# Patient Record
Sex: MALE | Race: WHITE | NOT HISPANIC OR LATINO | Employment: OTHER | URBAN - METROPOLITAN AREA
[De-identification: names, ages, dates, MRNs, and addresses within clinical notes are randomized per-mention and may not be internally consistent; named-entity substitution may affect disease eponyms.]

---

## 2017-03-02 DIAGNOSIS — I21.9 ACUTE MYOCARDIAL INFARCTION (H): ICD-10-CM

## 2017-03-02 RX ORDER — SPIRONOLACTONE 25 MG/1
25 TABLET ORAL DAILY
Qty: 90 TABLET | Refills: 0 | Status: SHIPPED | OUTPATIENT
Start: 2017-03-02 | End: 2017-06-01

## 2017-03-02 RX ORDER — LISINOPRIL 5 MG/1
5 TABLET ORAL DAILY
Qty: 90 TABLET | Refills: 0 | Status: SHIPPED | OUTPATIENT
Start: 2017-03-02 | End: 2017-06-01

## 2017-03-08 DIAGNOSIS — I21.9 ACUTE MYOCARDIAL INFARCTION (H): ICD-10-CM

## 2017-03-08 DIAGNOSIS — I42.9 CARDIOMYOPATHY (H): ICD-10-CM

## 2017-03-08 DIAGNOSIS — I50.9 CHF (CONGESTIVE HEART FAILURE) (H): ICD-10-CM

## 2017-03-08 RX ORDER — CLOPIDOGREL BISULFATE 75 MG/1
75 TABLET ORAL DAILY
Qty: 90 TABLET | Refills: 0 | Status: SHIPPED | OUTPATIENT
Start: 2017-03-08 | End: 2017-05-22

## 2017-03-08 RX ORDER — ATORVASTATIN CALCIUM 20 MG/1
20 TABLET, FILM COATED ORAL DAILY
Qty: 90 TABLET | Refills: 0 | Status: SHIPPED | OUTPATIENT
Start: 2017-03-08 | End: 2017-06-06

## 2017-03-10 ENCOUNTER — HOSPITAL ENCOUNTER (OUTPATIENT)
Dept: CARDIOLOGY | Facility: CLINIC | Age: 62
Discharge: HOME | End: 2017-03-10
Attending: INTERNAL MEDICINE | Admitting: INTERNAL MEDICINE
Payer: COMMERCIAL

## 2017-03-10 DIAGNOSIS — I21.09 ACUTE ST ELEVATION MYOCARDIAL INFARCTION (STEMI) INVOLVING OTHER CORONARY ARTERY OF ANTERIOR WALL (H): ICD-10-CM

## 2017-03-10 DIAGNOSIS — I25.5 ISCHEMIC CARDIOMYOPATHY: ICD-10-CM

## 2017-03-10 DIAGNOSIS — I46.9 CARDIAC ARREST (H): ICD-10-CM

## 2017-03-10 DIAGNOSIS — I42.9 CARDIOMYOPATHY (H): ICD-10-CM

## 2017-03-10 DIAGNOSIS — I25.10 CORONARY ARTERY DISEASE INVOLVING NATIVE CORONARY ARTERY OF NATIVE HEART WITHOUT ANGINA PECTORIS: ICD-10-CM

## 2017-03-10 PROCEDURE — 93306 TTE W/DOPPLER COMPLETE: CPT | Mod: 26 | Performed by: INTERNAL MEDICINE

## 2017-03-10 PROCEDURE — 40000264 ECHO COMPLETE WITH OPTISON

## 2017-03-10 PROCEDURE — 25500064 ZZH RX 255 OP 636: Performed by: INTERNAL MEDICINE

## 2017-03-10 RX ADMIN — HUMAN ALBUMIN MICROSPHERES AND PERFLUTREN 3 ML: 10; .22 INJECTION, SOLUTION INTRAVENOUS at 11:45

## 2017-03-24 ENCOUNTER — ALLIED HEALTH/NURSE VISIT (OUTPATIENT)
Dept: CARDIOLOGY | Facility: CLINIC | Age: 62
End: 2017-03-24
Attending: INTERNAL MEDICINE
Payer: COMMERCIAL

## 2017-03-24 VITALS
WEIGHT: 165 LBS | HEART RATE: 76 BPM | BODY MASS INDEX: 22.35 KG/M2 | HEIGHT: 72 IN | DIASTOLIC BLOOD PRESSURE: 70 MMHG | SYSTOLIC BLOOD PRESSURE: 104 MMHG

## 2017-03-24 DIAGNOSIS — I46.9 CARDIAC ARREST (H): ICD-10-CM

## 2017-03-24 DIAGNOSIS — I25.5 ISCHEMIC CARDIOMYOPATHY: Primary | ICD-10-CM

## 2017-03-24 DIAGNOSIS — Z95.810 ICD (IMPLANTABLE CARDIOVERTER-DEFIBRILLATOR) IN PLACE: ICD-10-CM

## 2017-03-24 DIAGNOSIS — I25.10 CORONARY ARTERY DISEASE INVOLVING NATIVE CORONARY ARTERY OF NATIVE HEART WITHOUT ANGINA PECTORIS: ICD-10-CM

## 2017-03-24 DIAGNOSIS — I21.09 ACUTE ST ELEVATION MYOCARDIAL INFARCTION (STEMI) INVOLVING OTHER CORONARY ARTERY OF ANTERIOR WALL (H): ICD-10-CM

## 2017-03-24 DIAGNOSIS — I25.5 ISCHEMIC CARDIOMYOPATHY: ICD-10-CM

## 2017-03-24 DIAGNOSIS — I42.9 CARDIOMYOPATHY (H): ICD-10-CM

## 2017-03-24 PROCEDURE — 93282 PRGRMG EVAL IMPLANTABLE DFB: CPT | Performed by: INTERNAL MEDICINE

## 2017-03-24 PROCEDURE — 99213 OFFICE O/P EST LOW 20 MIN: CPT | Performed by: INTERNAL MEDICINE

## 2017-03-24 NOTE — MR AVS SNAPSHOT
After Visit Summary   3/24/2017    Iraida Hernandez    MRN: [id number]           Patient Information     Date Of Birth          1955        Visit Information        Provider Department      3/24/2017 9:00 AM [facility]        Today's Diagnoses     Ischemic cardiomyopathy    -  1    ICD (implantable cardioverter-defibrillator) in place           Follow-ups after your visit        Your next 10 appointments already scheduled     Jun 27, 2017  4:30 PM CDT   Remote ICD Check with SINHA DCR2   [facility] ([facility])    [address] [place] [id number] [phone]           This appointment is for a remote check of your debrillator.  This is not an appointment at the office.              Future tests that were ordered for you today     Open Future Orders        Priority Expected Expires Ordered    Follow-Up with Cardiologist Routine 3/24/2018 8/6/2018 3/24/2017    Echocardiogram Routine 3/24/2018 4/28/2018 3/24/2017            Who to contact     If you have questions or need follow up information about today's clinic visit or your schedule please contact [facility] directly at [phone].  Normal or non-critical lab and imaging results will be communicated to you by MyChart, letter or phone within 4 business days after the clinic has received the results. If you do not hear from us within 7 days, please contact the clinic through MyChart or phone. If you have a critical or abnormal lab result, we will notify you by phone as soon as possible.  Submit refill requests through [employer] or call your pharmacy and they will forward the refill request to us. Please allow 3 business days for your refill to be completed.          Additional Information About Your Visit        Tangoehart Information     [employer] gives you secure access to your electronic  health record. If you see a primary care provider, you can also send messages to your care team and make appointments. If you have questions, please call your primary care clinic.  If you do not have a primary care provider, please call [phone] and they will assist you.        Care EveryWhere ID     This is your Care EveryWhere ID. This could be used by other organizations to access your [facility] medical records  [id number]         Blood Pressure from Last 3 Encounters:   03/24/17 104/70   02/29/16 94/60   08/13/15 118/70    Weight from Last 3 Encounters:   03/24/17 74.8 kg (165 lb)   02/29/16 75.8 kg (167 lb)   08/13/15 75.3 kg (166 lb)              We Performed the Following     Follow-Up with Device Clinic     ICD DEVICE PROGRAMMING EVAL, SINGLE LEAD ICD (27156)        Primary Care Provider Office Phone # Fax #    Lawson Abe Mahoney [phone]       [address] [address]        Thank you!     Thank you for choosing [facility] [place]  for your care. Our goal is always to provide you with excellent care. Hearing back from our patients is one way we can continue to improve our services. Please take a few minutes to complete the written survey that you may receive in the mail after your visit with us. Thank you!             Your Updated Medication List - Protect others around you: Learn how to safely use, store and throw away your medicines at www.disposemymeds.org.          This list is accurate as of: 3/24/17  9:30 AM.  Always use your most recent med list.                   Brand Name Dispense Instructions for use    aspirin 81 MG EC tablet      Take 1 tablet (81 mg) by mouth daily       atorvastatin 20 MG tablet    LIPITOR    90 tablet    Take 1 tablet (20 mg) by mouth daily       carvedilol 12.5 MG tablet    COREG    180 tablet    Take 1 tablet (12.5 mg) by mouth 2 times daily (with meals)        clopidogrel 75 MG tablet    PLAVIX    90 tablet    Take 1 tablet (75 mg) by mouth daily       lisinopril 5 MG tablet    PRINIVIL/ZESTRIL    90 tablet    Take 1 tablet (5 mg) by mouth daily       nitroglycerin 0.4 MG sublingual tablet    NITROSTAT    25 tablet    Place 1 tablet (0.4 mg) under the tongue every 5 minutes as needed for chest pain       spironolactone 25 MG tablet    ALDACTONE    90 tablet    Take 1 tablet (25 mg) by mouth daily

## 2017-03-24 NOTE — MR AVS SNAPSHOT
After Visit Summary   3/24/2017    Iraida Hernandez    MRN: [id number]           Patient Information     Date Of Birth          1955        Visit Information        Provider Department      3/24/2017 8:15 AM Juancarlos Stevens MD [facility]        Today's Diagnoses     Cardiomyopathy (H)        Ischemic cardiomyopathy        Coronary artery disease involving native coronary artery of native heart without angina pectoris        Acute ST elevation myocardial infarction (STEMI) involving other coronary artery of anterior wall (H)        Cardiac arrest (H)           Follow-ups after your visit        Additional Services     Follow-Up with Cardiologist                 Your next 10 appointments already scheduled     Mar 24, 2017  9:00 AM CDT   ICD Check with SINHA SHONA   [facility] ([facility])    [address] [place] [id number] [phone]              Future tests that were ordered for you today     Open Future Orders        Priority Expected Expires Ordered    Follow-Up with Cardiologist Routine 3/24/2018 8/6/2018 3/24/2017    Echocardiogram Routine 3/24/2018 4/28/2018 3/24/2017            Who to contact     If you have questions or need follow up information about today's clinic visit or your schedule please contact [facility] directly at [phone].  Normal or non-critical lab and imaging results will be communicated to you by MyChart, letter or phone within 4 business days after the clinic has received the results. If you do not hear from us within 7 days, please contact the clinic through MyChart or phone. If you have a critical or abnormal lab result, we will notify you by phone as soon as possible.  Submit refill requests through [email or web address] or call your pharmacy and they will forward the refill request to us. Please allow 3 business days for  your refill to be completed.          Additional Information About Your Visit        Med[employer]art Information     Pay with a Tweet gives you secure access to your electronic health record. If you see a primary care provider, you can also send messages to your care team and make appointments. If you have questions, please call your primary care clinic.  If you do not have a primary care provider, please call [phone] and they will assist you.        Care EveryWhere ID     This is your Care EveryWhere ID. This could be used by other organizations to access your [facility] medical records  [id number]        Your Vitals Were     Pulse Height BMI (Body Mass Index)             76 1.829 m (6') 22.38 kg/m2          Blood Pressure from Last 3 Encounters:   03/24/17 104/70   02/29/16 94/60   08/13/15 118/70    Weight from Last 3 Encounters:   03/24/17 74.8 kg (165 lb)   02/29/16 75.8 kg (167 lb)   08/13/15 75.3 kg (166 lb)              We Performed the Following     Follow-Up with Cardiologist        Primary Care Provider Office Phone # Fax #    Lawson Mahoney [phone]       [address] [address]        Thank you!     Thank you for choosing [facility]  for your care. Our goal is always to provide you with excellent care. Hearing back from our patients is one way we can continue to improve our services. Please take a few minutes to complete the written survey that you may receive in the mail after your visit with us. Thank you!             Your Updated Medication List - Protect others around you: Learn how to safely use, store and throw away your medicines at www.disposemymeds.org.          This list is accurate as of: 3/24/17  8:37 AM.  Always use your most recent med list.                   Brand Name Dispense Instructions for use    aspirin 81 MG EC tablet      Take 1 tablet (81 mg) by mouth daily       atorvastatin 20  MG tablet    LIPITOR    90 tablet    Take 1 tablet (20 mg) by mouth daily       carvedilol 12.5 MG tablet    COREG    180 tablet    Take 1 tablet (12.5 mg) by mouth 2 times daily (with meals)       clopidogrel 75 MG tablet    PLAVIX    90 tablet    Take 1 tablet (75 mg) by mouth daily       lisinopril 5 MG tablet    PRINIVIL/ZESTRIL    90 tablet    Take 1 tablet (5 mg) by mouth daily       nitroglycerin 0.4 MG sublingual tablet    NITROSTAT    25 tablet    Place 1 tablet (0.4 mg) under the tongue every 5 minutes as needed for chest pain       spironolactone 25 MG tablet    ALDACTONE    90 tablet    Take 1 tablet (25 mg) by mouth daily

## 2017-03-24 NOTE — PROGRESS NOTES
HPI and Plan:   See dictation    Orders Placed This Encounter   Procedures     Follow-Up with Cardiologist     Echocardiogram     No orders of the defined types were placed in this encounter.    There are no discontinued medications.      Encounter Diagnoses   Name Primary?     Cardiomyopathy (H)      Ischemic cardiomyopathy      Coronary artery disease involving native coronary artery of native heart without angina pectoris      Acute ST elevation myocardial infarction (STEMI) involving other coronary artery of anterior wall (H)      Cardiac arrest (H)        CURRENT MEDICATIONS:  Current Outpatient Prescriptions   Medication Sig Dispense Refill     clopidogrel (PLAVIX) 75 MG tablet Take 1 tablet (75 mg) by mouth daily 90 tablet 0     atorvastatin (LIPITOR) 20 MG tablet Take 1 tablet (20 mg) by mouth daily 90 tablet 0     lisinopril (PRINIVIL/ZESTRIL) 5 MG tablet Take 1 tablet (5 mg) by mouth daily 90 tablet 0     spironolactone (ALDACTONE) 25 MG tablet Take 1 tablet (25 mg) by mouth daily 90 tablet 0     carvedilol (COREG) 12.5 MG tablet Take 1 tablet (12.5 mg) by mouth 2 times daily (with meals) 180 tablet 3     nitroglycerin (NITROSTAT) 0.4 MG SL tablet Place 1 tablet (0.4 mg) under the tongue every 5 minutes as needed for chest pain 25 tablet 0     aspirin EC 81 MG EC tablet Take 1 tablet (81 mg) by mouth daily         ALLERGIES     Allergies   Allergen Reactions     Penicillins      Tetracyclines        PAST MEDICAL HISTORY:  Past Medical History:   Diagnosis Date     Calculus of kidney      Cancer (H)     basel cell on nose     Cardiac arrest (H)     V-fib, 2/16/2014     Cardiomyopathy (H)      Coronary artery disease      Hyperlipidaemia      ICD (implantable cardiac defibrillator) in place     12-1-2014 EF 29%     Mild intermittent asthma      Myocardial infarction (H) 2/2014     Anterior infarct     Tobacco dependence        PAST SURGICAL HISTORY:  Past Surgical History:   Procedure Laterality Date      CARDIAC SURGERY       HEART CATH, ANGIOPLASTY  2/2014    Emergent cath,thrombectomy-pt had cardiac arrest-severe 3 vessel CAD-MACY proximal LAD, LAD diagonal kissing balloon, and stent to proximal RCA     HERNIA REPAIR       NO HISTORY OF SURGERY         FAMILY HISTORY:  Family History   Problem Relation Age of Onset     CEREBROVASCULAR DISEASE Father      HEART DISEASE Father      MI     CANCER Mother      DIABETES Son      CANCER Maternal Uncle        SOCIAL HISTORY:  Social History     Social History     Marital status:      Spouse name: Jessica     Number of children: 2     Years of education: N/A     Occupational History     [employer]     Social History Main Topics     Smoking status: Current Every Day Smoker     Packs/day: 0.50     Years: 42.00     Last attempt to quit: 2/16/2014     Smokeless tobacco: Never Used      Comment: 8-10 cigs per day     Alcohol use Yes      Comment: rare     Drug use: No     Sexual activity: Yes     Partners: Female     Other Topics Concern      Service No     Blood Transfusions No     Caffeine Concern No     soda occasionally      Occupational Exposure No     Hobby Hazards No     Sleep Concern No     Stress Concern No     Weight Concern No     Special Diet Yes     eats healthy      Back Care No     Exercise Yes     golf 2x/week, tredmill daily     Bike Helmet Not Asked     NA     Seat Belt Yes     Self-Exams Yes     Social History Narrative       Review of Systems:  Skin:  Positive for bruising   Eyes:  Positive for glasses  ENT:  Negative    Respiratory:  Negative    Cardiovascular:  Negative    Gastroenterology: Negative    Genitourinary:  Negative    Musculoskeletal:  Negative    Neurologic:  Negative    Psychiatric:  Negative    Heme/Lymph/Imm:  Positive for allergies  Endocrine:  Negative      Physical Exam:  Vitals: /70  Pulse 76  Ht 1.829 m (6')  Wt 74.8 kg (165 lb)  BMI 22.38 kg/m2    Constitutional:   cooperative, alert and oriented, well developed, well nourished, in no acute distress        Skin:  warm and dry to the touch, no apparent skin lesions or masses noted        Head:  normocephalic, no masses or lesions        Eyes:  pupils equal and round;conjunctivae and lids unremarkable;sclera white        ENT:  no pallor or cyanosis, dentition good        Neck:  carotid pulses are full and equal bilaterally;JVP normal;no carotid bruit        Chest:  normal breath sounds, clear to auscultation, normal A-P diameter, normal symmetry, normal respiratory excursion, no use of accessory muscles        Cardiac: regular rhythm;no S3 or S4     no presence of murmur early systolic murmur          Abdomen:  abdomen soft        Vascular: pulses full and equal                                      Extremities and Back:  no deformities, clubbing, cyanosis, erythema observed;no edema        Neurological:  affect appropriate, oriented to time, person and place          Recent Lab Results:  LIPID RESULTS:  Lab Results   Component Value Date    CHOL 151 08/15/2014    HDL 49 08/15/2014    LDL 82 08/15/2014    TRIG 102 08/15/2014    CHOLHDLRATIO 3.1 08/15/2014       LIVER ENZYME RESULTS:  Lab Results   Component Value Date    AST 29 02/16/2014    ALT <    5 (L) 04/01/2014       CBC RESULTS:  Lab Results   Component Value Date    WBC 8.4 12/01/2014    RBC 4.63 12/01/2014    HGB 15.2 12/01/2014    HCT 45.7 12/01/2014    MCV 99 12/01/2014    MCH 32.8 12/01/2014    MCHC 33.3 12/01/2014    RDW 12.5 12/01/2014     12/01/2014       BMP RESULTS:  Lab Results   Component Value Date     12/01/2014    POTASSIUM 4.6 12/01/2014    CHLORIDE 107 12/01/2014    CO2 27 12/01/2014    ANIONGAP 6 12/01/2014     (H) 12/01/2014    BUN 16 12/01/2014    CR 0.99 12/01/2014    GFRESTIMATED 77 12/01/2014    GFRESTBLACK >90   GFR Calc   12/01/2014    DIEGO 9.2 12/01/2014        A1C RESULTS:  Lab Results   Component Value Date     A1C 4.8 12/10/2007       INR RESULTS:  Lab Results   Component Value Date    INR 0.90 12/01/2014    INR 0.89 02/16/2014           CC  Juancarlos Stevens MD   [facility]  [address]  [place] [id number]

## 2017-03-24 NOTE — PROGRESS NOTES
[employer] Energen (S) ICD Device Check  : <1 %  Mode: VVI 40        Underlying Rhythm: SR  Heart Rate: excellent variability  Sensing: WNL    Pacing Threshold: WNL    Impedance: WNL  Battery Status: 12 yrs estimated longevity  Atrial Arrhythmia: NA  Ventricular Arrhythmia: 4 events saved for NSVT, 2 with EGMs, first shows 6 beats NSVT,  bpm, second shows 7 second run of VS beats, slightly irregular, very little change in morphology, suggesting PAT,  bpm.   ATP: none    Shocks: none  Setting Change: none    Care Plan: remote in 3 months. Pt saw Dr. Stevens for OV today.   Janeen

## 2017-03-24 NOTE — LETTER
3/24/2017    Lawson Mahoney MD  [facility]   [address] [facility] [address]    RE: Iraida Pearsonler       Dear Colleague,    I had the pleasure of seeing Iraida Hernandez in the [facility].    Mr. Hernandez is a very pleasant 62-year-old gentleman with a history of anterior myocardial infarction, ischemic cardiomyopathy, left ventricular dysfunction status post ICD implantation.  He comes in for his annual visit today.  He feels extremely well.  He has no complaints.  He denies any chest pain, chest pressure, shortness of breath, heart racing, palpitations, syncope, near-syncope, PND, orthopnea or pedal edema.  Review of his echocardiogram shows stable left ventricular ejection fraction of 35%-40%.  His valvular function is normal.  He is due for a lipid profile.  He has been taking his medications and has had no difficulty.  He continues to be active, walking, playing golf with no limitations.     Outpatient Encounter Prescriptions as of 3/24/2017   Medication Sig Dispense Refill     clopidogrel (PLAVIX) 75 MG tablet Take 1 tablet (75 mg) by mouth daily 90 tablet 0     atorvastatin (LIPITOR) 20 MG tablet Take 1 tablet (20 mg) by mouth daily 90 tablet 0     lisinopril (PRINIVIL/ZESTRIL) 5 MG tablet Take 1 tablet (5 mg) by mouth daily 90 tablet 0     spironolactone (ALDACTONE) 25 MG tablet Take 1 tablet (25 mg) by mouth daily 90 tablet 0     carvedilol (COREG) 12.5 MG tablet Take 1 tablet (12.5 mg) by mouth 2 times daily (with meals) 180 tablet 3     nitroglycerin (NITROSTAT) 0.4 MG SL tablet Place 1 tablet (0.4 mg) under the tongue every 5 minutes as needed for chest pain 25 tablet 0     aspirin EC 81 MG EC tablet Take 1 tablet (81 mg) by mouth daily       No facility-administered encounter medications on file as of 3/24/2017.       ASSESSMENT AND PLAN:  It is a delight seeing Mr. Hernandez back in clinic appearing stable from a cardiovascular standpoint.  He  is asymptomatic.  There is no evidence of heart failure, angina pectoris.  His left ventricular ejection fraction is stable.  We will plan on continuing medical therapy as we have for Mr. Hernandez and plan for a followup in 1 year's time.  He is due for a lipid cholesterol profile check with Dr. Mahoney.     Again, thank you for allowing me to participate in the care of your patient.      Sincerely,    ELIANA KHAN MD     [facility]

## 2017-03-28 NOTE — PROGRESS NOTES
HISTORY OF PRESENT ILLNESS:  Mr. Hernandez is a very pleasant 62-year-old gentleman with a history of anterior myocardial infarction, ischemic cardiomyopathy, left ventricular dysfunction status post ICD implantation.  He comes in for his annual visit today.  He feels extremely well.  He has no complaints.  He denies any chest pain, chest pressure, shortness of breath, heart racing, palpitations, syncope, near-syncope, PND, orthopnea or pedal edema.  Review of his echocardiogram shows stable left ventricular ejection fraction of 35%-40%.  His valvular function is normal.  He is due for a lipid profile.  He has been taking his medications and has had no difficulty.  He continues to be active, walking, playing golf with no limitations.      ASSESSMENT AND PLAN:  It is a delight seeing Mr. Hernandez back in clinic appearing stable from a cardiovascular standpoint.  He is asymptomatic.  There is no evidence of heart failure, angina pectoris.  His left ventricular ejection fraction is stable.  We will plan on continuing medical therapy as we have for Mr. Hernandez and plan for a followup in 1 year's time.  He is due for a lipid cholesterol profile check with Dr. Mahoney.      MD ELIANA Padron MD [facility]             D: 2017 08:38   T: 2017 15:18   MT: BRIAN      Name:     JAZZMINE HERNANDEZ   MRN:      [id number]        Account:      [id number]   :      1955           Service Date: 2017      Document: [id number]

## 2017-05-22 DIAGNOSIS — I50.9 CHF (CONGESTIVE HEART FAILURE) (H): ICD-10-CM

## 2017-05-22 DIAGNOSIS — I21.9 ACUTE MYOCARDIAL INFARCTION (H): ICD-10-CM

## 2017-05-22 DIAGNOSIS — I42.9 CARDIOMYOPATHY (H): ICD-10-CM

## 2017-05-22 RX ORDER — CLOPIDOGREL BISULFATE 75 MG/1
75 TABLET ORAL DAILY
Qty: 90 TABLET | Refills: 1 | Status: SHIPPED | OUTPATIENT
Start: 2017-05-22 | End: 2017-11-27

## 2017-06-01 DIAGNOSIS — I21.9 ACUTE MYOCARDIAL INFARCTION (H): ICD-10-CM

## 2017-06-01 RX ORDER — LISINOPRIL 5 MG/1
5 TABLET ORAL DAILY
Qty: 90 TABLET | Refills: 3 | Status: SHIPPED | OUTPATIENT
Start: 2017-06-01 | End: 2018-05-25

## 2017-06-01 RX ORDER — SPIRONOLACTONE 25 MG/1
25 TABLET ORAL DAILY
Qty: 90 TABLET | Refills: 3 | Status: SHIPPED | OUTPATIENT
Start: 2017-06-01 | End: 2018-05-25

## 2017-06-06 DIAGNOSIS — I21.9 ACUTE MYOCARDIAL INFARCTION (H): ICD-10-CM

## 2017-06-06 RX ORDER — ATORVASTATIN CALCIUM 20 MG/1
20 TABLET, FILM COATED ORAL DAILY
Qty: 90 TABLET | Refills: 2 | Status: SHIPPED | OUTPATIENT
Start: 2017-06-06 | End: 2018-02-26

## 2017-06-27 ENCOUNTER — ALLIED HEALTH/NURSE VISIT (OUTPATIENT)
Dept: CARDIOLOGY | Facility: CLINIC | Age: 62
End: 2017-06-27
Payer: COMMERCIAL

## 2017-06-27 DIAGNOSIS — Z95.810 ICD (IMPLANTABLE CARDIOVERTER-DEFIBRILLATOR) IN PLACE: Primary | ICD-10-CM

## 2017-06-27 PROCEDURE — 93295 DEV INTERROG REMOTE 1/2/MLT: CPT | Performed by: INTERNAL MEDICINE

## 2017-06-27 PROCEDURE — 93296 REM INTERROG EVL PM/IDS: CPT | Performed by: INTERNAL MEDICINE

## 2017-06-27 NOTE — PROGRESS NOTES
[employer] Energen (S) ICD Remote Device Check  AP: NA % : 0 %  Mode: VVI Lower rate 40 bpm        Presenting Rhythm: Presenting EGM showed VS  Heart Rate: Heart rate stable with good variability.  Sensing: WNL    Pacing Threshold: Not performed, auto capture not on    Impedance: WNL  Battery Status: Approximately 12 years longevity.  Atrial Arrhythmia: 0  Ventricular Arrhythmia: 1 episode logged, EGM showed 6 beats of NSVT, rate 150-180 bpm.  ATP: 0    Shocks: 0    Care Plan: Follow up with Q 3 month remote checks. Will call the patient with today's result and date of the next remote. SAGE Torres

## 2017-06-27 NOTE — MR AVS SNAPSHOT
After Visit Summary   6/27/2017    Iraida Hernandez    MRN: [id number]           Patient Information     Date Of Birth          1955        Visit Information        Provider Department      6/27/2017 4:30 PM SINHA DCR2 [facility]        Today's Diagnoses     ICD (implantable cardioverter-defibrillator) in place    -  1       Follow-ups after your visit        Your next 10 appointments already scheduled     Jun 27, 2017  4:30 PM CDT   Remote ICD Check with SINHA DCR2   [facility] ([facility])    [address]  [place] [address] [phone]           This appointment is for a remote check of your debrillator.  This is not an appointment at the office.            Oct 09, 2017  4:30 PM CDT   Remote ICD Check with SINHA DCR2   [facility] ([facility])    [address]  [place] [address] [phone]           This appointment is for a remote check of your debrillator.  This is not an appointment at the office.              Who to contact     If you have questions or need follow up information about today's clinic visit or your schedule please contact [facility] directly at [phone].  Normal or non-critical lab and imaging results will be communicated to you by MyChart, letter or phone within 4 business days after the clinic has received the results. If you do not hear from us within 7 days, please contact the clinic through MyChart or phone. If you have a critical or abnormal lab result, we will notify you by phone as soon as possible.  Submit refill requests through [employer]here or call your pharmacy and they will forward the refill request to us. Please allow 3 business days for your refill to be completed.          Additional Information About Your Visit        MyChart Information      [employer] gives you secure access to your electronic health record. If you see a primary care provider, you can also send messages to your care team and make appointments. If you have questions, please call your primary care clinic.  If you do not have a primary care provider, please call [phone] and they will assist you.        Care EveryWhere ID     This is your Care EveryWhere ID. This could be used by other organizations to access your [place] medical records  [id number]         Blood Pressure from Last 3 Encounters:   03/24/17 104/70   02/29/16 94/60   08/13/15 118/70    Weight from Last 3 Encounters:   03/24/17 74.8 kg (165 lb)   02/29/16 75.8 kg (167 lb)   08/13/15 75.3 kg (166 lb)              We Performed the Following     ICD DEVICE INTERROGAT REMOTE (74263)     INTERROGATION DEVICE EVAL REMOTE, PACER/ICD (47889)        Primary Care Provider Office Phone # Fax #    Lawson Abe Mahoney [phone]       [address] [address]        Equal Access to Services     CONNER BLACKMON AH: Hadii aad ku hadasho Soomaali, waaxda luqadaha, qaybta kaalmada adeegyada, ryley morris hayalma delian annie montanez . So [facility] [phone].    ATENCIÓN: Si habla español, tiene a summers disposición servicios gratuitos de asistencia lingüística. [facility] [phone].    We comply with applicable federal civil rights laws and [place] laws. We do not discriminate on the basis of race, color, national origin, age, disability sex, sexual orientation or gender identity.            Thank you!     Thank you for choosing [facility] [place]  for your care. Our goal is always to provide you with excellent care. Hearing back from our patients is one way we can continue to improve our services. Please take a few minutes to complete the written survey that you may receive in the mail after your visit with us. Thank you!             Your Updated  Medication List - Protect others around you: Learn how to safely use, store and throw away your medicines at www.disposemymeds.org.          This list is accurate as of: 6/27/17  8:24 AM.  Always use your most recent med list.                   Brand Name Dispense Instructions for use Diagnosis    aspirin 81 MG EC tablet      Take 1 tablet (81 mg) by mouth daily    Acute myocardial infarction (H)       atorvastatin 20 MG tablet    LIPITOR    90 tablet    Take 1 tablet (20 mg) by mouth daily    Acute myocardial infarction (H)       carvedilol 12.5 MG tablet    COREG    180 tablet    Take 1 tablet (12.5 mg) by mouth 2 times daily (with meals)    Acute myocardial infarction (H)       clopidogrel 75 MG tablet    PLAVIX    90 tablet    Take 1 tablet (75 mg) by mouth daily    Cardiomyopathy (H), Acute myocardial infarction (H), CHF (congestive heart failure) (H)       lisinopril 5 MG tablet    PRINIVIL/ZESTRIL    90 tablet    Take 1 tablet (5 mg) by mouth daily    Acute myocardial infarction (H)       nitroglycerin 0.4 MG sublingual tablet    NITROSTAT    25 tablet    Place 1 tablet (0.4 mg) under the tongue every 5 minutes as needed for chest pain    Acute myocardial infarction (H)       spironolactone 25 MG tablet    ALDACTONE    90 tablet    Take 1 tablet (25 mg) by mouth daily    Acute myocardial infarction (H)

## 2017-10-09 ENCOUNTER — ALLIED HEALTH/NURSE VISIT (OUTPATIENT)
Dept: CARDIOLOGY | Facility: CLINIC | Age: 62
End: 2017-10-09
Payer: COMMERCIAL

## 2017-10-09 DIAGNOSIS — Z95.810 ICD (IMPLANTABLE CARDIOVERTER-DEFIBRILLATOR), SINGLE, IN SITU: Primary | ICD-10-CM

## 2017-10-09 PROCEDURE — 93296 REM INTERROG EVL PM/IDS: CPT | Performed by: INTERNAL MEDICINE

## 2017-10-09 PROCEDURE — 93295 DEV INTERROG REMOTE 1/2/MLT: CPT | Performed by: INTERNAL MEDICINE

## 2017-10-09 NOTE — MR AVS SNAPSHOT
After Visit Summary   10/9/2017    Iraida Hernandez    MRN: [id number]           Patient Information     Date Of Birth          1955        Visit Information        Provider Department      10/9/2017 4:30 PM SINHA DCR2 [facility]        Today's Diagnoses     ICD (implantable cardioverter-defibrillator), single, in situ    -  1       Follow-ups after your visit        Your next 10 appointments already scheduled     Oct 09, 2017  4:30 PM CDT   Remote ICD Check with SINHA DCR2   [facility] ([facility])    [address]  [place] [address] [phone]           This appointment is for a remote check of your debrillator.  This is not an appointment at the office.            Jan 30, 2018  4:30 PM CST   Remote ICD Check with SINHA DCR2   [facility] ([facility])    [address]  [place] [address] [phone]           This appointment is for a remote check of your debrillator.  This is not an appointment at the office.              Who to contact     If you have questions or need follow up information about today's clinic visit or your schedule please contact [facility] directly at [phone].  Normal or non-critical lab and imaging results will be communicated to you by MyChart, letter or phone within 4 business days after the clinic has received the results. If you do not hear from us within 7 days, please contact the clinic through MyChart or phone. If you have a critical or abnormal lab result, we will notify you by phone as soon as possible.  Submit refill requests through [employer] or call your pharmacy and they will forward the refill request to us. Please allow 3 business days for your refill to be completed.          Additional Information About Your Visit        [facility] [employer]  Information     [employer] gives you secure access to your electronic health record. If you see a primary care provider, you can also send messages to your care team and make appointments. If you have questions, please call your primary care clinic.  If you do not have a primary care provider, please call [phone] and they will assist you.        Care EveryWhere ID     This is your Care EveryWhere ID. This could be used by other organizations to access your [place] medical records  [id number]         Blood Pressure from Last 3 Encounters:   03/24/17 104/70   02/29/16 94/60   08/13/15 118/70    Weight from Last 3 Encounters:   03/24/17 74.8 kg (165 lb)   02/29/16 75.8 kg (167 lb)   08/13/15 75.3 kg (166 lb)              We Performed the Following     ICD DEVICE INTERROGAT REMOTE (82717)     INTERROGATION DEVICE EVAL REMOTE, PACER/ICD (84760)        Primary Care Provider Office Phone # Fax #    Lawson Aeb Mahoney [phone] [address] [address] [address]        Equal Access to Services     JODY BLACKMON : Hadii aad ku hadasho Soamy, waaxda luqadaha, qaybta kaalmada malick, ryley montanez . So [facility] [phone].    ATENCIÓN: Si habla español, tiene a summers disposición servicios gratlnros de asistencia lingüística. [facility] [phone].    We comply with applicable federal civil rights laws and [place] laws. We do not discriminate on the basis of race, color, national origin, age, disability, sex, sexual orientation, or gender identity.            Thank you!     Thank you for choosing [facility] [place]  for your care. Our goal is always to provide you with excellent care. Hearing back from our patients is one way we can continue to improve our services. Please take a few minutes to complete the written survey that you may receive in the mail after your visit with us. Thank you!             Your Updated Medication  List - Protect others around you: Learn how to safely use, store and throw away your medicines at www.disposemymeds.org.          This list is accurate as of: 10/9/17  3:02 PM.  Always use your most recent med list.                   Brand Name Dispense Instructions for use Diagnosis    aspirin 81 MG EC tablet      Take 1 tablet (81 mg) by mouth daily    Acute myocardial infarction       atorvastatin 20 MG tablet    LIPITOR    90 tablet    Take 1 tablet (20 mg) by mouth daily    Acute myocardial infarction       carvedilol 12.5 MG tablet    COREG    180 tablet    Take 1 tablet (12.5 mg) by mouth 2 times daily (with meals)    Acute myocardial infarction       clopidogrel 75 MG tablet    PLAVIX    90 tablet    Take 1 tablet (75 mg) by mouth daily    Cardiomyopathy (H), Acute myocardial infarction, CHF (congestive heart failure) (H)       lisinopril 5 MG tablet    PRINIVIL/ZESTRIL    90 tablet    Take 1 tablet (5 mg) by mouth daily    Acute myocardial infarction       nitroGLYcerin 0.4 MG sublingual tablet    NITROSTAT    25 tablet    Place 1 tablet (0.4 mg) under the tongue every 5 minutes as needed for chest pain    Acute myocardial infarction       spironolactone 25 MG tablet    ALDACTONE    90 tablet    Take 1 tablet (25 mg) by mouth daily    Acute myocardial infarction

## 2017-10-09 NOTE — PROGRESS NOTES
Jose Bridges (S) ICD Remote Device Check  : NONE  Mode: VVI/40 bm        Presenting Rhythm: SR  Heart Rate: Histogram is stable with adequate HR  Sensing: Stable    Pacing Threshold: Stable    Impedance: WNL  Battery Status: 11.5 years estimated longevity  Atrial Arrhythmia: NONE  Ventricular Arrhythmia: NONE  ATP: NONE    Shocks: NONE    Care Plan: Remote in 3 months. Letter sent with next transmission date eneida

## 2017-10-24 DIAGNOSIS — I21.9 ACUTE MYOCARDIAL INFARCTION (H): ICD-10-CM

## 2017-10-24 RX ORDER — CARVEDILOL 12.5 MG/1
12.5 TABLET ORAL 2 TIMES DAILY WITH MEALS
Qty: 180 TABLET | Refills: 1 | Status: SHIPPED | OUTPATIENT
Start: 2017-10-24 | End: 2018-04-25

## 2017-11-27 DIAGNOSIS — I21.9 ACUTE MYOCARDIAL INFARCTION (H): ICD-10-CM

## 2017-11-27 DIAGNOSIS — I42.9 CARDIOMYOPATHY, UNSPECIFIED TYPE (H): ICD-10-CM

## 2017-11-27 DIAGNOSIS — I50.20 SYSTOLIC CONGESTIVE HEART FAILURE, UNSPECIFIED CONGESTIVE HEART FAILURE CHRONICITY: ICD-10-CM

## 2017-11-27 RX ORDER — CLOPIDOGREL BISULFATE 75 MG/1
75 TABLET ORAL DAILY
Qty: 90 TABLET | Refills: 3 | Status: SHIPPED | OUTPATIENT
Start: 2017-11-27 | End: 2018-11-19

## 2018-01-29 ENCOUNTER — CARE COORDINATION (OUTPATIENT)
Dept: CARDIOLOGY | Facility: CLINIC | Age: 63
End: 2018-01-29

## 2018-01-29 DIAGNOSIS — I25.10 CAD (CORONARY ARTERY DISEASE): Primary | ICD-10-CM

## 2018-01-29 NOTE — PROGRESS NOTES
patient due for his annual visit - questioning if he could have his labs here at [facility] heart rather than his PMD.  Reviewed with Dr. Stevens. Added a BMP and FLP to the patients visit. Labs scheduled for 1040 am on 2/23/2018.. No other tasks to be done at this time. Sue Leal

## 2018-01-30 ENCOUNTER — ALLIED HEALTH/NURSE VISIT (OUTPATIENT)
Dept: CARDIOLOGY | Facility: CLINIC | Age: 63
End: 2018-01-30
Payer: COMMERCIAL

## 2018-01-30 DIAGNOSIS — Z95.810 ICD (IMPLANTABLE CARDIOVERTER-DEFIBRILLATOR), SINGLE, IN SITU: ICD-10-CM

## 2018-01-30 DIAGNOSIS — I25.5 ISCHEMIC CARDIOMYOPATHY: Primary | ICD-10-CM

## 2018-01-30 PROCEDURE — 93296 REM INTERROG EVL PM/IDS: CPT | Performed by: INTERNAL MEDICINE

## 2018-01-30 PROCEDURE — 93295 DEV INTERROG REMOTE 1/2/MLT: CPT | Performed by: INTERNAL MEDICINE

## 2018-01-30 NOTE — LETTER
Iraida Hernandez    [address] [address] [facility] [address]    January 30, 2018      Dear Iraida Hernandez,     Your transmission sent on 1/30/2018 has been reviewed. It was determined the results are normal.  __X___ You are due for an in office appointment, please call [phone] to arrange.   Please call Francisco at [phone] to change your appointment if needed. You may call and leave a message for a device RN if you have any questions at [phone] and they will return your call. Device clinic hours: Monday - Friday  8:00am-4:00pm   Sincerely,   Fanny

## 2018-01-30 NOTE — PROGRESS NOTES
[employer] (S) ICD Remote Device Check    : 0 %  Mode: VVI 40        Presenting Rhythm: VS events  Heart Rate: stable with good variability, patient is active 1.2 hours/day  Sensing: WNL    Pacing Threshold: not obtained    Impedance: WNL  Battery Status: estimated 11.5 years longevity remaining with a 9.6 second charge time  Ventricular Arrhythmia: 3 episodes logged as NSVT - EGMs show 5-10 beats change in morphology, rates in the 170s.   ATP: 0    Shocks: 0    Care Plan: Patient is due for in office threshold, attempted to call patient with results, but he did not answer - order placed. Letter sent with results and plan. He is seeing Dr. Stevens on 2/23/2018. Fanny

## 2018-01-30 NOTE — MR AVS SNAPSHOT
After Visit Summary   1/30/2018    Iraida Hernandez    MRN: [id number]           Patient Information     Date Of Birth          1955        Visit Information        Provider Department      1/30/2018 4:30 PM BHARATH FORRESTER2 [facility]        Today's Diagnoses     Ischemic cardiomyopathy    -  1    ICD (implantable cardioverter-defibrillator), single, in situ           Follow-ups after your visit        Additional Services     Follow-Up with Device Clinic                 Your next 10 appointments already scheduled     Jan 30, 2018  4:30 PM CST   Remote ICD Check with SINHA MITZY2   [facility] ([facility])    [address]  [place] [address] [phone]           This appointment is for a remote check of your debrillator.  This is not an appointment at the office.            Feb 23, 2018 10:40 AM CST   LAB with SINHA LAB   [facility] ([facility])    [address] [place], [place] [address]  [facility] [address] [phone]           Please do not eat 10-12 hours before your appointment if you are coming in fasting for labs on lipids, cholesterol, or glucose (sugar). This does not apply to pregnant women. Water, hot tea and black coffee (with nothing added) are okay. Do not drink other fluids, diet soda or chew gum.            Feb 23, 2018 11:00 AM CST   Ech Complete with [id number]   [facility] CV Echocardiography ([facility])    [address] [place] [address]  [address]  [facility] [place] [address] [phone]           1. Please bring or wear a comfortable two-piece outfit. 2. You may eat, drink and take your normal medicines. 3. For any questions that cannot be answered, please contact the ordering physician            Feb 23, 2018  1:45 PM CST   Return Visit with Juancarlos Stevens MD   [facility]  Care   [place] ([facility])    [address]  [place] [address] [phone]              Future tests that were ordered for you today     Open Future Orders        Priority Expected Expires Ordered    Follow-Up with Device Clinic Routine 4/30/2018 1/30/2020 1/30/2018    Basic metabolic panel Routine 1/29/2018 1/29/2019 1/29/2018    Lipid Profile Routine 1/29/2018 1/29/2019 1/29/2018    ALT Routine 1/29/2018 1/29/2019 1/29/2018            Who to contact     If you have questions or need follow up information about today's clinic visit or your schedule please contact [facility]   [place] directly at [phone].  Normal or non-critical lab and imaging results will be communicated to you by MyChart, letter or phone within 4 business days after the clinic has received the results. If you do not hear from us within 7 days, please contact the clinic through MyChart or phone. If you have a critical or abnormal lab result, we will notify you by phone as soon as possible.  Submit refill requests through [employer] or call your pharmacy and they will forward the refill request to us. Please allow 3 business days for your refill to be completed.          Additional Information About Your Visit        [employer] Information     [employer] gives you secure access to your electronic health record. If you see a primary care provider, you can also send messages to your care team and make appointments. If you have questions, please call your primary care clinic.  If you do not have a primary care provider, please call [phone] and they will assist you.        Care EveryWhere ID     This is your Care EveryWhere ID. This could be used by other organizations to access your [place] medical records  [id number]         Blood Pressure from Last 3 Encounters:   03/24/17 104/70   02/29/16 94/60   08/13/15 118/70    Weight from Last 3 Encounters:   03/24/17 74.8 kg (165 lb)   02/29/16  75.8 kg (167 lb)   08/13/15 75.3 kg (166 lb)              We Performed the Following     ICD DEVICE INTERROGAT REMOTE (39681)     INTERROGATION DEVICE EVAL REMOTE, PACER/ICD (88066)        Primary Care Provider Office Phone # Fax #    Lawson Abe Mahoney [phone] [address] [facility] [address]        Equal Access to Services     JODY [facility] : Hadii aad ku hadasho Soomaali, waaxda luqadaha, qaybta kaalmada adeegyada, waxay idiin hayaan adeeg kharash la'aan ah. So [facility] [phone].    ATENCIÓN: Si habla español, tiene a summers disposición servicios gratuitos de asistencia lingüística. Llame al [phone].    We comply with applicable federal civil rights laws and [place] laws. We do not discriminate on the basis of race, color, national origin, age, disability, sex, sexual orientation, or gender identity.            Thank you!     Thank you for choosing [facility]  for your care. Our goal is always to provide you with excellent care. Hearing back from our patients is one way we can continue to improve our services. Please take a few minutes to complete the written survey that you may receive in the mail after your visit with us. Thank you!             Your Updated Medication List - Protect others around you: Learn how to safely use, store and throw away your medicines at www.disposemymeds.org.          This list is accurate as of 1/30/18 12:00 PM.  Always use your most recent med list.                   Brand Name Dispense Instructions for use Diagnosis    aspirin 81 MG EC tablet      Take 1 tablet (81 mg) by mouth daily    Acute myocardial infarction       atorvastatin 20 MG tablet    LIPITOR    90 tablet    Take 1 tablet (20 mg) by mouth daily    Acute myocardial infarction       carvedilol 12.5 MG tablet    COREG    180 tablet    Take 1 tablet (12.5 mg) by mouth 2 times daily (with meals)    Acute myocardial infarction       clopidogrel 75  MG tablet    PLAVIX    90 tablet    Take 1 tablet (75 mg) by mouth daily    Cardiomyopathy, unspecified type (H), Acute myocardial infarction, Systolic congestive heart failure, unspecified congestive heart failure chronicity (H)       lisinopril 5 MG tablet    PRINIVIL/ZESTRIL    90 tablet    Take 1 tablet (5 mg) by mouth daily    Acute myocardial infarction       nitroGLYcerin 0.4 MG sublingual tablet    NITROSTAT    25 tablet    Place 1 tablet (0.4 mg) under the tongue every 5 minutes as needed for chest pain    Acute myocardial infarction       spironolactone 25 MG tablet    ALDACTONE    90 tablet    Take 1 tablet (25 mg) by mouth daily    Acute myocardial infarction

## 2018-02-23 ENCOUNTER — HOSPITAL ENCOUNTER (OUTPATIENT)
Dept: CARDIOLOGY | Facility: CLINIC | Age: 63
Discharge: HOME | End: 2018-02-23
Attending: INTERNAL MEDICINE | Admitting: INTERNAL MEDICINE
Payer: COMMERCIAL

## 2018-02-23 DIAGNOSIS — I25.10 CAD (CORONARY ARTERY DISEASE): ICD-10-CM

## 2018-02-23 DIAGNOSIS — I46.9 CARDIAC ARREST (H): ICD-10-CM

## 2018-02-23 DIAGNOSIS — I25.5 ISCHEMIC CARDIOMYOPATHY: ICD-10-CM

## 2018-02-23 DIAGNOSIS — I21.09 ACUTE ST ELEVATION MYOCARDIAL INFARCTION (STEMI) INVOLVING OTHER CORONARY ARTERY OF ANTERIOR WALL (H): ICD-10-CM

## 2018-02-23 DIAGNOSIS — I25.10 CORONARY ARTERY DISEASE INVOLVING NATIVE CORONARY ARTERY OF NATIVE HEART WITHOUT ANGINA PECTORIS: ICD-10-CM

## 2018-02-23 DIAGNOSIS — I42.9 CARDIOMYOPATHY (H): ICD-10-CM

## 2018-02-23 LAB
ALT SERPL W P-5'-P-CCNC: <5 U/L (ref 5–30)
ANION GAP SERPL CALCULATED.3IONS-SCNC: 11.7 MMOL/L (ref 6–17)
BUN SERPL-MCNC: 21 MG/DL (ref 7–30)
CALCIUM SERPL-MCNC: 9.6 MG/DL (ref 8.5–10.5)
CHLORIDE SERPL-SCNC: 101 MMOL/L (ref 98–107)
CHOLEST SERPL-MCNC: 159 MG/DL
CO2 SERPL-SCNC: 29 MMOL/L (ref 23–29)
CREAT SERPL-MCNC: 1.11 MG/DL (ref 0.7–1.3)
GFR SERPL CREATININE-BSD FRML MDRD: 67 ML/MIN/1.7M2
GLUCOSE SERPL-MCNC: 96 MG/DL (ref 70–105)
HDLC SERPL-MCNC: 48 MG/DL
LDLC SERPL CALC-MCNC: 92 MG/DL
NONHDLC SERPL-MCNC: 111 MG/DL
POTASSIUM SERPL-SCNC: 4.7 MMOL/L (ref 3.5–5.1)
SODIUM SERPL-SCNC: 137 MMOL/L (ref 136–145)
TRIGL SERPL-MCNC: 97 MG/DL

## 2018-02-23 PROCEDURE — 80048 BASIC METABOLIC PNL TOTAL CA: CPT | Performed by: INTERNAL MEDICINE

## 2018-02-23 PROCEDURE — 36415 COLL VENOUS BLD VENIPUNCTURE: CPT | Performed by: INTERNAL MEDICINE

## 2018-02-23 PROCEDURE — 93306 TTE W/DOPPLER COMPLETE: CPT

## 2018-02-23 PROCEDURE — 84460 ALANINE AMINO (ALT) (SGPT): CPT | Performed by: INTERNAL MEDICINE

## 2018-02-23 PROCEDURE — 25500064 ZZH RX 255 OP 636: Performed by: INTERNAL MEDICINE

## 2018-02-23 PROCEDURE — 80061 LIPID PANEL: CPT | Performed by: INTERNAL MEDICINE

## 2018-02-23 PROCEDURE — 93306 TTE W/DOPPLER COMPLETE: CPT | Mod: 26 | Performed by: INTERNAL MEDICINE

## 2018-02-23 RX ADMIN — HUMAN ALBUMIN MICROSPHERES AND PERFLUTREN 5 ML: 10; .22 INJECTION, SOLUTION INTRAVENOUS at 12:00

## 2018-02-26 DIAGNOSIS — I21.9 ACUTE MYOCARDIAL INFARCTION (H): ICD-10-CM

## 2018-02-26 RX ORDER — ATORVASTATIN CALCIUM 20 MG/1
20 TABLET, FILM COATED ORAL DAILY
Qty: 15 TABLET | Refills: 0 | Status: SHIPPED | OUTPATIENT
Start: 2018-02-26 | End: 2018-03-08

## 2018-03-05 ENCOUNTER — PRE VISIT (OUTPATIENT)
Dept: CARDIOLOGY | Facility: CLINIC | Age: 63
End: 2018-03-05

## 2018-03-08 ENCOUNTER — OFFICE VISIT (OUTPATIENT)
Dept: CARDIOLOGY | Facility: CLINIC | Age: 63
End: 2018-03-08
Attending: INTERNAL MEDICINE
Payer: COMMERCIAL

## 2018-03-08 VITALS
HEIGHT: 72 IN | HEART RATE: 80 BPM | BODY MASS INDEX: 22.17 KG/M2 | SYSTOLIC BLOOD PRESSURE: 99 MMHG | DIASTOLIC BLOOD PRESSURE: 65 MMHG | WEIGHT: 163.7 LBS

## 2018-03-08 DIAGNOSIS — I21.02 ACUTE ST ELEVATION MYOCARDIAL INFARCTION (STEMI) INVOLVING LEFT ANTERIOR DESCENDING (LAD) CORONARY ARTERY (H): ICD-10-CM

## 2018-03-08 DIAGNOSIS — I25.10 CORONARY ARTERY DISEASE INVOLVING NATIVE CORONARY ARTERY OF NATIVE HEART WITHOUT ANGINA PECTORIS: ICD-10-CM

## 2018-03-08 DIAGNOSIS — I46.9 CARDIAC ARREST (H): ICD-10-CM

## 2018-03-08 DIAGNOSIS — I21.09 ACUTE ST ELEVATION MYOCARDIAL INFARCTION (STEMI) INVOLVING OTHER CORONARY ARTERY OF ANTERIOR WALL (H): ICD-10-CM

## 2018-03-08 DIAGNOSIS — I25.5 ISCHEMIC CARDIOMYOPATHY: ICD-10-CM

## 2018-03-08 PROCEDURE — 99214 OFFICE O/P EST MOD 30 MIN: CPT | Performed by: INTERNAL MEDICINE

## 2018-03-08 RX ORDER — ATORVASTATIN CALCIUM 20 MG/1
40 TABLET, FILM COATED ORAL DAILY
Qty: 90 TABLET | Refills: 3 | Status: SHIPPED | OUTPATIENT
Start: 2018-03-08 | End: 2018-03-16

## 2018-03-08 RX ORDER — ATORVASTATIN CALCIUM 20 MG/1
40 TABLET, FILM COATED ORAL DAILY
Qty: 15 TABLET | Refills: 0 | Status: SHIPPED | OUTPATIENT
Start: 2018-03-08 | End: 2018-03-08

## 2018-03-08 NOTE — Clinical Note
3/8/2018    Lawson Mahoney MD  [address] [facility] [address]    RE: Iraida Hernandez       Dear Colleague,    I had the pleasure of seeing Iraida Hernandez in the [facility].    HPI and Plan:   See dictation    No orders of the defined types were placed in this encounter.    Orders Placed This Encounter   Medications     DISCONTD: atorvastatin (LIPITOR) 20 MG tablet     Sig: Take 2 tablets (40 mg) by mouth daily     Dispense:  15 tablet     Refill:  0     atorvastatin (LIPITOR) 20 MG tablet     Sig: Take 2 tablets (40 mg) by mouth daily     Dispense:  90 tablet     Refill:  3     Medications Discontinued During This Encounter   Medication Reason     atorvastatin (LIPITOR) 20 MG tablet Reorder     atorvastatin (LIPITOR) 20 MG tablet Reorder         Encounter Diagnoses   Name Primary?     Ischemic cardiomyopathy      Coronary artery disease involving native coronary artery of native heart without angina pectoris      Acute ST elevation myocardial infarction (STEMI) involving other coronary artery of anterior wall (H)      Cardiac arrest (H)      Acute ST elevation myocardial infarction (STEMI) involving left anterior descending (LAD) coronary artery (H)        CURRENT MEDICATIONS:  Current Outpatient Prescriptions   Medication Sig Dispense Refill     atorvastatin (LIPITOR) 20 MG tablet Take 2 tablets (40 mg) by mouth daily 90 tablet 3     clopidogrel (PLAVIX) 75 MG tablet Take 1 tablet (75 mg) by mouth daily 90 tablet 3     carvedilol (COREG) 12.5 MG tablet Take 1 tablet (12.5 mg) by mouth 2 times daily (with meals) 180 tablet 1     spironolactone (ALDACTONE) 25 MG tablet Take 1 tablet (25 mg) by mouth daily 90 tablet 3     lisinopril (PRINIVIL/ZESTRIL) 5 MG tablet Take 1 tablet (5 mg) by mouth daily 90 tablet 3     nitroglycerin (NITROSTAT) 0.4 MG SL tablet Place 1 tablet (0.4 mg) under the tongue every 5 minutes as needed for chest pain 25 tablet 0     aspirin EC 81 MG EC  tablet Take 1 tablet (81 mg) by mouth daily       [DISCONTINUED] atorvastatin (LIPITOR) 20 MG tablet Take 2 tablets (40 mg) by mouth daily 15 tablet 0     [DISCONTINUED] atorvastatin (LIPITOR) 20 MG tablet Take 1 tablet (20 mg) by mouth daily 15 tablet 0       ALLERGIES     Allergies   Allergen Reactions     Penicillins      Tetracyclines        PAST MEDICAL HISTORY:  Past Medical History:   Diagnosis Date     Calculus of kidney      Cancer (H)     basel cell on nose     Cardiac arrest (H)     V-fib, 2/16/2014     Cardiomyopathy (H)      Coronary artery disease      Hyperlipidaemia      Hyperlipidemia with target LDL less than 70 10/31/2010     Diagnosis updated by automated process. Provider to review and confirm.     ICD (implantable cardiac defibrillator) in place     12-1-2014 EF 29%     Mild intermittent asthma      Myocardial infarction 2/2014     Anterior infarct     Tobacco dependence        PAST SURGICAL HISTORY:  Past Surgical History:   Procedure Laterality Date     CARDIAC SURGERY       HEART CATH, ANGIOPLASTY  2/2014    Emergent cath,thrombectomy-pt had cardiac arrest-severe 3 vessel CAD-MACY proximal LAD, LAD diagonal kissing balloon, and stent to proximal RCA     HERNIA REPAIR       NO HISTORY OF SURGERY         FAMILY HISTORY:  Family History   Problem Relation Age of Onset     CEREBROVASCULAR DISEASE Father      HEART DISEASE Father      MI     CANCER Mother      Unknown/Adopted Maternal Grandmother      Unknown/Adopted Maternal Grandfather      Unknown/Adopted Paternal Grandmother      Unknown/Adopted Paternal Grandfather      DIABETES Son      Family History Negative Son      CANCER Maternal Uncle        SOCIAL HISTORY:  Social History     Social History     Marital status:      Spouse name: Jessica     Number of children: 2     Years of education: N/A     Occupational History     [employer]     Social History Main Topics     Smoking status: Current  Every Day Smoker     Packs/day: 0.50     Years: 42.00     Last attempt to quit: 2/16/2014     Smokeless tobacco: Never Used      Comment: 4-6 cigs per day     Alcohol use Yes      Comment: rare     Drug use: No     Sexual activity: Yes     Partners: Female     Other Topics Concern      Service No     Blood Transfusions No     Caffeine Concern No     soda occasionally      Occupational Exposure No     Hobby Hazards No     Sleep Concern No     Stress Concern No     Weight Concern No     Special Diet Yes     eats healthy      Back Care No     Exercise Yes     golf 2x/week, tredmill daily     Bike Helmet Not Asked     NA     Seat Belt Yes     Self-Exams Yes     Social History Narrative       Review of Systems:  Skin:  Positive for bruising   Eyes:  Positive for glasses  ENT:  Negative    Respiratory:  Negative    Cardiovascular:  Negative    Gastroenterology: Negative    Genitourinary:  Negative    Musculoskeletal:  Negative    Neurologic:  Negative    Psychiatric:  Negative    Heme/Lymph/Imm:  Positive for allergies  Endocrine:  Negative      Physical Exam:  Vitals: BP 99/65  Pulse 80  Ht 1.829 m (6')  Wt 74.3 kg (163 lb 11.2 oz)  BMI 22.2 kg/m2    Constitutional:           Skin:             Head:           Eyes:           Lymph:      ENT:           Neck:           Respiratory:            Cardiac:                                                           GI:           Extremities and Muscular Skeletal:                 Neurological:           Psych:           Recent Lab Results:  LIPID RESULTS:  Lab Results   Component Value Date    CHOL 159 02/23/2018    HDL 48 02/23/2018    LDL 92 02/23/2018    TRIG 97 02/23/2018    CHOLHDLRATIO 3.1 08/15/2014       LIVER ENZYME RESULTS:  Lab Results   Component Value Date    AST 29 02/16/2014    ALT <5 (L) 02/23/2018       CBC RESULTS:  Lab Results   Component Value Date    WBC 8.4 12/01/2014    RBC 4.63 12/01/2014    HGB 15.2 12/01/2014    HCT 45.7 12/01/2014    MCV 99  12/01/2014    MCH 32.8 12/01/2014    MCHC 33.3 12/01/2014    RDW 12.5 12/01/2014     12/01/2014       BMP RESULTS:  Lab Results   Component Value Date     02/23/2018    POTASSIUM 4.7 02/23/2018    CHLORIDE 101 02/23/2018    CO2 29 02/23/2018    ANIONGAP 11.7 02/23/2018    GLC 96 02/23/2018    BUN 21 02/23/2018    CR 1.11 02/23/2018    GFRESTIMATED 67 02/23/2018    GFRESTBLACK 81 02/23/2018    DIEGO 9.6 02/23/2018        A1C RESULTS:  Lab Results   Component Value Date    A1C 4.8 12/10/2007       INR RESULTS:  Lab Results   Component Value Date    INR 0.90 12/01/2014    INR 0.89 02/16/2014           CC  Juancarlos Khan MD  [address]  [place] [id number]                  Thank you for allowing me to participate in the care of your patient.      Sincerely,     JUANCARLOS KHAN MD     [facility]    cc:   Juancarlos Khan MD  [address]  [place] [id number]

## 2018-03-08 NOTE — PROGRESS NOTES
Service Date: 2018      HISTORY OF PRESENT ILLNESS:  Mr. Hernandez is a very pleasant 63-year-old gentleman with a history of anterior myocardial infarction, ischemic cardiomyopathy, LV dysfunction with an ejection fraction approximately 30-35% status post ICD implantation.  Overall, he feels very well.  He is exercising on a regular basis 3-5 times per week, walking on the treadmill at nighttime up to a mile at approximately 3.3 miles per hour.  He feels well with this.  He denies any symptoms of chest pain, chest pressure, heart racing, palpitations, syncope, near-syncope, PND, orthopnea or pedal edema.  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  A review of his echocardiogram shows slight changes with perhaps a decrease, mild, in his ejection fraction, however, the left ventricular end diastolic dimension is decreased.  Overall, my review of the side-to-side echocardiogram showed really no changes in his ejection fraction.  His LDL cholesterol is 92.      ASSESSMENT AND PLAN:  It is a delight seeing Mr. Hernandez back in clinic, I think doing very well from a cardiovascular standpoint.  His LDL is too high for his ischemic cardiomyopathy and I am increasing his Lipitor to 40 mg daily.  His LV dysfunction is stable.  We went over all his medications.  We are going to continue all including Plavix indefinitely unless there should be a problem with this.  I will follow up with Mr. Hernandez in 1 year's time.  His stress test from last year showed no significant ischemia and we can hold on further stress testing for a few years' time.         ELIANA KHAN MD [facility]             D: 2018   T: 2018   MT: NITA      Name:     JAZZMINE HERNANDEZ   MRN:      [id number]        Account:      [id number]   :      1955           Service Date: 2018      Document: [id number]

## 2018-03-08 NOTE — LETTER
3/8/2018      Lawson Mahoney MD  [address] [facility] [address]      RE: Iraida Hernandez       Dear Colleague,    I had the pleasure of seeing Iraida Hernandez in the [facility].    Service Date: 03/08/2018      HISTORY OF PRESENT ILLNESS:  Mr. Hernandez is a very pleasant 63-year-old gentleman with a history of anterior myocardial infarction, ischemic cardiomyopathy, LV dysfunction with an ejection fraction approximately 30-35% status post ICD implantation.  Overall, he feels very well.  He is exercising on a regular basis 3-5 times per week, walking on the treadmill at nighttime up to a mile at approximately 3.3 miles per hour.  He feels well with this.  He denies any symptoms of chest pain, chest pressure, heart racing, palpitations, syncope, near-syncope, PND, orthopnea or pedal edema.  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  A review of his echocardiogram shows slight changes with perhaps a decrease, mild, in his ejection fraction, however, the left ventricular end diastolic dimension is decreased.  Overall, my review of the side-to-side echocardiogram showed really no changes in his ejection fraction.  His LDL cholesterol is 92.      ASSESSMENT AND PLAN:  It is a delight seeing Mr. Hernandez back in clinic, I think doing very well from a cardiovascular standpoint.  His LDL is too high for his ischemic cardiomyopathy and I am increasing his Lipitor to 40 mg daily.  His LV dysfunction is stable.  We went over all his medications.  We are going to continue all including Plavix indefinitely unless there should be a problem with this.  I will follow up with Mr. Hernandez in 1 year's time.  His stress test from last year showed no significant ischemia and we can hold on further stress testing for a few years' time.         ELIANA KHAN MD [facility]             D: 03/08/2018   T: 03/08/2018   MT: NITA      Name:      JAZZMINE MERCADO   MRN:      [id number]        Account:      [id number]   :      1955           Service Date: 2018      Document: [id number]         Outpatient Encounter Prescriptions as of 3/8/2018   Medication Sig Dispense Refill     atorvastatin (LIPITOR) 20 MG tablet Take 2 tablets (40 mg) by mouth daily 90 tablet 3     clopidogrel (PLAVIX) 75 MG tablet Take 1 tablet (75 mg) by mouth daily 90 tablet 3     carvedilol (COREG) 12.5 MG tablet Take 1 tablet (12.5 mg) by mouth 2 times daily (with meals) 180 tablet 1     spironolactone (ALDACTONE) 25 MG tablet Take 1 tablet (25 mg) by mouth daily 90 tablet 3     lisinopril (PRINIVIL/ZESTRIL) 5 MG tablet Take 1 tablet (5 mg) by mouth daily 90 tablet 3     nitroglycerin (NITROSTAT) 0.4 MG SL tablet Place 1 tablet (0.4 mg) under the tongue every 5 minutes as needed for chest pain 25 tablet 0     aspirin EC 81 MG EC tablet Take 1 tablet (81 mg) by mouth daily       [DISCONTINUED] atorvastatin (LIPITOR) 20 MG tablet Take 2 tablets (40 mg) by mouth daily 15 tablet 0     [DISCONTINUED] atorvastatin (LIPITOR) 20 MG tablet Take 1 tablet (20 mg) by mouth daily 15 tablet 0     No facility-administered encounter medications on file as of 3/8/2018.        Again, thank you for allowing me to participate in the care of your patient.      Sincerely,    ELIANA KHAN MD     [facility]

## 2018-03-08 NOTE — MR AVS SNAPSHOT
After Visit Summary   3/8/2018    Iraida Hernandez    MRN: [id number]           Patient Information     Date Of Birth          1955        Visit Information        Provider Department      3/8/2018 8:45 AM Juancarlos Stevens MD [facility]        Today's Diagnoses     Ischemic cardiomyopathy        Coronary artery disease involving native coronary artery of native heart without angina pectoris        Acute ST elevation myocardial infarction (STEMI) involving other coronary artery of anterior wall (H)        Cardiac arrest (H)        Acute ST elevation myocardial infarction (STEMI) involving left anterior descending (LAD) coronary artery (H)           Follow-ups after your visit        Additional Services     Follow-Up with Cardiologist                 Future tests that were ordered for you today     Open Future Orders        Priority Expected Expires Ordered    Follow-Up with Cardiologist Routine 3/8/2019 3/9/2019 3/8/2018    Echocardiogram Routine 3/8/2019 3/9/2019 3/8/2018            Who to contact     If you have questions or need follow up information about today's clinic visit or your schedule please contact [facility] directly at [phone].  Normal or non-critical lab and imaging results will be communicated to you by [employer], letter or phone within 4 business days after the clinic has received the results. If you do not hear from us within 7 days, please contact the clinic through [employer] or phone. If you have a critical or abnormal lab result, we will notify you by phone as soon as possible.  Submit refill requests through [employer] or call your pharmacy and they will forward the refill request to us. Please allow 3 business days for your refill to be completed.          Additional Information About Your Visit        MyChart Information     [employer] gives you secure access to your electronic health record. If you  see a primary care provider, you can also send messages to your care team and make appointments. If you have questions, please call your primary care clinic.  If you do not have a primary care provider, please call [phone] and they will assist you.        Care EveryWhere ID     This is your Care EveryWhere ID. This could be used by other organizations to access your [place] medical records  [id number]        Your Vitals Were     Pulse Height BMI (Body Mass Index)             80 1.829 m (6') 22.2 kg/m2          Blood Pressure from Last 3 Encounters:   03/08/18 99/65   03/24/17 104/70   02/29/16 94/60    Weight from Last 3 Encounters:   03/08/18 74.3 kg (163 lb 11.2 oz)   03/24/17 74.8 kg (165 lb)   02/29/16 75.8 kg (167 lb)              We Performed the Following     Follow-Up with Cardiologist          Today's Medication Changes          These changes are accurate as of 3/8/18  9:11 AM.  If you have any questions, ask your nurse or doctor.               Start taking these medicines.        Dose/Directions    atorvastatin 20 MG tablet   Commonly known as:  LIPITOR   Used for:  Acute ST elevation myocardial infarction (STEMI) involving left anterior descending (LAD) coronary artery (H)   Started by:  Juancarlos Stevens MD        Dose:  40 mg   Take 2 tablets (40 mg) by mouth daily   Quantity:  90 tablet   Refills:  3            Where to get your medicines      These medications were sent to [facility] [address] [place] [address] [address]  [address] [facility] [id number] [facility] [address]     Phone:  [phone]     atorvastatin 20 MG tablet                Primary Care Provider Office Phone # Fax #    Lawson Mahoney [phone] [address] [facility] [address]        Equal Access to Services     CONNER BLACKMON AH: Dipika Billy, wamahnazda luqadaha, qaybta kaalmaryley clark. So [facility]  [phone].    ATENCIÓN: Si desiree flores, tiene a summers disposición servicios gratuitos de asistencia lingüística. Maya ballesteros [phone].    We comply with applicable federal civil rights laws and [place] laws. We do not discriminate on the basis of race, color, national origin, age, disability, sex, sexual orientation, or gender identity.            Thank you!     Thank you for choosing [facility]  for your care. Our goal is always to provide you with excellent care. Hearing back from our patients is one way we can continue to improve our services. Please take a few minutes to complete the written survey that you may receive in the mail after your visit with us. Thank you!             Your Updated Medication List - Protect others around you: Learn how to safely use, store and throw away your medicines at www.disposemymeds.org.          This list is accurate as of 3/8/18  9:11 AM.  Always use your most recent med list.                   Brand Name Dispense Instructions for use Diagnosis    aspirin 81 MG EC tablet      Take 1 tablet (81 mg) by mouth daily    Acute myocardial infarction       atorvastatin 20 MG tablet    LIPITOR    90 tablet    Take 2 tablets (40 mg) by mouth daily    Acute ST elevation myocardial infarction (STEMI) involving left anterior descending (LAD) coronary artery (H)       carvedilol 12.5 MG tablet    COREG    180 tablet    Take 1 tablet (12.5 mg) by mouth 2 times daily (with meals)    Acute myocardial infarction       clopidogrel 75 MG tablet    PLAVIX    90 tablet    Take 1 tablet (75 mg) by mouth daily    Cardiomyopathy, unspecified type (H), Acute myocardial infarction, Systolic congestive heart failure, unspecified congestive heart failure chronicity (H)       lisinopril 5 MG tablet    PRINIVIL/ZESTRIL    90 tablet    Take 1 tablet (5 mg) by mouth daily    Acute myocardial infarction       nitroGLYcerin 0.4 MG sublingual tablet    NITROSTAT    25  tablet    Place 1 tablet (0.4 mg) under the tongue every 5 minutes as needed for chest pain    Acute myocardial infarction       spironolactone 25 MG tablet    ALDACTONE    90 tablet    Take 1 tablet (25 mg) by mouth daily    Acute myocardial infarction

## 2018-03-08 NOTE — PROGRESS NOTES
HPI and Plan:   See dictation    No orders of the defined types were placed in this encounter.    Orders Placed This Encounter   Medications     DISCONTD: atorvastatin (LIPITOR) 20 MG tablet     Sig: Take 2 tablets (40 mg) by mouth daily     Dispense:  15 tablet     Refill:  0     atorvastatin (LIPITOR) 20 MG tablet     Sig: Take 2 tablets (40 mg) by mouth daily     Dispense:  90 tablet     Refill:  3     Medications Discontinued During This Encounter   Medication Reason     atorvastatin (LIPITOR) 20 MG tablet Reorder     atorvastatin (LIPITOR) 20 MG tablet Reorder         Encounter Diagnoses   Name Primary?     Ischemic cardiomyopathy      Coronary artery disease involving native coronary artery of native heart without angina pectoris      Acute ST elevation myocardial infarction (STEMI) involving other coronary artery of anterior wall (H)      Cardiac arrest (H)      Acute ST elevation myocardial infarction (STEMI) involving left anterior descending (LAD) coronary artery (H)        CURRENT MEDICATIONS:  Current Outpatient Prescriptions   Medication Sig Dispense Refill     atorvastatin (LIPITOR) 20 MG tablet Take 2 tablets (40 mg) by mouth daily 90 tablet 3     clopidogrel (PLAVIX) 75 MG tablet Take 1 tablet (75 mg) by mouth daily 90 tablet 3     carvedilol (COREG) 12.5 MG tablet Take 1 tablet (12.5 mg) by mouth 2 times daily (with meals) 180 tablet 1     spironolactone (ALDACTONE) 25 MG tablet Take 1 tablet (25 mg) by mouth daily 90 tablet 3     lisinopril (PRINIVIL/ZESTRIL) 5 MG tablet Take 1 tablet (5 mg) by mouth daily 90 tablet 3     nitroglycerin (NITROSTAT) 0.4 MG SL tablet Place 1 tablet (0.4 mg) under the tongue every 5 minutes as needed for chest pain 25 tablet 0     aspirin EC 81 MG EC tablet Take 1 tablet (81 mg) by mouth daily       [DISCONTINUED] atorvastatin (LIPITOR) 20 MG tablet Take 2 tablets (40 mg) by mouth daily 15 tablet 0     [DISCONTINUED] atorvastatin (LIPITOR) 20 MG tablet Take 1 tablet  (20 mg) by mouth daily 15 tablet 0       ALLERGIES     Allergies   Allergen Reactions     Penicillins      Tetracyclines        PAST MEDICAL HISTORY:  Past Medical History:   Diagnosis Date     Calculus of kidney      Cancer (H)     basel cell on nose     Cardiac arrest (H)     V-fib, 2/16/2014     Cardiomyopathy (H)      Coronary artery disease      Hyperlipidaemia      Hyperlipidemia with target LDL less than 70 10/31/2010     Diagnosis updated by automated process. Provider to review and confirm.     ICD (implantable cardiac defibrillator) in place     12-1-2014 EF 29%     Mild intermittent asthma      Myocardial infarction 2/2014     Anterior infarct     Tobacco dependence        PAST SURGICAL HISTORY:  Past Surgical History:   Procedure Laterality Date     CARDIAC SURGERY       HEART CATH, ANGIOPLASTY  2/2014    Emergent cath,thrombectomy-pt had cardiac arrest-severe 3 vessel CAD-MACY proximal LAD, LAD diagonal kissing balloon, and stent to proximal RCA     HERNIA REPAIR       NO HISTORY OF SURGERY         FAMILY HISTORY:  Family History   Problem Relation Age of Onset     CEREBROVASCULAR DISEASE Father      HEART DISEASE Father      MI     CANCER Mother      Unknown/Adopted Maternal Grandmother      Unknown/Adopted Maternal Grandfather      Unknown/Adopted Paternal Grandmother      Unknown/Adopted Paternal Grandfather      DIABETES Son      Family History Negative Son      CANCER Maternal Uncle        SOCIAL HISTORY:  Social History     Social History     Marital status:      Spouse name: Jessica     Number of children: 2     Years of education: N/A     Occupational History     [employer]     Social History Main Topics     Smoking status: Current Every Day Smoker     Packs/day: 0.50     Years: 42.00     Last attempt to quit: 2/16/2014     Smokeless tobacco: Never Used      Comment: 4-6 cigs per day     Alcohol use Yes      Comment: rare     Drug use: No     Sexual  activity: Yes     Partners: Female     Other Topics Concern      Service No     Blood Transfusions No     Caffeine Concern No     soda occasionally      Occupational Exposure No     Hobby Hazards No     Sleep Concern No     Stress Concern No     Weight Concern No     Special Diet Yes     eats healthy      Back Care No     Exercise Yes     golf 2x/week, tredmill daily     Bike Helmet Not Asked     NA     Seat Belt Yes     Self-Exams Yes     Social History Narrative       Review of Systems:  Skin:  Positive for bruising   Eyes:  Positive for glasses  ENT:  Negative    Respiratory:  Negative    Cardiovascular:  Negative    Gastroenterology: Negative    Genitourinary:  Negative    Musculoskeletal:  Negative    Neurologic:  Negative    Psychiatric:  Negative    Heme/Lymph/Imm:  Positive for allergies  Endocrine:  Negative      Physical Exam:  Vitals: BP 99/65  Pulse 80  Ht 1.829 m (6')  Wt 74.3 kg (163 lb 11.2 oz)  BMI 22.2 kg/m2    Constitutional:           Skin:             Head:           Eyes:           Lymph:      ENT:           Neck:           Respiratory:            Cardiac:                                                           GI:           Extremities and Muscular Skeletal:                 Neurological:           Psych:           Recent Lab Results:  LIPID RESULTS:  Lab Results   Component Value Date    CHOL 159 02/23/2018    HDL 48 02/23/2018    LDL 92 02/23/2018    TRIG 97 02/23/2018    CHOLHDLRATIO 3.1 08/15/2014       LIVER ENZYME RESULTS:  Lab Results   Component Value Date    AST 29 02/16/2014    ALT <5 (L) 02/23/2018       CBC RESULTS:  Lab Results   Component Value Date    WBC 8.4 12/01/2014    RBC 4.63 12/01/2014    HGB 15.2 12/01/2014    HCT 45.7 12/01/2014    MCV 99 12/01/2014    MCH 32.8 12/01/2014    MCHC 33.3 12/01/2014    RDW 12.5 12/01/2014     12/01/2014       BMP RESULTS:  Lab Results   Component Value Date     02/23/2018    POTASSIUM 4.7 02/23/2018    CHLORIDE 101  02/23/2018    CO2 29 02/23/2018    ANIONGAP 11.7 02/23/2018    GLC 96 02/23/2018    BUN 21 02/23/2018    CR 1.11 02/23/2018    GFRESTIMATED 67 02/23/2018    GFRESTBLACK 81 02/23/2018    DIEGO 9.6 02/23/2018        A1C RESULTS:  Lab Results   Component Value Date    A1C 4.8 12/10/2007       INR RESULTS:  Lab Results   Component Value Date    INR 0.90 12/01/2014    INR 0.89 02/16/2014           CC  Juancarlos Stevens MD  [address]  [place] [id number]

## 2018-03-16 DIAGNOSIS — I21.02 ACUTE ST ELEVATION MYOCARDIAL INFARCTION (STEMI) INVOLVING LEFT ANTERIOR DESCENDING (LAD) CORONARY ARTERY (H): ICD-10-CM

## 2018-03-16 RX ORDER — ATORVASTATIN CALCIUM 40 MG/1
40 TABLET, FILM COATED ORAL DAILY
Qty: 90 TABLET | Refills: 3 | Status: SHIPPED | OUTPATIENT
Start: 2018-03-16 | End: 2019-02-18

## 2018-03-16 NOTE — TELEPHONE ENCOUNTER
Patients insurance reaching out to the patient regarding Lipitor being a 20 mg tablet take 2 prescription.. Switched the patients prescription to the 40 mg tablet.. Patient advised to reach back out to our clinic if this does not satisfy his insurance company.  No other tasks to be done at this time. Sue Leal

## 2018-04-25 DIAGNOSIS — I21.9 ACUTE MYOCARDIAL INFARCTION (H): ICD-10-CM

## 2018-04-25 RX ORDER — CARVEDILOL 12.5 MG/1
12.5 TABLET ORAL 2 TIMES DAILY WITH MEALS
Qty: 180 TABLET | Refills: 2 | Status: SHIPPED | OUTPATIENT
Start: 2018-04-25 | End: 2019-02-06

## 2018-05-25 DIAGNOSIS — I21.9 ACUTE MYOCARDIAL INFARCTION (H): ICD-10-CM

## 2018-05-25 RX ORDER — LISINOPRIL 5 MG/1
5 TABLET ORAL DAILY
Qty: 90 TABLET | Refills: 3 | Status: SHIPPED | OUTPATIENT
Start: 2018-05-25 | End: 2019-05-10

## 2018-05-25 RX ORDER — SPIRONOLACTONE 25 MG/1
25 TABLET ORAL DAILY
Qty: 90 TABLET | Refills: 3 | Status: SHIPPED | OUTPATIENT
Start: 2018-05-25 | End: 2019-05-10

## 2018-11-08 ENCOUNTER — ALLIED HEALTH/NURSE VISIT (OUTPATIENT)
Dept: CARDIOLOGY | Facility: CLINIC | Age: 63
End: 2018-11-08
Payer: COMMERCIAL

## 2018-11-08 DIAGNOSIS — I25.5 ISCHEMIC CARDIOMYOPATHY: ICD-10-CM

## 2018-11-08 DIAGNOSIS — Z95.810 ICD (IMPLANTABLE CARDIOVERTER-DEFIBRILLATOR), SINGLE, IN SITU: ICD-10-CM

## 2018-11-08 PROCEDURE — 93282 PRGRMG EVAL IMPLANTABLE DFB: CPT | Performed by: INTERNAL MEDICINE

## 2018-11-08 NOTE — PROGRESS NOTES
[employer] E[employer] E140 ICD Device Check  AP: NA % : <1 %  Mode: VVI Lower rate 40 bpm        Underlying Rhythm: NSR  Heart Rate: Stable with good variability.  Sensing: WNL    Pacing Threshold: WNL    Impedance: WNL  Battery Status: Approximately 10.5 years longevity.  Device Site: Tissue is thin over the header.  Atrial Arrhythmia: 0  Ventricular Arrhythmia: Since 1/30/18, 9 episodes logged, 2 stored EGMs which showed 6-8 beats of NSVT, rate 175-200 bpm.  ATP: 0    Shocks: 0  Setting Change: 0    Care Plan: Follow up with Q 3 month remote checks. Orders in place for annual OV with Dr. Stevens.

## 2018-11-08 NOTE — MR AVS SNAPSHOT
After Visit Summary   11/8/2018    Iraida Hernandez    MRN: [id number]           Patient Information     Date Of Birth          1955        Visit Information        Provider Department      11/8/2018 2:00 PM S[facility] DCR2 [facility]        Today's Diagnoses     Ischemic cardiomyopathy        ICD (implantable cardioverter-defibrillator), single, in situ           Follow-ups after your visit        Your next 10 appointments already scheduled     Mar 06, 2019  4:30 PM CST   Remote ICD Check with SINHA DCR2   [facility] ([facility])    [address] [place] [address] [address]  [facility] [id number] [phone] OPT 2           This appointment is for a remote check of your debrillator.  This is not an appointment at the office.              Who to contact     If you have questions or need follow up information about today's clinic visit or your schedule please contact [facility] directly at [phone].  Normal or non-critical lab and imaging results will be communicated to you by [facility] [employer], letter or phone within 4 business days after the clinic has received the results. If you do not hear from us within 7 days, please contact the clinic through [employer] or phone. If you have a critical or abnormal lab result, we will notify you by phone as soon as possible.  Submit refill requests through [facility] or call your pharmacy and they will forward the refill request to us. Please allow 3 business days for your refill to be completed.          Additional Information About Your Visit        MyChart Information     [facility] gives you secure access to your electronic health record. If you see a primary care provider, you can also send messages to your care team and make appointments. If you have questions, please call your primary care clinic.  If you do not have a primary care provider, please call  [phone] and they will assist you.        Care EveryWhere ID     This is your Care EveryWhere ID. This could be used by other organizations to access your [place] medical records  [id number]         Blood Pressure from Last 3 Encounters:   03/08/18 99/65   03/24/17 104/70   02/29/16 94/60    Weight from Last 3 Encounters:   03/08/18 74.3 kg (163 lb 11.2 oz)   03/24/17 74.8 kg (165 lb)   02/29/16 75.8 kg (167 lb)              We Performed the Following     Follow-Up with Device Clinic     ICD DEVICE PROGRAMMING EVAL, SINGLE LEAD ICD (55827)        Primary Care Provider Office Phone # Fax #    Lawson Mahoney [phone] [address] [facility] [address]        Equal Access to Services     CONNER BLACKMON : Hadii aad ku hadasho Soomaali, waaxda luqadaha, qaybta kaalmada adeegyada, ryley huiin hayaan annie montanez . So [facility] [phone].    ATENCIÓN: Si habla español, tiene a summers disposición servicios gratuitos de asistencia lingüística. Loriame al [phone].    We comply with applicable federal civil rights laws and [place] laws. We do not discriminate on the basis of race, color, national origin, age, disability, sex, sexual orientation, or gender identity.            Thank you!     Thank you for choosing [facility]  for your care. Our goal is always to provide you with excellent care. Hearing back from our patients is one way we can continue to improve our services. Please take a few minutes to complete the written survey that you may receive in the mail after your visit with us. Thank you!             Your Updated Medication List - Protect others around you: Learn how to safely use, store and throw away your medicines at www.disposemymeds.org.          This list is accurate as of 11/8/18  2:33 PM.  Always use your most recent med list.                   Brand Name Dispense Instructions for use Diagnosis    aspirin 81 MG EC  tablet      Take 1 tablet (81 mg) by mouth daily    Acute myocardial infarction (H)       atorvastatin 40 MG tablet    LIPITOR    90 tablet    Take 1 tablet (40 mg) by mouth daily    Acute ST elevation myocardial infarction (STEMI) involving left anterior descending (LAD) coronary artery (H)       carvedilol 12.5 MG tablet    COREG    180 tablet    Take 1 tablet (12.5 mg) by mouth 2 times daily (with meals)    Acute myocardial infarction (H)       clopidogrel 75 MG tablet    PLAVIX    90 tablet    Take 1 tablet (75 mg) by mouth daily    Cardiomyopathy, unspecified type (H), Acute myocardial infarction (H), Systolic congestive heart failure, unspecified congestive heart failure chronicity       lisinopril 5 MG tablet    PRINIVIL/ZESTRIL    90 tablet    Take 1 tablet (5 mg) by mouth daily    Acute myocardial infarction (H)       nitroGLYcerin 0.4 MG sublingual tablet    NITROSTAT    25 tablet    Place 1 tablet (0.4 mg) under the tongue every 5 minutes as needed for chest pain    Acute myocardial infarction (H)       spironolactone 25 MG tablet    ALDACTONE    90 tablet    Take 1 tablet (25 mg) by mouth daily    Acute myocardial infarction (H)

## 2018-11-19 DIAGNOSIS — I42.9 CARDIOMYOPATHY, UNSPECIFIED TYPE (H): ICD-10-CM

## 2018-11-19 DIAGNOSIS — I21.9 ACUTE MYOCARDIAL INFARCTION (H): ICD-10-CM

## 2018-11-19 RX ORDER — CLOPIDOGREL BISULFATE 75 MG/1
75 TABLET ORAL DAILY
Qty: 90 TABLET | Refills: 1 | Status: SHIPPED | OUTPATIENT
Start: 2018-11-19 | End: 2019-05-10

## 2019-02-06 DIAGNOSIS — I21.9 ACUTE MYOCARDIAL INFARCTION (H): ICD-10-CM

## 2019-02-06 RX ORDER — CARVEDILOL 12.5 MG/1
12.5 TABLET ORAL 2 TIMES DAILY WITH MEALS
Qty: 180 TABLET | Refills: 0 | Status: SHIPPED | OUTPATIENT
Start: 2019-02-06 | End: 2019-04-29

## 2019-02-15 ENCOUNTER — ANCILLARY PROCEDURE (OUTPATIENT)
Dept: GENERAL RADIOLOGY | Facility: CLINIC | Age: 64
End: 2019-02-15
Attending: FAMILY MEDICINE
Payer: MEDICARE

## 2019-02-15 ENCOUNTER — OFFICE VISIT (OUTPATIENT)
Dept: FAMILY MEDICINE | Facility: CLINIC | Age: 64
End: 2019-02-15
Payer: MEDICARE

## 2019-02-15 VITALS
SYSTOLIC BLOOD PRESSURE: 118 MMHG | BODY MASS INDEX: 22.4 KG/M2 | DIASTOLIC BLOOD PRESSURE: 77 MMHG | HEART RATE: 72 BPM | TEMPERATURE: 97.9 F | HEIGHT: 71 IN | RESPIRATION RATE: 12 BRPM | WEIGHT: 160 LBS | OXYGEN SATURATION: 99 %

## 2019-02-15 DIAGNOSIS — M54.6 ACUTE RIGHT-SIDED THORACIC BACK PAIN: ICD-10-CM

## 2019-02-15 DIAGNOSIS — G89.29 CHRONIC PAIN OF RIGHT KNEE: ICD-10-CM

## 2019-02-15 DIAGNOSIS — I46.9 CARDIAC ARREST (H): ICD-10-CM

## 2019-02-15 DIAGNOSIS — M25.551 HIP PAIN, RIGHT: ICD-10-CM

## 2019-02-15 DIAGNOSIS — Z12.5 SCREENING FOR PROSTATE CANCER: ICD-10-CM

## 2019-02-15 DIAGNOSIS — Z11.59 NEED FOR HEPATITIS C SCREENING TEST: ICD-10-CM

## 2019-02-15 DIAGNOSIS — M25.561 CHRONIC PAIN OF RIGHT KNEE: ICD-10-CM

## 2019-02-15 DIAGNOSIS — Z95.810 ICD (IMPLANTABLE CARDIOVERTER-DEFIBRILLATOR) IN PLACE: Primary | ICD-10-CM

## 2019-02-15 DIAGNOSIS — I25.5 ISCHEMIC CARDIOMYOPATHY: ICD-10-CM

## 2019-02-15 DIAGNOSIS — Z12.11 SCREEN FOR COLON CANCER: ICD-10-CM

## 2019-02-15 DIAGNOSIS — R97.20 ELEVATED PROSTATE SPECIFIC ANTIGEN (PSA): ICD-10-CM

## 2019-02-15 DIAGNOSIS — Z11.4 SCREENING FOR HIV (HUMAN IMMUNODEFICIENCY VIRUS): ICD-10-CM

## 2019-02-15 DIAGNOSIS — I70.90 ASVD (ARTERIOSCLEROTIC VASCULAR DISEASE): ICD-10-CM

## 2019-02-15 DIAGNOSIS — Z23 NEED FOR PROPHYLACTIC VACCINATION AND INOCULATION AGAINST INFLUENZA: ICD-10-CM

## 2019-02-15 LAB
ALBUMIN SERPL-MCNC: 3.7 G/DL (ref 3.4–5)
ALP SERPL-CCNC: 66 U/L (ref 40–150)
ALT SERPL W P-5'-P-CCNC: 19 U/L (ref 0–70)
ANION GAP SERPL CALCULATED.3IONS-SCNC: 4 MMOL/L (ref 3–14)
AST SERPL W P-5'-P-CCNC: 14 U/L (ref 0–45)
BILIRUB SERPL-MCNC: 0.7 MG/DL (ref 0.2–1.3)
BUN SERPL-MCNC: 14 MG/DL (ref 7–30)
CALCIUM SERPL-MCNC: 8.4 MG/DL (ref 8.5–10.1)
CHLORIDE SERPL-SCNC: 103 MMOL/L (ref 94–109)
CHOLEST SERPL-MCNC: 125 MG/DL
CO2 SERPL-SCNC: 27 MMOL/L (ref 20–32)
CREAT SERPL-MCNC: 0.91 MG/DL (ref 0.66–1.25)
GFR SERPL CREATININE-BSD FRML MDRD: 88 ML/MIN/{1.73_M2}
GLUCOSE SERPL-MCNC: 99 MG/DL (ref 70–99)
HDLC SERPL-MCNC: 42 MG/DL
LDLC SERPL CALC-MCNC: 67 MG/DL
NONHDLC SERPL-MCNC: 83 MG/DL
POTASSIUM SERPL-SCNC: 4.2 MMOL/L (ref 3.4–5.3)
PROT SERPL-MCNC: 6.7 G/DL (ref 6.8–8.8)
PSA SERPL-ACNC: 9.79 UG/L (ref 0–4)
SODIUM SERPL-SCNC: 134 MMOL/L (ref 133–144)
TRIGL SERPL-MCNC: 81 MG/DL

## 2019-02-15 PROCEDURE — G0402 INITIAL PREVENTIVE EXAM: HCPCS | Performed by: FAMILY MEDICINE

## 2019-02-15 PROCEDURE — 99213 OFFICE O/P EST LOW 20 MIN: CPT | Mod: 25 | Performed by: FAMILY MEDICINE

## 2019-02-15 PROCEDURE — G0103 PSA SCREENING: HCPCS | Performed by: FAMILY MEDICINE

## 2019-02-15 PROCEDURE — 36415 COLL VENOUS BLD VENIPUNCTURE: CPT | Performed by: FAMILY MEDICINE

## 2019-02-15 PROCEDURE — 87389 HIV-1 AG W/HIV-1&-2 AB AG IA: CPT | Performed by: FAMILY MEDICINE

## 2019-02-15 PROCEDURE — G0472 HEP C SCREEN HIGH RISK/OTHER: HCPCS | Performed by: FAMILY MEDICINE

## 2019-02-15 PROCEDURE — 80061 LIPID PANEL: CPT | Performed by: FAMILY MEDICINE

## 2019-02-15 PROCEDURE — 80053 COMPREHEN METABOLIC PANEL: CPT | Performed by: FAMILY MEDICINE

## 2019-02-15 PROCEDURE — 73560 X-RAY EXAM OF KNEE 1 OR 2: CPT | Mod: RT

## 2019-02-15 PROCEDURE — 73502 X-RAY EXAM HIP UNI 2-3 VIEWS: CPT

## 2019-02-15 ASSESSMENT — ENCOUNTER SYMPTOMS
NERVOUS/ANXIOUS: 0
MYALGIAS: 0
NAUSEA: 0
JOINT SWELLING: 0
SHORTNESS OF BREATH: 0
WEAKNESS: 0
CHILLS: 0
DIZZINESS: 0
PARESTHESIAS: 0
HEMATOCHEZIA: 0
ABDOMINAL PAIN: 0
CONSTIPATION: 0
PALPITATIONS: 0
SORE THROAT: 0
DYSURIA: 0
ARTHRALGIAS: 1
FREQUENCY: 0
HEARTBURN: 0
COUGH: 0
DIARRHEA: 0
EYE PAIN: 0
HEADACHES: 0
HEMATURIA: 0
FEVER: 0

## 2019-02-15 ASSESSMENT — MIFFLIN-ST. JEOR: SCORE: 1535.27

## 2019-02-15 ASSESSMENT — ACTIVITIES OF DAILY LIVING (ADL): CURRENT_FUNCTION: NO ASSISTANCE NEEDED

## 2019-02-15 NOTE — PROGRESS NOTES
"SUBJECTIVE:   Iraida Hernandez is a 63 year old male who presents for Preventive Visit.  Are you in the first 12 months of your Medicare coverage?  Yes,  Visual Acuity:  Right Eye: 20/25   Left Eye: 20/12.5  Both Eyes: 20/12.5  History of cardiac asvd, arrest and cardiomyopathy   Annual Wellness Visit     In general, how would you rate your overall health?  Good    Frequency of exercise:  2-3 days/week    Do you usually eat at least 4 servings of fruit and vegetables a day, include whole grains    & fiber and avoid regularly eating high fat or \"junk\" foods?  No    Taking medications regularly:  Yes    Medication side effects:  None    Ability to successfully perform activities of daily living:  No assistance needed    Home Safety:  No safety concerns identified    Hearing Impairment:  No hearing concerns    In the past 6 months, have you been bothered by leaking of urine?  No    In general, how would you rate your overall mental or emotional health?  Excellent    PHQ-2 Total Score: 0    Additional concerns today:  Yes    Do you feel safe in your environment? Yes    Do you have a Health Care Directive? Yes: Patient states has Advance Directive and will bring in a copy to clinic.      Fall risk:no additional fall risk        Cognitive Screening   1) Repeat 3 items (Leader, Season, Table)    2) Clock draw: NORMAL  3) 3 item recall: Recalls 3 objects  Results: 3 items recalled: COGNITIVE IMPAIRMENT LESS LIKELY, NORMAL CLOCK    Mini-CogTM Copyright S [place]. Licensed by the author for use in Health system; reprinted with permission ([email or web address]@.[email or web address]). All rights reserved.      Do you have sleep apnea, excessive snoring or daytime drowsiness?: no    Reviewed and updated as needed this visit by clinical staff       He's always been high strung and tobacco helps to mitigate his stress   Reviewed and updated as needed this visit by Provider        Social History     Tobacco Use     Smoking status: Current Every Day " Smoker     Packs/day: 0.50     Years: 42.00     Pack years: 21.00     Last attempt to quit: 2014     Years since quittin.0     Smokeless tobacco: Never Used     Tobacco comment: 4-6 cigs per day   Substance Use Topics     Alcohol use: Yes     Comment: rare       Alcohol Use 2/15/2019   If you drink alcohol do you typically have greater than 3 drinks per day OR greater than 7 drinks per week? No               Current providers sharing in care for this patient include:   Patient Care Team:  Lawson Mahoney MD as PCP - General (Family Practice)  Juancarlos Stevens MD as MD (Cardiology)  Jose Roberto Echevarria MD as MD (Urology)    The following health maintenance items are reviewed in Epic and correct as of today:  Health Maintenance   Topic Date Due     HF ACTION PLAN Q3 YR  1955     PHQ-2 Q1 YR  1965     HIV SCREEN (SYSTEM ASSIGNED)  1971     HEPATITIS C SCREENING  1971     ZOSTER IMMUNIZATION (1 of 2) 2003     ADVANCE DIRECTIVE PLANNING Q5 YRS  2008     ASTHMA CONTROL TEST Q6 MOS  2014     ASTHMA ACTION PLAN Q1 YR  2014     DTAP/TDAP/TD IMMUNIZATION (2 - Td) 2015     COLON CANCER SCREEN (SYSTEM ASSIGNED)  2015     CBC Q1 YR  2015     FALL RISK ASSESSMENT  2018     AORTIC ANEURYSM SCREENING (SYSTEM ASSIGNED)  2018     BMP Q6 MOS  2018     INFLUENZA VACCINE (1) 2018     ALT Q1 YR  2019     LIPID MONITORING Q1 YEAR  2019     IPV IMMUNIZATION  Aged Out     MENINGITIS IMMUNIZATION  Aged Out     BP Readings from Last 3 Encounters:   02/15/19 118/77   18 99/65   17 104/70    Wt Readings from Last 3 Encounters:   02/15/19 72.6 kg (160 lb)   18 74.3 kg (163 lb 11.2 oz)   17 74.8 kg (165 lb)                  Pneumonia Vaccine:Adults age 65+ who received Pneumovax (PPSV23) at 65 years or older: Should be given PCV13 > 1 year after their most recent PPSV23    Review of Systems  "  Constitutional: Negative for chills and fever.   HENT: Negative for congestion, ear pain, hearing loss and sore throat.    Eyes: Negative for pain and visual disturbance.   Respiratory: Negative for cough and shortness of breath.    Cardiovascular: Negative for chest pain, palpitations and peripheral edema.   Gastrointestinal: Negative for abdominal pain, constipation, diarrhea, heartburn, hematochezia and nausea.   Genitourinary: Negative for discharge, dysuria, frequency, genital sores, hematuria, impotence and urgency.   Musculoskeletal: Positive for arthralgias. Negative for joint swelling and myalgias.   Skin: Negative for rash.   Neurological: Negative for dizziness, weakness, headaches and paresthesias.   Psychiatric/Behavioral: Negative for mood changes. The patient is not nervous/anxious.      Constitutional, HEENT, cardiovascular, pulmonary, GI, , musculoskeletal, neuro, skin, endocrine and psych systems are negative, except as otherwise noted.    OBJECTIVE:   /77 (BP Location: Right arm, Patient Position: Chair, Cuff Size: Adult Regular)   Pulse 72   Temp 97.9  F (36.6  C) (Oral)   Resp 12   Ht 1.807 m (5' 11.15\")   Wt 72.6 kg (160 lb)   SpO2 99%   BMI 22.22 kg/m      Physical Exam  GENERAL: healthy, alert and no distress  EYES: Eyes grossly normal to inspection, PERRL and conjunctivae and sclerae normal  HENT: ear canals and TM's normal, nose and mouth without ulcers or lesions  NECK: no adenopathy, no asymmetry, masses, or scars and thyroid normal to palpation  RESP: lungs clear to auscultation - no rales, rhonchi or wheezes  CV: regular rate and rhythm, normal S1 S2, no S3 or S4, no murmur, click or rub, no peripheral edema and peripheral pulses strong  ABDOMEN: soft, nontender, no hepatosplenomegaly, no masses and bowel sounds normal   (male): normal male genitalia without lesions or urethral discharge, no hernia  MS: no gross musculoskeletal defects noted, no edema  SKIN: no " suspicious lesions or rashes  NEURO: Normal strength and tone, mentation intact and speech normal  PSYCH: mentation appears normal, affect normal/bright    Diagnostic Test Results:  none     ASSESSMENT / PLAN:         (Z95.810) ICD (implantable cardioverter-defibrillator) in place  (primary encounter diagnosis)  Comment:   Plan: question of colonoscopy safety and choice of screening test:     (Z12.11) Screen for colon cancer  Comment: question of colonoscopy safety   Plan:     (Z11.59) Need for hepatitis C screening test  Comment:   Plan: Hepatitis C Screen Reflex to HCV RNA Quant and         Genotype      (Z11.4) Screening for HIV (human immunodeficiency virus)  Comment:   Plan: HIV Screening          (I46.9) Cardiac arrest (H)  Comment:   Plan: Comprehensive metabolic panel            (I70.90) ASVD (arteriosclerotic vascular disease)  Comment:   Plan: Hepatitis C Screen Reflex to HCV RNA Quant and         Genotype, Lipid panel reflex to direct LDL         Fasting            (M25.551) Hip pain, right  Comment:   Plan: Comprehensive metabolic panel            (M54.6) Acute right-sided thoracic back pain  Comment:   Plan:               End of Life Planning:  Patient currently has an advanced directive:     COUNSELING:  Reviewed preventive health counseling, as reflected in patient instructions       Regular exercise       Healthy diet/nutrition       Vision screening    BP Readings from Last 1 Encounters:   03/08/18 99/65     Estimated body mass index is 22.2 kg/m  as calculated from the following:    Height as of 3/8/18: 1.829 m (6').    Weight as of 3/8/18: 74.3 kg (163 lb 11.2 oz).  Knee Subjective:     recurrent acute knee pain.    Pain is in the right knee(s), and began 6 month(s) prior to presentation, and is slowly progressing since onset.  Pain is characterized as aching, and at worst is a 6 on a scale of 1-10.      Pain location: patellar.  Associated sx: able to bear weight.  Mechanism of injury:  none.  Pain exacerbated by: extended rest.    Meds used for pain relief: Tylenol 325 mg 1-3 tabs po every 4 hours as needed for pain or fever.  Patient denies prior hx of knee pain/injury.       Focused knee examination: full range of motion, no effusion, no laxity with Lachman's, varus or valgus stress, no joint line tenderness and negative Pauly's.    Knee Xrays: taken and show minimal djd .    Diagnosisi is patellofemoral pain  and Orthopedic evaluation is offered with alternatives including expectant management and Physical Therpy for /if recurrent pain  Right hip Subjective:         Symptoms began:   1 year(s) ago       Symptoms changing:  onset gradual and frequency intermittant and are worse.                  Location:  hip right region       Radiation to radiates into the right buttocks       At worst a 7 on a scale of 1-10.         Personal hx of back pain is:  recurrent self limited episodes of low back pain in the past.       Pain is exacerbated by: standing and walking.       Pain is relieved by: heat and rest.       Associated sx include: none.       Previous plain films obtained: No.        Results: today moderate djd??.       Red flag symptoms: negative.      He's tobacco dependent and smokes in spite of his heart issues      reports that he has been smoking.  He has a 21.00 pack-year smoking history. he has never used smokeless tobacco.      Appropriate preventive services were discussed with this patient, including applicable screening as appropriate for cardiovascular disease, diabetes, osteopenia/osteoporosis, and glaucoma.  As appropriate for age/gender, discussed screening for colorectal cancer, prostate cancer, breast cancer, and cervical cancer. Checklist reviewing preventive services available has been given to the patient.    Reviewed patients plan of care and provided an AVS. The Basic Care Plan (routine screening as documented in Health Maintenance) for Iraida meets the Care Plan  requirement. This Care Plan has been established and reviewed with the Patient.    Counseling Resources:  ATP IV Guidelines  Pooled Cohorts Equation Calculator  Breast Cancer Risk Calculator  FRAX Risk Assessment  ICSI Preventive Guidelines  Dietary Guidelines for Americans, 2010  USDA's MyPlate  ASA Prophylaxis  Lung CA Screening    Lawson Mahoney MD  [facility]

## 2019-02-15 NOTE — PATIENT INSTRUCTIONS
Preventive Health Recommendations:     See your health care provider every year to    Review health changes.     Discuss preventive care.      Review your medicines if your doctor has prescribed any.    Talk with your health care provider about whether you should have a test to screen for prostate cancer (PSA).    Every 3 years, have a diabetes test (fasting glucose). If you are at risk for diabetes, you should have this test more often.    Every 5 years, have a cholesterol test. Have this test more often if you are at risk for high cholesterol or heart disease.     Every 10 years, have a colonoscopy. Or, have a yearly FIT test (stool test). These exams will check for colon cancer.    Talk to with your health care provider about screening for Abdominal Aortic Aneurysm if you have a family history of AAA or have a history of smoking.  Shots:     Get a flu shot each year.     Get a tetanus shot every 10 years.     Talk to your doctor about your pneumonia vaccines. There are now two you should receive - Pneumovax (PPSV 23) and Prevnar (PCV 13).    Talk to your pharmacist about a shingles vaccine.     Talk to your doctor about the hepatitis B vaccine.  Nutrition:     Eat at least 5 servings of fruits and vegetables each day.     Eat whole-grain bread, whole-wheat pasta and brown rice instead of white grains and rice.     Get adequate Calcium and Vitamin D.   Lifestyle    Exercise for at least 150 minutes a week (30 minutes a day, 5 days a week). This will help you control your weight and prevent disease.     Limit alcohol to one drink per day.     No smoking.     Wear sunscreen to prevent skin cancer.     See your dentist every six months for an exam and cleaning.     See your eye doctor every 1 to 2 years to screen for conditions such as glaucoma, macular degeneration and cataracts.    Personalized Prevention Plan  You are due for the preventive services outlined below.  Your care team is available to assist you in  scheduling these services.  If you have already completed any of these items, please share that information with your care team to update in your medical record.    Health Maintenance Due   Topic Date Due     Heart Failure Action Plan Reviewed Every 3 Years  1955     Depression Assessment 2 - yearly  03/04/1965     HIV SCREEN (SYSTEM ASSIGNED)  03/04/1971     Hepatitis C Screening  03/04/1971     Zoster (Shingles) Vaccine (1 of 2) 03/04/2003     Discuss Advance Directive Planning  03/04/2008     Asthma Control Test - every 6 months  06/02/2014     Asthma Action Plan - yearly  12/02/2014     Diptheria Tetanus Pertussis (DTAP/TDAP/TD) Vaccine (2 - Td) 04/06/2015     Colon Cancer Screening - every 10 years.  06/06/2015     Complete Blood Count Every Year  12/01/2015     FALL RISK ASSESSMENT  03/04/2018     AORTIC ANEURYSM SCREENING (SYSTEM ASSIGNED)  03/04/2018     Basic Metabolic Lab - every 6 months  08/23/2018     Flu Vaccine (1) 09/01/2018     Liver Monitoring Lab - yearly  02/23/2019     Cholesterol Lab - yearly  02/23/2019

## 2019-02-15 NOTE — LETTER
My Asthma Action Plan  Name: Iraida Hernandez   YOB: 1955  Date: 2/15/2019   My doctor: Lawson Mahoney MD   My clinic: [facility]        No episodes in five years , prn albuterol if needed in future  My Asthma Severity: relented   Avoid your asthma triggers: upper respiratory infections and dust mites               GREEN ZONE   Good Control    I feel good    No cough or wheeze    Can work, sleep and play without asthma symptoms       Take your asthma control medicine every day.     1. If exercise triggers your asthma, take your rescue medication    15 minutes before exercise or sports, and    During exercise if you have asthma symptoms  2. Spacer to use with inhaler: If you have a spacer, make sure to use it with your inhaler             YELLOW ZONE Getting Worse  I have ANY of these:    I do not feel good    Cough or wheeze    Chest feels tight    Wake up at night   1. Keep taking your Green Zone medications  2. Start taking your rescue medicine:    every 20 minutes for up to 1 hour. Then every 4 hours for 24-48 hours.  3. If you stay in the Yellow Zone for more than 12-24 hours, contact your doctor.  4. If you do not return to the Green Zone in 12-24 hours or you get worse, start taking your oral steroid medicine if prescribed by your provider.           RED ZONE Medical Alert - Get Help  I have ANY of these:    I feel awful    Medicine is not helping    Breathing getting harder    Trouble walking or talking    Nose opens wide to breathe       1. Take your rescue medicine NOW  2. If your provider has prescribed an oral steroid medicine, start taking it NOW  3. Call your doctor NOW  4. If you are still in the Red Zone after 20 minutes and you have not reached your doctor:    Take your rescue medicine again and    Call 911 or go to the emergency room right away    See your regular doctor within 2 weeks of an Emergency Room or Urgent Care visit for follow-up treatment.           Annual Reminders:  Meet with Asthma Educator,  Flu Shot in the Fall, consider Pneumonia Vaccination for patients with asthma (aged 19 and older).    Pharmacy:    [id number] [facility] [address] AT [facility] [address]  [facility] [place] - [address]  [id number] - [place], [place] - [address]                      Asthma Triggers  How To Control Things That Make Your Asthma Worse    Triggers are things that make your asthma worse.  Look at the list below to help you find your triggers and what you can do about them.  You can help prevent asthma flare-ups by staying away from your triggers.      Trigger                                                          What you can do   Cigarette Smoke  Tobacco smoke can make asthma worse. Do not allow smoking in your home, car or around you.  Be sure no one smokes at a child s day care or school.  If you smoke, ask your health care provider for ways to help you quit.  Ask family members to quit too.  Ask your health care provider for a referral to Quit Plan to help you quit smoking, or call 6-927-615-PLAN.     Colds, Flu, Bronchitis  These are common triggers of asthma. Wash your hands often.  Don t touch your eyes, nose or mouth.  Get a flu shot every year.     Dust Mites  These are tiny bugs that live in cloth or carpet. They are too small to see. Wash sheets and blankets in hot water every week.   Encase pillows and mattress in dust mite proof covers.  Avoid having carpet if you can. If you have carpet, vacuum weekly.   Use a dust mask and HEPA vacuum.   Pollen and Outdoor Mold  Some people are allergic to trees, grass, or weed pollen, or molds. Try to keep your windows closed.  Limit time out doors when pollen count is high.   Ask you health care provider about taking medicine during allergy season.     Animal Dander  Some people are allergic to skin flakes, urine or saliva from pets with  fur or feathers. Keep pets with fur or feathers out of your home.    If you can t keep the pet outdoors, then keep the pet out of your bedroom.  Keep the bedroom door closed.  Keep pets off cloth furniture and away from stuffed toys.     Mice, Rats, and Cockroaches  Some people are allergic to the waste from these pests.   Cover food and garbage.  Clean up spills and food crumbs.  Store grease in the refrigerator.   Keep food out of the bedroom.   Indoor Mold  This can be a trigger if your home has high moisture. Fix leaking faucets, pipes, or other sources of water.   Clean moldy surfaces.  Dehumidify basement if it is damp and smelly.   Smoke, Strong Odors, and Sprays  These can reduce air quality. Stay away from strong odors and sprays, such as perfume, powder, hair spray, paints, smoke incense, paint, cleaning products, candles and new carpet.   Exercise or Sports  Some people with asthma have this trigger. Be active!  Ask your doctor about taking medicine before sports or exercise to prevent symptoms.    Warm up for 5-10 minutes before and after sports or exercise.     Other Triggers of Asthma  Cold air:  Cover your nose and mouth with a scarf.  Sometimes laughing or crying can be a trigger.  Some medicines and food can trigger asthma.

## 2019-02-16 LAB
HCV AB SERPL QL IA: NONREACTIVE
HIV 1+2 AB+HIV1 P24 AG SERPL QL IA: NONREACTIVE

## 2019-02-16 ASSESSMENT — ASTHMA QUESTIONNAIRES: ACT_TOTALSCORE: 25

## 2019-02-18 DIAGNOSIS — I21.02 ACUTE ST ELEVATION MYOCARDIAL INFARCTION (STEMI) INVOLVING LEFT ANTERIOR DESCENDING (LAD) CORONARY ARTERY (H): ICD-10-CM

## 2019-02-18 RX ORDER — ATORVASTATIN CALCIUM 40 MG/1
40 TABLET, FILM COATED ORAL DAILY
Qty: 90 TABLET | Refills: 0 | Status: SHIPPED | OUTPATIENT
Start: 2019-02-18 | End: 2019-05-16

## 2019-02-22 ENCOUNTER — DOCUMENTATION ONLY (OUTPATIENT)
Dept: OTHER | Facility: CLINIC | Age: 64
End: 2019-02-22

## 2019-02-22 NOTE — PROGRESS NOTES
ASSESSMENT & PLAN  Patient Instructions     1. Muscle strain of right gluteal region, initial encounter    2. Chronic right-sided low back pain, with sciatica presence unspecified    3. Other idiopathic scoliosis, lumbar region    4. Lumbar degenerative disc disease    5. Lumbar spondylosis    6. Primary osteoarthritis of right hip      -Patient has right-sided hip pain due to multiple issues.  Patient's main pain generator is right gluteal strain and tendinitis.  Patient also has lumbar scoliosis, degenerative disc disease and arthritis which is contributing to his pain at times.  Patient also has severe right hip bone-on-bone osteoarthritis.  -Patient has many questions which were all addressed and answered.  Patient has numerous chronic injuries that were examined and taken into account  -Patient will start formal physical therapy and home exercise program.  -Patient will continue to play golf as tolerated.  -Patient will follow-up in 4-8 weeks when he is in town for reevaluation and progression of activity.  -Call direct clinic number [[phone]] at any time with questions or concerns.    Albert Yeo MD [facility]  [facility]          -----    SUBJECTIVE  Iraida Hernandez is a/an 63 year old male who is seen in consultation at the request of  Lawson Mahoney M.D. for evaluation of right hip pain. The patient is seen by themselves.    Onset: 2 years(s) ago. Reports insidious onset without acute precipitating event.  Location of Pain: right lateral hip and ischium with occasional radiation down the right leg  Rating of Pain at worst: 9/10  Rating of Pain Currently: 1/10  Worsened by: laying on right hip, walking while carrying 20-25 lbs, sore after playing golf for a few days , putting all his weight on the right leg  Better with: nothing in particular  Treatments tried: ice, heat, Tylenol (sparingly), massage and shoe inserts  Quality: shooting  pricking, radiating pain down the right leg  Red flags: Weakness: No, bowel/bladder loss: No, foot drop: No  Associated symptoms: occasionally pain radiates to the anterior thigh and down the front of the leg to the foot  Orthopedic history:  anteromedial knee pain for the past year (gets random electrical sensations while walking)  Relevant surgical history: NO  Social history: social history: retired- enjoys golfing and is concerned that pain will limit ability to play    Past Medical History:   Diagnosis Date     Calculus of kidney      Cancer (H)     basel cell on nose     Cardiac arrest (H)     V-fib, 2014     Cardiomyopathy (H)      Coronary artery disease      Hyperlipidaemia      Hyperlipidemia with target LDL less than 70 10/31/2010     Diagnosis updated by automated process. Provider to review and confirm.     ICD (implantable cardiac defibrillator) in place     2014 EF 29%     Mild intermittent asthma      Myocardial infarction (H) 2014     Anterior infarct     Tobacco dependence      Social History     Socioeconomic History     Marital status:      Spouse name: Jessica     Number of children: 2     Years of education: Not on file     Highest education level: Not on file   Occupational History     Occupation:      Employer: [employer]     Comment: [employer]   Social Needs     Financial resource strain: Not on file     Food insecurity:     Worry: Not on file     Inability: Not on file     Transportation needs:     Medical: Not on file     Non-medical: Not on file   Tobacco Use     Smoking status: Current Every Day Smoker     Packs/day: 0.25     Years: 42.00     Pack years: 10.50     Last attempt to quit: 2014     Years since quittin.0     Smokeless tobacco: Never Used     Tobacco comment: 4-6 cigs per day   Substance and Sexual Activity     Alcohol use: Yes     Comment: rare     Drug use: No     Sexual activity: Yes     Partners: Female   Lifestyle      "Physical activity:     Days per week: Not on file     Minutes per session: Not on file     Stress: Not on file   Relationships     Social connections:     Talks on phone: Not on file     Gets together: Not on file     Attends [employer] service: Not on file     Active member of club or organization: Not on file     Attends meetings of clubs or organizations: Not on file     Relationship status: Not on file     Intimate partner violence:     Fear of current or ex partner: Not on file     Emotionally abused: Not on file     Physically abused: Not on file     Forced sexual activity: Not on file   Other Topics Concern      Service No     Blood Transfusions No     Caffeine Concern No     Comment: soda occasionally      Occupational Exposure No     Hobby Hazards No     Sleep Concern No     Stress Concern No     Weight Concern No     Special Diet Yes     Comment: eats healthy      Back Care No     Exercise Yes     Comment: golf 2x/week, tredmill daily     Bike Helmet Not Asked     Comment: NA     Seat Belt Yes     Self-Exams Yes     Parent/sibling w/ CABG, MI or angioplasty before 65F 55M? Not Asked   Social History Narrative     Not on file         Patient's past medical, surgical, social, and family histories were reviewed today and no changes are noted.    REVIEW OF SYSTEMS:  10 point ROS is negative other than symptoms noted above in HPI, Past Medical History or as stated below  Constitutional: NEGATIVE for fever, chills, change in weight  Skin: NEGATIVE for worrisome rashes, moles or lesions  GI/: NEGATIVE for bowel or bladder changes  Neuro: NEGATIVE for weakness, dizziness or paresthesias    OBJECTIVE:  /74   Ht 1.81 m (5' 11.25\")   Wt 72.6 kg (160 lb)   BMI 22.16 kg/m     General: healthy, alert and in no distress  HEENT: no scleral icterus or conjunctival erythema  Skin: no suspicious lesions or rash. No jaundice.  CV: no pedal edema  Resp: normal respiratory effort without conversational dyspnea "   Psych: normal mood and affect  Gait: normal steady gait with appropriate coordination and balance  Neuro: normal light touch sensory exam of the bilateral lower extremities.  DTR's 2+ patella and achilles bilaterally.  MSK:  THORACIC/LUMBAR SPINE  Inspection:    No gross deformity/asymmetry  Palpation:    Tender about the right gluteal musculature. Otherwise remainder of landmarks are nontender.  Range of Motion:     Lumbar flexion limited by tightness    Lumbar extension limited by tightness  Strength:    Full strength throughout hips/quads/hamstrings  Special Tests:    Positive: none    Negative: straight leg raise (bilateral), slump test (bilateral), femoral stretch test (bilateral)    RIGHT HIP  Inspection:    No obvious deformity or asymmetry, pelvis level  Palpation:    Tender about the gluteus medius insertion and gluteal muscles. Otherwise all other landmarks are nontender.  Active Range of Motion:     Flexion limited by tightness, IR limited by tightness, ER  limited by tightness  Strength:    Flexion 5-/5, adduction 5-/5, abduction 5-/5  Special Tests:    Positive: resisted gluteus medius provocation    Negative: Logroll, THEODORE, anterior impingement (FADIR), posterior impingement (EX/AB/ER)    Independent visualization of the below image:  Recent Results (from the past 24 hour(s))   XR Lumbar Spine 2/3 Views    Narrative    LUMBAR SPINE TWO TO THREE VIEWS   2/25/2019 1:52 PM     HISTORY:  Chronic right-sided low back pain, with sciatica presence  unspecified.      Impression    IMPRESSION: Degenerative disc disease changes at each level from L1 to  L5, most prominent on the right at L2-L3. Mild left lateral listhesis  at L3-L4. Mild levoscoliosis. There appears to be facet arthropathy at  L5-S1. Cardiac pacer wiring. Prominent right and moderate-prominent  left hip arthropathy    YUMIKO SMITH MD     XR Hip Right 2-3 Views    Narrative    RIGHT HIP TWO VIEWS  2/15/2019 10:50 AM     HISTORY:  Hip pain,  right.    COMPARISON: None.      Impression    IMPRESSION:  There are severe degenerative changes of the right hip  with bone-on-bone articulation at the superior aspect of the joint. No  fracture or dislocation. Arterial calcifications noted.    YANY TSAI MD         Patient's conditions were thoroughly discussed during today's visit with greater than 50% of the visit spent counseling the patient with total time spent face-to-face with the patient being 45 minutes.    Albert Yeo MD [facility]

## 2019-02-25 ENCOUNTER — ANCILLARY PROCEDURE (OUTPATIENT)
Dept: GENERAL RADIOLOGY | Facility: CLINIC | Age: 64
End: 2019-02-25
Attending: FAMILY MEDICINE
Payer: MEDICARE

## 2019-02-25 ENCOUNTER — OFFICE VISIT (OUTPATIENT)
Dept: ORTHOPEDICS | Facility: CLINIC | Age: 64
End: 2019-02-25
Payer: MEDICARE

## 2019-02-25 VITALS
SYSTOLIC BLOOD PRESSURE: 114 MMHG | HEIGHT: 71 IN | BODY MASS INDEX: 22.4 KG/M2 | DIASTOLIC BLOOD PRESSURE: 74 MMHG | WEIGHT: 160 LBS

## 2019-02-25 DIAGNOSIS — G89.29 CHRONIC RIGHT-SIDED LOW BACK PAIN, WITH SCIATICA PRESENCE UNSPECIFIED: ICD-10-CM

## 2019-02-25 DIAGNOSIS — M54.5 CHRONIC RIGHT-SIDED LOW BACK PAIN, WITH SCIATICA PRESENCE UNSPECIFIED: ICD-10-CM

## 2019-02-25 DIAGNOSIS — S76.011A MUSCLE STRAIN OF RIGHT GLUTEAL REGION, INITIAL ENCOUNTER: Primary | ICD-10-CM

## 2019-02-25 DIAGNOSIS — M51.369 LUMBAR DEGENERATIVE DISC DISEASE: ICD-10-CM

## 2019-02-25 DIAGNOSIS — M47.816 LUMBAR SPONDYLOSIS: ICD-10-CM

## 2019-02-25 DIAGNOSIS — M41.26 OTHER IDIOPATHIC SCOLIOSIS, LUMBAR REGION: ICD-10-CM

## 2019-02-25 DIAGNOSIS — M16.11 PRIMARY OSTEOARTHRITIS OF RIGHT HIP: ICD-10-CM

## 2019-02-25 PROCEDURE — 99204 OFFICE O/P NEW MOD 45 MIN: CPT | Performed by: FAMILY MEDICINE

## 2019-02-25 PROCEDURE — 72100 X-RAY EXAM L-S SPINE 2/3 VWS: CPT

## 2019-02-25 ASSESSMENT — MIFFLIN-ST. JEOR: SCORE: 1536.85

## 2019-02-25 NOTE — LETTER
2/25/2019         RE: Iraida Hernandez  [address] [facility] [address]        Dear Colleague,    Thank you for referring your patient, Iraida Hernandez, to the [facility]. Please see a copy of my visit note below.    ASSESSMENT & PLAN  Patient Instructions     1. Muscle strain of right gluteal region, initial encounter    2. Chronic right-sided low back pain, with sciatica presence unspecified    3. Other idiopathic scoliosis, lumbar region    4. Lumbar degenerative disc disease    5. Lumbar spondylosis    6. Primary osteoarthritis of right hip      -Patient has right-sided hip pain due to multiple issues.  Patient's main pain generator is right gluteal strain and tendinitis.  Patient also has lumbar scoliosis, degenerative disc disease and arthritis which is contributing to his pain at times.  Patient also has severe right hip bone-on-bone osteoarthritis.  -Patient has many questions which were all addressed and answered.  Patient has numerous chronic injuries that were examined and taken into account  -Patient will start formal physical therapy and home exercise program.  -Patient will continue to play golf as tolerated.  -Patient will follow-up in 4-8 weeks when he is in town for reevaluation and progression of activity.  -Call direct clinic number [[phone]] at any time with questions or concerns.    Albert Yeo MD [facility]  [facility] [place]          -----    SUBJECTIVE  Iraida Hernandez is a/an 63 year old male who is seen in consultation at the request of  Lawson Mahoney M.D. for evaluation of right hip pain. The patient is seen by themselves.    Onset: 2 years(s) ago. Reports insidious onset without acute precipitating event.  Location of Pain: right lateral hip and ischium with occasional radiation down the right leg  Rating of Pain at worst: 9/10  Rating of Pain Currently: 1/10  Worsened by: laying on right hip,  walking while carrying 20-25 lbs, sore after playing golf for a few days , putting all his weight on the right leg  Better with: nothing in particular  Treatments tried: ice, heat, Tylenol (sparingly), massage and shoe inserts  Quality: shooting pricking, radiating pain down the right leg  Red flags: Weakness: No, bowel/bladder loss: No, foot drop: No  Associated symptoms: occasionally pain radiates to the anterior thigh and down the front of the leg to the foot  Orthopedic history:  anteromedial knee pain for the past year (gets random electrical sensations while walking)  Relevant surgical history: NO  Social history: social history: retired- enjoys golfing and is concerned that pain will limit ability to play    Past Medical History:   Diagnosis Date     Calculus of kidney      Cancer (H)     basel cell on nose     Cardiac arrest (H)     V-fib, 2/16/2014     Cardiomyopathy (H)      Coronary artery disease      Hyperlipidaemia      Hyperlipidemia with target LDL less than 70 10/31/2010     Diagnosis updated by automated process. Provider to review and confirm.     ICD (implantable cardiac defibrillator) in place     12-1-2014 EF 29%     Mild intermittent asthma      Myocardial infarction (H) 2/2014     Anterior infarct     Tobacco dependence      Social History     Socioeconomic History     Marital status:      Spouse name: Jessica     Number of children: 2     Years of education: Not on file     Highest education level: Not on file   Occupational History     Occupation:      Employer: [employer]     Comment: [employer]   Social Needs     Financial resource strain: Not on file     Food insecurity:     Worry: Not on file     Inability: Not on file     Transportation needs:     Medical: Not on file     Non-medical: Not on file   Tobacco Use     Smoking status: Current Every Day Smoker     Packs/day: 0.25     Years: 42.00     Pack years: 10.50     Last attempt to quit: 2/16/2014      "Years since quittin.0     Smokeless tobacco: Never Used     Tobacco comment: 4-6 cigs per day   Substance and Sexual Activity     Alcohol use: Yes     Comment: rare     Drug use: No     Sexual activity: Yes     Partners: Female   Lifestyle     Physical activity:     Days per week: Not on file     Minutes per session: Not on file     Stress: Not on file   Relationships     Social connections:     Talks on phone: Not on file     Gets together: Not on file     Attends Worship service: Not on file     Active member of club or organization: Not on file     Attends meetings of clubs or organizations: Not on file     Relationship status: Not on file     Intimate partner violence:     Fear of current or ex partner: Not on file     Emotionally abused: Not on file     Physically abused: Not on file     Forced sexual activity: Not on file   Other Topics Concern      Service No     Blood Transfusions No     Caffeine Concern No     Comment: soda occasionally      Occupational Exposure No     Hobby Hazards No     Sleep Concern No     Stress Concern No     Weight Concern No     Special Diet Yes     Comment: eats healthy      Back Care No     Exercise Yes     Comment: golf 2x/week, tredmill daily     Bike Helmet Not Asked     Comment: NA     Seat Belt Yes     Self-Exams Yes     Parent/sibling w/ CABG, MI or angioplasty before 65F 55M? Not Asked   Social History Narrative     Not on file         Patient's past medical, surgical, social, and family histories were reviewed today and no changes are noted.    REVIEW OF SYSTEMS:  10 point ROS is negative other than symptoms noted above in HPI, Past Medical History or as stated below  Constitutional: NEGATIVE for fever, chills, change in weight  Skin: NEGATIVE for worrisome rashes, moles or lesions  GI/: NEGATIVE for bowel or bladder changes  Neuro: NEGATIVE for weakness, dizziness or paresthesias    OBJECTIVE:  /74   Ht 1.81 m (5' 11.25\")   Wt 72.6 kg (160 lb)   " BMI 22.16 kg/m      General: healthy, alert and in no distress  HEENT: no scleral icterus or conjunctival erythema  Skin: no suspicious lesions or rash. No jaundice.  CV: no pedal edema  Resp: normal respiratory effort without conversational dyspnea   Psych: normal mood and affect  Gait: normal steady gait with appropriate coordination and balance  Neuro: normal light touch sensory exam of the bilateral lower extremities.  DTR's 2+ patella and achilles bilaterally.  MSK:  THORACIC/LUMBAR SPINE  Inspection:    No gross deformity/asymmetry  Palpation:    Tender about the right gluteal musculature. Otherwise remainder of landmarks are nontender.  Range of Motion:     Lumbar flexion limited by tightness    Lumbar extension limited by tightness  Strength:    Full strength throughout hips/quads/hamstrings  Special Tests:    Positive: none    Negative: straight leg raise (bilateral), slump test (bilateral), femoral stretch test (bilateral)    RIGHT HIP  Inspection:    No obvious deformity or asymmetry, pelvis level  Palpation:    Tender about the gluteus medius insertion and gluteal muscles. Otherwise all other landmarks are nontender.  Active Range of Motion:     Flexion limited by tightness, IR limited by tightness, ER  limited by tightness  Strength:    Flexion 5-/5, adduction 5-/5, abduction 5-/5  Special Tests:    Positive: resisted gluteus medius provocation    Negative: Logroll, THEODORE, anterior impingement (FADIR), posterior impingement (EX/AB/ER)    Independent visualization of the below image:  Recent Results (from the past 24 hour(s))   XR Lumbar Spine 2/3 Views    Narrative    LUMBAR SPINE TWO TO THREE VIEWS   2/25/2019 1:52 PM     HISTORY:  Chronic right-sided low back pain, with sciatica presence  unspecified.      Impression    IMPRESSION: Degenerative disc disease changes at each level from L1 to  L5, most prominent on the right at L2-L3. Mild left lateral listhesis  at L3-L4. Mild levoscoliosis. There  appears to be facet arthropathy at  L5-S1. Cardiac pacer wiring. Prominent right and moderate-prominent  left hip arthropathy    YUMIKO SMITH MD     XR Hip Right 2-3 Views    Narrative    RIGHT HIP TWO VIEWS  2/15/2019 10:50 AM     HISTORY:  Hip pain, right.    COMPARISON: None.      Impression    IMPRESSION:  There are severe degenerative changes of the right hip  with bone-on-bone articulation at the superior aspect of the joint. No  fracture or dislocation. Arterial calcifications noted.    YANY TSAI MD         Patient's conditions were thoroughly discussed during today's visit with greater than 50% of the visit spent counseling the patient with total time spent face-to-face with the patient being 45 minutes.    Albert Yeo MD [facility]      Again, thank you for allowing me to participate in the care of your patient.        Sincerely,        Albert Yeo, MD

## 2019-02-25 NOTE — PATIENT INSTRUCTIONS
1. Muscle strain of right gluteal region, initial encounter    2. Chronic right-sided low back pain, with sciatica presence unspecified    3. Other idiopathic scoliosis, lumbar region    4. Lumbar degenerative disc disease    5. Lumbar spondylosis    6. Primary osteoarthritis of right hip      -Patient has right-sided hip pain due to multiple issues.  Patient's main pain generator is right gluteal strain and tendinitis.  Patient also has lumbar scoliosis, degenerative disc disease and arthritis which is contributing to his pain at times.  Patient also has severe right hip bone-on-bone osteoarthritis.  -Patient has many questions which were all addressed and answered.  Patient has numerous chronic injuries that were examined and taken into account  -Patient will start formal physical therapy and home exercise program.  -Patient will continue to play golf as tolerated.  -Patient will follow-up in 4-8 weeks when he is in town for reevaluation and progression of activity.  -Call direct clinic number [[phone]] at any time with questions or concerns.    Albert Yeo MD [facility]  [facility] [place]

## 2019-03-06 ENCOUNTER — ANCILLARY PROCEDURE (OUTPATIENT)
Dept: CARDIOLOGY | Facility: CLINIC | Age: 64
End: 2019-03-06
Payer: MEDICARE

## 2019-03-06 DIAGNOSIS — R97.20 ELEVATED PROSTATE SPECIFIC ANTIGEN (PSA): Primary | ICD-10-CM

## 2019-03-06 DIAGNOSIS — I25.5 ISCHEMIC CARDIOMYOPATHY: ICD-10-CM

## 2019-03-06 PROCEDURE — 93295 DEV INTERROG REMOTE 1/2/MLT: CPT | Performed by: INTERNAL MEDICINE

## 2019-03-06 PROCEDURE — 93296 REM INTERROG EVL PM/IDS: CPT | Performed by: INTERNAL MEDICINE

## 2019-03-08 ENCOUNTER — OFFICE VISIT (OUTPATIENT)
Dept: UROLOGY | Facility: CLINIC | Age: 64
End: 2019-03-08
Payer: MEDICARE

## 2019-03-08 VITALS
SYSTOLIC BLOOD PRESSURE: 110 MMHG | OXYGEN SATURATION: 97 % | BODY MASS INDEX: 21.67 KG/M2 | WEIGHT: 160 LBS | DIASTOLIC BLOOD PRESSURE: 76 MMHG | HEART RATE: 76 BPM | HEIGHT: 72 IN

## 2019-03-08 DIAGNOSIS — R97.20 ELEVATED PROSTATE SPECIFIC ANTIGEN (PSA): ICD-10-CM

## 2019-03-08 DIAGNOSIS — Z79.2 PROPHYLACTIC ANTIBIOTIC: Primary | ICD-10-CM

## 2019-03-08 LAB
ALBUMIN UR-MCNC: NEGATIVE MG/DL
APPEARANCE UR: CLEAR
BILIRUB UR QL STRIP: NEGATIVE
COLOR UR AUTO: YELLOW
GLUCOSE UR STRIP-MCNC: NEGATIVE MG/DL
HGB UR QL STRIP: ABNORMAL
KETONES UR STRIP-MCNC: NEGATIVE MG/DL
LEUKOCYTE ESTERASE UR QL STRIP: NEGATIVE
NITRATE UR QL: NEGATIVE
PH UR STRIP: 6.5 PH (ref 5–7)
SOURCE: ABNORMAL
SP GR UR STRIP: 1.02 (ref 1–1.03)
UROBILINOGEN UR STRIP-ACNC: 0.2 EU/DL (ref 0.2–1)

## 2019-03-08 PROCEDURE — 99203 OFFICE O/P NEW LOW 30 MIN: CPT | Performed by: UROLOGY

## 2019-03-08 PROCEDURE — 81003 URINALYSIS AUTO W/O SCOPE: CPT | Performed by: UROLOGY

## 2019-03-08 RX ORDER — CIPROFLOXACIN 500 MG/1
500 TABLET, FILM COATED ORAL 2 TIMES DAILY
Qty: 6 TABLET | Refills: 0 | Status: SHIPPED | OUTPATIENT
Start: 2019-03-08 | End: 2019-03-11

## 2019-03-08 ASSESSMENT — PAIN SCALES - GENERAL: PAINLEVEL: NO PAIN (0)

## 2019-03-08 ASSESSMENT — MIFFLIN-ST. JEOR: SCORE: 1535.82

## 2019-03-08 NOTE — PATIENT INSTRUCTIONS
Ultrasound Guided Prostate Biopsy    What is a prostate biopsy? A prostate biopsy is a relatively painless procedure performed in the physician's office, outpatient facility or hospital.  A long, thin needle is inserted into the prostate to collect a sample of tissue from the prostate.  These samples are then examined by a pathologist for abnormal cells.    Why do I need a prostate biopsy? During a man's lifetime the prostate gradually increases in size.  The patient may or may not experience symptoms.  Symptoms of an enlarged prostate include:    Increased frequency of urination    Decreased force of urinary stream    Trouble with urination    Awakening at night to urinate    When the prostate is examined, the physician may feel a nodule (hard or firm growth) which would require a biopsy.    Another reason for having a prostate biopsy is an increase in the prostate specific androgen (PSA) in your blood.  A prostate biopsy may be necessary to determine the cause of the increased PSA.    Your physician will also perform an ultrasound (an image created by sound waves).  The ultrasound is performed by placing a small probe in the rectum to image the prostate gland.    Preparation for the Prostate Biopsy    1. Blood thinning medications must be stopped prior to your biopsy.  Please stop the following medication the appropriate number of days before:  a. 10 days-acetylsalicylic acid, vitamin E, fish oil, aspirin, baby aspirin  b. 7 days- Plavix, Brilinta, ibuprofen (Advil, Motrin, Aleve)  c. 5 days-Pradaxa  d. 4 days-Coumadin/warfarin  e. 3 days-Eliquis, Xarelto    2.  If you have any bleeding problems (thin blood), please tell your doctor.    3.  If you have been told by another doctor to take antibiotics before dental work or surgery, please tell the doctor.  This is common for patients that have had a joint replacement.    YOU HAVE BEEN PRESCRIBED AN ANTIBIOTIC.  Please follow the directions written on the bottle.   The directions usually will tell you to start the antibiotic the day before your biopsy.  You will continue taking the medication until it is gone.    The day of the biopsy you will be asked to use an enema one hour before you leave for your appointment.    Unless you have been prescribed a sedative (Valium or a similar drug) you will be able to drive to and from your appointment.  If you have taken a sedative you must have a ride.    AFTER THE BIOPSY    DANGER SIGNS    Small amount of blood in the urine for 10-14 days Excessive blood in the urine, stool or ejaculate  Small amount of blood in the stool for 48 hours Fever over 100 or chills  Small amount of blood in the ejaculate for up to Frequent urination or burning when you urinate   4 weeks          CALL THE DOCTOR'S OFFICE -[phone] IF YOU HAVE ANY OF THESE SYMPTOMS.  IF YOU CANNOT CONTACT THE OFFICE, GO TO THE EMERGENCY ROOM.          Your biopsy is scheduled on_______3/29/19________________________ at our [place] office.  Please be at the office at    _____1:45_______________.  A follow up visit has been scheduled for you on ___4/26/19_________________@______11:00_________     at the  _______[place]_______________________office. Your doctor will discuss your results with you at this visit

## 2019-03-08 NOTE — LETTER
RE: Iraida Hernandez  [address] [facility] [address]     Dear Colleague,    Thank you for referring your patient, Iraida Hernandez, to the [facility] at [facility]. Please see a copy of my visit note below.    Saravanan Hernandez is a very pleasant 64-year-old male with a history of significant heart disease and now a PSA of 9.79.  In 2012 and 13 he also had elevated PSAs that were never evaluated because he was going through heart problems  There is no family history of prostate cancer  The patient is having no voiding problems, dysuria or hematuria  Other past medical history: Remote history of urolithiasis, basal cell carcinoma of skin, cardiac arrest, cardiomyopathy, coronary artery disease, hyperlipidemia, ICD, intermittent asthma, tobacco use.  Emergent thrombectomy, angioplasty with stents, herniorrhaphy  Medications: Aspirin, Plavix, Lipitor, Coreg, lisinopril, nitroglycerin, Spironolactone  Allergies: Penicillin, tetracycline  Urinalysis: No sample  Exam: Alert and oriented, normal appearance, normocephalic, normal respirations, neuro grossly intact, normal vital signs.  Normal sphincter tone, no rectal mass or impaction, benign feeling prostate about 30 cc at the most, normal seminal vesicles  Assessment: Elevated PSA with small prostate-very concerned about prostate cancer  With ICD device he is not a candidate for MRI.  We will schedule transrectal ultrasound of prostate with biopsies but he will need to be off aspirin 10 days prior and Plavix 7 days prior-will make sure this is okay with his cardiologist, Dr. Stevens  Greater than 30 minutes total time with more than 50% counseling    Again, thank you for allowing me to participate in the care of your patient.      Sincerely,    Romain Weiss MD

## 2019-03-08 NOTE — PROGRESS NOTES
Saravanan Hernandez is a very pleasant 64-year-old male with a history of significant heart disease and now a PSA of 9.79.  In 2012 and 13 he also had elevated PSAs that were never evaluated because he was going through heart problems  There is no family history of prostate cancer  The patient is having no voiding problems, dysuria or hematuria  Other past medical history: Remote history of urolithiasis, basal cell carcinoma of skin, cardiac arrest, cardiomyopathy, coronary artery disease, hyperlipidemia, ICD, intermittent asthma, tobacco use.  Emergent thrombectomy, angioplasty with stents, herniorrhaphy  Medications: Aspirin, Plavix, Lipitor, Coreg, lisinopril, nitroglycerin, Spironolactone  Allergies: Penicillin, tetracycline  Urinalysis: No sample  Exam: Alert and oriented, normal appearance, normocephalic, normal respirations, neuro grossly intact, normal vital signs.  Normal sphincter tone, no rectal mass or impaction, benign feeling prostate about 30 cc at the most, normal seminal vesicles  Assessment: Elevated PSA with small prostate-very concerned about prostate cancer  With ICD device he is not a candidate for MRI.  We will schedule transrectal ultrasound of prostate with biopsies but he will need to be off aspirin 10 days prior and Plavix 7 days prior-will make sure this is okay with his cardiologist, Dr. Stevens  Greater than 30 minutes total time with more than 50% counseling

## 2019-03-08 NOTE — NURSING NOTE
Chief Complaint   Patient presents with     Clinic Care Coordination - Follow-up     Pt here for elevated PSA     Alta Jensen CMA

## 2019-03-11 ENCOUNTER — THERAPY VISIT (OUTPATIENT)
Dept: PHYSICAL THERAPY | Facility: CLINIC | Age: 64
End: 2019-03-11
Attending: FAMILY MEDICINE
Payer: MEDICARE

## 2019-03-11 DIAGNOSIS — M16.11 PRIMARY OSTEOARTHRITIS OF RIGHT HIP: ICD-10-CM

## 2019-03-11 DIAGNOSIS — S76.011A MUSCLE STRAIN OF RIGHT GLUTEAL REGION, INITIAL ENCOUNTER: ICD-10-CM

## 2019-03-11 DIAGNOSIS — M25.551 HIP PAIN, RIGHT: ICD-10-CM

## 2019-03-11 PROCEDURE — 97110 THERAPEUTIC EXERCISES: CPT | Mod: GP | Performed by: PHYSICAL THERAPIST

## 2019-03-11 PROCEDURE — G8979 MOBILITY GOAL STATUS: HCPCS | Mod: GP | Performed by: PHYSICAL THERAPIST

## 2019-03-11 PROCEDURE — 97161 PT EVAL LOW COMPLEX 20 MIN: CPT | Mod: GP | Performed by: PHYSICAL THERAPIST

## 2019-03-11 PROCEDURE — G8978 MOBILITY CURRENT STATUS: HCPCS | Mod: GP | Performed by: PHYSICAL THERAPIST

## 2019-03-11 ASSESSMENT — ACTIVITIES OF DAILY LIVING (ADL)
STEPPING_UP_AND_DOWN_CURBS: NO DIFFICULTY AT ALL
GOING_UP_1_FLIGHT_OF_STAIRS: NO DIFFICULTY AT ALL
GETTING_INTO_AND_OUT_OF_A_BATHTUB: SLIGHT DIFFICULTY
PUTTING_ON_SOCKS_AND_SHOES: SLIGHT DIFFICULTY
ROLLING_OVER_IN_BED: SLIGHT DIFFICULTY
WALKING_INITIALLY: SLIGHT DIFFICULTY
WALKING_UP_STEEP_HILLS: MODERATE DIFFICULTY
WALKING_15_MINUTES_OR_GREATER: SLIGHT DIFFICULTY
GETTING_INTO_AND_OUT_OF_AN_AVERAGE_CAR: SLIGHT DIFFICULTY
SITTING_FOR_15_MINUTES: NO DIFFICULTY AT ALL
DEEP_SQUATTING: SLIGHT DIFFICULTY
HOW_WOULD_YOU_RATE_YOUR_CURRENT_LEVEL_OF_FUNCTION_DURING_YOUR_USUAL_ACTIVITIES_OF_DAILY_LIVING_FROM_0_TO_100_WITH_100_BEING_YOUR_LEVEL_OF_FUNCTION_PRIOR_TO_YOUR_HIP_PROBLEM_AND_0_BEING_THE_INABILITY_TO_PERFORM_ANY_OF_YOUR_USUAL_DAILY_ACTIVITIES?: 70
RECREATIONAL_ACTIVITIES: MODERATE DIFFICULTY
TWISTING/PIVOTING_ON_INVOLVED_LEG: SLIGHT DIFFICULTY
HEAVY_WORK: MODERATE DIFFICULTY
WALKING_DOWN_STEEP_HILLS: NO DIFFICULTY AT ALL
GOING_DOWN_1_FLIGHT_OF_STAIRS: NO DIFFICULTY AT ALL
LIGHT_TO_MODERATE_WORK: SLIGHT DIFFICULTY
WALKING_APPROXIMATELY_10_MINUTES: SLIGHT DIFFICULTY

## 2019-03-11 NOTE — PROGRESS NOTES
[facility] Initial Evaluation  Iraida Hernandez is a 64 year old  male referred to physical therapy by Dr. Yeo for treatment of right hip pain with Precautions/Restrictions/MD instructions eval and treat     Physical Therapy Initial Evaluation: Subjective History      Injury/Condition Details:  Presenting Complaint Right hip pain   Onset Timing/Date March 2018   Mechanism Insidious onset without acute precipitating event      Symptom Behavior Details    Primary Pain Symptoms Location: Right posterior lateral hip  Quality: ache sometimes sharp   Frequency: Intermittent   Worst Pain 6/10   Best Pain 2/10   Symptom Provocators Walking, stair climbing, squatting, golfing   Symptom Relievers Activity avoidance, rest   Time of day dependent? Worst during the day   Recent symptom change? None      Prior Testing/Intervention for current condition:  Prior Tests X-ray of right hip indicating:  IMPRESSION:  There are severe degenerative changes of the right hip with bone-on-bone articulation at the superior aspect of the joint. Nofracture or dislocation. Arterial calcifications noted.   Prior Treatment None      Lifestyle & General Medical History:  General Health Reported by Patient good   Employment Retired   Usual physical activities  (within past year) Walking, golfing   Orthopaedic history None   Notable medical history Heart problems, implanted device       HPI                    Objective:    Gait:    Gait Type:  Normal                                                      Hip Evaluation  HIP AROM:    Flexion: Left: 120    Right:  120          Internal Rotation: Left: 25    Right: 25  External Rotation: Left: 35    Right: 35        Hip Strength:    Flexion:   Left: 5/5   Pain:  Right: 5/5   Pain:                    Extension:  Left: 4+/5  Pain:Right: 4/5    Pain:    Abduction:  Left: 4/5     Pain:Right: 4-/5    Pain:        Knee Flexion:  Left: 5/5   Pain:Right: 5/5   Pain:  Knee Extension:  Left: 5/5    Pain:Right: 5/5    Pain:        Hip Special Testing:    Left hip positive for the following special tests:  Arya   Right hip positive for the following special tests:  Arya      Functional Testing:          Quad:      Bilateral leg squat:  50%  Moderate loss of control and excessive anterior knee excursion     Proprioception:    Stork balance test:   Left:    3  Right:  3  % of Uninvolved:                General     ROS    Assessment/Plan:    Patient is a 64 year old male with right side hip complaints.    Patient has the following significant findings with corresponding treatment plan.                Diagnosis 1:  Right hip pain  Pain -  manual therapy, splint/taping/bracing/orthotics, self management, education and home program  Decreased ROM/flexibility - manual therapy, therapeutic exercise, therapeutic activity and home program  Decreased joint mobility - manual therapy, therapeutic exercise, therapeutic activity and home program  Decreased strength - therapeutic exercise, therapeutic activities and home program  Impaired balance - neuro re-education, therapeutic activities and home program  Decreased proprioception - neuro re-education, therapeutic activities and home program  Impaired muscle performance - neuro re-education and home program    Therapy Evaluation Codes:   1) History comprised of:   Personal factors that impact the plan of care:      None.    Comorbidity factors that impact the plan of care are:      Heart problems and Implanted device.     Medications impacting care: None.  2) Examination of Body Systems comprised of:   Body structures and functions that impact the plan of care:      Hip.   Activity limitations that impact the plan of care are:      Lifting, Sports, Squatting/kneeling, Stairs, Walking and Working.  3) Clinical presentation characteristics are:   Stable/Uncomplicated.  4) Decision-Making    Low complexity using standardized patient assessment instrument and/or measureable  assessment of functional outcome.  Cumulative Therapy Evaluation is: Low complexity.    Previous and current functional limitations:  (See Goal Flow Sheet for this information)    Short term and Long term goals: (See Goal Flow Sheet for this information)     Communication ability:  Patient appears to be able to clearly communicate and understand verbal and written communication and follow directions correctly.  Treatment Explanation - The following has been discussed with the patient:   RX ordered/plan of care  Anticipated outcomes  Possible risks and side effects  This patient would benefit from PT intervention to resume normal activities.   Rehab potential is good.    Frequency:  1 X week, once daily  Duration:  for 8 weeks  Discharge Plan:  Achieve all LTG.  Independent in home treatment program.  Reach maximal therapeutic benefit.    Please refer to the daily flowsheet for treatment today, total treatment time and time spent performing 1:1 timed codes.

## 2019-03-11 NOTE — LETTER
DEPARTMENT OF HEALTH AND HUMAN SERVICES  CENTERS FOR MEDICARE & MEDICAID SERVICES    PLAN/UPDATED PLAN OF PROGRESS FOR OUTPATIENT REHABILITATION    PATIENTS NAME:  Iraida Hernandez   : 1955  PROVIDER NUMBER:    [id number]  HICN: [id number]  PROVIDER NAME: MURPHY EDWARD CLAY PT    MEDICAL RECORD NUMBER: [id number]     START OF CARE DATE:  19   TYPE:  PT    PRIMARY/TREATMENT DIAGNOSIS: (Pertinent Medical Diagnosis)   Muscle strain of right gluteal region, initial encounter  Primary osteoarthritis of right hip  Hip pain, right    VISITS FROM START OF CARE:  Rxs Used: 1     [facility] Initial Evaluation  Iraida Hernandez is a 64 year old  male referred to physical therapy by Dr. Yeo for treatment of right hip pain with Precautions/Restrictions/MD instructions eval and treat     Physical Therapy Initial Evaluation: Subjective History      Injury/Condition Details:  Presenting Complaint Right hip pain   Onset Timing/Date 2018   Mechanism Insidious onset without acute precipitating event      Symptom Behavior Details    Primary Pain Symptoms Location: Right posterior lateral hip  Quality: ache sometimes sharp   Frequency: Intermittent   Worst Pain 6/10   Best Pain 2/10   Symptom Provocators Walking, stair climbing, squatting, golfing   Symptom Relievers Activity avoidance, rest   Time of day dependent? Worst during the day   Recent symptom change? None      Prior Testing/Intervention for current condition:  Prior Tests X-ray of right hip indicating:  IMPRESSION:  There are severe degenerative changes of the right hip with bone-on-bone articulation at the superior aspect of the joint. Nofracture or dislocation. Arterial calcifications noted.   Prior Treatment None      Lifestyle & General Medical History:  General Health Reported by Patient good   Employment Retired   Usual physical activities  (within past year) Walking, golfing   Orthopaedic history None   Notable medical history Heart  problems, implanted device           PATIENTS NAME:  Iraida Hernandez   : 1955    HPI  Objective:    Gait:    Gait Type:  Normal       Hip Evaluation  HIP AROM:    Flexion: Left: 120    Right:  120    Internal Rotation: Left: 25    Right: 25  External Rotation: Left: 35    Right: 35    Hip Strength:    Flexion:   Left: 5/5   Pain:  Right: 5/5   Pain:                    Extension:  Left: 4+/5  Pain:Right: 4/5    Pain:    Abduction:  Left: 4/5     Pain:Right: 4-/5    Pain:    Knee Flexion:  Left: 5/5   Pain:Right: 5/5   Pain:  Knee Extension:  Left: 5/5   Pain:Right: 5/5    Pain:        Hip Special Testing:    Left hip positive for the following special tests:  Arya   Right hip positive for the following special tests:  Arya      Functional Testing:      Quad:      Bilateral leg squat:  50%  Moderate loss of control and excessive anterior knee excursion     Proprioception:    Stork balance test:   Left:    3  Right:  3  % of Uninvolved:     General     ROS    Assessment/Plan:    Patient is a 64 year old male with right side hip complaints.    Patient has the following significant findings with corresponding treatment plan.                Diagnosis 1:  Right hip pain  Pain -  manual therapy, splint/taping/bracing/orthotics, self management, education and home program  Decreased ROM/flexibility - manual therapy, therapeutic exercise, therapeutic activity and home program  Decreased joint mobility - manual therapy, therapeutic exercise, therapeutic activity and home program  Decreased strength - therapeutic exercise, therapeutic activities and home program  Impaired balance - neuro re-education, therapeutic activities and home program  Decreased proprioception - neuro re-education, therapeutic activities and home program  Impaired muscle performance - neuro re-education and home program                  PATIENTS NAME:  Iraida Hernandez   : 1955    Therapy Evaluation Codes:   1) History comprised  of:   Personal factors that impact the plan of care:      None.    Comorbidity factors that impact the plan of care are:      Heart problems and Implanted device.     Medications impacting care: None.  2) Examination of Body Systems comprised of:   Body structures and functions that impact the plan of care:      Hip.   Activity limitations that impact the plan of care are:      Lifting, Sports, Squatting/kneeling, Stairs, Walking and Working.  3) Clinical presentation characteristics are:   Stable/Uncomplicated.  4) Decision-Making    Low complexity using standardized patient assessment instrument and/or measureable assessment of functional outcome.  Cumulative Therapy Evaluation is: Low complexity.    Previous and current functional limitations:  (See Goal Flow Sheet for this information)    Short term and Long term goals: (See Goal Flow Sheet for this information)     Communication ability:  Patient appears to be able to clearly communicate and understand verbal and written communication and follow directions correctly.  Treatment Explanation - The following has been discussed with the patient:   RX ordered/plan of care  Anticipated outcomes  Possible risks and side effects  This patient would benefit from PT intervention to resume normal activities.   Rehab potential is good.    Frequency:  1 X week, once daily  Duration:  for 8 weeks  Discharge Plan:  Achieve all LTG.  Independent in home treatment program.  Reach maximal therapeutic benefit.    Please refer to the daily flowsheet for treatment today, total treatment time and time spent performing 1:1 timed codes.         Caregiver Signature/Credentials _____________________________                    Date ________               Treating Provider: Jorge Thompson DPT, OCS   I have reviewed and certified the need for these services and plan of treatment while under my care.        PHYSICIAN'S SIGNATURE:   _________________________________________   "Date___________     Albert Yeo, MD    Certification period:  Beginning of Cert date period: 03/11/19 to  End of Cert period date: 05/10/19     Functional Level Progress Report: Please see attached \"Goal Flow sheet for Functional level.\"    ____X____ Continue Services or       ________ DC Services                Service dates: From  SOC Date: 03/11/19 date to present                         "

## 2019-03-13 LAB
MDC_IDC_EPISODE_DTM: NORMAL
MDC_IDC_EPISODE_DURATION: 7 S
MDC_IDC_EPISODE_DURATION: 8 S
MDC_IDC_EPISODE_DURATION: 9 S
MDC_IDC_EPISODE_DURATION: 9 S
MDC_IDC_EPISODE_ID: NORMAL
MDC_IDC_EPISODE_TYPE: NORMAL
MDC_IDC_LEAD_IMPLANT_DT: NORMAL
MDC_IDC_LEAD_LOCATION: NORMAL
MDC_IDC_LEAD_LOCATION_DETAIL_1: NORMAL
MDC_IDC_LEAD_MFG: NORMAL
MDC_IDC_LEAD_MODEL: NORMAL
MDC_IDC_LEAD_POLARITY_TYPE: NORMAL
MDC_IDC_LEAD_SERIAL: NORMAL
MDC_IDC_MSMT_BATTERY_DTM: NORMAL
MDC_IDC_MSMT_BATTERY_REMAINING_LONGEVITY: 126 MO
MDC_IDC_MSMT_BATTERY_REMAINING_PERCENTAGE: 100 %
MDC_IDC_MSMT_BATTERY_STATUS: NORMAL
MDC_IDC_MSMT_CAP_CHARGE_DTM: NORMAL
MDC_IDC_MSMT_CAP_CHARGE_TIME: 9.7 S
MDC_IDC_MSMT_CAP_CHARGE_TYPE: NORMAL
MDC_IDC_MSMT_LEADCHNL_RV_IMPEDANCE_VALUE: 663 OHM
MDC_IDC_PG_IMPLANT_DTM: NORMAL
MDC_IDC_PG_MFG: NORMAL
MDC_IDC_PG_MODEL: NORMAL
MDC_IDC_PG_SERIAL: NORMAL
MDC_IDC_PG_TYPE: NORMAL
MDC_IDC_SESS_CLINIC_NAME: NORMAL
MDC_IDC_SESS_DTM: NORMAL
MDC_IDC_SESS_TYPE: NORMAL
MDC_IDC_SET_BRADY_LOWRATE: 40 {BEATS}/MIN
MDC_IDC_SET_BRADY_MODE: NORMAL
MDC_IDC_SET_LEADCHNL_RV_PACING_AMPLITUDE: 2 V
MDC_IDC_SET_LEADCHNL_RV_PACING_POLARITY: NORMAL
MDC_IDC_SET_LEADCHNL_RV_PACING_PULSEWIDTH: 0.5 MS
MDC_IDC_SET_LEADCHNL_RV_SENSING_ADAPTATION_MODE: NORMAL
MDC_IDC_SET_LEADCHNL_RV_SENSING_POLARITY: NORMAL
MDC_IDC_SET_LEADCHNL_RV_SENSING_SENSITIVITY: 0.6 MV
MDC_IDC_SET_ZONE_DETECTION_INTERVAL: 250 MS
MDC_IDC_SET_ZONE_DETECTION_INTERVAL: 300 MS
MDC_IDC_SET_ZONE_DETECTION_INTERVAL: 353 MS
MDC_IDC_SET_ZONE_TYPE: NORMAL
MDC_IDC_SET_ZONE_VENDOR_TYPE: NORMAL
MDC_IDC_STAT_BRADY_DTM_END: NORMAL
MDC_IDC_STAT_BRADY_DTM_START: NORMAL
MDC_IDC_STAT_BRADY_RV_PERCENT_PACED: 0 %
MDC_IDC_STAT_EPISODE_RECENT_COUNT: 0
MDC_IDC_STAT_EPISODE_RECENT_COUNT: 4
MDC_IDC_STAT_EPISODE_RECENT_COUNT_DTM_END: NORMAL
MDC_IDC_STAT_EPISODE_RECENT_COUNT_DTM_START: NORMAL
MDC_IDC_STAT_EPISODE_TYPE: NORMAL
MDC_IDC_STAT_EPISODE_VENDOR_TYPE: NORMAL
MDC_IDC_STAT_TACHYTHERAPY_ATP_DELIVERED_RECENT: 0
MDC_IDC_STAT_TACHYTHERAPY_ATP_DELIVERED_TOTAL: 0
MDC_IDC_STAT_TACHYTHERAPY_RECENT_DTM_END: NORMAL
MDC_IDC_STAT_TACHYTHERAPY_RECENT_DTM_START: NORMAL
MDC_IDC_STAT_TACHYTHERAPY_SHOCKS_ABORTED_RECENT: 0
MDC_IDC_STAT_TACHYTHERAPY_SHOCKS_ABORTED_TOTAL: 0
MDC_IDC_STAT_TACHYTHERAPY_SHOCKS_DELIVERED_RECENT: 0
MDC_IDC_STAT_TACHYTHERAPY_SHOCKS_DELIVERED_TOTAL: 0
MDC_IDC_STAT_TACHYTHERAPY_TOTAL_DTM_END: NORMAL
MDC_IDC_STAT_TACHYTHERAPY_TOTAL_DTM_START: NORMAL

## 2019-03-15 ENCOUNTER — CARE COORDINATION (OUTPATIENT)
Dept: CARDIOLOGY | Facility: CLINIC | Age: 64
End: 2019-03-15

## 2019-03-15 NOTE — PROGRESS NOTES
"Patient needs to hold his plavix and ASA for 10 days prior to a prostate biopsy - reviewed the patients history \"63-year-old gentleman with a history of anterior myocardial infarction, ischemic cardiomyopathy, LV dysfunction with an ejection fraction approximately 30-35% status post ICD implantation. \"  Dr. Stevens ok with medicaiton hold - advised to restart plavix and ASA once deemed safe by procedural MD.  Patients wife updated with this input. No other tasks to be done at this time.. Sue Leal    "

## 2019-03-18 ENCOUNTER — THERAPY VISIT (OUTPATIENT)
Dept: PHYSICAL THERAPY | Facility: CLINIC | Age: 64
End: 2019-03-18
Payer: MEDICARE

## 2019-03-18 DIAGNOSIS — M25.551 HIP PAIN, RIGHT: ICD-10-CM

## 2019-03-18 PROCEDURE — 97110 THERAPEUTIC EXERCISES: CPT | Mod: GP | Performed by: PHYSICAL THERAPIST

## 2019-03-18 PROCEDURE — 97530 THERAPEUTIC ACTIVITIES: CPT | Mod: GP | Performed by: PHYSICAL THERAPIST

## 2019-03-27 ENCOUNTER — THERAPY VISIT (OUTPATIENT)
Dept: PHYSICAL THERAPY | Facility: CLINIC | Age: 64
End: 2019-03-27
Payer: MEDICARE

## 2019-03-27 DIAGNOSIS — M25.551 HIP PAIN, RIGHT: ICD-10-CM

## 2019-03-27 PROCEDURE — 97112 NEUROMUSCULAR REEDUCATION: CPT | Mod: GP | Performed by: PHYSICAL THERAPIST

## 2019-03-27 PROCEDURE — 97110 THERAPEUTIC EXERCISES: CPT | Mod: GP | Performed by: PHYSICAL THERAPIST

## 2019-03-29 ENCOUNTER — TRANSFERRED RECORDS (OUTPATIENT)
Dept: HEALTH INFORMATION MANAGEMENT | Facility: CLINIC | Age: 64
End: 2019-03-29

## 2019-03-29 ENCOUNTER — OFFICE VISIT (OUTPATIENT)
Dept: UROLOGY | Facility: CLINIC | Age: 64
End: 2019-03-29
Payer: MEDICARE

## 2019-03-29 VITALS
WEIGHT: 160 LBS | OXYGEN SATURATION: 98 % | DIASTOLIC BLOOD PRESSURE: 60 MMHG | SYSTOLIC BLOOD PRESSURE: 100 MMHG | HEIGHT: 72 IN | HEART RATE: 71 BPM | BODY MASS INDEX: 21.67 KG/M2

## 2019-03-29 DIAGNOSIS — R97.20 ELEVATED PROSTATE SPECIFIC ANTIGEN (PSA): Primary | ICD-10-CM

## 2019-03-29 PROCEDURE — 88305 TISSUE EXAM BY PATHOLOGIST: CPT | Performed by: UROLOGY

## 2019-03-29 PROCEDURE — 88305 TISSUE EXAM BY PATHOLOGIST: CPT | Mod: 76 | Performed by: UROLOGY

## 2019-03-29 PROCEDURE — 76872 US TRANSRECTAL: CPT | Performed by: UROLOGY

## 2019-03-29 PROCEDURE — 55700 HC BIOPSY PROSTATE NEEDLE/PUNCH: CPT | Performed by: UROLOGY

## 2019-03-29 ASSESSMENT — PAIN SCALES - GENERAL: PAINLEVEL: NO PAIN (0)

## 2019-03-29 ASSESSMENT — MIFFLIN-ST. JEOR: SCORE: 1535.82

## 2019-03-29 NOTE — LETTER
RE: Iraida Hernandez  [address] [facility] [address]     Dear Colleague,    Thank you for referring your patient, Iraida Hernandez, to the [facility] UROLOGY CLINIC [place] at [facility]. Please see a copy of my visit note below.    Diagnosis: Elevated PSA  Procedure: Transrectal ultrasound of prostate with transrectal biopsies of prostate (12)  Surgeon: Isela  Anesthesia: Local  EBL: Less than 5 ml  Findings: Normal-appearing gland about 36 cc in size.  Complications: None.  Patient tolerated procedure well  Follow-up: Complete Cipro prescription.  No heavy lifting for 48 hours.  Resume Plavix in the morning and aspirin Sunday morning if no blood in urine.  Call with tissue report next Tuesday    Again, thank you for allowing me to participate in the care of your patient.      Sincerely,    Romain Weiss MD

## 2019-03-29 NOTE — PATIENT INSTRUCTIONS
[facility]  Transrectal Ultrasound  Post Operative Information    The physician who performed your Transrectal Ultrasound is Dr. Weiss (telephone number [phone]).  Please contact this doctor if you have any problems or questions.  If unable to reach your doctor, please return to the Emergency Department.      Take one antibiotic the evening of the procedure and then as directed on your prescription.    Drink at least 6-8 glasses of fluids for the first 48 hours.    Avoid heavy lifting and strenuous activity for 48 hours.    Avoid sexual intercourse for the first 24 hours.    No aspirin or ibuprofen products (Motrin, Advil, Nuprin, ect.) Take your Paxil  on Saturday and Sunday you may start the ASA if see little blood in urine, You may take acetaminophen (Tylenol) for pain.    You may notice a small amount of blood on the tissue after a bowel movement.    You may pass blood with clots in your urine following the procedure.  The amount will decrease with time but may be visible for up to two weeks.     You make have blood in your semen for 4 weeks after the procedure.    You may experience mild perineal (groin area) discomfort after the procedure.    Please call you doctor if you have any of the follow symptoms:  Fever  Increase in the amount of blood passed  Severe discomfort or pain

## 2019-03-29 NOTE — PROGRESS NOTES
Diagnosis: Elevated PSA  Procedure: Transrectal ultrasound of prostate with transrectal biopsies of prostate (12)  Surgeon: Isela  Anesthesia: Local  EBL: Less than 5 ml  Findings: Normal-appearing gland about 36 cc in size.  Complications: None.  Patient tolerated procedure well  Follow-up: Complete Cipro prescription.  No heavy lifting for 48 hours.  Resume Plavix in the morning and aspirin Sunday morning if no blood in urine.  Call with tissue report next Tuesday

## 2019-04-02 LAB — COPATH REPORT: NORMAL

## 2019-04-26 ENCOUNTER — OFFICE VISIT (OUTPATIENT)
Dept: UROLOGY | Facility: CLINIC | Age: 64
End: 2019-04-26
Payer: MEDICARE

## 2019-04-26 VITALS
OXYGEN SATURATION: 98 % | SYSTOLIC BLOOD PRESSURE: 110 MMHG | DIASTOLIC BLOOD PRESSURE: 60 MMHG | HEART RATE: 70 BPM | WEIGHT: 158 LBS | BODY MASS INDEX: 21.4 KG/M2 | HEIGHT: 72 IN

## 2019-04-26 DIAGNOSIS — C61 PROSTATE CANCER (H): Primary | ICD-10-CM

## 2019-04-26 PROCEDURE — 99213 OFFICE O/P EST LOW 20 MIN: CPT | Performed by: UROLOGY

## 2019-04-26 ASSESSMENT — MIFFLIN-ST. JEOR: SCORE: 1526.74

## 2019-04-26 NOTE — PROGRESS NOTES
Mr. 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  He has a PSA of 9.7, clinical stage T1c.  He has pathology report was reviewed with he and his wife this morning.  Staging and grading was discussed at length.  All options for treatment and follow-up were discussed including risks  Other past medical history: Remote history of urolithiasis, basal cell carcinoma of skin, cardiac arrest, cardiomyopathy, coronary artery disease with stents, hyperlipidemia, ICD implant, mild asthma, history of tobacco dependence.  Emergent coronary thrombectomy, herniorrhaphy.  Current smoker  Family history: No prostate cancer.  Cardiovascular disease and cerebrovascular disease  Medications: Baby aspirin, Lipitor, Coreg, Plavix, lisinopril, Nitrostat, Spironolactone  Allergies: Penicillin, tetracycline  Exam: Alert and oriented, normal appearance, with spouse  Assessment: Grade 3+3 adenocarcinoma , stage T1c disease.  Recommend surveillance, Oncotype DX testing and follow-up in 3 months with PSA and for rectal exam.  Discussed re-biopsies in the future.  He is unable to do MRI scans because of his ICD implant.  Also discussed option of radical prostatectomy for cure.  Not a great candidate for seed implants, cryotherapy or external radiation because of anticoagulants.  Hormonal therapy discussed but has no role in his care at this time.  Greater than 40 minutes total time with all discussion

## 2019-04-26 NOTE — LETTER
RE: Iraida Hernandez  [address] [facility] [address]     Dear Colleague,    Thank you for referring your patient, Iraida Hernandez, to the [facility]OLOG[facility] [place] at [facility]. Please see a copy of my visit note below.    Mr. 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  He has a PSA of 9.7, clinical stage T1c.  He has pathology report was reviewed with he and his wife this morning.  Staging and grading was discussed at length.  All options for treatment and follow-up were discussed including risks  Other past medical history: Remote history of urolithiasis, basal cell carcinoma of skin, cardiac arrest, cardiomyopathy, coronary artery disease with stents, hyperlipidemia, ICD implant, mild asthma, history of tobacco dependence.  Emergent coronary thrombectomy, herniorrhaphy.  Current smoker  Family history: No prostate cancer.  Cardiovascular disease and cerebrovascular disease  Medications: Baby aspirin, Lipitor, Coreg, Plavix, lisinopril, Nitrostat, Spironolactone  Allergies: Penicillin, tetracycline  Exam: Alert and oriented, normal appearance, with spouse  Assessment: Grade 3+3 adenocarcinoma , stage T1c disease.  Recommend surveillance, Oncotype DX testing and follow-up in 3 months with PSA and for rectal exam.  Discussed re-biopsies in the future.  He is unable to do MRI scans because of his ICD implant.  Also discussed option of radical prostatectomy for cure.  Not a great candidate for seed implants, cryotherapy or external radiation because of anticoagulants.  Hormonal therapy discussed but has no role in his care at this time.  Greater than 40 minutes total time with all discussion    Again, thank you for allowing me to participate in the care of your patient.      Sincerely,    Romain Weiss MD

## 2019-04-26 NOTE — NURSING NOTE
"Chief Complaint   Patient presents with     Hx of Elevated PSA     Patient here today to talk about Trus with BX       Blood pressure 110/60, pulse 70, height 1.816 m (5' 11.5\"), weight 71.7 kg (158 lb), SpO2 98 %. Body mass index is 21.73 kg/m .    Patient Active Problem List   Diagnosis     Mild intermittent asthma     Hyperlipidemia with target LDL less than 70     Nevus     Tobacco abuse     Elevated prostate specific antigen (PSA)     Acute myocardial infarction (H)     Health Care Home     Hyperlipidemia     Coronary artery disease     Myocardial infarction (H)     Cardiomyopathy (H)     Cardiac arrest (H)     Hip pain, right       Allergies   Allergen Reactions     Penicillins      Tetracyclines        Current Outpatient Medications   Medication Sig Dispense Refill     aspirin EC 81 MG EC tablet Take 1 tablet (81 mg) by mouth daily       atorvastatin (LIPITOR) 40 MG tablet Take 1 tablet (40 mg) by mouth daily 90 tablet 0     carvedilol (COREG) 12.5 MG tablet Take 1 tablet (12.5 mg) by mouth 2 times daily (with meals) 180 tablet 0     clopidogrel (PLAVIX) 75 MG tablet Take 1 tablet (75 mg) by mouth daily 90 tablet 1     lisinopril (PRINIVIL/ZESTRIL) 5 MG tablet Take 1 tablet (5 mg) by mouth daily 90 tablet 3     nitroglycerin (NITROSTAT) 0.4 MG SL tablet Place 1 tablet (0.4 mg) under the tongue every 5 minutes as needed for chest pain 25 tablet 0     spironolactone (ALDACTONE) 25 MG tablet Take 1 tablet (25 mg) by mouth daily 90 tablet 3       Social History     Tobacco Use     Smoking status: Current Every Day Smoker     Packs/day: 0.25     Years: 42.00     Pack years: 10.50     Last attempt to quit: 2014     Years since quittin.1     Smokeless tobacco: Never Used     Tobacco comment: 4-6 cigs per day   Substance Use Topics     Alcohol use: Yes     Comment: rare     Drug use: No       Angela Romero MA  2019  11:11 AM       "

## 2019-04-29 DIAGNOSIS — I21.9 ACUTE MYOCARDIAL INFARCTION (H): ICD-10-CM

## 2019-04-29 RX ORDER — CARVEDILOL 12.5 MG/1
12.5 TABLET ORAL 2 TIMES DAILY WITH MEALS
Qty: 60 TABLET | Refills: 0 | Status: SHIPPED | OUTPATIENT
Start: 2019-04-29 | End: 2019-06-03

## 2019-05-09 DIAGNOSIS — Z79.2 PROPHYLACTIC ANTIBIOTIC: ICD-10-CM

## 2019-05-10 DIAGNOSIS — I42.9 CARDIOMYOPATHY, UNSPECIFIED TYPE (H): ICD-10-CM

## 2019-05-10 DIAGNOSIS — I21.9 ACUTE MYOCARDIAL INFARCTION (H): ICD-10-CM

## 2019-05-10 RX ORDER — SPIRONOLACTONE 25 MG/1
25 TABLET ORAL DAILY
Qty: 90 TABLET | Refills: 0 | Status: SHIPPED | OUTPATIENT
Start: 2019-05-10 | End: 2019-08-23

## 2019-05-10 RX ORDER — CLOPIDOGREL BISULFATE 75 MG/1
75 TABLET ORAL DAILY
Qty: 90 TABLET | Refills: 0 | Status: SHIPPED | OUTPATIENT
Start: 2019-05-10 | End: 2019-09-18

## 2019-05-10 RX ORDER — CIPROFLOXACIN 500 MG/1
TABLET, FILM COATED ORAL
Qty: 6 TABLET | Refills: 0 | OUTPATIENT
Start: 2019-05-10

## 2019-05-10 RX ORDER — LISINOPRIL 5 MG/1
5 TABLET ORAL DAILY
Qty: 90 TABLET | Refills: 0 | Status: SHIPPED | OUTPATIENT
Start: 2019-05-10 | End: 2019-08-23

## 2019-05-16 DIAGNOSIS — I21.02 ACUTE ST ELEVATION MYOCARDIAL INFARCTION (STEMI) INVOLVING LEFT ANTERIOR DESCENDING (LAD) CORONARY ARTERY (H): ICD-10-CM

## 2019-05-16 RX ORDER — ATORVASTATIN CALCIUM 40 MG/1
40 TABLET, FILM COATED ORAL DAILY
Qty: 90 TABLET | Refills: 0 | Status: SHIPPED | OUTPATIENT
Start: 2019-05-16 | End: 2019-08-14

## 2019-05-24 ENCOUNTER — HOSPITAL ENCOUNTER (OUTPATIENT)
Dept: CARDIOLOGY | Facility: CLINIC | Age: 64
Discharge: HOME | End: 2019-05-24
Attending: INTERNAL MEDICINE | Admitting: INTERNAL MEDICINE
Payer: MEDICARE

## 2019-05-24 DIAGNOSIS — I25.5 ISCHEMIC CARDIOMYOPATHY: ICD-10-CM

## 2019-05-24 DIAGNOSIS — I21.02 ACUTE ST ELEVATION MYOCARDIAL INFARCTION (STEMI) INVOLVING LEFT ANTERIOR DESCENDING (LAD) CORONARY ARTERY (H): ICD-10-CM

## 2019-05-24 DIAGNOSIS — I25.10 CORONARY ARTERY DISEASE INVOLVING NATIVE CORONARY ARTERY OF NATIVE HEART WITHOUT ANGINA PECTORIS: ICD-10-CM

## 2019-05-24 DIAGNOSIS — I46.9 CARDIAC ARREST (H): ICD-10-CM

## 2019-05-24 DIAGNOSIS — I21.09 ACUTE ST ELEVATION MYOCARDIAL INFARCTION (STEMI) INVOLVING OTHER CORONARY ARTERY OF ANTERIOR WALL (H): ICD-10-CM

## 2019-05-24 LAB
ALT SERPL W P-5'-P-CCNC: 6 U/L (ref 5–30)
CHOLEST SERPL-MCNC: 142 MG/DL
HDLC SERPL-MCNC: 48 MG/DL
LDLC SERPL CALC-MCNC: 74 MG/DL
NONHDLC SERPL-MCNC: 94 MG/DL
TRIGL SERPL-MCNC: 99 MG/DL

## 2019-05-24 PROCEDURE — 93306 TTE W/DOPPLER COMPLETE: CPT | Mod: 26 | Performed by: INTERNAL MEDICINE

## 2019-05-24 PROCEDURE — 80061 LIPID PANEL: CPT | Performed by: INTERNAL MEDICINE

## 2019-05-24 PROCEDURE — 36415 COLL VENOUS BLD VENIPUNCTURE: CPT | Performed by: INTERNAL MEDICINE

## 2019-05-24 PROCEDURE — 84460 ALANINE AMINO (ALT) (SGPT): CPT | Performed by: INTERNAL MEDICINE

## 2019-05-24 PROCEDURE — 93306 TTE W/DOPPLER COMPLETE: CPT

## 2019-05-30 ENCOUNTER — OFFICE VISIT (OUTPATIENT)
Dept: CARDIOLOGY | Facility: CLINIC | Age: 64
End: 2019-05-30
Payer: MEDICARE

## 2019-05-30 VITALS
HEIGHT: 72 IN | SYSTOLIC BLOOD PRESSURE: 129 MMHG | BODY MASS INDEX: 21.67 KG/M2 | HEART RATE: 78 BPM | WEIGHT: 160 LBS | DIASTOLIC BLOOD PRESSURE: 72 MMHG

## 2019-05-30 DIAGNOSIS — I21.09 ACUTE ST ELEVATION MYOCARDIAL INFARCTION (STEMI) INVOLVING OTHER CORONARY ARTERY OF ANTERIOR WALL (H): ICD-10-CM

## 2019-05-30 DIAGNOSIS — I25.10 CORONARY ARTERY DISEASE INVOLVING NATIVE CORONARY ARTERY OF NATIVE HEART WITHOUT ANGINA PECTORIS: ICD-10-CM

## 2019-05-30 DIAGNOSIS — I25.5 ISCHEMIC CARDIOMYOPATHY: Primary | ICD-10-CM

## 2019-05-30 DIAGNOSIS — Z95.810 ICD (IMPLANTABLE CARDIOVERTER-DEFIBRILLATOR), SINGLE, IN SITU: ICD-10-CM

## 2019-05-30 PROCEDURE — 99214 OFFICE O/P EST MOD 30 MIN: CPT | Performed by: INTERNAL MEDICINE

## 2019-05-30 ASSESSMENT — MIFFLIN-ST. JEOR: SCORE: 1535.82

## 2019-05-30 NOTE — LETTER
5/30/2019    [place]son [place]  [address] [facility] [address]    RE: Iraida Hernandez       Dear Colleague,    I had the pleasure of seeing Iraida Hernandez in the [facility].    HPI and Plan:   See dictation    No orders of the defined types were placed in this encounter.    No orders of the defined types were placed in this encounter.    There are no discontinued medications.      No diagnosis found.    CURRENT MEDICATIONS:  Current Outpatient Medications   Medication Sig Dispense Refill     aspirin EC 81 MG EC tablet Take 1 tablet (81 mg) by mouth daily       atorvastatin (LIPITOR) 40 MG tablet Take 1 tablet (40 mg) by mouth daily 90 tablet 0     carvedilol (COREG) 12.5 MG tablet Take 1 tablet (12.5 mg) by mouth 2 times daily (with meals) 60 tablet 0     clopidogrel (PLAVIX) 75 MG tablet Take 1 tablet (75 mg) by mouth daily 90 tablet 0     lisinopril (PRINIVIL/ZESTRIL) 5 MG tablet Take 1 tablet (5 mg) by mouth daily 90 tablet 0     nitroglycerin (NITROSTAT) 0.4 MG SL tablet Place 1 tablet (0.4 mg) under the tongue every 5 minutes as needed for chest pain 25 tablet 0     spironolactone (ALDACTONE) 25 MG tablet Take 1 tablet (25 mg) by mouth daily 90 tablet 0       ALLERGIES     Allergies   Allergen Reactions     Penicillins      Tetracyclines        PAST MEDICAL HISTORY:  Past Medical History:   Diagnosis Date     Calculus of kidney      Cancer (H)     basel cell on nose     Cardiac arrest (H)     V-fib, 2/16/2014     Cardiomyopathy (H)      Coronary artery disease      Hyperlipidaemia      Hyperlipidemia with target LDL less than 70 10/31/2010     Diagnosis updated by automated process. Provider to review and confirm.     ICD (implantable cardiac defibrillator) in place     12-1-2014 EF 29%     Mild intermittent asthma      Myocardial infarction (H) 2/2014     Anterior infarct     Tobacco dependence        PAST SURGICAL HISTORY:  Past Surgical History:   Procedure  Laterality Date     CARDIAC SURGERY       HEART CATH, ANGIOPLASTY  2014    Emergent cath,thrombectomy-pt had cardiac arrest-severe 3 vessel CAD-MACY proximal LAD, LAD diagonal kissing balloon, and stent to proximal RCA     HERNIA REPAIR       NO HISTORY OF SURGERY       PROSTATE SURGERY  2019       FAMILY HISTORY:  Family History   Problem Relation Age of Onset     Cerebrovascular Disease Father      Heart Disease Father         MI     Cancer Mother      Unknown/Adopted Maternal Grandmother      Unknown/Adopted Maternal Grandfather      Unknown/Adopted Paternal Grandmother      Unknown/Adopted Paternal Grandfather      Diabetes Son      Family History Negative Son      Cancer Maternal Uncle        SOCIAL HISTORY:  Social History     Socioeconomic History     Marital status:      Spouse name: Jessica     Number of children: 2     Years of education: None     Highest education level: None   Occupational History     Occupation:      Employer: [employer]     Comment: [employer]   Social Needs     Financial resource strain: None     Food insecurity:     Worry: None     Inability: None     Transportation needs:     Medical: None     Non-medical: None   Tobacco Use     Smoking status: Current Every Day Smoker     Packs/day: 0.25     Years: 42.00     Pack years: 10.50     Last attempt to quit: 2014     Years since quittin.2     Smokeless tobacco: Never Used     Tobacco comment: 4-6 cigs per day   Substance and Sexual Activity     Alcohol use: Yes     Comment: rare     Drug use: No     Sexual activity: Yes     Partners: Female   Lifestyle     Physical activity:     Days per week: None     Minutes per session: None     Stress: None   Relationships     Social connections:     Talks on phone: None     Gets together: None     Attends Anabaptism service: None     Active member of club or organization: None     Attends meetings of clubs or organizations: None     Relationship status:  "None     Intimate partner violence:     Fear of current or ex partner: None     Emotionally abused: None     Physically abused: None     Forced sexual activity: None   Other Topics Concern      Service No     Blood Transfusions No     Caffeine Concern No     Comment: soda occasionally      Occupational Exposure No     Hobby Hazards No     Sleep Concern No     Stress Concern No     Weight Concern No     Special Diet Yes     Comment: eats healthy      Back Care No     Exercise Yes     Comment: golf 2x/week, tredmill daily     Bike Helmet Not Asked     Comment: NA     Seat Belt Yes     Self-Exams Yes     Parent/sibling w/ CABG, MI or angioplasty before 65F 55M? Not Asked   Social History Narrative     None       Review of Systems:  Skin:  Positive for bruising   Eyes:  Positive for glasses  ENT:  Negative    Respiratory:  Negative shortness of breath;dyspnea on exertion  Cardiovascular:  Negative    Gastroenterology: Negative    Genitourinary:  Negative    Musculoskeletal:  Negative    Neurologic:  Negative    Psychiatric:  Negative    Heme/Lymph/Imm:  Positive for allergies  Endocrine:  Negative      Physical Exam:  Vitals: /72   Pulse 78   Ht 1.816 m (5' 11.5\")   Wt 72.6 kg (160 lb)   BMI 22.00 kg/m       Constitutional:           Skin:             Head:           Eyes:           Lymph:      ENT:           Neck:           Respiratory:            Cardiac:                                                           GI:           Extremities and Muscular Skeletal:                 Neurological:           Psych:           Recent Lab Results:  LIPID RESULTS:  Lab Results   Component Value Date    CHOL 142 05/24/2019    HDL 48 05/24/2019    LDL 74 05/24/2019    TRIG 99 05/24/2019    CHOLHDLRATIO 3.1 08/15/2014       LIVER ENZYME RESULTS:  Lab Results   Component Value Date    AST 14 02/15/2019    ALT 6 05/24/2019       CBC RESULTS:  Lab Results   Component Value Date    WBC 8.4 12/01/2014    RBC 4.63 " 12/01/2014    HGB 15.2 12/01/2014    HCT 45.7 12/01/2014    MCV 99 12/01/2014    MCH 32.8 12/01/2014    MCHC 33.3 12/01/2014    RDW 12.5 12/01/2014     12/01/2014       BMP RESULTS:  Lab Results   Component Value Date     02/15/2019    POTASSIUM 4.2 02/15/2019    CHLORIDE 103 02/15/2019    CO2 27 02/15/2019    ANIONGAP 4 02/15/2019    GLC 99 02/15/2019    BUN 14 02/15/2019    CR 0.91 02/15/2019    GFRESTIMATED 88 02/15/2019    GFRESTBLACK >90 02/15/2019    DIEGO 8.4 (L) 02/15/2019        A1C RESULTS:  Lab Results   Component Value Date    A1C 4.8 12/10/2007       INR RESULTS:  Lab Results   Component Value Date    INR 0.90 12/01/2014    INR 0.89 02/16/2014           CC  No referring provider defined for this encounter.                  Thank you for allowing me to participate in the care of your patient.      Sincerely,     ELIANA KHAN MD     [facility]    cc:   No referring provider defined for this encounter.

## 2019-05-30 NOTE — PROGRESS NOTES
HPI and Plan:   See dictation    No orders of the defined types were placed in this encounter.    No orders of the defined types were placed in this encounter.    There are no discontinued medications.      No diagnosis found.    CURRENT MEDICATIONS:  Current Outpatient Medications   Medication Sig Dispense Refill     aspirin EC 81 MG EC tablet Take 1 tablet (81 mg) by mouth daily       atorvastatin (LIPITOR) 40 MG tablet Take 1 tablet (40 mg) by mouth daily 90 tablet 0     carvedilol (COREG) 12.5 MG tablet Take 1 tablet (12.5 mg) by mouth 2 times daily (with meals) 60 tablet 0     clopidogrel (PLAVIX) 75 MG tablet Take 1 tablet (75 mg) by mouth daily 90 tablet 0     lisinopril (PRINIVIL/ZESTRIL) 5 MG tablet Take 1 tablet (5 mg) by mouth daily 90 tablet 0     nitroglycerin (NITROSTAT) 0.4 MG SL tablet Place 1 tablet (0.4 mg) under the tongue every 5 minutes as needed for chest pain 25 tablet 0     spironolactone (ALDACTONE) 25 MG tablet Take 1 tablet (25 mg) by mouth daily 90 tablet 0       ALLERGIES     Allergies   Allergen Reactions     Penicillins      Tetracyclines        PAST MEDICAL HISTORY:  Past Medical History:   Diagnosis Date     Calculus of kidney      Cancer (H)     basel cell on nose     Cardiac arrest (H)     V-fib, 2/16/2014     Cardiomyopathy (H)      Coronary artery disease      Hyperlipidaemia      Hyperlipidemia with target LDL less than 70 10/31/2010     Diagnosis updated by automated process. Provider to review and confirm.     ICD (implantable cardiac defibrillator) in place     12-1-2014 EF 29%     Mild intermittent asthma      Myocardial infarction (H) 2/2014     Anterior infarct     Tobacco dependence        PAST SURGICAL HISTORY:  Past Surgical History:   Procedure Laterality Date     CARDIAC SURGERY       HEART CATH, ANGIOPLASTY  2/2014    Emergent cath,thrombectomy-pt had cardiac arrest-severe 3 vessel CAD-MACY proximal LAD, LAD diagonal kissing balloon, and stent to proximal RCA      HERNIA REPAIR       NO HISTORY OF SURGERY       PROSTATE SURGERY  2019       FAMILY HISTORY:  Family History   Problem Relation Age of Onset     Cerebrovascular Disease Father      Heart Disease Father         MI     Cancer Mother      Unknown/Adopted Maternal Grandmother      Unknown/Adopted Maternal Grandfather      Unknown/Adopted Paternal Grandmother      Unknown/Adopted Paternal Grandfather      Diabetes Son      Family History Negative Son      Cancer Maternal Uncle        SOCIAL HISTORY:  Social History     Socioeconomic History     Marital status:      Spouse name: Jessica     Number of children: 2     Years of education: None     Highest education level: None   Occupational History     Occupation:      Employer: [employer]     Comment: [employer]   Social Needs     Financial resource strain: None     Food insecurity:     Worry: None     Inability: None     Transportation needs:     Medical: None     Non-medical: None   Tobacco Use     Smoking status: Current Every Day Smoker     Packs/day: 0.25     Years: 42.00     Pack years: 10.50     Last attempt to quit: 2014     Years since quittin.2     Smokeless tobacco: Never Used     Tobacco comment: 4-6 cigs per day   Substance and Sexual Activity     Alcohol use: Yes     Comment: rare     Drug use: No     Sexual activity: Yes     Partners: Female   Lifestyle     Physical activity:     Days per week: None     Minutes per session: None     Stress: None   Relationships     Social connections:     Talks on phone: None     Gets together: None     Attends Shinto service: None     Active member of club or organization: None     Attends meetings of clubs or organizations: None     Relationship status: None     Intimate partner violence:     Fear of current or ex partner: None     Emotionally abused: None     Physically abused: None     Forced sexual activity: None   Other Topics Concern      Service No     Blood  "Transfusions No     Caffeine Concern No     Comment: soda occasionally      Occupational Exposure No     Hobby Hazards No     Sleep Concern No     Stress Concern No     Weight Concern No     Special Diet Yes     Comment: eats healthy      Back Care No     Exercise Yes     Comment: golf 2x/week, tredmill daily     Bike Helmet Not Asked     Comment: NA     Seat Belt Yes     Self-Exams Yes     Parent/sibling w/ CABG, MI or angioplasty before 65F 55M? Not Asked   Social History Narrative     None       Review of Systems:  Skin:  Positive for bruising   Eyes:  Positive for glasses  ENT:  Negative    Respiratory:  Negative shortness of breath;dyspnea on exertion  Cardiovascular:  Negative    Gastroenterology: Negative    Genitourinary:  Negative    Musculoskeletal:  Negative    Neurologic:  Negative    Psychiatric:  Negative    Heme/Lymph/Imm:  Positive for allergies  Endocrine:  Negative      Physical Exam:  Vitals: /72   Pulse 78   Ht 1.816 m (5' 11.5\")   Wt 72.6 kg (160 lb)   BMI 22.00 kg/m      Constitutional:           Skin:             Head:           Eyes:           Lymph:      ENT:           Neck:           Respiratory:            Cardiac:                                                           GI:           Extremities and Muscular Skeletal:                 Neurological:           Psych:           Recent Lab Results:  LIPID RESULTS:  Lab Results   Component Value Date    CHOL 142 05/24/2019    HDL 48 05/24/2019    LDL 74 05/24/2019    TRIG 99 05/24/2019    CHOLHDLRATIO 3.1 08/15/2014       LIVER ENZYME RESULTS:  Lab Results   Component Value Date    AST 14 02/15/2019    ALT 6 05/24/2019       CBC RESULTS:  Lab Results   Component Value Date    WBC 8.4 12/01/2014    RBC 4.63 12/01/2014    HGB 15.2 12/01/2014    HCT 45.7 12/01/2014    MCV 99 12/01/2014    MCH 32.8 12/01/2014    MCHC 33.3 12/01/2014    RDW 12.5 12/01/2014     12/01/2014       BMP RESULTS:  Lab Results   Component Value Date    NA " 134 02/15/2019    POTASSIUM 4.2 02/15/2019    CHLORIDE 103 02/15/2019    CO2 27 02/15/2019    ANIONGAP 4 02/15/2019    GLC 99 02/15/2019    BUN 14 02/15/2019    CR 0.91 02/15/2019    GFRESTIMATED 88 02/15/2019    GFRESTBLACK >90 02/15/2019    DIEGO 8.4 (L) 02/15/2019        A1C RESULTS:  Lab Results   Component Value Date    A1C 4.8 12/10/2007       INR RESULTS:  Lab Results   Component Value Date    INR 0.90 12/01/2014    INR 0.89 02/16/2014           CC  No referring provider defined for this encounter.

## 2019-05-30 NOTE — LETTER
5/30/2019      Lawson Mahoney [place]  [address] [facility] [address]      RE: Iraida Hernandez       Dear Colleague,    I had the pleasure of seeing Iraida Hernandez in the [facility].    Service Date: 05/30/2019      HISTORY OF PRESENT ILLNESS:  Mr. Hernandez is a very pleasant 64-year-old gentleman who presented with a large anterior myocardial infarction in 02/2014.  He presented with dramatic ST segment elevation in the anterior leads and was brought emergently to the heart catheterization laboratory.  He had a ventricular fibrillatory arrest.  He had known 3-vessel coronary disease.  He underwent successful stenting of the LAD, angioplasty of LAD diagonal and successful stenting of the proximal RCA.  He has some disease of the circumflex, moderate.  He has subsequently had an ICD implanted, and he has been left with an ischemic cardiomyopathy.  The ejection fraction has been stable at approximately 35%.  His most recent echocardiogram done earlier this month showed an EF of 30%-35%, trace mitral regurgitation, no change from previous study.        Mr. Hernandez is doing well.  His biggest problem is that of his hip discomfort which is improved after physical therapy.  He is an avid golfer and plays golf frequently and has plans to play 3 times over the next week.  He gets some fatigue and therefore takes a cart but really has no chest pain, chest pressure, shortness of breath or edema.  He has been compliant with his medications.      ASSESSMENT AND PLAN:  It is a delight seeing Mr. Hernandez back in clinic appearing stable from a cardiovascular standpoint.  He remains stable at class III NYHA heart failure.  He has only mild limitations in his abilities to do exertion and really is able to do all ADLs.  His muscular strength and pain have improved in his hips.  His blood pressure, heart rate and cholesterol are all well controlled.  I will plan on seeing him back in 1 year's time  or sooner as symptoms warrant.      Juancarlos Khan MD, [facility]         JUANCARLOS KHAN MD [facility]             D: 2019   T: 2019   MT: LOUIE      Name:     JAZZMINE MERCADO   MRN:      [id number]        Account:      [id number]   :      1955           Service Date: 2019      Document: [id number]         Outpatient Encounter Medications as of 2019   Medication Sig Dispense Refill     aspirin EC 81 MG EC tablet Take 1 tablet (81 mg) by mouth daily       atorvastatin (LIPITOR) 40 MG tablet Take 1 tablet (40 mg) by mouth daily 90 tablet 0     clopidogrel (PLAVIX) 75 MG tablet Take 1 tablet (75 mg) by mouth daily 90 tablet 0     lisinopril (PRINIVIL/ZESTRIL) 5 MG tablet Take 1 tablet (5 mg) by mouth daily 90 tablet 0     nitroglycerin (NITROSTAT) 0.4 MG SL tablet Place 1 tablet (0.4 mg) under the tongue every 5 minutes as needed for chest pain 25 tablet 0     spironolactone (ALDACTONE) 25 MG tablet Take 1 tablet (25 mg) by mouth daily 90 tablet 0     [DISCONTINUED] carvedilol (COREG) 12.5 MG tablet Take 1 tablet (12.5 mg) by mouth 2 times daily (with meals) 60 tablet 0     [] ciprofloxacin (CIPRO) 500 MG tablet Take 1 tablet (500 mg) by mouth 2 times daily for 3 days 6 tablet 0     No facility-administered encounter medications on file as of 2019.        Again, thank you for allowing me to participate in the care of your patient.      Sincerely,    JUANCARLOS KHAN MD     [facility]

## 2019-05-30 NOTE — PROGRESS NOTES
Service Date: 05/30/2019      HISTORY OF PRESENT ILLNESS:  Mr. Hernandez is a very pleasant 64-year-old gentleman who presented with a large anterior myocardial infarction in 02/2014.  He presented with dramatic ST segment elevation in the anterior leads and was brought emergently to the heart catheterization laboratory.  He had a ventricular fibrillatory arrest.  He had known 3-vessel coronary disease.  He underwent successful stenting of the LAD, angioplasty of LAD diagonal and successful stenting of the proximal RCA.  He has some disease of the circumflex, moderate.  He has subsequently had an ICD implanted, and he has been left with an ischemic cardiomyopathy.  The ejection fraction has been stable at approximately 35%.  His most recent echocardiogram done earlier this month showed an EF of 30%-35%, trace mitral regurgitation, no change from previous study.        Mr. Hernandez is doing well.  His biggest problem is that of his hip discomfort which is improved after physical therapy.  He is an avid golfer and plays golf frequently and has plans to play 3 times over the next week.  He gets some fatigue and therefore takes a cart but really has no chest pain, chest pressure, shortness of breath or edema.  He has been compliant with his medications.      ASSESSMENT AND PLAN:  It is a delight seeing Mr. Hernandez back in clinic appearing stable from a cardiovascular standpoint.  He remains stable at class III NYHA heart failure.  He has only mild limitations in his abilities to do exertion and really is able to do all ADLs.  His muscular strength and pain have improved in his hips.  His blood pressure, heart rate and cholesterol are all well controlled.  I will plan on seeing him back in 1 year's time or sooner as symptoms warrant.      Juancarlos Khan MD, FACC         JUANCARLOS KHAN MD [facility]             D: 05/30/2019   T: 05/30/2019   MT: LOUIE      Name:     JAZZMINE HERNANDEZ   MRN:      [id number]        Account:       [id number]   :      1955           Service Date: 2019      Document: [id number]

## 2019-06-03 DIAGNOSIS — I21.9 ACUTE MYOCARDIAL INFARCTION (H): ICD-10-CM

## 2019-06-03 RX ORDER — CARVEDILOL 12.5 MG/1
12.5 TABLET ORAL 2 TIMES DAILY WITH MEALS
Qty: 180 TABLET | Refills: 3 | Status: SHIPPED | OUTPATIENT
Start: 2019-06-03 | End: 2020-05-28

## 2019-06-10 ENCOUNTER — ANCILLARY PROCEDURE (OUTPATIENT)
Dept: CARDIOLOGY | Facility: CLINIC | Age: 64
End: 2019-06-10
Attending: INTERNAL MEDICINE
Payer: MEDICARE

## 2019-06-10 DIAGNOSIS — I25.5 ISCHEMIC CARDIOMYOPATHY: ICD-10-CM

## 2019-06-10 PROCEDURE — 93296 REM INTERROG EVL PM/IDS: CPT | Performed by: INTERNAL MEDICINE

## 2019-06-10 PROCEDURE — 93295 DEV INTERROG REMOTE 1/2/MLT: CPT | Performed by: INTERNAL MEDICINE

## 2019-06-14 LAB
MDC_IDC_EPISODE_DTM: NORMAL
MDC_IDC_EPISODE_DURATION: 7 S
MDC_IDC_EPISODE_DURATION: 8 S
MDC_IDC_EPISODE_DURATION: 8 S
MDC_IDC_EPISODE_ID: NORMAL
MDC_IDC_EPISODE_TYPE: NORMAL
MDC_IDC_LEAD_IMPLANT_DT: NORMAL
MDC_IDC_LEAD_LOCATION: NORMAL
MDC_IDC_LEAD_LOCATION_DETAIL_1: NORMAL
MDC_IDC_LEAD_MFG: NORMAL
MDC_IDC_LEAD_MODEL: NORMAL
MDC_IDC_LEAD_POLARITY_TYPE: NORMAL
MDC_IDC_LEAD_SERIAL: NORMAL
MDC_IDC_MSMT_BATTERY_DTM: NORMAL
MDC_IDC_MSMT_BATTERY_REMAINING_LONGEVITY: 120 MO
MDC_IDC_MSMT_BATTERY_REMAINING_PERCENTAGE: 100 %
MDC_IDC_MSMT_BATTERY_STATUS: NORMAL
MDC_IDC_MSMT_CAP_CHARGE_DTM: NORMAL
MDC_IDC_MSMT_CAP_CHARGE_TIME: 9.8 S
MDC_IDC_MSMT_CAP_CHARGE_TYPE: NORMAL
MDC_IDC_MSMT_LEADCHNL_RV_IMPEDANCE_VALUE: 768 OHM
MDC_IDC_PG_IMPLANT_DTM: NORMAL
MDC_IDC_PG_MFG: NORMAL
MDC_IDC_PG_MODEL: NORMAL
MDC_IDC_PG_SERIAL: NORMAL
MDC_IDC_PG_TYPE: NORMAL
MDC_IDC_SESS_CLINIC_NAME: NORMAL
MDC_IDC_SESS_DTM: NORMAL
MDC_IDC_SESS_TYPE: NORMAL
MDC_IDC_SET_BRADY_LOWRATE: 40 {BEATS}/MIN
MDC_IDC_SET_BRADY_MODE: NORMAL
MDC_IDC_SET_LEADCHNL_RV_PACING_AMPLITUDE: 2 V
MDC_IDC_SET_LEADCHNL_RV_PACING_POLARITY: NORMAL
MDC_IDC_SET_LEADCHNL_RV_PACING_PULSEWIDTH: 0.5 MS
MDC_IDC_SET_LEADCHNL_RV_SENSING_ADAPTATION_MODE: NORMAL
MDC_IDC_SET_LEADCHNL_RV_SENSING_POLARITY: NORMAL
MDC_IDC_SET_LEADCHNL_RV_SENSING_SENSITIVITY: 0.6 MV
MDC_IDC_SET_ZONE_DETECTION_INTERVAL: 250 MS
MDC_IDC_SET_ZONE_DETECTION_INTERVAL: 300 MS
MDC_IDC_SET_ZONE_DETECTION_INTERVAL: 353 MS
MDC_IDC_SET_ZONE_TYPE: NORMAL
MDC_IDC_SET_ZONE_VENDOR_TYPE: NORMAL
MDC_IDC_STAT_BRADY_DTM_END: NORMAL
MDC_IDC_STAT_BRADY_DTM_START: NORMAL
MDC_IDC_STAT_BRADY_RV_PERCENT_PACED: 0 %
MDC_IDC_STAT_EPISODE_RECENT_COUNT: 0
MDC_IDC_STAT_EPISODE_RECENT_COUNT: 7
MDC_IDC_STAT_EPISODE_RECENT_COUNT_DTM_END: NORMAL
MDC_IDC_STAT_EPISODE_RECENT_COUNT_DTM_START: NORMAL
MDC_IDC_STAT_EPISODE_TYPE: NORMAL
MDC_IDC_STAT_EPISODE_VENDOR_TYPE: NORMAL
MDC_IDC_STAT_TACHYTHERAPY_ATP_DELIVERED_RECENT: 0
MDC_IDC_STAT_TACHYTHERAPY_ATP_DELIVERED_TOTAL: 0
MDC_IDC_STAT_TACHYTHERAPY_RECENT_DTM_END: NORMAL
MDC_IDC_STAT_TACHYTHERAPY_RECENT_DTM_START: NORMAL
MDC_IDC_STAT_TACHYTHERAPY_SHOCKS_ABORTED_RECENT: 0
MDC_IDC_STAT_TACHYTHERAPY_SHOCKS_ABORTED_TOTAL: 0
MDC_IDC_STAT_TACHYTHERAPY_SHOCKS_DELIVERED_RECENT: 0
MDC_IDC_STAT_TACHYTHERAPY_SHOCKS_DELIVERED_TOTAL: 0
MDC_IDC_STAT_TACHYTHERAPY_TOTAL_DTM_END: NORMAL
MDC_IDC_STAT_TACHYTHERAPY_TOTAL_DTM_START: NORMAL

## 2019-07-29 PROBLEM — M25.551 HIP PAIN, RIGHT: Status: RESOLVED | Noted: 2019-03-11 | Resolved: 2019-07-29

## 2019-07-29 NOTE — PROGRESS NOTES
[facility]    System    Physical Exam    General     ROS    Assessment/Plan:    DISCHARGE REPORT    Progress reporting period is from 3/11/19 to 3/27/19.       SUBJECTIVE   Subjective: Pt's pain is better since last session. Was able to play 4 rounds of golf with very little discomfort. Did become a little lightheaded one day but otherwise it went very well. Performs exercises routinely     Current Pain level: 2/10.      Initial Pain level: 4/10.   Changes in function:  Yes (See Goal flowsheet attached for changes in current functional level)  Adverse reaction to treatment or activity: None    OBJECTIVE  Objective: R hip abd strength 4+/5, ext 4+/5     ASSESSMENT/PLAN  Updated problem list and treatment plan: Diagnosis 1:  Right hip pain   STG/LTGs have been met or progress has been made towards goals:  Yes (See Goal flow sheet completed today.)  Assessment of Progress: The patient has not returned to therapy since 3/27/19. Current status is unknown.  Self Management Plans:  Patient has been instructed in a home treatment program.  Dietmar continues to require the following intervention to meet STG and LTG's:  PT intervention is no longer required to meet STG/LTG.    Recommendations:  This patient is ready to be discharged from therapy and continue their home treatment program.    Please refer to the daily flowsheet for treatment today, total treatment time and time spent performing 1:1 timed codes.

## 2019-08-02 DIAGNOSIS — I25.10 CORONARY ARTERY DISEASE INVOLVING NATIVE CORONARY ARTERY OF NATIVE HEART WITHOUT ANGINA PECTORIS: Primary | ICD-10-CM

## 2019-08-02 DIAGNOSIS — I21.9 ACUTE MYOCARDIAL INFARCTION (H): ICD-10-CM

## 2019-08-02 RX ORDER — NITROGLYCERIN 0.4 MG/1
0.4 TABLET SUBLINGUAL EVERY 5 MIN PRN
Qty: 25 TABLET | Refills: 0 | Status: SHIPPED | OUTPATIENT
Start: 2019-08-02 | End: 2020-08-14

## 2019-08-14 DIAGNOSIS — I21.02 ACUTE ST ELEVATION MYOCARDIAL INFARCTION (STEMI) INVOLVING LEFT ANTERIOR DESCENDING (LAD) CORONARY ARTERY (H): ICD-10-CM

## 2019-08-14 RX ORDER — ATORVASTATIN CALCIUM 40 MG/1
40 TABLET, FILM COATED ORAL DAILY
Qty: 90 TABLET | Refills: 2 | Status: SHIPPED | OUTPATIENT
Start: 2019-08-14 | End: 2020-05-19

## 2019-08-23 DIAGNOSIS — I21.9 ACUTE MYOCARDIAL INFARCTION (H): ICD-10-CM

## 2019-08-23 RX ORDER — SPIRONOLACTONE 25 MG/1
25 TABLET ORAL DAILY
Qty: 90 TABLET | Refills: 3 | Status: SHIPPED | OUTPATIENT
Start: 2019-08-23 | End: 2020-08-12

## 2019-08-23 RX ORDER — LISINOPRIL 5 MG/1
5 TABLET ORAL DAILY
Qty: 90 TABLET | Refills: 3 | Status: SHIPPED | OUTPATIENT
Start: 2019-08-23 | End: 2020-08-14

## 2019-09-18 DIAGNOSIS — I42.9 CARDIOMYOPATHY, UNSPECIFIED TYPE (H): ICD-10-CM

## 2019-09-18 DIAGNOSIS — I21.9 ACUTE MYOCARDIAL INFARCTION (H): ICD-10-CM

## 2019-09-18 RX ORDER — CLOPIDOGREL BISULFATE 75 MG/1
75 TABLET ORAL DAILY
Qty: 90 TABLET | Refills: 3 | Status: SHIPPED | OUTPATIENT
Start: 2019-09-18 | End: 2020-09-10

## 2019-09-19 ENCOUNTER — ANCILLARY PROCEDURE (OUTPATIENT)
Dept: CARDIOLOGY | Facility: CLINIC | Age: 64
End: 2019-09-19
Attending: INTERNAL MEDICINE
Payer: MEDICARE

## 2019-09-19 DIAGNOSIS — I25.5 ISCHEMIC CARDIOMYOPATHY: ICD-10-CM

## 2019-09-19 PROCEDURE — 93296 REM INTERROG EVL PM/IDS: CPT | Performed by: INTERNAL MEDICINE

## 2019-09-19 PROCEDURE — 93295 DEV INTERROG REMOTE 1/2/MLT: CPT | Performed by: INTERNAL MEDICINE

## 2019-09-27 ENCOUNTER — HEALTH MAINTENANCE LETTER (OUTPATIENT)
Age: 64
End: 2019-09-27

## 2019-09-30 LAB
MDC_IDC_EPISODE_DTM: NORMAL
MDC_IDC_EPISODE_DURATION: 6 S
MDC_IDC_EPISODE_DURATION: 7 S
MDC_IDC_EPISODE_DURATION: 7 S
MDC_IDC_EPISODE_DURATION: 8 S
MDC_IDC_EPISODE_ID: NORMAL
MDC_IDC_EPISODE_TYPE: NORMAL
MDC_IDC_LEAD_IMPLANT_DT: NORMAL
MDC_IDC_LEAD_LOCATION: NORMAL
MDC_IDC_LEAD_LOCATION_DETAIL_1: NORMAL
MDC_IDC_LEAD_MFG: NORMAL
MDC_IDC_LEAD_MODEL: NORMAL
MDC_IDC_LEAD_POLARITY_TYPE: NORMAL
MDC_IDC_LEAD_SERIAL: NORMAL
MDC_IDC_MSMT_BATTERY_DTM: NORMAL
MDC_IDC_MSMT_BATTERY_REMAINING_LONGEVITY: 114 MO
MDC_IDC_MSMT_BATTERY_REMAINING_PERCENTAGE: 100 %
MDC_IDC_MSMT_BATTERY_STATUS: NORMAL
MDC_IDC_MSMT_CAP_CHARGE_DTM: NORMAL
MDC_IDC_MSMT_CAP_CHARGE_TIME: 9.8 S
MDC_IDC_MSMT_CAP_CHARGE_TYPE: NORMAL
MDC_IDC_MSMT_LEADCHNL_RV_IMPEDANCE_VALUE: 689 OHM
MDC_IDC_MSMT_LEADCHNL_RV_PACING_THRESHOLD_AMPLITUDE: 0.5 V
MDC_IDC_MSMT_LEADCHNL_RV_PACING_THRESHOLD_PULSEWIDTH: 0.5 MS
MDC_IDC_PG_IMPLANT_DTM: NORMAL
MDC_IDC_PG_MFG: NORMAL
MDC_IDC_PG_MODEL: NORMAL
MDC_IDC_PG_SERIAL: NORMAL
MDC_IDC_PG_TYPE: NORMAL
MDC_IDC_SESS_CLINIC_NAME: NORMAL
MDC_IDC_SESS_DTM: NORMAL
MDC_IDC_SESS_TYPE: NORMAL
MDC_IDC_SET_BRADY_LOWRATE: 40 {BEATS}/MIN
MDC_IDC_SET_BRADY_MODE: NORMAL
MDC_IDC_SET_LEADCHNL_RV_PACING_AMPLITUDE: 2 V
MDC_IDC_SET_LEADCHNL_RV_PACING_POLARITY: NORMAL
MDC_IDC_SET_LEADCHNL_RV_PACING_PULSEWIDTH: 0.5 MS
MDC_IDC_SET_LEADCHNL_RV_SENSING_ADAPTATION_MODE: NORMAL
MDC_IDC_SET_LEADCHNL_RV_SENSING_POLARITY: NORMAL
MDC_IDC_SET_LEADCHNL_RV_SENSING_SENSITIVITY: 0.6 MV
MDC_IDC_SET_ZONE_DETECTION_INTERVAL: 250 MS
MDC_IDC_SET_ZONE_DETECTION_INTERVAL: 300 MS
MDC_IDC_SET_ZONE_DETECTION_INTERVAL: 353 MS
MDC_IDC_SET_ZONE_TYPE: NORMAL
MDC_IDC_SET_ZONE_VENDOR_TYPE: NORMAL
MDC_IDC_STAT_BRADY_DTM_END: NORMAL
MDC_IDC_STAT_BRADY_DTM_START: NORMAL
MDC_IDC_STAT_BRADY_RV_PERCENT_PACED: 0 %
MDC_IDC_STAT_EPISODE_RECENT_COUNT: 0
MDC_IDC_STAT_EPISODE_RECENT_COUNT: 11
MDC_IDC_STAT_EPISODE_RECENT_COUNT_DTM_END: NORMAL
MDC_IDC_STAT_EPISODE_RECENT_COUNT_DTM_START: NORMAL
MDC_IDC_STAT_EPISODE_TYPE: NORMAL
MDC_IDC_STAT_EPISODE_VENDOR_TYPE: NORMAL
MDC_IDC_STAT_TACHYTHERAPY_ATP_DELIVERED_RECENT: 0
MDC_IDC_STAT_TACHYTHERAPY_ATP_DELIVERED_TOTAL: 0
MDC_IDC_STAT_TACHYTHERAPY_RECENT_DTM_END: NORMAL
MDC_IDC_STAT_TACHYTHERAPY_RECENT_DTM_START: NORMAL
MDC_IDC_STAT_TACHYTHERAPY_SHOCKS_ABORTED_RECENT: 0
MDC_IDC_STAT_TACHYTHERAPY_SHOCKS_ABORTED_TOTAL: 0
MDC_IDC_STAT_TACHYTHERAPY_SHOCKS_DELIVERED_RECENT: 0
MDC_IDC_STAT_TACHYTHERAPY_SHOCKS_DELIVERED_TOTAL: 0
MDC_IDC_STAT_TACHYTHERAPY_TOTAL_DTM_END: NORMAL
MDC_IDC_STAT_TACHYTHERAPY_TOTAL_DTM_START: NORMAL

## 2019-12-06 ENCOUNTER — ANCILLARY PROCEDURE (OUTPATIENT)
Dept: CARDIOLOGY | Facility: CLINIC | Age: 64
End: 2019-12-06
Attending: INTERNAL MEDICINE
Payer: MEDICARE

## 2019-12-06 DIAGNOSIS — I25.5 ISCHEMIC CARDIOMYOPATHY: ICD-10-CM

## 2019-12-06 DIAGNOSIS — Z95.810 ICD (IMPLANTABLE CARDIOVERTER-DEFIBRILLATOR), SINGLE, IN SITU: Primary | ICD-10-CM

## 2019-12-06 PROCEDURE — 93282 PRGRMG EVAL IMPLANTABLE DFB: CPT | Performed by: INTERNAL MEDICINE

## 2019-12-09 LAB
MDC_IDC_EPISODE_DTM: NORMAL
MDC_IDC_EPISODE_ID: NORMAL
MDC_IDC_EPISODE_TYPE: NORMAL
MDC_IDC_LEAD_IMPLANT_DT: NORMAL
MDC_IDC_LEAD_LOCATION: NORMAL
MDC_IDC_LEAD_LOCATION_DETAIL_1: NORMAL
MDC_IDC_LEAD_MFG: NORMAL
MDC_IDC_LEAD_MODEL: NORMAL
MDC_IDC_LEAD_POLARITY_TYPE: NORMAL
MDC_IDC_LEAD_SERIAL: NORMAL
MDC_IDC_MSMT_BATTERY_STATUS: NORMAL
MDC_IDC_MSMT_CAP_CHARGE_DTM: NORMAL
MDC_IDC_MSMT_CAP_CHARGE_ENERGY: 0 J
MDC_IDC_MSMT_CAP_CHARGE_TIME: 0 S
MDC_IDC_MSMT_CAP_CHARGE_TIME: 9.79
MDC_IDC_MSMT_CAP_CHARGE_TYPE: NORMAL
MDC_IDC_MSMT_CAP_CHARGE_TYPE: NORMAL
MDC_IDC_MSMT_LEADCHNL_RV_IMPEDANCE_VALUE: 762 OHM
MDC_IDC_MSMT_LEADCHNL_RV_PACING_THRESHOLD_AMPLITUDE: 0.5 V
MDC_IDC_MSMT_LEADCHNL_RV_PACING_THRESHOLD_PULSEWIDTH: 0.5 MS
MDC_IDC_MSMT_LEADCHNL_RV_SENSING_INTR_AMPL: 9.7 MV
MDC_IDC_PG_IMPLANT_DTM: NORMAL
MDC_IDC_PG_MFG: NORMAL
MDC_IDC_PG_MODEL: NORMAL
MDC_IDC_PG_SERIAL: NORMAL
MDC_IDC_PG_TYPE: NORMAL
MDC_IDC_SESS_CLINIC_NAME: NORMAL
MDC_IDC_SESS_DTM: NORMAL
MDC_IDC_SESS_TYPE: NORMAL
MDC_IDC_SET_BRADY_HYSTRATE: NORMAL
MDC_IDC_SET_BRADY_LOWRATE: 40 {BEATS}/MIN
MDC_IDC_SET_BRADY_MODE: NORMAL
MDC_IDC_SET_LEADCHNL_RV_PACING_AMPLITUDE: 2 V
MDC_IDC_SET_LEADCHNL_RV_PACING_ANODE_ELECTRODE_1: NORMAL
MDC_IDC_SET_LEADCHNL_RV_PACING_ANODE_LOCATION_1: NORMAL
MDC_IDC_SET_LEADCHNL_RV_PACING_CAPTURE_MODE: NORMAL
MDC_IDC_SET_LEADCHNL_RV_PACING_CATHODE_ELECTRODE_1: NORMAL
MDC_IDC_SET_LEADCHNL_RV_PACING_CATHODE_LOCATION_1: NORMAL
MDC_IDC_SET_LEADCHNL_RV_PACING_POLARITY: NORMAL
MDC_IDC_SET_LEADCHNL_RV_PACING_PULSEWIDTH: 0.5 MS
MDC_IDC_SET_LEADCHNL_RV_SENSING_ADAPTATION_MODE: NORMAL
MDC_IDC_SET_LEADCHNL_RV_SENSING_ANODE_ELECTRODE_1: NORMAL
MDC_IDC_SET_LEADCHNL_RV_SENSING_ANODE_LOCATION_1: NORMAL
MDC_IDC_SET_LEADCHNL_RV_SENSING_CATHODE_ELECTRODE_1: NORMAL
MDC_IDC_SET_LEADCHNL_RV_SENSING_CATHODE_LOCATION_1: NORMAL
MDC_IDC_SET_LEADCHNL_RV_SENSING_POLARITY: NORMAL
MDC_IDC_SET_LEADCHNL_RV_SENSING_SENSITIVITY: 0.6 MV
MDC_IDC_SET_ZONE_DETECTION_INTERVAL: 250 MS
MDC_IDC_SET_ZONE_DETECTION_INTERVAL: 300 MS
MDC_IDC_SET_ZONE_DETECTION_INTERVAL: 353 MS
MDC_IDC_SET_ZONE_TYPE: NORMAL
MDC_IDC_SET_ZONE_VENDOR_TYPE: NORMAL
MDC_IDC_STAT_EPISODE_RECENT_COUNT: 0
MDC_IDC_STAT_EPISODE_RECENT_COUNT: 0
MDC_IDC_STAT_EPISODE_RECENT_COUNT: 11
MDC_IDC_STAT_EPISODE_RECENT_COUNT_DTM_END: NORMAL
MDC_IDC_STAT_EPISODE_RECENT_COUNT_DTM_START: NORMAL
MDC_IDC_STAT_EPISODE_TOTAL_COUNT: 0
MDC_IDC_STAT_EPISODE_TOTAL_COUNT: 0
MDC_IDC_STAT_EPISODE_TOTAL_COUNT: 50
MDC_IDC_STAT_EPISODE_TOTAL_COUNT_DTM_END: NORMAL
MDC_IDC_STAT_EPISODE_TYPE: NORMAL
MDC_IDC_STAT_EPISODE_VENDOR_TYPE: NORMAL
MDC_IDC_STAT_TACHYTHERAPY_ATP_DELIVERED_RECENT: 0
MDC_IDC_STAT_TACHYTHERAPY_ATP_DELIVERED_TOTAL: 0
MDC_IDC_STAT_TACHYTHERAPY_RECENT_DTM_END: NORMAL
MDC_IDC_STAT_TACHYTHERAPY_RECENT_DTM_START: NORMAL
MDC_IDC_STAT_TACHYTHERAPY_SHOCKS_ABORTED_RECENT: 0
MDC_IDC_STAT_TACHYTHERAPY_SHOCKS_ABORTED_TOTAL: 0
MDC_IDC_STAT_TACHYTHERAPY_SHOCKS_DELIVERED_RECENT: 0
MDC_IDC_STAT_TACHYTHERAPY_SHOCKS_DELIVERED_TOTAL: 0
MDC_IDC_STAT_TACHYTHERAPY_TOTAL_DTM_END: NORMAL

## 2019-12-11 ENCOUNTER — OFFICE VISIT (OUTPATIENT)
Dept: ORTHOPEDICS | Facility: CLINIC | Age: 64
End: 2019-12-11
Payer: MEDICARE

## 2019-12-11 VITALS
BODY MASS INDEX: 21.81 KG/M2 | WEIGHT: 161 LBS | DIASTOLIC BLOOD PRESSURE: 69 MMHG | HEIGHT: 72 IN | SYSTOLIC BLOOD PRESSURE: 107 MMHG

## 2019-12-11 DIAGNOSIS — M47.816 LUMBAR SPONDYLOSIS: ICD-10-CM

## 2019-12-11 DIAGNOSIS — M51.369 LUMBAR DEGENERATIVE DISC DISEASE: Primary | ICD-10-CM

## 2019-12-11 DIAGNOSIS — M16.11 PRIMARY OSTEOARTHRITIS OF RIGHT HIP: ICD-10-CM

## 2019-12-11 PROCEDURE — 99214 OFFICE O/P EST MOD 30 MIN: CPT | Performed by: FAMILY MEDICINE

## 2019-12-11 ASSESSMENT — MIFFLIN-ST. JEOR: SCORE: 1540.35

## 2019-12-11 NOTE — PATIENT INSTRUCTIONS
1. Lumbar degenerative disc disease    2. Lumbar spondylosis    3. Primary osteoarthritis of right hip      -Patient has ongoing low back and posterior glute pain most likely due to multilevel lumbar degenerative disc disease and arthritis  -Patient will get an MRI for further evaluation.  Patient has an ICD and so will need to go to the [facility] for special protocol to have the MRI performed.  Your MRI has been ordered. Will check for same day otherwise, schedule with [place] ([phone]). Once you know the date of your MRI, please call my office and schedule a follow-up visit for 2 days after that.  -Call direct clinic number [[phone]] at any time with questions or concerns.    Albert Yeo MD CAQSM  [facility]  [facility] [place]

## 2019-12-11 NOTE — LETTER
12/11/2019         RE: Iraida Hernandez  [address] [address]        Dear Colleague,    Thank you for referring your patient, Iraida Hernandez, to the [facility]. Please see a copy of my visit note below.    ASSESSMENT & PLAN  Patient Instructions     1. Lumbar degenerative disc disease    2. Lumbar spondylosis    3. Primary osteoarthritis of right hip      -Patient has ongoing low back and posterior glute pain most likely due to multilevel lumbar degenerative disc disease and arthritis  -Patient will get an MRI for further evaluation.  Patient has an ICD and so will need to go to the [facility] for special protocol to have the MRI performed.  Your MRI has been ordered. Will check for same day otherwise, schedule with [place] ([phone]). Once you know the date of your MRI, please call my office and schedule a follow-up visit for 2 days after that.  -Call direct clinic number [[phone]] at any time with questions or concerns.    Albert Yeo MD [facility]  [facility] [place]          -----    SUBJECTIVE:  Iraida Hernandez is a 64 year old male who is seen in follow-up for right-sided hip pain.They were last seen 02/25/19.     Since their last visit reports 20% improvement. They indicate that their current pain level is 0/10. They have tried rest/activity avoidance, home exercises, physical therapy (1 visits) and magnesium caltrate . States has pain in hips when golfing, stamina goes down and pain increases. Walking for long distances causes pain. States pain in nagging, and has a limp. Sleeping on either side bothers him.  Mostly right sided greater troc area.     The patient is seen by themselves.    Patient's past medical, surgical, social, and family histories were reviewed today and no changes are noted.    REVIEW OF SYSTEMS:  Constitutional: NEGATIVE for fever, chills, change in weight  Skin: NEGATIVE for  "worrisome rashes, moles or lesions  GI/: NEGATIVE for bowel or bladder changes  Neuro: NEGATIVE for weakness, dizziness or paresthesias    OBJECTIVE:  /69   Ht 1.816 m (5' 11.5\")   Wt 73 kg (161 lb)   BMI 22.14 kg/m      General: healthy, alert and in no distress  HEENT: no scleral icterus or conjunctival erythema  Skin: no suspicious lesions or rash. No jaundice.  CV: regular rhythm by palpation, no pedal edema  Resp: normal respiratory effort without conversational dyspnea   Psych: normal mood and affect  Gait: normal steady gait with appropriate coordination and balance  Neuro: normal light touch sensory exam of the extremities.    MSK:  THORACIC/LUMBAR SPINE  Inspection:    No gross deformity/asymmetry  Palpation:    Tender about the right gluteal musculature. Otherwise remainder of landmarks are nontender.  Range of Motion:     Lumbar flexion limited by tightness    Lumbar extension limited by tightness  Strength:    Full strength throughout hips/quads/hamstrings  Special Tests:    Positive: none    Negative: straight leg raise (bilateral), slump test (bilateral), femoral stretch test (bilateral)     RIGHT HIP  Inspection:    No obvious deformity or asymmetry, pelvis level  Palpation:    Tender about the gluteus medius insertion and gluteal muscles. Otherwise all other landmarks are nontender.  Active Range of Motion:     Flexion limited by tightness, IR limited by tightness, ER  limited by tightness  Strength:    Flexion 5-/5, adduction 5-/5, abduction 5-/5  Special Tests:    Positive: resisted gluteus medius provocation    Negative: Logroll, THEODORE, anterior impingement (FADIR), posterior impingement (EX/AB/ER)       Independent visualization of the below image:  Recent Results (from the past 24 hour(s))   XR Lumbar Spine 2/3 Views     Narrative     LUMBAR SPINE TWO TO THREE VIEWS   2/25/2019 1:52 PM      HISTORY:  Chronic right-sided low back pain, with sciatica presence  unspecified.        " Impression     IMPRESSION: Degenerative disc disease changes at each level from L1 to  L5, most prominent on the right at L2-L3. Mild left lateral listhesis  at L3-L4. Mild levoscoliosis. There appears to be facet arthropathy at  L5-S1. Cardiac pacer wiring. Prominent right and moderate-prominent  left hip arthropathy     YUMIKO SMITH MD         Patient's conditions were thoroughly discussed during today's visit with greater than 50% of the visit spent counseling the patient with total time spent face-to-face with the patient being 30 minutes.    Albert Yeo MD, [facility]          Again, thank you for allowing me to participate in the care of your patient.        Sincerely,        Albert Yeo, MD

## 2019-12-11 NOTE — PROGRESS NOTES
"ASSESSMENT & PLAN  Patient Instructions     1. Lumbar degenerative disc disease    2. Lumbar spondylosis    3. Primary osteoarthritis of right hip      -Patient has ongoing low back and posterior glute pain most likely due to multilevel lumbar degenerative disc disease and arthritis  -Patient will get an MRI for further evaluation.  Patient has an ICD and so will need to go to the [facility] for special protocol to have the MRI performed.  Your MRI has been ordered. Will check for same day otherwise, schedule with [place] [facility] ([phone]). Once you know the date of your MRI, please call my office and schedule a follow-up visit for 2 days after that.  -Call direct clinic number [[phone]] at any time with questions or concerns.    Albert Yeo MD [facility]  [facility]          -----    SUBJECTIVE:  Iraida Hernandez is a 64 year old male who is seen in follow-up for right-sided hip pain.They were last seen 02/25/19.     Since their last visit reports 20% improvement. They indicate that their current pain level is 0/10. They have tried rest/activity avoidance, home exercises, physical therapy (1 visits) and magnesium caltrate . States has pain in hips when golfing, stamina goes down and pain increases. Walking for long distances causes pain. States pain in nagging, and has a limp. Sleeping on either side bothers him.  Mostly right sided greater troc area.     The patient is seen by themselves.    Patient's past medical, surgical, social, and family histories were reviewed today and no changes are noted.    REVIEW OF SYSTEMS:  Constitutional: NEGATIVE for fever, chills, change in weight  Skin: NEGATIVE for worrisome rashes, moles or lesions  GI/: NEGATIVE for bowel or bladder changes  Neuro: NEGATIVE for weakness, dizziness or paresthesias    OBJECTIVE:  /69   Ht 1.816 m (5' 11.5\")   Wt 73 kg (161 lb)   BMI 22.14 kg/m     General: healthy, " alert and in no distress  HEENT: no scleral icterus or conjunctival erythema  Skin: no suspicious lesions or rash. No jaundice.  CV: regular rhythm by palpation, no pedal edema  Resp: normal respiratory effort without conversational dyspnea   Psych: normal mood and affect  Gait: normal steady gait with appropriate coordination and balance  Neuro: normal light touch sensory exam of the extremities.    MSK:  THORACIC/LUMBAR SPINE  Inspection:    No gross deformity/asymmetry  Palpation:    Tender about the right gluteal musculature. Otherwise remainder of landmarks are nontender.  Range of Motion:     Lumbar flexion limited by tightness    Lumbar extension limited by tightness  Strength:    Full strength throughout hips/quads/hamstrings  Special Tests:    Positive: none    Negative: straight leg raise (bilateral), slump test (bilateral), femoral stretch test (bilateral)     RIGHT HIP  Inspection:    No obvious deformity or asymmetry, pelvis level  Palpation:    Tender about the gluteus medius insertion and gluteal muscles. Otherwise all other landmarks are nontender.  Active Range of Motion:     Flexion limited by tightness, IR limited by tightness, ER  limited by tightness  Strength:    Flexion 5-/5, adduction 5-/5, abduction 5-/5  Special Tests:    Positive: resisted gluteus medius provocation    Negative: Logroll, THEODORE, anterior impingement (FADIR), posterior impingement (EX/AB/ER)       Independent visualization of the below image:  Recent Results (from the past 24 hour(s))   XR Lumbar Spine 2/3 Views     Narrative     LUMBAR SPINE TWO TO THREE VIEWS   2/25/2019 1:52 PM      HISTORY:  Chronic right-sided low back pain, with sciatica presence  unspecified.        Impression     IMPRESSION: Degenerative disc disease changes at each level from L1 to  L5, most prominent on the right at L2-L3. Mild left lateral listhesis  at L3-L4. Mild levoscoliosis. There appears to be facet arthropathy at  L5-S1. Cardiac pacer  wiring. Prominent right and moderate-prominent  left hip arthropathy     YUMIKO SMITH MD         Patient's conditions were thoroughly discussed during today's visit with greater than 50% of the visit spent counseling the patient with total time spent face-to-face with the patient being 30 minutes.    Albert Yeo MD, [facility]

## 2019-12-12 ENCOUNTER — DOCUMENTATION ONLY (OUTPATIENT)
Dept: CARDIOLOGY | Facility: CLINIC | Age: 64
End: 2019-12-12

## 2019-12-12 NOTE — TELEPHONE ENCOUNTER
Spoke with Chela from the [facility] and she will have a NP at the clinic review the case to see if the MRI can be performed.  She stated that if it can be performed, someone from scheduling should reach out to the patient to get the procedure scheduled.  If the procedure cannot be done, then the [facility] Device clinic will notify the patient and/or the [facility] device clinic.    Pt was called and updated on status.  Pt instructed to notify us if he does not hear anything by the end of next week.  Patient was appreciative of assistance and will call the clinic with any questions.    SAGE Calero

## 2019-12-12 NOTE — TELEPHONE ENCOUNTER
Received call from patient stating that he needed to have a lumbar spine and right hip MRI for lumbar degenerative disc disease/lumbar spondylosis and primary osteoarthritis of right hip.  Pt stated his orthopedic specialist also mentioned looking at the knees.    Pt has a [employer] ICD and a [address] 4-Site G lead.  This MRI is unable to be performed at [facility].      Recommended patient reach out to the [facility] to see if this would be able to completed there.  Pt requested assistance with this.  [facility] clinic was called and a message was left with device and lead detail with a request for a call back.      SAGE Calero

## 2019-12-26 ENCOUNTER — TELEPHONE (OUTPATIENT)
Dept: FAMILY MEDICINE | Facility: CLINIC | Age: 64
End: 2019-12-26

## 2019-12-26 DIAGNOSIS — Z12.11 SPECIAL SCREENING FOR MALIGNANT NEOPLASMS, COLON: Primary | ICD-10-CM

## 2019-12-26 NOTE — TELEPHONE ENCOUNTER
Panel Management Review      Patient has the following on his problem list: None      Composite cancer screening  Chart review shows that this patient is due/due soon for the following Colonoscopy  Summary:    Patient is due/failing the following:   COLONOSCOPY    Action needed:   Pt need colonoscopy    Type of outreach:    Phone, spoke to patient.  pt notified about colonoscopy due and orders placed as pt was ok.    Questions for provider review:    None                                                                                                                                    Estela Brown MA  [facility].         Chart routed to Care Team .

## 2020-01-14 ENCOUNTER — ANCILLARY PROCEDURE (OUTPATIENT)
Dept: CARDIOLOGY | Facility: CLINIC | Age: 65
End: 2020-01-14
Attending: INTERNAL MEDICINE
Payer: MEDICARE

## 2020-01-14 ENCOUNTER — HOSPITAL ENCOUNTER (OUTPATIENT)
Dept: GENERAL RADIOLOGY | Facility: CLINIC | Age: 65
End: 2020-01-14
Attending: FAMILY MEDICINE
Payer: MEDICARE

## 2020-01-14 ENCOUNTER — HOSPITAL ENCOUNTER (OUTPATIENT)
Dept: MRI IMAGING | Facility: CLINIC | Age: 65
Discharge: HOME | End: 2020-01-14
Attending: FAMILY MEDICINE | Admitting: FAMILY MEDICINE
Payer: MEDICARE

## 2020-01-14 DIAGNOSIS — M51.369 LUMBAR DEGENERATIVE DISC DISEASE: ICD-10-CM

## 2020-01-14 DIAGNOSIS — M47.816 LUMBAR SPONDYLOSIS: ICD-10-CM

## 2020-01-14 DIAGNOSIS — Z45.02 ENCOUNTER FOR ADJUSTMENT AND MANAGEMENT OF AUTOMATIC IMPLANTABLE CARDIAC DEFIBRILLATOR: ICD-10-CM

## 2020-01-14 DIAGNOSIS — I42.9 CARDIOMYOPATHY (H): Primary | ICD-10-CM

## 2020-01-14 DIAGNOSIS — I42.9 CARDIOMYOPATHY (H): ICD-10-CM

## 2020-01-14 PROCEDURE — 71046 X-RAY EXAM CHEST 2 VIEWS: CPT

## 2020-01-14 PROCEDURE — 93287 PERI-PX DEVICE EVAL & PRGR: CPT | Mod: 26 | Performed by: INTERNAL MEDICINE

## 2020-01-14 PROCEDURE — 72148 MRI LUMBAR SPINE W/O DYE: CPT

## 2020-01-14 PROCEDURE — 93282 PRGRMG EVAL IMPLANTABLE DFB: CPT

## 2020-01-15 ENCOUNTER — MYC MEDICAL ADVICE (OUTPATIENT)
Dept: ORTHOPEDICS | Facility: CLINIC | Age: 65
End: 2020-01-15

## 2020-01-15 LAB
MDC_IDC_LEAD_IMPLANT_DT: NORMAL
MDC_IDC_LEAD_LOCATION: NORMAL
MDC_IDC_LEAD_LOCATION_DETAIL_1: NORMAL
MDC_IDC_LEAD_MFG: NORMAL
MDC_IDC_LEAD_MODEL: NORMAL
MDC_IDC_LEAD_POLARITY_TYPE: NORMAL
MDC_IDC_LEAD_SERIAL: NORMAL
MDC_IDC_MSMT_BATTERY_DTM: NORMAL
MDC_IDC_MSMT_BATTERY_REMAINING_LONGEVITY: 120 MO
MDC_IDC_MSMT_BATTERY_STATUS: NORMAL
MDC_IDC_MSMT_CAP_CHARGE_DTM: NORMAL
MDC_IDC_MSMT_CAP_CHARGE_ENERGY: 0 J
MDC_IDC_MSMT_CAP_CHARGE_TIME: 0 S
MDC_IDC_MSMT_CAP_CHARGE_TIME: 9.79
MDC_IDC_MSMT_CAP_CHARGE_TYPE: NORMAL
MDC_IDC_MSMT_CAP_CHARGE_TYPE: NORMAL
MDC_IDC_MSMT_LEADCHNL_RV_IMPEDANCE_VALUE: 774 OHM
MDC_IDC_MSMT_LEADCHNL_RV_PACING_THRESHOLD_AMPLITUDE: 0.5 V
MDC_IDC_MSMT_LEADCHNL_RV_PACING_THRESHOLD_PULSEWIDTH: 0.5 MS
MDC_IDC_MSMT_LEADCHNL_RV_SENSING_INTR_AMPL: 6.3 MV
MDC_IDC_PG_IMPLANT_DTM: NORMAL
MDC_IDC_PG_MFG: NORMAL
MDC_IDC_PG_MODEL: NORMAL
MDC_IDC_PG_SERIAL: NORMAL
MDC_IDC_PG_TYPE: NORMAL
MDC_IDC_SESS_CLINIC_NAME: NORMAL
MDC_IDC_SESS_DTM: NORMAL
MDC_IDC_SESS_TYPE: NORMAL
MDC_IDC_SET_BRADY_HYSTRATE: NORMAL
MDC_IDC_SET_BRADY_LOWRATE: 40 {BEATS}/MIN
MDC_IDC_SET_BRADY_MODE: NORMAL
MDC_IDC_SET_LEADCHNL_RV_PACING_AMPLITUDE: 2 V
MDC_IDC_SET_LEADCHNL_RV_PACING_ANODE_ELECTRODE_1: NORMAL
MDC_IDC_SET_LEADCHNL_RV_PACING_ANODE_LOCATION_1: NORMAL
MDC_IDC_SET_LEADCHNL_RV_PACING_CAPTURE_MODE: NORMAL
MDC_IDC_SET_LEADCHNL_RV_PACING_CATHODE_ELECTRODE_1: NORMAL
MDC_IDC_SET_LEADCHNL_RV_PACING_CATHODE_LOCATION_1: NORMAL
MDC_IDC_SET_LEADCHNL_RV_PACING_POLARITY: NORMAL
MDC_IDC_SET_LEADCHNL_RV_PACING_PULSEWIDTH: 0.5 MS
MDC_IDC_SET_LEADCHNL_RV_SENSING_ADAPTATION_MODE: NORMAL
MDC_IDC_SET_LEADCHNL_RV_SENSING_ANODE_ELECTRODE_1: NORMAL
MDC_IDC_SET_LEADCHNL_RV_SENSING_ANODE_LOCATION_1: NORMAL
MDC_IDC_SET_LEADCHNL_RV_SENSING_CATHODE_ELECTRODE_1: NORMAL
MDC_IDC_SET_LEADCHNL_RV_SENSING_CATHODE_LOCATION_1: NORMAL
MDC_IDC_SET_LEADCHNL_RV_SENSING_POLARITY: NORMAL
MDC_IDC_SET_LEADCHNL_RV_SENSING_SENSITIVITY: 0.6 MV
MDC_IDC_SET_ZONE_DETECTION_INTERVAL: 250 MS
MDC_IDC_SET_ZONE_DETECTION_INTERVAL: 300 MS
MDC_IDC_SET_ZONE_DETECTION_INTERVAL: 353 MS
MDC_IDC_SET_ZONE_TYPE: NORMAL
MDC_IDC_SET_ZONE_VENDOR_TYPE: NORMAL
MDC_IDC_STAT_EPISODE_RECENT_COUNT: 0
MDC_IDC_STAT_EPISODE_RECENT_COUNT: 0
MDC_IDC_STAT_EPISODE_RECENT_COUNT: 1
MDC_IDC_STAT_EPISODE_RECENT_COUNT_DTM_END: NORMAL
MDC_IDC_STAT_EPISODE_RECENT_COUNT_DTM_START: NORMAL
MDC_IDC_STAT_EPISODE_TOTAL_COUNT: 0
MDC_IDC_STAT_EPISODE_TOTAL_COUNT: 0
MDC_IDC_STAT_EPISODE_TOTAL_COUNT: 51
MDC_IDC_STAT_EPISODE_TOTAL_COUNT_DTM_END: NORMAL
MDC_IDC_STAT_EPISODE_TYPE: NORMAL
MDC_IDC_STAT_EPISODE_VENDOR_TYPE: NORMAL
MDC_IDC_STAT_TACHYTHERAPY_ATP_DELIVERED_RECENT: 0
MDC_IDC_STAT_TACHYTHERAPY_ATP_DELIVERED_TOTAL: 0
MDC_IDC_STAT_TACHYTHERAPY_RECENT_DTM_END: NORMAL
MDC_IDC_STAT_TACHYTHERAPY_RECENT_DTM_START: NORMAL
MDC_IDC_STAT_TACHYTHERAPY_SHOCKS_ABORTED_RECENT: 0
MDC_IDC_STAT_TACHYTHERAPY_SHOCKS_ABORTED_TOTAL: 0
MDC_IDC_STAT_TACHYTHERAPY_SHOCKS_DELIVERED_RECENT: 0
MDC_IDC_STAT_TACHYTHERAPY_SHOCKS_DELIVERED_TOTAL: 0
MDC_IDC_STAT_TACHYTHERAPY_TOTAL_DTM_END: NORMAL

## 2020-01-16 ENCOUNTER — MYC MEDICAL ADVICE (OUTPATIENT)
Dept: FAMILY MEDICINE | Facility: CLINIC | Age: 65
End: 2020-01-16

## 2020-01-16 DIAGNOSIS — Z12.11 SCREEN FOR COLON CANCER: Primary | ICD-10-CM

## 2020-01-22 ENCOUNTER — OFFICE VISIT (OUTPATIENT)
Dept: ORTHOPEDICS | Facility: CLINIC | Age: 65
End: 2020-01-22
Payer: MEDICARE

## 2020-01-22 VITALS
SYSTOLIC BLOOD PRESSURE: 106 MMHG | WEIGHT: 166 LBS | DIASTOLIC BLOOD PRESSURE: 82 MMHG | BODY MASS INDEX: 22.48 KG/M2 | HEIGHT: 72 IN

## 2020-01-22 DIAGNOSIS — M70.61 TROCHANTERIC BURSITIS OF BOTH HIPS: Primary | ICD-10-CM

## 2020-01-22 DIAGNOSIS — M51.369 LUMBAR DEGENERATIVE DISC DISEASE: ICD-10-CM

## 2020-01-22 DIAGNOSIS — M70.62 TROCHANTERIC BURSITIS OF BOTH HIPS: Primary | ICD-10-CM

## 2020-01-22 DIAGNOSIS — M47.816 LUMBAR FACET ARTHROPATHY: ICD-10-CM

## 2020-01-22 DIAGNOSIS — M48.061 SPINAL STENOSIS OF LUMBAR REGION WITHOUT NEUROGENIC CLAUDICATION: ICD-10-CM

## 2020-01-22 PROCEDURE — 99214 OFFICE O/P EST MOD 30 MIN: CPT | Performed by: FAMILY MEDICINE

## 2020-01-22 ASSESSMENT — MIFFLIN-ST. JEOR: SCORE: 1563.03

## 2020-01-22 NOTE — PROGRESS NOTES
ASSESSMENT & PLAN  Patient Instructions     1. Trochanteric bursitis of both hips    2. Lumbar degenerative disc disease    3. Lumbar facet arthropathy    4. Spinal stenosis of lumbar region without neurogenic claudication      -Patient is here for follow-up of bilateral hip pain due to trochanteric bursitis and low back pain due to multilevel degenerative disc disease, facet arthropathy and canal/foraminal stenosis.  -We reviewed results of MRI of the lumbar spine.  All questions were answered.  -Patient will start a comprehensive formal physical therapy and home exercise program.  Patient is requesting Jorge Thompson at [facility] located at [address], [address]  He may call [phone]  -Patient will schedule a procedure visit for bilateral trochanteric bursa cortisone injections.  -Call direct clinic number [[phone]] at any time with questions or concerns.    Albert Yeo MD [facility]  [facility]          -----    SUBJECTIVE:  Iraida Hernandez is a 64 year old male who is seen in follow-up for ongoing low back and posterior glute pain most likely due to multilevel lumbar degenerative disc disease and arthritis.They were last seen 12/11/2019.     Since their last visit reports 90% improvement. They indicate that their current pain level is 1/10. They have tried previous imaging (MRI 1/14/2020) and chiropractic care (1 visits).  Still has soreness on outside of both hips, right is worse than left, and still has aches in both knees.    The patient is seen with their wife.    Patient's past medical, surgical, social, and family histories were reviewed today and no changes are noted.    REVIEW OF SYSTEMS:  Constitutional: NEGATIVE for fever, chills, change in weight  Skin: NEGATIVE for worrisome rashes, moles or lesions  GI/: NEGATIVE for bowel or bladder changes  Neuro: NEGATIVE for weakness, dizziness or paresthesias    OBJECTIVE:  /82    "Ht 1.816 m (5' 11.5\")   Wt 75.3 kg (166 lb)   BMI 22.83 kg/m     General: healthy, alert and in no distress  HEENT: no scleral icterus or conjunctival erythema  Skin: no suspicious lesions or rash. No jaundice.  CV: regular rhythm by palpation, no pedal edema  Resp: normal respiratory effort without conversational dyspnea   Psych: normal mood and affect  Gait: normal steady gait with appropriate coordination and balance  Neuro: normal light touch sensory exam of the extremities.    MSK:  THORACIC/LUMBAR SPINE  Inspection:    No gross deformity/asymmetry  Palpation:    Tender about the right gluteal musculature. Otherwise remainder of landmarks are nontender.  Range of Motion:     Lumbar flexion limited by tightness    Lumbar extension limited by tightness  Strength:    Full strength throughout hips/quads/hamstrings  Special Tests:    Positive: none    Negative: straight leg raise (bilateral), slump test (bilateral), femoral stretch test (bilateral)     BILATERAL HIP  Inspection:    No obvious deformity or asymmetry, pelvis level  Palpation:    Tender about the right gluteus medius insertion and bilateral trochanteric bursa Otherwise all other landmarks are nontender.  Active Range of Motion:     Flexion limited by tightness, IR limited by tightness, ER  limited by tightness  Strength:    Flexion 5-/5, adduction 5-/5, abduction 5-/5  Special Tests:    Positive: resisted gluteus medius provocation    Negative: Logroll, THEODORE, anterior impingement (FADIR), posterior impingement (EX/AB/ER)    Independent visualization of the below image:    MR LUMBAR SPINE W/O CONTRAST 1/14/2020 2:55 PM    Provided History: Abn xray, L/S-spine, DJD; chronic low back pain.   r/o lumbar degenerative disc disease; Lumbar degenerative disc  disease; Lumbar spondylosis    ICD-10: Lumbar degenerative disc disease; Lumbar spondylosis    Comparison: None available.    Technique: Sagittal T1-weighted, sagittal STIR, 3D volumetric axial  and " sagittal reconstructed T2-weighted images of the lumbar spine were  obtained without intravenous contrast.     Findings: There are 5 lumbar-type vertebrae assumed for the purposes  of this dictation.  The tip of the conus medullaris is at L1-L2.   Normal lumbar vertebral alignment.  Straightening of the normal lumbar  lordosis. There is marked disc height narrowing at L2-3 with mild  narrowing at L3-4.  Degenerative endplate changes at L2-3..    On a level by level basis:    T12-L1: No spinal canal or neuroforaminal stenosis.    L1-2: Circumferential disc bulge with facet arthropathy but no  significant spinal canal stenosis or neural foraminal narrowing.     L2-3: Circumferential disc osteophyte complex with facet arthropathy  mildly narrows the spinal canal. There is the suggestion of clumping  of the nerve roots at this level suggesting a component of  arachnoiditis. The neural foramen are mildly narrowed.    L3-4: Circumferential disc bulge with facet arthropathy without  significant spinal canal stenosis. There is moderate narrowing of the  right lateral recess and abutment of the descending right L4 nerve  root without definite impingement. The neural foramen are mildly  narrowed bilaterally.    L4-5: Disc bulge with facet arthropathy but no significant spinal  canal stenosis. There is moderate left and mild right foraminal  stenosis.    L5-S1: Disc bulge asymmetric to the left with abutment of the exiting  left L5 nerve root in the neural foramen and mild neural foraminal  narrowing. There is mild-to-moderate narrowing of the right neural  foramen secondary to facet arthropathy. Bilateral facet arthropathy  with facet joint effusions.    There is a T2 hyperintense mass in the right renal fossa which is only  minimally visualized measuring at least 5.6 cm in AP dimension.   Impression:     Impression:   1. Multilevel lumbar spondylosis greatest at L2-3 where there is mild  spinal canal stenosis. There is also  apparent nerve root clumping at  this level suggesting a component of arachnoiditis.  2. T2 hyperintense mass partially visualized in the right renal fossa  which may represent a renal cyst, but further evaluation with renal  ultrasound is recommended.    ALEKSANDR CORRAL MD         Patient's conditions were thoroughly discussed during today's visit with greater than 50% of the visit spent counseling the patient with total time spent face-to-face with the patient being 30 minutes.    Albert Yeo MD, [facility]opedic [facility]

## 2020-01-22 NOTE — PATIENT INSTRUCTIONS
1. Trochanteric bursitis of both hips    2. Lumbar degenerative disc disease    3. Lumbar facet arthropathy    4. Spinal stenosis of lumbar region without neurogenic claudication      -Patient is here for follow-up of bilateral hip pain due to trochanteric bursitis and low back pain due to multilevel degenerative disc disease, facet arthropathy and canal/foraminal stenosis.  -We reviewed results of MRI of the lumbar spine.  All questions were answered.  -Patient will start a comprehensive formal physical therapy and home exercise program.  Patient is requesting Jorge Thompson at [facility] located at [address] [place] [address].  He may call [phone]  -Patient will schedule a procedure visit for bilateral trochanteric bursa cortisone injections.  -Call direct clinic number [[phone]] at any time with questions or concerns.    Albert Yeo MD [facility]  [facility] [place]

## 2020-01-22 NOTE — LETTER
1/22/2020         RE: Iraida Hernandez  [address] [address] [address]        Dear Colleague,    Thank you for referring your patient, Iraida Hernandez, to the [facility] SP[facility]TS [facility]. Please see a copy of my visit note below.    ASSESSMENT & PLAN  Patient Instructions     1. Trochanteric bursitis of both hips    2. Lumbar degenerative disc disease    3. Lumbar facet arthropathy    4. Spinal stenosis of lumbar region without neurogenic claudication      -Patient is here for follow-up of bilateral hip pain due to trochanteric bursitis and low back pain due to multilevel degenerative disc disease, facet arthropathy and canal/foraminal stenosis.  -We reviewed results of MRI of the lumbar spine.  All questions were answered.  -Patient will start a comprehensive formal physical therapy and home exercise program.  Patient is requesting Jorge Thompson at [facility] located at [address] [place] [address], [address].  He may call [phone]  -Patient will schedule a procedure visit for bilateral trochanteric bursa cortisone injections.  -Call direct clinic number [[phone]] at any time with questions or concerns.    Albert Yeo MD [facility]  [facility] [place]          -----    SUBJECTIVE:  Iraida Hernandez is a 64 year old male who is seen in follow-up for ongoing low back and posterior glute pain most likely due to multilevel lumbar degenerative disc disease and arthritis.They were last seen 12/11/2019.     Since their last visit reports 90% improvement. They indicate that their current pain level is 1/10. They have tried previous imaging (MRI 1/14/2020) and chiropractic care (1 visits).  Still has soreness on outside of both hips, right is worse than left, and still has aches in both knees.    The patient is seen with their wife.    Patient's past medical, surgical, social, and family histories were reviewed today and no changes are noted.    REVIEW OF  "SYSTEMS:  Constitutional: NEGATIVE for fever, chills, change in weight  Skin: NEGATIVE for worrisome rashes, moles or lesions  GI/: NEGATIVE for bowel or bladder changes  Neuro: NEGATIVE for weakness, dizziness or paresthesias    OBJECTIVE:  /82   Ht 1.816 m (5' 11.5\")   Wt 75.3 kg (166 lb)   BMI 22.83 kg/m      General: healthy, alert and in no distress  HEENT: no scleral icterus or conjunctival erythema  Skin: no suspicious lesions or rash. No jaundice.  CV: regular rhythm by palpation, no pedal edema  Resp: normal respiratory effort without conversational dyspnea   Psych: normal mood and affect  Gait: normal steady gait with appropriate coordination and balance  Neuro: normal light touch sensory exam of the extremities.    MSK:  THORACIC/LUMBAR SPINE  Inspection:    No gross deformity/asymmetry  Palpation:    Tender about the right gluteal musculature. Otherwise remainder of landmarks are nontender.  Range of Motion:     Lumbar flexion limited by tightness    Lumbar extension limited by tightness  Strength:    Full strength throughout hips/quads/hamstrings  Special Tests:    Positive: none    Negative: straight leg raise (bilateral), slump test (bilateral), femoral stretch test (bilateral)     BILATERAL HIP  Inspection:    No obvious deformity or asymmetry, pelvis level  Palpation:    Tender about the right gluteus medius insertion and bilateral trochanteric bursa Otherwise all other landmarks are nontender.  Active Range of Motion:     Flexion limited by tightness, IR limited by tightness, ER  limited by tightness  Strength:    Flexion 5-/5, adduction 5-/5, abduction 5-/5  Special Tests:    Positive: resisted gluteus medius provocation    Negative: Logroll, THEODORE, anterior impingement (FADIR), posterior impingement (EX/AB/ER)    Independent visualization of the below image:    MR LUMBAR SPINE W/O CONTRAST 1/14/2020 2:55 PM    Provided History: Abn xray, L/S-spine, DJD; chronic low back pain.   r/o " lumbar degenerative disc disease; Lumbar degenerative disc  disease; Lumbar spondylosis    ICD-10: Lumbar degenerative disc disease; Lumbar spondylosis    Comparison: None available.    Technique: Sagittal T1-weighted, sagittal STIR, 3D volumetric axial  and sagittal reconstructed T2-weighted images of the lumbar spine were  obtained without intravenous contrast.     Findings: There are 5 lumbar-type vertebrae assumed for the purposes  of this dictation.  The tip of the conus medullaris is at L1-L2.   Normal lumbar vertebral alignment.  Straightening of the normal lumbar  lordosis. There is marked disc height narrowing at L2-3 with mild  narrowing at L3-4.  Degenerative endplate changes at L2-3..    On a level by level basis:    T12-L1: No spinal canal or neuroforaminal stenosis.    L1-2: Circumferential disc bulge with facet arthropathy but no  significant spinal canal stenosis or neural foraminal narrowing.     L2-3: Circumferential disc osteophyte complex with facet arthropathy  mildly narrows the spinal canal. There is the suggestion of clumping  of the nerve roots at this level suggesting a component of  arachnoiditis. The neural foramen are mildly narrowed.    L3-4: Circumferential disc bulge with facet arthropathy without  significant spinal canal stenosis. There is moderate narrowing of the  right lateral recess and abutment of the descending right L4 nerve  root without definite impingement. The neural foramen are mildly  narrowed bilaterally.    L4-5: Disc bulge with facet arthropathy but no significant spinal  canal stenosis. There is moderate left and mild right foraminal  stenosis.    L5-S1: Disc bulge asymmetric to the left with abutment of the exiting  left L5 nerve root in the neural foramen and mild neural foraminal  narrowing. There is mild-to-moderate narrowing of the right neural  foramen secondary to facet arthropathy. Bilateral facet arthropathy  with facet joint effusions.    There is a T2  hyperintense mass in the right renal fossa which is only  minimally visualized measuring at least 5.6 cm in AP dimension.   Impression:     Impression:   1. Multilevel lumbar spondylosis greatest at L2-3 where there is mild  spinal canal stenosis. There is also apparent nerve root clumping at  this level suggesting a component of arachnoiditis.  2. T2 hyperintense mass partially visualized in the right renal fossa  which may represent a renal cyst, but further evaluation with renal  ultrasound is recommended.    ALEKSANDR CORRAL MD         Patient's conditions were thoroughly discussed during today's visit with greater than 50% of the visit spent counseling the patient with total time spent face-to-face with the patient being 30 minutes.    Albert Yeo MD, [facility] Orthopedic Care          Again, thank you for allowing me to participate in the care of your patient.        Sincerely,        Albert Yeo, MD

## 2020-01-31 ENCOUNTER — OFFICE VISIT (OUTPATIENT)
Dept: ORTHOPEDICS | Facility: CLINIC | Age: 65
End: 2020-01-31
Payer: MEDICARE

## 2020-01-31 DIAGNOSIS — M70.62 TROCHANTERIC BURSITIS OF BOTH HIPS: Primary | ICD-10-CM

## 2020-01-31 DIAGNOSIS — M70.61 TROCHANTERIC BURSITIS OF BOTH HIPS: Primary | ICD-10-CM

## 2020-01-31 PROCEDURE — 20611 DRAIN/INJ JOINT/BURSA W/US: CPT | Mod: 50 | Performed by: FAMILY MEDICINE

## 2020-01-31 RX ORDER — METHYLPREDNISOLONE ACETATE 40 MG/ML
40 INJECTION, SUSPENSION INTRA-ARTICULAR; INTRALESIONAL; INTRAMUSCULAR; SOFT TISSUE
Status: DISCONTINUED | OUTPATIENT
Start: 2020-01-31 | End: 2022-09-21

## 2020-01-31 RX ADMIN — METHYLPREDNISOLONE ACETATE 40 MG: 40 INJECTION, SUSPENSION INTRA-ARTICULAR; INTRALESIONAL; INTRAMUSCULAR; SOFT TISSUE at 13:50

## 2020-01-31 NOTE — LETTER
1/31/2020         RE: Iraida Hernandez  [address] [address] [address]        Dear Colleague,    Thank you for referring your patient, Iraida Hernandez, to the [facility] SP[facility]TS MEDICINE. Please see a copy of my visit note below.    ASSESSMENT & PLAN  Patient Instructions     1. Trochanteric bursitis of both hips      -Patient has bilateral hip pain due to trochanteric bursitis.  -Patient tolerated cortisone injection today without complications.  Patient was given postprocedure instructions  -Patient will follow-up when pain returns  -Patient will start physical therapy in approximately 2 weeks and slowly progress walking and physical activities as tolerated  -Call direct clinic number [[phone]] at any time with questions or concerns.    Albert Yeo MD [facility] Orthopedics and Sports Medicine  [facility]          -----    SUBJECTIVE:  Iraida Hernandez is a 64 year old male who is seen for bilateral greater trochanter injections.     Large Joint Injection/Arthocentesis: bilateral greater trochanteric bursa  Date/Time: 1/31/2020 1:50 PM  Performed by: Yeo, Albert, MD  Authorized by: Yeo, Albert, MD     Indications:  Pain and osteoarthritis  Needle Size:  22 G  Guidance: ultrasound    Approach:  Anterior  Location:  Hip  Laterality:  Bilateral      Site:  Bilateral greater trochanteric bursa  Medications (Right):  40 mg methylPREDNISolone 40 MG/ML  Medications (Left):  40 mg methylPREDNISolone 40 MG/ML  Outcome:  Tolerated well, no immediate complications  Procedure discussed: discussed risks, benefits, and alternatives    Consent Given by:  Patient  Timeout: timeout called immediately prior to procedure    Prep: patient was prepped and draped in usual sterile fashion              Albert Yeo MD, [facility] and Orthopedic Care      Again, thank you for allowing me to participate in the care of your patient.        Sincerely,        Albert Yeo, MD

## 2020-01-31 NOTE — PROGRESS NOTES
ASSESSMENT & PLAN  Patient Instructions     1. Trochanteric bursitis of both hips      -Patient has bilateral hip pain due to trochanteric bursitis.  -Patient tolerated cortisone injection today without complications.  Patient was given postprocedure instructions  -Patient will follow-up when pain returns  -Patient will start physical therapy in approximately 2 weeks and slowly progress walking and physical activities as tolerated  -Call direct clinic number [[phone]] at any time with questions or concerns.    Albert Yeo MD [facility] Orthopedics and Sports Medicine  [facility]          -----    SUBJECTIVE:  Iraida Hernandez is a 64 year old male who is seen for bilateral greater trochanter injections.     Large Joint Injection/Arthocentesis: bilateral greater trochanteric bursa  Date/Time: 1/31/2020 1:50 PM  Performed by: Yeo, Albert, MD  Authorized by: Yeo, Albert, MD     Indications:  Pain and osteoarthritis  Needle Size:  22 G  Guidance: ultrasound    Approach:  Anterior  Location:  Hip  Laterality:  Bilateral      Site:  Bilateral greater trochanteric bursa  Medications (Right):  40 mg methylPREDNISolone 40 MG/ML  Medications (Left):  40 mg methylPREDNISolone 40 MG/ML  Outcome:  Tolerated well, no immediate complications  Procedure discussed: discussed risks, benefits, and alternatives    Consent Given by:  Patient  Timeout: timeout called immediately prior to procedure    Prep: patient was prepped and draped in usual sterile fashion              Albert Yeo MD, [facility] Orthopedic Care

## 2020-01-31 NOTE — PATIENT INSTRUCTIONS
1. Trochanteric bursitis of both hips      -Patient has bilateral hip pain due to trochanteric bursitis.  -Patient tolerated cortisone injection today without complications.  Patient was given postprocedure instructions  -Patient will follow-up when pain returns  -Patient will start physical therapy in approximately 2 weeks and slowly progress walking and physical activities as tolerated  -Call direct clinic number [[phone]] at any time with questions or concerns.    Albert Yeo MD [facility]  [facility] [place]

## 2020-02-05 ENCOUNTER — OFFICE VISIT (OUTPATIENT)
Dept: FAMILY MEDICINE | Facility: CLINIC | Age: 65
End: 2020-02-05
Payer: MEDICARE

## 2020-02-05 VITALS
RESPIRATION RATE: 16 BRPM | DIASTOLIC BLOOD PRESSURE: 64 MMHG | SYSTOLIC BLOOD PRESSURE: 110 MMHG | WEIGHT: 162 LBS | BODY MASS INDEX: 21.94 KG/M2 | HEART RATE: 74 BPM | TEMPERATURE: 98 F | HEIGHT: 72 IN

## 2020-02-05 DIAGNOSIS — I25.5 ISCHEMIC CARDIOMYOPATHY: ICD-10-CM

## 2020-02-05 DIAGNOSIS — C61 PROSTATE CANCER (H): ICD-10-CM

## 2020-02-05 DIAGNOSIS — I46.9 CARDIAC ARREST (H): ICD-10-CM

## 2020-02-05 PROCEDURE — 99214 OFFICE O/P EST MOD 30 MIN: CPT | Performed by: FAMILY MEDICINE

## 2020-02-05 PROCEDURE — 36415 COLL VENOUS BLD VENIPUNCTURE: CPT | Performed by: FAMILY MEDICINE

## 2020-02-05 PROCEDURE — 84153 ASSAY OF PSA TOTAL: CPT | Performed by: FAMILY MEDICINE

## 2020-02-05 ASSESSMENT — MIFFLIN-ST. JEOR: SCORE: 1544.89

## 2020-02-05 NOTE — PROGRESS NOTES
Subjective     Iraida Hernandez is a 64 year old male who presents to clinic today for the following health issues:    History of Present Illness        He eats 2-3 servings of fruits and vegetables daily.He consumes 1 sweetened beverage(s) daily.He exercises with enough effort to increase his heart rate 10 to 19 minutes per day.  He exercises with enough effort to increase his heart rate 3 or less days per week.   He is taking medications regularly.     Medication Followup of  All Heart meds-has device implant, sees [facility]     Taking Medication as prescribed: yes    Side Effects:  None    Medication Helping Symptoms:  yes     Sees Ortho re: trochanteric bursitis and djd lumbar and mild djd hips good improvement after steroid shots     BP Readings from Last 3 Encounters:   02/05/20 110/64   01/22/20 106/82   12/11/19 107/69    Wt Readings from Last 3 Encounters:   02/05/20 73.5 kg (162 lb)   01/22/20 75.3 kg (166 lb)   12/11/19 73 kg (161 lb)                      Reviewed and updated as needed this visit by Provider         Review of Systems   ROS COMP: Constitutional, HEENT, cardiovascular, pulmonary, GI, , musculoskeletal, neuro, skin, endocrine and psych systems are negative, except as otherwise noted.      Objective    There were no vitals taken for this visit.  There is no height or weight on file to calculate BMI.  Physical Exam   GENERAL: healthy, alert and no distress  EYES: Eyes grossly normal to inspection, PERRL and conjunctivae and sclerae normal  HENT: ear canals and TM's normal, nose and mouth without ulcers or lesions  NECK: no adenopathy, no asymmetry, masses, or scars and thyroid normal to palpation  RESP: lungs clear to auscultation - no rales, rhonchi or wheezes  CV: regular rate and rhythm, normal S1 S2, no S3 or S4, no murmur, click or rub, no peripheral edema and peripheral pulses strong  ABDOMEN: soft, nontender, no hepatosplenomegaly, no masses and bowel sounds normal  MS: no gross  musculoskeletal defects noted, no edema  SKIN: no suspicious lesions or rashes  NEURO: Normal strength and tone, mentation intact and speech normal  PSYCH: mentation appears normal, affect normal/bright    Diagnostic Test Results:  Labs reviewed in Epic        Assessment & Plan     1. Ischemic cardiomyopathy  compensated    2. Cardiac arrest (H)  Device implant     3. Prostate cancer (H)  Watchful waiting follow up [facility] Dr [facility]  - PSA, tumor marker  - UROLOGY ADULT REFERRAL     Tobacco Cessation:   reports that he has been smoking. He has a 10.50 pack-year smoking history. He has never used smokeless tobacco.          Regular exercise  Walks the [employer] course      Lawson Mahoney MD  [facility]

## 2020-02-06 DIAGNOSIS — Z12.11 SCREEN FOR COLON CANCER: ICD-10-CM

## 2020-02-06 LAB — PSA SERPL-MCNC: 14.6 UG/L (ref 0–4)

## 2020-02-06 PROCEDURE — 82274 ASSAY TEST FOR BLOOD FECAL: CPT | Performed by: FAMILY MEDICINE

## 2020-02-11 LAB — HEMOCCULT STL QL IA: NEGATIVE

## 2020-02-12 ENCOUNTER — TELEPHONE (OUTPATIENT)
Dept: UROLOGY | Facility: CLINIC | Age: 65
End: 2020-02-12

## 2020-02-12 NOTE — TELEPHONE ENCOUNTER
[facility] Call Center    Phone Message    May a detailed message be left on voicemail: yes     Reason for Call: Other: Pt returned Nithya's phone call, he wasnt sure why he was called so he set up an appt thinking thats what it was for, please call pt back and confirm.      Action Taken: Message routed to:  [facility] ([facility] Urology     Travel Screening: Not Applicable

## 2020-02-12 NOTE — TELEPHONE ENCOUNTER
Returned phone call and spoke with patient informed him that we were calling to set-up an appointment per MD related to elevated PSA.     Nithya Lam LPN

## 2020-02-13 ENCOUNTER — TELEPHONE (OUTPATIENT)
Dept: ORTHOPEDICS | Facility: CLINIC | Age: 65
End: 2020-02-13

## 2020-02-13 NOTE — TELEPHONE ENCOUNTER
Patient returned call. Informed that orders were faxed. He was appreciative of assistance.     JORGE Ayala RN

## 2020-02-13 NOTE — TELEPHONE ENCOUNTER
Iraida Hernandez is a 64 year old male who left voicemail stating he saw Dr. Yeo. Who recommended physical therapy with MURPHY Barajas. Felice is no longer in our building and works at [facility] [facility] [employer] in [place].   He made an appointment with him for next week. Their office states patient needs to have order for physical therapy faxed to: attn: Cyrus:[phone].     Please fax orders and call patient to let him know we received the message.     Patient expecting call back: YES      Preferred contact number:  [phone]  Can we leave a detailed message on this number: NO consent on file.     JORGE Ayala RN

## 2020-02-13 NOTE — TELEPHONE ENCOUNTER
Return call to patient. LVM requesting call back. Requested that if patient needs to leave another message when he calls back to inform us if we can leave a more detailed message on this number.    PT orders faxed to [facility].    Akanksha Rebollar ATC

## 2020-03-04 DIAGNOSIS — R97.20 ELEVATED PROSTATE SPECIFIC ANTIGEN (PSA): Primary | ICD-10-CM

## 2020-03-09 ENCOUNTER — ANCILLARY PROCEDURE (OUTPATIENT)
Dept: CARDIOLOGY | Facility: CLINIC | Age: 65
End: 2020-03-09
Attending: INTERNAL MEDICINE
Payer: MEDICARE

## 2020-03-09 DIAGNOSIS — Z95.810 ICD (IMPLANTABLE CARDIOVERTER-DEFIBRILLATOR), SINGLE, IN SITU: ICD-10-CM

## 2020-03-09 DIAGNOSIS — I25.5 ISCHEMIC CARDIOMYOPATHY: ICD-10-CM

## 2020-03-09 PROCEDURE — 93296 REM INTERROG EVL PM/IDS: CPT | Performed by: INTERNAL MEDICINE

## 2020-03-09 PROCEDURE — 93295 DEV INTERROG REMOTE 1/2/MLT: CPT | Performed by: INTERNAL MEDICINE

## 2020-03-11 ENCOUNTER — OFFICE VISIT (OUTPATIENT)
Dept: UROLOGY | Facility: CLINIC | Age: 65
End: 2020-03-11
Payer: MEDICARE

## 2020-03-11 VITALS
SYSTOLIC BLOOD PRESSURE: 124 MMHG | WEIGHT: 162 LBS | DIASTOLIC BLOOD PRESSURE: 68 MMHG | HEIGHT: 72 IN | HEART RATE: 80 BPM | BODY MASS INDEX: 21.94 KG/M2

## 2020-03-11 DIAGNOSIS — C61 PROSTATE CANCER (H): Primary | ICD-10-CM

## 2020-03-11 DIAGNOSIS — R97.20 ELEVATED PROSTATE SPECIFIC ANTIGEN (PSA): ICD-10-CM

## 2020-03-11 LAB
ALBUMIN UR-MCNC: NEGATIVE MG/DL
APPEARANCE UR: CLEAR
BILIRUB UR QL STRIP: NEGATIVE
COLOR UR AUTO: YELLOW
GLUCOSE UR STRIP-MCNC: NEGATIVE MG/DL
HGB UR QL STRIP: ABNORMAL
KETONES UR STRIP-MCNC: NEGATIVE MG/DL
LEUKOCYTE ESTERASE UR QL STRIP: NEGATIVE
MDC_IDC_EPISODE_DTM: NORMAL
MDC_IDC_EPISODE_ID: NORMAL
MDC_IDC_EPISODE_TYPE: NORMAL
MDC_IDC_LEAD_IMPLANT_DT: NORMAL
MDC_IDC_LEAD_LOCATION: NORMAL
MDC_IDC_LEAD_LOCATION_DETAIL_1: NORMAL
MDC_IDC_LEAD_MFG: NORMAL
MDC_IDC_LEAD_MODEL: NORMAL
MDC_IDC_LEAD_POLARITY_TYPE: NORMAL
MDC_IDC_LEAD_SERIAL: NORMAL
MDC_IDC_MSMT_BATTERY_DTM: NORMAL
MDC_IDC_MSMT_BATTERY_REMAINING_LONGEVITY: 114 MO
MDC_IDC_MSMT_BATTERY_REMAINING_PERCENTAGE: 100 %
MDC_IDC_MSMT_BATTERY_STATUS: NORMAL
MDC_IDC_MSMT_CAP_CHARGE_DTM: NORMAL
MDC_IDC_MSMT_CAP_CHARGE_TIME: 9.9 S
MDC_IDC_MSMT_CAP_CHARGE_TYPE: NORMAL
MDC_IDC_MSMT_LEADCHNL_RV_IMPEDANCE_VALUE: 700 OHM
MDC_IDC_MSMT_LEADCHNL_RV_PACING_THRESHOLD_AMPLITUDE: 0.5 V
MDC_IDC_MSMT_LEADCHNL_RV_PACING_THRESHOLD_PULSEWIDTH: 0.5 MS
MDC_IDC_PG_IMPLANT_DTM: NORMAL
MDC_IDC_PG_MFG: NORMAL
MDC_IDC_PG_MODEL: NORMAL
MDC_IDC_PG_SERIAL: NORMAL
MDC_IDC_PG_TYPE: NORMAL
MDC_IDC_SESS_CLINIC_NAME: NORMAL
MDC_IDC_SESS_DTM: NORMAL
MDC_IDC_SESS_TYPE: NORMAL
MDC_IDC_SET_BRADY_LOWRATE: 40 {BEATS}/MIN
MDC_IDC_SET_BRADY_MODE: NORMAL
MDC_IDC_SET_LEADCHNL_RV_PACING_AMPLITUDE: 2 V
MDC_IDC_SET_LEADCHNL_RV_PACING_POLARITY: NORMAL
MDC_IDC_SET_LEADCHNL_RV_PACING_PULSEWIDTH: 0.5 MS
MDC_IDC_SET_LEADCHNL_RV_SENSING_ADAPTATION_MODE: NORMAL
MDC_IDC_SET_LEADCHNL_RV_SENSING_POLARITY: NORMAL
MDC_IDC_SET_LEADCHNL_RV_SENSING_SENSITIVITY: 0.6 MV
MDC_IDC_SET_ZONE_DETECTION_INTERVAL: 250 MS
MDC_IDC_SET_ZONE_DETECTION_INTERVAL: 300 MS
MDC_IDC_SET_ZONE_DETECTION_INTERVAL: 353 MS
MDC_IDC_SET_ZONE_TYPE: NORMAL
MDC_IDC_SET_ZONE_VENDOR_TYPE: NORMAL
MDC_IDC_STAT_BRADY_DTM_END: NORMAL
MDC_IDC_STAT_BRADY_DTM_START: NORMAL
MDC_IDC_STAT_BRADY_RV_PERCENT_PACED: 0 %
MDC_IDC_STAT_EPISODE_RECENT_COUNT: 0
MDC_IDC_STAT_EPISODE_RECENT_COUNT: 1
MDC_IDC_STAT_EPISODE_RECENT_COUNT_DTM_END: NORMAL
MDC_IDC_STAT_EPISODE_RECENT_COUNT_DTM_START: NORMAL
MDC_IDC_STAT_EPISODE_TYPE: NORMAL
MDC_IDC_STAT_EPISODE_VENDOR_TYPE: NORMAL
MDC_IDC_STAT_TACHYTHERAPY_ATP_DELIVERED_RECENT: 0
MDC_IDC_STAT_TACHYTHERAPY_ATP_DELIVERED_TOTAL: 0
MDC_IDC_STAT_TACHYTHERAPY_RECENT_DTM_END: NORMAL
MDC_IDC_STAT_TACHYTHERAPY_RECENT_DTM_START: NORMAL
MDC_IDC_STAT_TACHYTHERAPY_SHOCKS_ABORTED_RECENT: 0
MDC_IDC_STAT_TACHYTHERAPY_SHOCKS_ABORTED_TOTAL: 0
MDC_IDC_STAT_TACHYTHERAPY_SHOCKS_DELIVERED_RECENT: 0
MDC_IDC_STAT_TACHYTHERAPY_SHOCKS_DELIVERED_TOTAL: 0
MDC_IDC_STAT_TACHYTHERAPY_TOTAL_DTM_END: NORMAL
MDC_IDC_STAT_TACHYTHERAPY_TOTAL_DTM_START: NORMAL
NITRATE UR QL: NEGATIVE
PH UR STRIP: 5.5 PH (ref 5–7)
SOURCE: ABNORMAL
SP GR UR STRIP: 1.02 (ref 1–1.03)
UROBILINOGEN UR STRIP-ACNC: 0.2 EU/DL (ref 0.2–1)

## 2020-03-11 PROCEDURE — 99213 OFFICE O/P EST LOW 20 MIN: CPT | Performed by: UROLOGY

## 2020-03-11 PROCEDURE — 81003 URINALYSIS AUTO W/O SCOPE: CPT | Performed by: UROLOGY

## 2020-03-11 ASSESSMENT — PAIN SCALES - GENERAL: PAINLEVEL: NO PAIN (0)

## 2020-03-11 ASSESSMENT — MIFFLIN-ST. JEOR: SCORE: 1539.89

## 2020-03-11 NOTE — LETTER
3/11/2020       RE: Iraida Hernandez  [address] [address] [facility] [address]     Dear Colleague,    Thank you for referring your patient, Iraida Hernandez, to the [facility] [place] at [facility]. Please see a copy of my visit note below.    Iraida Hernandez is a very pleasant 65-year-old male with grade 3+3 adenocarcinoma in 2 biopsies a year ago.  Unfortunately he did not return for his 3-month follow-up in now his PSA is over 14.0 a year later.  He is having no difficulty voiding, dysuria or hematuria.  He was not a candidate for a 3 Sherry MRI of the prostate because of an ICD implant- he did, however, have a MRI of the spine at the [place] with several people in attendance in case he had problems when they turned his ICD off.  He said it was sort of a cluster f...  Other past medical history: Significant coronary disease, cardiomyopathy, cardiac arrest, coronary embolus, asthma, hyperlipidemia, tobacco abuse  Medications: Baby aspirin, Plavix, Coreg, Lipitor, lisinopril, Spironolactone, Nitrostat  Allergies: Penicillin, tetracycline   Review of systems: As above.  He becomes fatigued after walking with his wife  Exam: Alert and oriented, normal appearance, normal vital signs.  Normal respirations, neuro grossly intact.  Normal sphincter tone, no rectal mass or impaction, benign and symmetric prostate, normal seminal vesicles  Assessment: Low-grade adenocarcinoma the prostate with large change in PSA in a year-patient was lost to follow-up.  He is not a good candidate for radiation because of his anticoagulant use, a very poor candidate for surgical treatment because of his heart disease.  Plan: After much discussion, recommend CT abdomen and pelvis next Tuesday at [facility] and a repeat a PSA at [facility] office a week later.  No ejaculations for 1 week prior, then decide follow-up and/or treatment.  We will ask Dr. Oglesby's  opinion    Again, thank you for allowing me to participate in the care of your patient.      Sincerely,    Romain Weiss MD

## 2020-03-11 NOTE — PROGRESS NOTES
Iraida Hernandez is a very pleasant 65-year-old male with grade 3+3 adenocarcinoma in 2 biopsies a year ago.  Unfortunately he did not return for his 3-month follow-up in now his PSA is over 14.0 a year later.  He is having no difficulty voiding, dysuria or hematuria.  He was not a candidate for a 3 Sherry MRI of the prostate because of an ICD implant- he did, however, have a MRI of the spine at the [place] with several people in attendance in case he had problems when they turned his ICD off.  He said it was sort of a cluster f...  Other past medical history: Significant coronary disease, cardiomyopathy, cardiac arrest, coronary embolus, asthma, hyperlipidemia, tobacco abuse  Medications: Baby aspirin, Plavix, Coreg, Lipitor, lisinopril, Spironolactone, Nitrostat  Allergies: Penicillin, tetracycline   Review of systems: As above.  He becomes fatigued after walking with his wife  Exam: Alert and oriented, normal appearance, normal vital signs.  Normal respirations, neuro grossly intact.  Normal sphincter tone, no rectal mass or impaction, benign and symmetric prostate, normal seminal vesicles  Assessment: Low-grade adenocarcinoma the prostate with large change in PSA in a year-patient was lost to follow-up.  He is not a good candidate for radiation because of his anticoagulant use, a very poor candidate for surgical treatment because of his heart disease.  Plan: After much discussion, recommend CT abdomen and pelvis next Tuesday at [facility] and a repeat a PSA at [facility] a week later.  No ejaculations for 1 week prior, then decide follow-up and/or treatment.  We will ask Dr. Oglesby's opinion

## 2020-03-17 ENCOUNTER — HOSPITAL ENCOUNTER (OUTPATIENT)
Dept: CT IMAGING | Facility: CLINIC | Age: 65
Discharge: HOME | End: 2020-03-17
Attending: UROLOGY | Admitting: UROLOGY
Payer: MEDICARE

## 2020-03-17 DIAGNOSIS — C61 PROSTATE CANCER (H): ICD-10-CM

## 2020-03-17 PROCEDURE — 25000128 H RX IP 250 OP 636: Performed by: RADIOLOGY

## 2020-03-17 PROCEDURE — 74178 CT ABD&PLV WO CNTR FLWD CNTR: CPT

## 2020-03-17 PROCEDURE — 25000125 ZZHC RX 250: Performed by: RADIOLOGY

## 2020-03-17 RX ORDER — IOPAMIDOL 755 MG/ML
500 INJECTION, SOLUTION INTRAVASCULAR ONCE
Status: COMPLETED | OUTPATIENT
Start: 2020-03-17 | End: 2020-03-17

## 2020-03-17 RX ADMIN — IOPAMIDOL 81 ML: 755 INJECTION, SOLUTION INTRAVENOUS at 13:28

## 2020-03-17 RX ADMIN — SODIUM CHLORIDE 50 ML: 9 INJECTION, SOLUTION INTRAVENOUS at 13:28

## 2020-03-26 DIAGNOSIS — C61 PROSTATE CANCER (H): Primary | ICD-10-CM

## 2020-03-27 DIAGNOSIS — R97.20 ELEVATED PROSTATE SPECIFIC ANTIGEN (PSA): Primary | ICD-10-CM

## 2020-03-31 DIAGNOSIS — R97.20 ELEVATED PROSTATE SPECIFIC ANTIGEN (PSA): ICD-10-CM

## 2020-03-31 LAB — PSA SERPL-MCNC: 10.7 UG/L (ref 0–4)

## 2020-03-31 PROCEDURE — 84153 ASSAY OF PSA TOTAL: CPT | Performed by: UROLOGY

## 2020-03-31 PROCEDURE — 36415 COLL VENOUS BLD VENIPUNCTURE: CPT | Performed by: UROLOGY

## 2020-04-14 ENCOUNTER — VIRTUAL VISIT (OUTPATIENT)
Dept: UROLOGY | Facility: CLINIC | Age: 65
End: 2020-04-14
Payer: MEDICARE

## 2020-04-14 VITALS — BODY MASS INDEX: 21.54 KG/M2 | HEIGHT: 72 IN | WEIGHT: 159 LBS

## 2020-04-14 DIAGNOSIS — C61 PROSTATE CANCER (H): ICD-10-CM

## 2020-04-14 DIAGNOSIS — N28.89 RIGHT RENAL MASS: Primary | ICD-10-CM

## 2020-04-14 PROCEDURE — 99442 ZZC PHYSICIAN TELEPHONE EVALUATION 11-20 MIN: CPT | Performed by: UROLOGY

## 2020-04-14 ASSESSMENT — PAIN SCALES - GENERAL: PAINLEVEL: NO PAIN (0)

## 2020-04-14 ASSESSMENT — MIFFLIN-ST. JEOR: SCORE: 1534.22

## 2020-04-14 NOTE — NURSING NOTE
Chief Complaint   Patient presents with     Prostate Cancer     Review PSA and CT results     Bing Moise, EMT

## 2020-04-14 NOTE — PROGRESS NOTES
"Iraida Hernandez is a 65 year old male who is being evaluated via a billable telephone visit.      The patient has been notified of following:     \"This telephone visit will be conducted via a call between you and your physician/provider. We have found that certain health care needs can be provided without the need for a physical exam.  This service lets us provide the care you need with a short phone conversation.  If a prescription is necessary we can send it directly to your pharmacy.  If lab work is needed we can place an order for that and you can then stop by our lab to have the test done at a later time.    Telephone visits are billed at different rates depending on your insurance coverage. During this emergency period, for some insurers they may be billed the same as an in-person visit.  Please reach out to your insurance provider with any questions.    If during the course of the call the physician/provider feels a telephone visit is not appropriate, you will not be charged for this service.\"    Patient has given verbal consent for Telephone visit? YES    How would you like to obtain your AVS?     Additional provider notes: The patient is a 65-year-old male diagnosed with grade 3+3 adenocarcinoma of the prostate in the spring of last year.  He has clinical stage T1c disease.  His Oncotype DX test shows very low risk for higher grade disease and progression.  His PSA at diagnosis was 9.79.  He had elevation of the PSA in 2012 and 13 but was never evaluated because he was going through so many heart issues.  There is no family history of prostate cancer.  Other past medical history: Urolithiasis, basal cell carcinoma of skin, cardiac arrest, cardiomyopathy, coronary artery disease, hyperlipidemia, intermittent asthma, tobacco abuse, ICD implant, coronary thrombectomy and angioplasty with stents, herniorrhaphy  Medications unchanged but he still on aspirin and Plavix for anticoagulation  His PSA follow-up was " 14.6 in February of this year.  Repeat PSA on March 31 was 10.7.  Repeat digital rectal exam was benign.  CT urogram shows tiny renal stones and no hydronephrosis or adenopathy.  There is also a possible 1.3 cm solid mass laterally at the right kidney and multi-cystic kidney disease  Review of systems: He is voiding fine, no dysuria or hematuria  Assessment: Low-grade adenocarcinoma the prostate, clinical stage T1c  Elevation of PSA is probably of limited significance because of clinical findings- Oncotype DX test, digital rectal exam  Plan: Repeat CT scan right kidney in 6 months, PSA in 6 months and see me for ANDREW      Phone call duration:15 minutes    WM Isela MD

## 2020-05-06 ENCOUNTER — VIRTUAL VISIT (OUTPATIENT)
Dept: CARDIOLOGY | Facility: CLINIC | Age: 65
End: 2020-05-06
Attending: INTERNAL MEDICINE
Payer: MEDICARE

## 2020-05-06 VITALS — DIASTOLIC BLOOD PRESSURE: 68 MMHG | WEIGHT: 159 LBS | SYSTOLIC BLOOD PRESSURE: 120 MMHG | BODY MASS INDEX: 21.56 KG/M2

## 2020-05-06 DIAGNOSIS — I25.5 ISCHEMIC CARDIOMYOPATHY: ICD-10-CM

## 2020-05-06 DIAGNOSIS — I25.10 CORONARY ARTERY DISEASE INVOLVING NATIVE CORONARY ARTERY OF NATIVE HEART WITHOUT ANGINA PECTORIS: ICD-10-CM

## 2020-05-06 DIAGNOSIS — I21.09 ACUTE ST ELEVATION MYOCARDIAL INFARCTION (STEMI) INVOLVING OTHER CORONARY ARTERY OF ANTERIOR WALL (H): ICD-10-CM

## 2020-05-06 DIAGNOSIS — Z95.810 ICD (IMPLANTABLE CARDIOVERTER-DEFIBRILLATOR), SINGLE, IN SITU: ICD-10-CM

## 2020-05-06 PROCEDURE — 99441 ZZC PHYSICIAN TELEPHONE EVALUATION 5-10 MIN: CPT | Performed by: INTERNAL MEDICINE

## 2020-05-06 NOTE — LETTER
5/6/2020      RE: Iraida Hernandez  [id number] [address] [facility] [id number]       Dear Colleague,    Thank you for the opportunity to participate in the care of your patient, Iraida Hernandez, at the [facility] at [facility]. Please see a copy of my visit note below.    Mr. Holliday is a very pleasant 65-year-old gentleman with known history of coronary disease ischemic cardiomyopathy ICD implantation.  We have phone visit today.  He has been active without any cardiovascular complaints.  Specifically no chest pain chest pressure shortness of breath heart racing palpitations syncope or edema.  He has been compliant with his medications and has had no issues.  He has been active playing golf and was played 6 rounds of golf already this year with no difficulties.  His biggest difficulty is that with hip pain and knee pain.    We reviewed that he is due for an echocardiogram but is not urgent so we will plan on scheduling this for 6 months from now.  He prefers to appointments in the afternoon time.  I will see him then with an echocardiogram in 6 months.    Please do not hesitate to contact me if you have any questions/concerns.     Sincerely,     ELIANA KHAN MD

## 2020-05-06 NOTE — PROGRESS NOTES
"Iraida Hernandez is a 65 year old male who is being evaluated via a billable telephone visit.      The patient has been notified of following:     \"This telephone visit will be conducted via a call between you and your physician/provider. We have found that certain health care needs can be provided without the need for a physical exam.  This service lets us provide the care you need with a short phone conversation.  If a prescription is necessary we can send it directly to your pharmacy.  If lab work is needed we can place an order for that and you can then stop by our lab to have the test done at a later time.    Telephone visits are billed at different rates depending on your insurance coverage. During this emergency period, for some insurers they may be billed the same as an in-person visit.  Please reach out to your insurance provider with any questions.    If during the course of the call the physician/provider feels a telephone visit is not appropriate, you will not be charged for this service.\"    Patient has given verbal consent for Telephone visit?  Yes    What phone number would you like to be contacted at? [phone]    How would you like to obtain your AVS? [facility]       /68       Review Of Systems  Skin:   Eyes:Ears/Nose/Throat: NEGATIVE  Respiratory:  Cardiovascular:negative   Gastrointestinal:   Genitourinary:   Musculoskeletal:   Neurologic:   Psychiatric:   Hematologic/Lymphatic/Immunologic:   Endocrine:      Phone call duration: 10 minutes      Mr. Holliday is a very pleasant 65-year-old gentleman with known history of coronary disease ischemic cardiomyopathy ICD implantation.  We have phone visit today.  He has been active without any cardiovascular complaints.  Specifically no chest pain chest pressure shortness of breath heart racing palpitations syncope or edema.  He has been compliant with his medications and has had no issues.  He has been active playing golf and was played 6 rounds of golf " already this year with no difficulties.  His biggest difficulty is that with hip pain and knee pain.    We reviewed that he is due for an echocardiogram but is not urgent so we will plan on scheduling this for 6 months from now.  He prefers to appointments in the afternoon time.  I will see him then with an echocardiogram in 6 months.  ELIANA KHAN MD

## 2020-05-19 DIAGNOSIS — I21.02 ACUTE ST ELEVATION MYOCARDIAL INFARCTION (STEMI) INVOLVING LEFT ANTERIOR DESCENDING (LAD) CORONARY ARTERY (H): ICD-10-CM

## 2020-05-19 RX ORDER — ATORVASTATIN CALCIUM 40 MG/1
40 TABLET, FILM COATED ORAL DAILY
Qty: 90 TABLET | Refills: 1 | Status: SHIPPED | OUTPATIENT
Start: 2020-05-19 | End: 2020-11-10

## 2020-05-28 DIAGNOSIS — I21.9 ACUTE MYOCARDIAL INFARCTION (H): ICD-10-CM

## 2020-05-28 RX ORDER — CARVEDILOL 12.5 MG/1
12.5 TABLET ORAL 2 TIMES DAILY WITH MEALS
Qty: 180 TABLET | Refills: 1 | Status: SHIPPED | OUTPATIENT
Start: 2020-05-28 | End: 2020-12-17

## 2020-06-08 ENCOUNTER — ANCILLARY PROCEDURE (OUTPATIENT)
Dept: CARDIOLOGY | Facility: CLINIC | Age: 65
End: 2020-06-08
Attending: INTERNAL MEDICINE
Payer: MEDICARE

## 2020-06-08 DIAGNOSIS — Z95.810 ICD (IMPLANTABLE CARDIOVERTER-DEFIBRILLATOR), SINGLE, IN SITU: Primary | ICD-10-CM

## 2020-06-08 DIAGNOSIS — Z95.810 ICD (IMPLANTABLE CARDIOVERTER-DEFIBRILLATOR), SINGLE, IN SITU: ICD-10-CM

## 2020-06-08 DIAGNOSIS — I42.9 CARDIOMYOPATHY (H): ICD-10-CM

## 2020-06-08 PROCEDURE — 93296 REM INTERROG EVL PM/IDS: CPT | Performed by: INTERNAL MEDICINE

## 2020-06-08 PROCEDURE — 93295 DEV INTERROG REMOTE 1/2/MLT: CPT | Performed by: INTERNAL MEDICINE

## 2020-06-11 LAB
MDC_IDC_EPISODE_DTM: NORMAL
MDC_IDC_EPISODE_DTM: NORMAL
MDC_IDC_EPISODE_DURATION: 7 S
MDC_IDC_EPISODE_ID: NORMAL
MDC_IDC_EPISODE_ID: NORMAL
MDC_IDC_EPISODE_TYPE: NORMAL
MDC_IDC_EPISODE_TYPE: NORMAL
MDC_IDC_LEAD_IMPLANT_DT: NORMAL
MDC_IDC_LEAD_LOCATION: NORMAL
MDC_IDC_LEAD_LOCATION_DETAIL_1: NORMAL
MDC_IDC_LEAD_MFG: NORMAL
MDC_IDC_LEAD_MODEL: NORMAL
MDC_IDC_LEAD_POLARITY_TYPE: NORMAL
MDC_IDC_LEAD_SERIAL: NORMAL
MDC_IDC_MSMT_BATTERY_DTM: NORMAL
MDC_IDC_MSMT_BATTERY_REMAINING_LONGEVITY: 108 MO
MDC_IDC_MSMT_BATTERY_REMAINING_PERCENTAGE: 100 %
MDC_IDC_MSMT_BATTERY_STATUS: NORMAL
MDC_IDC_MSMT_CAP_CHARGE_DTM: NORMAL
MDC_IDC_MSMT_CAP_CHARGE_TIME: 9.9 S
MDC_IDC_MSMT_CAP_CHARGE_TYPE: NORMAL
MDC_IDC_MSMT_LEADCHNL_RV_IMPEDANCE_VALUE: 702 OHM
MDC_IDC_MSMT_LEADCHNL_RV_PACING_THRESHOLD_AMPLITUDE: 0.5 V
MDC_IDC_MSMT_LEADCHNL_RV_PACING_THRESHOLD_PULSEWIDTH: 0.5 MS
MDC_IDC_PG_IMPLANT_DTM: NORMAL
MDC_IDC_PG_MFG: NORMAL
MDC_IDC_PG_MODEL: NORMAL
MDC_IDC_PG_SERIAL: NORMAL
MDC_IDC_PG_TYPE: NORMAL
MDC_IDC_SESS_CLINIC_NAME: NORMAL
MDC_IDC_SESS_DTM: NORMAL
MDC_IDC_SESS_TYPE: NORMAL
MDC_IDC_SET_BRADY_LOWRATE: 40 {BEATS}/MIN
MDC_IDC_SET_BRADY_MODE: NORMAL
MDC_IDC_SET_LEADCHNL_RV_PACING_AMPLITUDE: 2 V
MDC_IDC_SET_LEADCHNL_RV_PACING_POLARITY: NORMAL
MDC_IDC_SET_LEADCHNL_RV_PACING_PULSEWIDTH: 0.5 MS
MDC_IDC_SET_LEADCHNL_RV_SENSING_ADAPTATION_MODE: NORMAL
MDC_IDC_SET_LEADCHNL_RV_SENSING_POLARITY: NORMAL
MDC_IDC_SET_LEADCHNL_RV_SENSING_SENSITIVITY: 0.6 MV
MDC_IDC_SET_ZONE_DETECTION_INTERVAL: 250 MS
MDC_IDC_SET_ZONE_DETECTION_INTERVAL: 300 MS
MDC_IDC_SET_ZONE_DETECTION_INTERVAL: 353 MS
MDC_IDC_SET_ZONE_TYPE: NORMAL
MDC_IDC_SET_ZONE_VENDOR_TYPE: NORMAL
MDC_IDC_STAT_BRADY_DTM_END: NORMAL
MDC_IDC_STAT_BRADY_DTM_START: NORMAL
MDC_IDC_STAT_BRADY_RV_PERCENT_PACED: 0 %
MDC_IDC_STAT_EPISODE_RECENT_COUNT: 0
MDC_IDC_STAT_EPISODE_RECENT_COUNT: 2
MDC_IDC_STAT_EPISODE_RECENT_COUNT_DTM_END: NORMAL
MDC_IDC_STAT_EPISODE_RECENT_COUNT_DTM_START: NORMAL
MDC_IDC_STAT_EPISODE_TYPE: NORMAL
MDC_IDC_STAT_EPISODE_VENDOR_TYPE: NORMAL
MDC_IDC_STAT_TACHYTHERAPY_ATP_DELIVERED_RECENT: 0
MDC_IDC_STAT_TACHYTHERAPY_ATP_DELIVERED_TOTAL: 0
MDC_IDC_STAT_TACHYTHERAPY_RECENT_DTM_END: NORMAL
MDC_IDC_STAT_TACHYTHERAPY_RECENT_DTM_START: NORMAL
MDC_IDC_STAT_TACHYTHERAPY_SHOCKS_ABORTED_RECENT: 0
MDC_IDC_STAT_TACHYTHERAPY_SHOCKS_ABORTED_TOTAL: 0
MDC_IDC_STAT_TACHYTHERAPY_SHOCKS_DELIVERED_RECENT: 0
MDC_IDC_STAT_TACHYTHERAPY_SHOCKS_DELIVERED_TOTAL: 0
MDC_IDC_STAT_TACHYTHERAPY_TOTAL_DTM_END: NORMAL
MDC_IDC_STAT_TACHYTHERAPY_TOTAL_DTM_START: NORMAL

## 2020-07-14 ENCOUNTER — OFFICE VISIT (OUTPATIENT)
Dept: FAMILY MEDICINE | Facility: CLINIC | Age: 65
End: 2020-07-14
Payer: MEDICARE

## 2020-07-14 VITALS
WEIGHT: 151.5 LBS | TEMPERATURE: 98.3 F | DIASTOLIC BLOOD PRESSURE: 78 MMHG | HEART RATE: 68 BPM | SYSTOLIC BLOOD PRESSURE: 106 MMHG | BODY MASS INDEX: 20.55 KG/M2

## 2020-07-14 DIAGNOSIS — L03.115 CELLULITIS OF RIGHT THIGH: Primary | ICD-10-CM

## 2020-07-14 PROCEDURE — 99213 OFFICE O/P EST LOW 20 MIN: CPT | Performed by: FAMILY MEDICINE

## 2020-07-14 RX ORDER — CEFUROXIME AXETIL 500 MG/1
500 TABLET ORAL 2 TIMES DAILY
Qty: 42 TABLET | Refills: 0 | Status: SHIPPED | OUTPATIENT
Start: 2020-07-14 | End: 2020-08-04

## 2020-07-14 NOTE — PROGRESS NOTES
Subjective     Iraida Hernandez is a 65 year old male who presents to clinic today for the following health issues:    HPI       Concern - Tick Bite  Onset: 7/8/2020    Description:   Round and red lesion on inner right thigh    Intensity: none    Progression of Symptoms:  improving    Accompanying Signs & Symptoms:  none    Previous history of similar problem:   none    Precipitating factors:   Worsened by: none    Alleviating factors:  Improved by: none    Therapies Tried and outcome: Neosporin    Physician HPI: Patient is seen for concern of possible Lyme's disease.  Patient plays golf regularly which takes him around wooded areas.  He has not seen any tick embedded in his skin.  He developed a rash area on his right thigh a week ago. He took a picture of this on his mobile phone 4 days ago to show me.   No recent fever or chills. He has been more fatigued over the past 4 days, which is getting better. No new muscle or joint pains.             Review of Systems   See history of present illness.  The rash on his right thigh is not tender.      Objective    /78 (BP Location: Right arm, Patient Position: Chair, Cuff Size: Adult Regular)   Pulse 68   Temp 98.3  F (36.8  C) (Oral)   Wt 68.7 kg (151 lb 8 oz)   BMI 20.55 kg/m    Body mass index is 20.55 kg/m .  Physical Exam   Examination of right thigh is notable for a confluent area of moderately dark erythema which is warm to touch, measuring approximately 12 cm diameter.  In the center of this area there is dry appearing skin with some superficial flaking measuring approximately 2 cm in diameter.  No open skin areas are noted.  The area of erythema is similar to the picture patient showed me during exam.    I explained to patient that given his recreational activities in this area [place], his recent symptoms, and the rash, I recommended that we treat for possible Lyme's disease using an antibiotic that will also cover cellulitis.  I explained that  the sooner treatment was started, the better the outcome if it is Lyme's disease, and that serologic testing does not completely exclude the possibility of Lyme's disease.  He was agreeable to my suggestion that we start oral antibiotic treatment without the testing.        Assessment & Plan     1. Cellulitis of right thigh  Discoloration on thigh may be related to Lyme's disease, which treatment prescribed should cover.  - cefuroxime (CEFTIN) 500 MG tablet; Take 1 tablet (500 mg) by mouth 2 times daily for 21 days  Dispense: 42 tablet; Refill: 0      Patient Instructions     The antibiotic I prescribed should treat most causes of cellulitis, and will also treat possible Lyme's disease well. See hand out for further info about this.     Patient Education     Cellulitis  Cellulitis is an infection of the deep layers of skin. A break in the skin, such as a cut or scratch, can let bacteria under the skin. If the bacteria get to deep layers of the skin, it can be serious. If not treated, cellulitis can get into the bloodstream and lymph nodes. The infection can then spread throughout the body. This causes serious illness.  Cellulitis causes the affected skin to become red, swollen, warm, and sore. The reddened areas have a visible border. An open sore may leak fluid (pus). You may have a fever, chills, and pain.  Cellulitis is treated with antibiotics taken for 7 to 10 days. An open sore may be cleaned and covered with cool wet gauze. Symptoms should get better 1 to 2 days after treatment is started. Make sure to take all the antibiotics for the full number of days until they are gone. Keep taking the medicine even if your symptoms go away.  Home care  Follow these tips:    Limit the use of the part of your body with cellulitis.     If the infection is on your leg, keep your leg raised while sitting. This will help to reduce swelling.    Take all of the antibiotic medicine exactly as directed until it is gone. Do not  miss any doses, especially during the first 7 days. Don t stop taking the medicine when your symptoms get better.    Keep the affected area clean and dry.    Wash your hands with soap and warm water before and after touching your skin. Anyone else who touches your skin should also wash his or her hands. Don't share towels.  Follow-up care  Follow up with your healthcare provider, or as advised. If your infection does not go away on the first antibiotic, your healthcare provider will prescribe a different one.  When to seek medical advice  Call your healthcare provider right away if any of these occur:    Red areas that spread    Swelling or pain that gets worse    Fluid leaking from the skin (pus)    Fever higher of 100.4  F (38.0  C) or higher after 2 days on antibiotics  Date Last Reviewed: 9/1/2016 2000-2019 [employer]. [address] [place] [address]. All rights reserved. This information is not intended as a substitute for professional medical care. Always follow your healthcare professional's instructions.           Patient Education     Lyme Disease  Lyme disease is caused by bacteria. The infection is most often passed during the bite of a deer tick. The tick is very small, so many people with Lyme disease don't know they have been bitten. Tests for Lyme disease are not always accurate early in the disease. If the disease is suspected, treatment may start before testing confirms the infection. A long course of antibiotics is the standard treatment.  If untreated, Lyme disease can cause symptoms in many parts of the body that may worsen.    Early symptoms limited to a small area may appear within a few days to a month after the tick bite. These symptoms may include a round, red rash that sometimes looks like a bull's-eye target with darker outer ring and a darker center. There may fever, chills, fatigue, body aches, and headache. In time, the rash goes away, even without treatment.  That doesn't mean the infection has gone away, however. In some cases, early local symptoms never develop.    Early body-wide symptoms may appear weeks to months after the bite. These can include rashes on the skin of various parts of the body, muscle aches, fatigue, fever, headache, stiff neck, weakness on one side of the face, dizziness, palpitations, passing out, and joint pain and swelling.    Late-stage symptoms can include weakness in an arm, or leg, headache, fever, and numbness and tingling in the arms or legs, joint pain and swelling, confusion, and memory loss.    Many people will have left over symptoms even after treatment and cure of the Lyme disease. These are called post-Lyme symptoms and may include fatigue, body aches, joint aches, and headaches, which generally improve with time. Repeated courses of antibiotics don't help these symptoms to resolve faster. And, having the symptoms after a course of treatment does not mean that the Lyme bacteria is still active in the body.  Testing is done to check for the bacteria. When the infection is treated early, it can be cured. In some cases, a second or third course of antibiotics may be needed. Be sure to follow your healthcare providers directions about treatment.  Home care  If antibiotic pills have been prescribed, take them exactly as directed until they are completely gone. Don't stop taking them until you have taken the full course or your healthcare provider has told you to stop.  Ask your healthcare provider about taking over-the-counter medicines to control symptoms such as aches and fever.  Follow-up care  Follow up with your healthcare provider, or as advised. Be sure to return for follow-up testing as directed to be sure the infection has been treated.  When to seek medical advice  Call your healthcare provider right away if any of the following occur:    Current symptoms get worse    Unexplained fever, neck pain or stiffness, or  headache    Arm, leg or facial weakness    Joint pain or swelling    Numbness and tingling in the arms or legs    Confusion or memory loss    Irregular or rapid heartbeat, palpitations, dizziness, or passing out  Date Last Reviewed: 3/1/2018    7055-9724 [employer]. [address] [place] [address]. All rights reserved. This information is not intended as a substitute for professional medical care. Always follow your healthcare professional's instructions.           Patient Education     Preventing Lyme Disease  Ticks are small spider-like arachnids that feed on the blood of animals and humans. They can carry the bacteria that cause Lyme disease. The bacteria can pass to a person from a tick bite. (It is not passed from person to person.) Lyme disease can lead to serious health problems if it is not treated with antibiotics. The best way to prevent Lyme disease is to avoid being bitten by a tick.     The tick that carries Lyme disease is very small--about the size of a poppy seed.   Preventing tick bites  The disease is carried by the blacklegged tick, also called a  deer tick.  Young ticks called nymphs are the most likely to carry the bacteria. They are very small--about the size of a poppy seed. They can be found on deer, rodents, and birds. Often the animal brushes the tick onto leaves or other plants as it runs through the woods and then the tick lives in bushes, grasses, and dead leaves. The most active time of year for infected ticks varies by region of the country. In the [place] and [place], they are more active from April to September. In the West, they may be more active from December to February. But you can still be bitten at other times of the year. Below are tips for protecting yourself from tick bites.  Protect your body    Wear clothes that protect you. Clothing can help protect you from tick bites. Wear long pants and long sleeves in outdoor areas where ticks may live. [place]  your shirt into your pants. Tuck pant legs into your socks. And wear light colors so you can more easily see ticks on your clothes.    Use insect repellent. Spray insect repellent containing at least 20 percent DEET on your exposed skin. You can also use it on clothing, shoes, and camping gear. Avoid getting DEET on children s hands, mouth, or eyes. You can use products with permethrin on clothing, shoes, and camping gear. But do not spray permethrin on skin. It will cause a rash. Follow the directions on the package of the spray you use. For more information on bug sprays, visit the National Pesticide Information Center at http://npic.[facility].edu.    Avoid tick-infested areas. Avoid brushing against grasses, bushes, and other plants. Avoid walking through dead leaves and other ground vegetation. Do not sit on fallen logs. And avoid areas with large numbers of deer and rodents.    Check yourself for ticks. After being outdoors, check your clothes and skin for ticks. Keep in mind that the ticks are about the size of a poppy seed. Use both a hand-held and a full-length mirror to view all of your skin. Pay special attention to areas with hair. Make sure to thoroughly check these areas:  ? Scalp  ? Behind the ears  ? Armpits  ? Belly button  ? Waist  ? Groin  ? Backs of the knees    Use the clothes dryer. Putting clothing or bedding into a clothes dryer for 1 hour at high heat has been shown to kill ticks.  Control ticks around your home    Create tick-free safety zones. Ticks that transmit Lyme disease live in ground vegetation. Ticks crawl onto people from shrubs and grasses in and around wooded areas. Cut bushes and other plants away from the deck or patio and any child play areas. Keep all grasses cut short. Remove dead leaves and other dead vegetation. Do not put children s play equipment near wooded areas. Put wood chips or gravel on the ground between lawns and wooded areas.    Use pesticides. You can apply them  yourself or hire a pest control expert. States have different regulations about pesticides. Ask your local health department for information.    Keep deer away. Deer often carry the ticks that can infect you with Lyme disease. Do not attempt to pet or feed deer. Ask your local [facility] center about deer-resistant plants. Ask your local health department about deer control in your area.    Prevent ticks on pets. Pets can bring ticks into your home. Use tick control medicine as advised by your . Check your pet for ticks after it comes indoors. Pay special attention to the ears.    Ask about local tick-control methods. Some states have tick-control programs. Local health departments may be using methods that can help you control ticks at home.  If you have a tick  If you find a tick on your skin, do not panic. Most ticks don't carry Lyme disease. And the tick must be attached for 36 to 48 hours before it might infect you. If you find a tick on you, here s what to do:    If the tick is not yet attached to your skin, remove the tick with tweezers or a tissue. Flush it down the toilet. If you see a tick on your clothes, use a piece of tape to lift it off. Do not touch it with your bare hands.    If the tick is attached to your skin:  ? Carefully remove the tick with tweezers. If you don t have tweezers, use your fingers protected by a paper towel or a thin cloth. Grasp the tick as close to your skin as possible. Pull slowly and gently to remove the tick. The tick may not let go right away. Do not pull harder. Be patient and keep trying gently. Do not twist the head or body as you pull. Do not crush or squeeze the body. This can release the bacteria into your body. Never use a hot match, petroleum jelly, or other products to remove a tick. (If you can t remove the tick or if part of the tick remains in the skin, call your healthcare provider right away.)  ? Wash your skin with soap and water after you remove the  tick. This will help ensure you remove any bacteria.  ? If you can, save the tick in a tightly sealed glass or plastic container. Take it to your healthcare provider. He or she may be able to have someone identify if it is the type of tick that transmits Lyme.  ? Call your healthcare provider and describe the bite and the tick. You may be asked to come in for an exam. You may be tested for Lyme. You may also be prescribed antibiotics to help prevent infection.  ? Over the next month, watch for the symptoms below, especially a rash at the site of the bite.  Symptoms of Lyme disease  Call your healthcare provider if you develop any symptoms of Lyme disease, even if you don t remember being bitten. These include:    A round, red rash (called a bull s-eye rash)    Fever and chills    Tiredness or body aches    Headache or a stiff neck    Joint pain and swelling (arthritis), especially in large joints such as the knees   To learn more    Centers for Disease Control and Prevention: www.cdc.gov/lyme    American Lyme Disease Foundation: www.aldf.com  Date Last Reviewed: 11/1/2016 2000-2019 [employer]. [address] [place] [address]. All rights reserved. This information is not intended as a substitute for professional medical care. Always follow your healthcare professional's instructions.               Return in about 5 days (around 7/19/2020), or if symptoms worsen or fail to improve.    Billy Lewis, DO  [facility]

## 2020-07-14 NOTE — PATIENT INSTRUCTIONS
The antibiotic I prescribed should treat most causes of cellulitis, and will also treat possible Lyme's disease well. See hand out for further info about this.     Patient Education     Cellulitis  Cellulitis is an infection of the deep layers of skin. A break in the skin, such as a cut or scratch, can let bacteria under the skin. If the bacteria get to deep layers of the skin, it can be serious. If not treated, cellulitis can get into the bloodstream and lymph nodes. The infection can then spread throughout the body. This causes serious illness.  Cellulitis causes the affected skin to become red, swollen, warm, and sore. The reddened areas have a visible border. An open sore may leak fluid (pus). You may have a fever, chills, and pain.  Cellulitis is treated with antibiotics taken for 7 to 10 days. An open sore may be cleaned and covered with cool wet gauze. Symptoms should get better 1 to 2 days after treatment is started. Make sure to take all the antibiotics for the full number of days until they are gone. Keep taking the medicine even if your symptoms go away.  Home care  Follow these tips:    Limit the use of the part of your body with cellulitis.     If the infection is on your leg, keep your leg raised while sitting. This will help to reduce swelling.    Take all of the antibiotic medicine exactly as directed until it is gone. Do not miss any doses, especially during the first 7 days. Don t stop taking the medicine when your symptoms get better.    Keep the affected area clean and dry.    Wash your hands with soap and warm water before and after touching your skin. Anyone else who touches your skin should also wash his or her hands. Don't share towels.  Follow-up care  Follow up with your healthcare provider, or as advised. If your infection does not go away on the first antibiotic, your healthcare provider will prescribe a different one.  When to seek medical advice  Call your healthcare provider right away  if any of these occur:    Red areas that spread    Swelling or pain that gets worse    Fluid leaking from the skin (pus)    Fever higher of 100.4  F (38.0  C) or higher after 2 days on antibiotics  Date Last Reviewed: 9/1/2016 2000-2019 [employer]. [address] [place] [address]. All rights reserved. This information is not intended as a substitute for professional medical care. Always follow your healthcare professional's instructions.           Patient Education     Lyme Disease  Lyme disease is caused by bacteria. The infection is most often passed during the bite of a deer tick. The tick is very small, so many people with Lyme disease don't know they have been bitten. Tests for Lyme disease are not always accurate early in the disease. If the disease is suspected, treatment may start before testing confirms the infection. A long course of antibiotics is the standard treatment.  If untreated, Lyme disease can cause symptoms in many parts of the body that may worsen.    Early symptoms limited to a small area may appear within a few days to a month after the tick bite. These symptoms may include a round, red rash that sometimes looks like a bull's-eye target with darker outer ring and a darker center. There may fever, chills, fatigue, body aches, and headache. In time, the rash goes away, even without treatment. That doesn't mean the infection has gone away, however. In some cases, early local symptoms never develop.    Early body-wide symptoms may appear weeks to months after the bite. These can include rashes on the skin of various parts of the body, muscle aches, fatigue, fever, headache, stiff neck, weakness on one side of the face, dizziness, palpitations, passing out, and joint pain and swelling.    Late-stage symptoms can include weakness in an arm, or leg, headache, fever, and numbness and tingling in the arms or legs, joint pain and swelling, confusion, and memory loss.    Many  people will have left over symptoms even after treatment and cure of the Lyme disease. These are called post-Lyme symptoms and may include fatigue, body aches, joint aches, and headaches, which generally improve with time. Repeated courses of antibiotics don't help these symptoms to resolve faster. And, having the symptoms after a course of treatment does not mean that the Lyme bacteria is still active in the body.  Testing is done to check for the bacteria. When the infection is treated early, it can be cured. In some cases, a second or third course of antibiotics may be needed. Be sure to follow your healthcare providers directions about treatment.  Home care  If antibiotic pills have been prescribed, take them exactly as directed until they are completely gone. Don't stop taking them until you have taken the full course or your healthcare provider has told you to stop.  Ask your healthcare provider about taking over-the-counter medicines to control symptoms such as aches and fever.  Follow-up care  Follow up with your healthcare provider, or as advised. Be sure to return for follow-up testing as directed to be sure the infection has been treated.  When to seek medical advice  Call your healthcare provider right away if any of the following occur:    Current symptoms get worse    Unexplained fever, neck pain or stiffness, or headache    Arm, leg or facial weakness    Joint pain or swelling    Numbness and tingling in the arms or legs    Confusion or memory loss    Irregular or rapid heartbeat, palpitations, dizziness, or passing out  Date Last Reviewed: 3/1/2018    3266-6111 [employer]. [address] [place] [address]61. All rights reserved. This information is not intended as a substitute for professional medical care. Always follow your healthcare professional's instructions.           Patient Education     Preventing Lyme Disease  Ticks are small spider-like arachnids that feed on the  blood of animals and humans. They can carry the bacteria that cause Lyme disease. The bacteria can pass to a person from a tick bite. (It is not passed from person to person.) Lyme disease can lead to serious health problems if it is not treated with antibiotics. The best way to prevent Lyme disease is to avoid being bitten by a tick.     The tick that carries Lyme disease is very small--about the size of a poppy seed.   Preventing tick bites  The disease is carried by the blacklegged tick, also called a  deer tick.  Young ticks called nymphs are the most likely to carry the bacteria. They are very small--about the size of a poppy seed. They can be found on deer, rodents, and birds. Often the animal brushes the tick onto leaves or other plants as it runs through the woods and then the tick lives in bushes, grasses, and dead leaves. The most active time of year for infected ticks varies by region of the country. In the East and Midwest, they are more active from April to September. In the West, they may be more active from December to February. But you can still be bitten at other times of the year. Below are tips for protecting yourself from tick bites.  Protect your body    Wear clothes that protect you. Clothing can help protect you from tick bites. Wear long pants and long sleeves in outdoor areas where ticks may live. Tuck your shirt into your pants. Tuck pant legs into your socks. And wear light colors so you can more easily see ticks on your clothes.    Use insect repellent. Spray insect repellent containing at least 20 percent DEET on your exposed skin. You can also use it on clothing, shoes, and camping gear. Avoid getting DEET on children s hands, mouth, or eyes. You can use products with permethrin on clothing, shoes, and camping gear. But do not spray permethrin on skin. It will cause a rash. Follow the directions on the package of the spray you use. For more information on bug sprays, visit the National  Pesticide Information Center at http://npic.[facility].    Avoid tick-infested areas. Avoid brushing against grasses, bushes, and other plants. Avoid walking through dead leaves and other ground vegetation. Do not sit on fallen logs. And avoid areas with large numbers of deer and rodents.    Check yourself for ticks. After being outdoors, check your clothes and skin for ticks. Keep in mind that the ticks are about the size of a poppy seed. Use both a hand-held and a full-length mirror to view all of your skin. Pay special attention to areas with hair. Make sure to thoroughly check these areas:  ? Scalp  ? Behind the ears  ? Armpits  ? Belly button  ? Waist  ? Groin  ? Backs of the knees    Use the clothes dryer. Putting clothing or bedding into a clothes dryer for 1 hour at high heat has been shown to kill ticks.  Control ticks around your home    Create tick-free safety zones. Ticks that transmit Lyme disease live in ground vegetation. Ticks crawl onto people from shrubs and grasses in and around wooded areas. Cut bushes and other plants away from the deck or patio and any child play areas. Keep all grasses cut short. Remove dead leaves and other dead vegetation. Do not put children s play equipment near wooded areas. Put wood chips or gravel on the ground between lawns and wooded areas.    Use pesticides. You can apply them yourself or hire a pest control expert. States have different regulations about pesticides. Ask your local health department for information.    Keep deer away. Deer often carry the ticks that can infect you with Lyme disease. Do not attempt to pet or feed deer. Ask your local [facility] about deer-resistant plants. Ask your local health department about deer control in your area.    Prevent ticks on pets. Pets can bring ticks into your home. Use tick control medicine as advised by your . Check your pet for ticks after it comes indoors. Pay special attention to the ears.    Ask  about local tick-control methods. Some states have tick-control programs. Local health departments may be using methods that can help you control ticks at home.  If you have a tick  If you find a tick on your skin, do not panic. Most ticks don't carry Lyme disease. And the tick must be attached for 36 to 48 hours before it might infect you. If you find a tick on you, here s what to do:    If the tick is not yet attached to your skin, remove the tick with tweezers or a tissue. Flush it down the toilet. If you see a tick on your clothes, use a piece of tape to lift it off. Do not touch it with your bare hands.    If the tick is attached to your skin:  ? Carefully remove the tick with tweezers. If you don t have tweezers, use your fingers protected by a paper towel or a thin cloth. Grasp the tick as close to your skin as possible. Pull slowly and gently to remove the tick. The tick may not let go right away. Do not pull harder. Be patient and keep trying gently. Do not twist the head or body as you pull. Do not crush or squeeze the body. This can release the bacteria into your body. Never use a hot match, petroleum jelly, or other products to remove a tick. (If you can t remove the tick or if part of the tick remains in the skin, call your healthcare provider right away.)  ? Wash your skin with soap and water after you remove the tick. This will help ensure you remove any bacteria.  ? If you can, save the tick in a tightly sealed glass or plastic container. Take it to your healthcare provider. He or she may be able to have someone identify if it is the type of tick that transmits Lyme.  ? Call your healthcare provider and describe the bite and the tick. You may be asked to come in for an exam. You may be tested for Lyme. You may also be prescribed antibiotics to help prevent infection.  ? Over the next month, watch for the symptoms below, especially a rash at the site of the bite.  Symptoms of Lyme disease  Call your  healthcare provider if you develop any symptoms of Lyme disease, even if you don t remember being bitten. These include:    A round, red rash (called a bull s-eye rash)    Fever and chills    Tiredness or body aches    Headache or a stiff neck    Joint pain and swelling (arthritis), especially in large joints such as the knees   To learn more    Centers for Disease Control and Prevention: www.cdc.gov/lyme    American Lyme Disease Foundation: www.aldf.com  Date Last Reviewed: 11/1/2016 2000-2019 [employer]. [address] [place] [address]. All rights reserved. This information is not intended as a substitute for professional medical care. Always follow your healthcare professional's instructions.

## 2020-08-12 DIAGNOSIS — I21.9 ACUTE MYOCARDIAL INFARCTION (H): ICD-10-CM

## 2020-08-12 RX ORDER — SPIRONOLACTONE 25 MG/1
25 TABLET ORAL DAILY
Qty: 90 TABLET | Refills: 0 | Status: SHIPPED | OUTPATIENT
Start: 2020-08-12 | End: 2020-11-10

## 2020-08-14 DIAGNOSIS — I25.10 CORONARY ARTERY DISEASE INVOLVING NATIVE CORONARY ARTERY OF NATIVE HEART WITHOUT ANGINA PECTORIS: ICD-10-CM

## 2020-08-14 DIAGNOSIS — I21.9 ACUTE MYOCARDIAL INFARCTION (H): ICD-10-CM

## 2020-08-14 RX ORDER — NITROGLYCERIN 0.4 MG/1
0.4 TABLET SUBLINGUAL EVERY 5 MIN PRN
Qty: 25 TABLET | Refills: 0 | Status: SHIPPED | OUTPATIENT
Start: 2020-08-14 | End: 2021-08-10

## 2020-08-14 RX ORDER — LISINOPRIL 5 MG/1
5 TABLET ORAL DAILY
Qty: 90 TABLET | Refills: 0 | Status: SHIPPED | OUTPATIENT
Start: 2020-08-14 | End: 2020-11-12

## 2020-09-10 DIAGNOSIS — I42.9 CARDIOMYOPATHY, UNSPECIFIED TYPE (H): ICD-10-CM

## 2020-09-10 DIAGNOSIS — I21.9 ACUTE MYOCARDIAL INFARCTION (H): ICD-10-CM

## 2020-09-10 RX ORDER — CLOPIDOGREL BISULFATE 75 MG/1
75 TABLET ORAL DAILY
Qty: 90 TABLET | Refills: 3 | Status: SHIPPED | OUTPATIENT
Start: 2020-09-10 | End: 2021-08-10

## 2020-09-14 ENCOUNTER — ANCILLARY PROCEDURE (OUTPATIENT)
Dept: CARDIOLOGY | Facility: CLINIC | Age: 65
End: 2020-09-14
Attending: INTERNAL MEDICINE
Payer: MEDICARE

## 2020-09-14 DIAGNOSIS — I42.9 CARDIOMYOPATHY (H): ICD-10-CM

## 2020-09-14 DIAGNOSIS — Z95.810 ICD (IMPLANTABLE CARDIOVERTER-DEFIBRILLATOR), SINGLE, IN SITU: ICD-10-CM

## 2020-09-14 PROCEDURE — 93296 REM INTERROG EVL PM/IDS: CPT | Performed by: INTERNAL MEDICINE

## 2020-09-14 PROCEDURE — 93295 DEV INTERROG REMOTE 1/2/MLT: CPT | Performed by: INTERNAL MEDICINE

## 2020-09-16 LAB
MDC_IDC_EPISODE_DTM: NORMAL
MDC_IDC_EPISODE_ID: NORMAL
MDC_IDC_EPISODE_TYPE: NORMAL
MDC_IDC_LEAD_IMPLANT_DT: NORMAL
MDC_IDC_LEAD_LOCATION: NORMAL
MDC_IDC_LEAD_LOCATION_DETAIL_1: NORMAL
MDC_IDC_LEAD_MFG: NORMAL
MDC_IDC_LEAD_MODEL: NORMAL
MDC_IDC_LEAD_POLARITY_TYPE: NORMAL
MDC_IDC_LEAD_SERIAL: NORMAL
MDC_IDC_MSMT_BATTERY_DTM: NORMAL
MDC_IDC_MSMT_BATTERY_REMAINING_LONGEVITY: 102 MO
MDC_IDC_MSMT_BATTERY_REMAINING_PERCENTAGE: 100 %
MDC_IDC_MSMT_BATTERY_STATUS: NORMAL
MDC_IDC_MSMT_CAP_CHARGE_DTM: NORMAL
MDC_IDC_MSMT_CAP_CHARGE_TIME: 9.9 S
MDC_IDC_MSMT_CAP_CHARGE_TYPE: NORMAL
MDC_IDC_MSMT_LEADCHNL_RV_IMPEDANCE_VALUE: 669 OHM
MDC_IDC_MSMT_LEADCHNL_RV_PACING_THRESHOLD_AMPLITUDE: 0.5 V
MDC_IDC_MSMT_LEADCHNL_RV_PACING_THRESHOLD_PULSEWIDTH: 0.5 MS
MDC_IDC_PG_IMPLANT_DTM: NORMAL
MDC_IDC_PG_MFG: NORMAL
MDC_IDC_PG_MODEL: NORMAL
MDC_IDC_PG_SERIAL: NORMAL
MDC_IDC_PG_TYPE: NORMAL
MDC_IDC_SESS_CLINIC_NAME: NORMAL
MDC_IDC_SESS_DTM: NORMAL
MDC_IDC_SESS_TYPE: NORMAL
MDC_IDC_SET_BRADY_LOWRATE: 40 {BEATS}/MIN
MDC_IDC_SET_BRADY_MODE: NORMAL
MDC_IDC_SET_LEADCHNL_RV_PACING_AMPLITUDE: 2 V
MDC_IDC_SET_LEADCHNL_RV_PACING_POLARITY: NORMAL
MDC_IDC_SET_LEADCHNL_RV_PACING_PULSEWIDTH: 0.5 MS
MDC_IDC_SET_LEADCHNL_RV_SENSING_ADAPTATION_MODE: NORMAL
MDC_IDC_SET_LEADCHNL_RV_SENSING_POLARITY: NORMAL
MDC_IDC_SET_LEADCHNL_RV_SENSING_SENSITIVITY: 0.6 MV
MDC_IDC_SET_ZONE_DETECTION_INTERVAL: 250 MS
MDC_IDC_SET_ZONE_DETECTION_INTERVAL: 300 MS
MDC_IDC_SET_ZONE_DETECTION_INTERVAL: 353 MS
MDC_IDC_SET_ZONE_TYPE: NORMAL
MDC_IDC_SET_ZONE_VENDOR_TYPE: NORMAL
MDC_IDC_STAT_BRADY_DTM_END: NORMAL
MDC_IDC_STAT_BRADY_DTM_START: NORMAL
MDC_IDC_STAT_BRADY_RV_PERCENT_PACED: 0 %
MDC_IDC_STAT_EPISODE_RECENT_COUNT: 0
MDC_IDC_STAT_EPISODE_RECENT_COUNT: 11
MDC_IDC_STAT_EPISODE_RECENT_COUNT_DTM_END: NORMAL
MDC_IDC_STAT_EPISODE_RECENT_COUNT_DTM_START: NORMAL
MDC_IDC_STAT_EPISODE_TYPE: NORMAL
MDC_IDC_STAT_EPISODE_VENDOR_TYPE: NORMAL
MDC_IDC_STAT_TACHYTHERAPY_ATP_DELIVERED_RECENT: 0
MDC_IDC_STAT_TACHYTHERAPY_ATP_DELIVERED_TOTAL: 0
MDC_IDC_STAT_TACHYTHERAPY_RECENT_DTM_END: NORMAL
MDC_IDC_STAT_TACHYTHERAPY_RECENT_DTM_START: NORMAL
MDC_IDC_STAT_TACHYTHERAPY_SHOCKS_ABORTED_RECENT: 0
MDC_IDC_STAT_TACHYTHERAPY_SHOCKS_ABORTED_TOTAL: 0
MDC_IDC_STAT_TACHYTHERAPY_SHOCKS_DELIVERED_RECENT: 0
MDC_IDC_STAT_TACHYTHERAPY_SHOCKS_DELIVERED_TOTAL: 0
MDC_IDC_STAT_TACHYTHERAPY_TOTAL_DTM_END: NORMAL
MDC_IDC_STAT_TACHYTHERAPY_TOTAL_DTM_START: NORMAL

## 2020-11-03 ENCOUNTER — ANCILLARY PROCEDURE (OUTPATIENT)
Dept: CARDIOLOGY | Facility: CLINIC | Age: 65
End: 2020-11-03
Attending: INTERNAL MEDICINE
Payer: MEDICARE

## 2020-11-03 ENCOUNTER — HOSPITAL ENCOUNTER (OUTPATIENT)
Dept: CARDIOLOGY | Facility: CLINIC | Age: 65
Discharge: HOME | End: 2020-11-03
Attending: INTERNAL MEDICINE | Admitting: INTERNAL MEDICINE
Payer: MEDICARE

## 2020-11-03 DIAGNOSIS — Z95.810 ICD (IMPLANTABLE CARDIOVERTER-DEFIBRILLATOR), SINGLE, IN SITU: ICD-10-CM

## 2020-11-03 DIAGNOSIS — Z95.810 ICD (IMPLANTABLE CARDIOVERTER-DEFIBRILLATOR) IN PLACE: ICD-10-CM

## 2020-11-03 DIAGNOSIS — I21.09 ACUTE ST ELEVATION MYOCARDIAL INFARCTION (STEMI) INVOLVING OTHER CORONARY ARTERY OF ANTERIOR WALL (H): ICD-10-CM

## 2020-11-03 DIAGNOSIS — I25.10 CORONARY ARTERY DISEASE INVOLVING NATIVE CORONARY ARTERY OF NATIVE HEART WITHOUT ANGINA PECTORIS: ICD-10-CM

## 2020-11-03 DIAGNOSIS — I25.5 ISCHEMIC CARDIOMYOPATHY: ICD-10-CM

## 2020-11-03 PROCEDURE — 93282 PRGRMG EVAL IMPLANTABLE DFB: CPT | Performed by: INTERNAL MEDICINE

## 2020-11-03 PROCEDURE — 999N000208 ECHOCARDIOGRAM COMPLETE

## 2020-11-03 PROCEDURE — 255N000002 HC RX 255 OP 636: Performed by: INTERNAL MEDICINE

## 2020-11-03 PROCEDURE — 93306 TTE W/DOPPLER COMPLETE: CPT | Mod: 26 | Performed by: INTERNAL MEDICINE

## 2020-11-03 RX ADMIN — HUMAN ALBUMIN MICROSPHERES AND PERFLUTREN 9 ML: 10; .22 INJECTION, SOLUTION INTRAVENOUS at 11:03

## 2020-11-04 LAB
MDC_IDC_LEAD_IMPLANT_DT: NORMAL
MDC_IDC_LEAD_LOCATION: NORMAL
MDC_IDC_LEAD_LOCATION_DETAIL_1: NORMAL
MDC_IDC_LEAD_MFG: NORMAL
MDC_IDC_LEAD_MODEL: NORMAL
MDC_IDC_LEAD_POLARITY_TYPE: NORMAL
MDC_IDC_LEAD_SERIAL: NORMAL
MDC_IDC_MSMT_BATTERY_STATUS: NORMAL
MDC_IDC_MSMT_CAP_CHARGE_DTM: NORMAL
MDC_IDC_MSMT_CAP_CHARGE_ENERGY: 0 J
MDC_IDC_MSMT_CAP_CHARGE_TIME: 0 S
MDC_IDC_MSMT_CAP_CHARGE_TIME: 9.94
MDC_IDC_MSMT_CAP_CHARGE_TYPE: NORMAL
MDC_IDC_MSMT_CAP_CHARGE_TYPE: NORMAL
MDC_IDC_MSMT_LEADCHNL_RV_IMPEDANCE_VALUE: 718 OHM
MDC_IDC_MSMT_LEADCHNL_RV_PACING_THRESHOLD_AMPLITUDE: 0.5 V
MDC_IDC_MSMT_LEADCHNL_RV_PACING_THRESHOLD_PULSEWIDTH: 0.5 MS
MDC_IDC_MSMT_LEADCHNL_RV_SENSING_INTR_AMPL: 6.1 MV
MDC_IDC_PG_IMPLANT_DTM: NORMAL
MDC_IDC_PG_MFG: NORMAL
MDC_IDC_PG_MODEL: NORMAL
MDC_IDC_PG_SERIAL: NORMAL
MDC_IDC_PG_TYPE: NORMAL
MDC_IDC_SESS_CLINIC_NAME: NORMAL
MDC_IDC_SESS_DTM: NORMAL
MDC_IDC_SESS_TYPE: NORMAL
MDC_IDC_SET_BRADY_HYSTRATE: NORMAL
MDC_IDC_SET_BRADY_LOWRATE: 40 {BEATS}/MIN
MDC_IDC_SET_BRADY_MODE: NORMAL
MDC_IDC_SET_LEADCHNL_RV_PACING_AMPLITUDE: 2 V
MDC_IDC_SET_LEADCHNL_RV_PACING_CAPTURE_MODE: NORMAL
MDC_IDC_SET_LEADCHNL_RV_PACING_POLARITY: NORMAL
MDC_IDC_SET_LEADCHNL_RV_PACING_PULSEWIDTH: 0.5 MS
MDC_IDC_SET_LEADCHNL_RV_SENSING_ADAPTATION_MODE: NORMAL
MDC_IDC_SET_LEADCHNL_RV_SENSING_POLARITY: NORMAL
MDC_IDC_SET_LEADCHNL_RV_SENSING_SENSITIVITY: 0.6 MV
MDC_IDC_SET_ZONE_DETECTION_INTERVAL: 250 MS
MDC_IDC_SET_ZONE_DETECTION_INTERVAL: 300 MS
MDC_IDC_SET_ZONE_DETECTION_INTERVAL: 353 MS
MDC_IDC_SET_ZONE_TYPE: NORMAL
MDC_IDC_SET_ZONE_VENDOR_TYPE: NORMAL
MDC_IDC_STAT_EPISODE_RECENT_COUNT: 0
MDC_IDC_STAT_EPISODE_RECENT_COUNT: 0
MDC_IDC_STAT_EPISODE_RECENT_COUNT: 12
MDC_IDC_STAT_EPISODE_RECENT_COUNT_DTM_END: NORMAL
MDC_IDC_STAT_EPISODE_RECENT_COUNT_DTM_START: NORMAL
MDC_IDC_STAT_EPISODE_TOTAL_COUNT: 0
MDC_IDC_STAT_EPISODE_TOTAL_COUNT: 0
MDC_IDC_STAT_EPISODE_TOTAL_COUNT: 62
MDC_IDC_STAT_EPISODE_TOTAL_COUNT_DTM_END: NORMAL
MDC_IDC_STAT_EPISODE_TYPE: NORMAL
MDC_IDC_STAT_EPISODE_VENDOR_TYPE: NORMAL
MDC_IDC_STAT_TACHYTHERAPY_ATP_DELIVERED_RECENT: 0
MDC_IDC_STAT_TACHYTHERAPY_ATP_DELIVERED_TOTAL: 0
MDC_IDC_STAT_TACHYTHERAPY_RECENT_DTM_END: NORMAL
MDC_IDC_STAT_TACHYTHERAPY_RECENT_DTM_START: NORMAL
MDC_IDC_STAT_TACHYTHERAPY_SHOCKS_ABORTED_RECENT: 0
MDC_IDC_STAT_TACHYTHERAPY_SHOCKS_ABORTED_TOTAL: 0
MDC_IDC_STAT_TACHYTHERAPY_SHOCKS_DELIVERED_RECENT: 0
MDC_IDC_STAT_TACHYTHERAPY_SHOCKS_DELIVERED_TOTAL: 0
MDC_IDC_STAT_TACHYTHERAPY_TOTAL_DTM_END: NORMAL

## 2020-11-10 DIAGNOSIS — I21.02 ACUTE ST ELEVATION MYOCARDIAL INFARCTION (STEMI) INVOLVING LEFT ANTERIOR DESCENDING (LAD) CORONARY ARTERY (H): ICD-10-CM

## 2020-11-10 DIAGNOSIS — I21.9 ACUTE MYOCARDIAL INFARCTION (H): ICD-10-CM

## 2020-11-10 RX ORDER — ATORVASTATIN CALCIUM 40 MG/1
40 TABLET, FILM COATED ORAL DAILY
Qty: 90 TABLET | Refills: 0 | Status: SHIPPED | OUTPATIENT
Start: 2020-11-10 | End: 2021-02-08

## 2020-11-10 RX ORDER — SPIRONOLACTONE 25 MG/1
25 TABLET ORAL DAILY
Qty: 90 TABLET | Refills: 0 | Status: SHIPPED | OUTPATIENT
Start: 2020-11-10 | End: 2021-02-08

## 2020-11-12 ENCOUNTER — VIRTUAL VISIT (OUTPATIENT)
Dept: CARDIOLOGY | Facility: CLINIC | Age: 65
End: 2020-11-12
Attending: INTERNAL MEDICINE
Payer: MEDICARE

## 2020-11-12 DIAGNOSIS — I21.9 ACUTE MYOCARDIAL INFARCTION (H): ICD-10-CM

## 2020-11-12 DIAGNOSIS — I21.09 ACUTE ST ELEVATION MYOCARDIAL INFARCTION (STEMI) INVOLVING OTHER CORONARY ARTERY OF ANTERIOR WALL (H): ICD-10-CM

## 2020-11-12 DIAGNOSIS — I25.5 ISCHEMIC CARDIOMYOPATHY: ICD-10-CM

## 2020-11-12 DIAGNOSIS — I25.10 CORONARY ARTERY DISEASE INVOLVING NATIVE CORONARY ARTERY OF NATIVE HEART WITHOUT ANGINA PECTORIS: ICD-10-CM

## 2020-11-12 DIAGNOSIS — Z95.810 ICD (IMPLANTABLE CARDIOVERTER-DEFIBRILLATOR), SINGLE, IN SITU: ICD-10-CM

## 2020-11-12 PROCEDURE — 99214 OFFICE O/P EST MOD 30 MIN: CPT | Mod: 95 | Performed by: INTERNAL MEDICINE

## 2020-11-12 RX ORDER — LISINOPRIL 5 MG/1
5 TABLET ORAL DAILY
Qty: 90 TABLET | Refills: 3 | Status: SHIPPED | OUTPATIENT
Start: 2020-11-12 | End: 2021-12-06

## 2020-11-12 NOTE — LETTER
"11/12/2020    Lawson Mahoney MD  [address]  [facility] [address]    RE: Iraida Hernandez       Dear Colleague,    I had the pleasure of seeing Iraida Hernandez in the [facility].    Iraida Hernandez is a 65 year old male who is being evaluated via a billable video visit.      The patient has been notified of following:     \"This video visit will be conducted via a call between you and your physician/provider. We have found that certain health care needs can be provided without the need for an in-person physical exam.  This service lets us provide the care you need with a video conversation.  If a prescription is necessary we can send it directly to your pharmacy.  If lab work is needed we can place an order for that and you can then stop by our lab to have the test done at a later time.    Video visits are billed at different rates depending on your insurance coverage.  Please reach out to your insurance provider with any questions.    If during the course of the call the physician/provider feels a video visit is not appropriate, you will not be charged for this service.\"    Patient has given verbal consent for Video visit? Yes  How would you like to obtain your AVS? MyChart  If you are dropped from the video visit, the video invite should be resent to: Text to cell phone: [phone]  Will anyone else be joining your video visit? No    Pt reported vitals- 155# no bp   Review Of Systems  Skin: negative  Eyes: negative, glasses  Respiratory: No shortness of breath, dyspnea on exertion, cough, or hemoptysis  Cardiovascular: negative  Gastrointestinal: negative  Musculoskeletal: hip and knee pain   Neurologic: negative  Psychiatric: negative  Hematologic/Lymphatic/Immunologic: negative  Endocrine: negative  Pat Scott MA    [facility] [phone]  Video-Visit Details    Type of service:  Video Visit    Video Start Time: 11:15  Video End Time: 11:25 AM    Originating " Location (pt. Location): Home    Distant Location (provider location):  [facility]     Platform used for Video Visit: D[employer]imDomobios    Mr. Hernandez is a pleasant 65-year-old gentleman who I have been following for some time now.  He has a history of coronary artery disease ischemic cardiomyopathy history myocardial infarction hypertension also history of previous prostate cancer low-grade.  He is on video visit today for routine annual visit and follow-up.  Mr. Hernandez feels well.  He denies any chest pain chest pressure shortness of breath heart racing palpitations or edema.  We reviewed his cholesterol results from 2019 and stated that he is due for repeat when he next sees his primary physician this year.  We reviewed his echocardiogram showing stable ejection fraction at moderately decreased EF 30 to 35%.  We have reviewed his device checks showing battery life at 8-1/2 years.  No therapy delivered.  One episode of nonsustained VT.  And no evidence of mode switching.    Assessment: Patient is doing well.  He got in 60 rounds of golf this year.  He is asymptomatic and his cardiovascular assessment both by device check and echocardiogram are stable.  We will continue his medications.  He is due for cholesterol check with his primary.  We will follow-up with him in 1 years time.      Thank you for allowing me to participate in the care of your patient.    Sincerely,     ELIANA KHAN MD     [facility]

## 2020-11-12 NOTE — PROGRESS NOTES
"Iraida Hernandez is a 65 year old male who is being evaluated via a billable video visit.      The patient has been notified of following:     \"This video visit will be conducted via a call between you and your physician/provider. We have found that certain health care needs can be provided without the need for an in-person physical exam.  This service lets us provide the care you need with a video conversation.  If a prescription is necessary we can send it directly to your pharmacy.  If lab work is needed we can place an order for that and you can then stop by our lab to have the test done at a later time.    Video visits are billed at different rates depending on your insurance coverage.  Please reach out to your insurance provider with any questions.    If during the course of the call the physician/provider feels a video visit is not appropriate, you will not be charged for this service.\"    Patient has given verbal consent for Video visit? Yes  How would you like to obtain your AVS? MyChart  If you are dropped from the video visit, the video invite should be resent to: Text to cell phone: [phone]  Will anyone else be joining your video visit? No    Pt reported vitals- 155# no bp   Review Of Systems  Skin: negative  Eyes: negative, glasses  Respiratory: No shortness of breath, dyspnea on exertion, cough, or hemoptysis  Cardiovascular: negative  Gastrointestinal: negative  Musculoskeletal: hip and knee pain   Neurologic: negative  Psychiatric: negative  Hematologic/Lymphatic/Immunologic: negative  Endocrine: negative  ELIOT Cameron [phone]  Video-Visit Details    Type of service:  Video Visit    Video Start Time: 11:15  Video End Time: 11:25 AM    Originating Location (pt. Location): Home    Distant Location (provider location):  [facility]     Platform used for Video Visit: Kathrine    Mr. Hernandez is a pleasant 65-year-old gentleman who I have been following " for some time now.  He has a history of coronary artery disease ischemic cardiomyopathy history myocardial infarction hypertension also history of previous prostate cancer low-grade.  He is on video visit today for routine annual visit and follow-up.  Mr. Hernandez feels well.  He denies any chest pain chest pressure shortness of breath heart racing palpitations or edema.  We reviewed his cholesterol results from 2019 and stated that he is due for repeat when he next sees his primary physician this year.  We reviewed his echocardiogram showing stable ejection fraction at moderately decreased EF 30 to 35%.  We have reviewed his device checks showing battery life at 8-1/2 years.  No therapy delivered.  One episode of nonsustained VT.  And no evidence of mode switching.    Assessment: Patient is doing well.  He got in 60 rounds of golf this year.  He is asymptomatic and his cardiovascular assessment both by device check and echocardiogram are stable.  We will continue his medications.  He is due for cholesterol check with his primary.  We will follow-up with him in 1 years time.    ELIANA KHAN MD

## 2020-12-17 DIAGNOSIS — I21.9 ACUTE MYOCARDIAL INFARCTION (H): ICD-10-CM

## 2020-12-17 RX ORDER — CARVEDILOL 12.5 MG/1
12.5 TABLET ORAL 2 TIMES DAILY WITH MEALS
Qty: 180 TABLET | Refills: 3 | Status: SHIPPED | OUTPATIENT
Start: 2020-12-17 | End: 2021-12-22

## 2020-12-22 ENCOUNTER — MYC MEDICAL ADVICE (OUTPATIENT)
Dept: FAMILY MEDICINE | Facility: CLINIC | Age: 65
End: 2020-12-22

## 2021-01-09 ENCOUNTER — HEALTH MAINTENANCE LETTER (OUTPATIENT)
Age: 66
End: 2021-01-09

## 2021-01-25 ENCOUNTER — TELEPHONE (OUTPATIENT)
Dept: UROLOGY | Facility: CLINIC | Age: 66
End: 2021-01-25

## 2021-01-25 NOTE — TELEPHONE ENCOUNTER
[facility] Call Center    Phone Message    May a detailed message be left on voicemail: yes     Reason for Call: Other: pt is wondering if he can get his chlosterol drawn at his appt on 02/04/2021, please call pt thank you     Action Taken: Message routed to:  [facility] ([facility] uro     Travel Screening: Not Applicable

## 2021-01-25 NOTE — TELEPHONE ENCOUNTER
Returned phone call and informed patient that we do not draw cholesterol in our office. Patient was instructed to also go for CT scan as per MD's last office notes.    PSA to be drawn at time of visit on 02/04/20.     Nithya Lam LPN

## 2021-01-28 ENCOUNTER — HOSPITAL ENCOUNTER (OUTPATIENT)
Dept: CT IMAGING | Facility: CLINIC | Age: 66
Discharge: HOME | End: 2021-01-28
Attending: UROLOGY | Admitting: UROLOGY
Payer: MEDICARE

## 2021-01-28 DIAGNOSIS — N28.89 RIGHT RENAL MASS: ICD-10-CM

## 2021-01-28 LAB
CREAT BLD-MCNC: 1 MG/DL (ref 0.66–1.25)
GFR SERPL CREATININE-BSD FRML MDRD: 75 ML/MIN/{1.73_M2}

## 2021-01-28 PROCEDURE — 74170 CT ABD WO CNTRST FLWD CNTRST: CPT | Mod: MG

## 2021-01-28 PROCEDURE — 82565 ASSAY OF CREATININE: CPT

## 2021-01-28 PROCEDURE — 250N000009 HC RX 250: Performed by: UROLOGY

## 2021-01-28 PROCEDURE — 250N000011 HC RX IP 250 OP 636: Performed by: UROLOGY

## 2021-01-28 RX ORDER — IOPAMIDOL 755 MG/ML
500 INJECTION, SOLUTION INTRAVASCULAR ONCE
Status: COMPLETED | OUTPATIENT
Start: 2021-01-28 | End: 2021-01-28

## 2021-01-28 RX ADMIN — SODIUM CHLORIDE 57 ML: 9 INJECTION, SOLUTION INTRAVENOUS at 13:18

## 2021-01-28 RX ADMIN — IOPAMIDOL 81 ML: 755 INJECTION, SOLUTION INTRAVENOUS at 13:18

## 2021-02-02 DIAGNOSIS — R97.20 ELEVATED PROSTATE SPECIFIC ANTIGEN (PSA): Primary | ICD-10-CM

## 2021-02-02 LAB — PSA SERPL-MCNC: 12.9 NG/ML (ref 0–4)

## 2021-02-02 PROCEDURE — 84153 ASSAY OF PSA TOTAL: CPT | Performed by: UROLOGY

## 2021-02-03 ENCOUNTER — ANCILLARY PROCEDURE (OUTPATIENT)
Dept: CARDIOLOGY | Facility: CLINIC | Age: 66
End: 2021-02-03
Attending: INTERNAL MEDICINE
Payer: MEDICARE

## 2021-02-03 DIAGNOSIS — Z95.810 ICD (IMPLANTABLE CARDIOVERTER-DEFIBRILLATOR) IN PLACE: ICD-10-CM

## 2021-02-03 DIAGNOSIS — I25.5 ISCHEMIC CARDIOMYOPATHY: Primary | ICD-10-CM

## 2021-02-03 PROCEDURE — 93295 DEV INTERROG REMOTE 1/2/MLT: CPT | Performed by: INTERNAL MEDICINE

## 2021-02-03 PROCEDURE — 93296 REM INTERROG EVL PM/IDS: CPT | Performed by: INTERNAL MEDICINE

## 2021-02-03 NOTE — PROGRESS NOTES
[employer] (S) ICD Remote Device Check    : 0 %    Mode: VVI 40    Presenting Rhythm: Regular VS    Heart Rate: Stable with adequate variability     Sensing: wnl    Pacing Threshold: not obtained    Impedance: stable    Battery Status: 8.5 years     Ventricular Arrhythmia: 0    ATP: 0    Shocks: 0    Care Plan: Follow-up in three months with remote device check on 05/03/2021.       I have reviewed and interpreted the device interrogation, settings, programming and nurse's summary. The device is functioning within normal device parameters. I agree with the current findings, assessment and plan.

## 2021-02-04 ENCOUNTER — OFFICE VISIT (OUTPATIENT)
Dept: UROLOGY | Facility: CLINIC | Age: 66
End: 2021-02-04
Payer: MEDICARE

## 2021-02-04 VITALS
SYSTOLIC BLOOD PRESSURE: 110 MMHG | HEIGHT: 71 IN | DIASTOLIC BLOOD PRESSURE: 66 MMHG | BODY MASS INDEX: 22.12 KG/M2 | HEART RATE: 70 BPM | WEIGHT: 158 LBS

## 2021-02-04 DIAGNOSIS — C61 PROSTATE CANCER (H): Primary | ICD-10-CM

## 2021-02-04 PROCEDURE — 99213 OFFICE O/P EST LOW 20 MIN: CPT | Performed by: UROLOGY

## 2021-02-04 ASSESSMENT — MIFFLIN-ST. JEOR: SCORE: 1513.81

## 2021-02-04 ASSESSMENT — PAIN SCALES - GENERAL: PAINLEVEL: NO PAIN (0)

## 2021-02-04 NOTE — PROGRESS NOTES
Mr. 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  He had clinical stage T1c disease at that time with a PSA of 9.7.  His Oncotype DX test suggested a very low probability of grade 4 disease or progression.  His PSA is recently 12.9.  He has not been cycling and had no ejaculations for a week prior to the PSA.  He is voiding comfortably.  Exam: Alert and oriented, normal appearance, normal vital signs.  Normal respirations, neuro grossly intact.  Normal sphincter tone, no rectal mass or impaction, small hard nodule at left apex now  Normal seminal vesicles and right prostate lobe  Other past medical history significant for cardiac disease and he is on daily aspirin and Plavix  Assessment: Grade 3+3 adenocarcinoma of the prostate with increasing PSA and now a palpable nodule at the left apex.  Discussed whether or not he needs another biopsy and we discussed treatment options, as we did 2 years ago.  He is a poor candidate for radical prostatectomy because of heart disease and a long-term smoker.  He is a poor candidate for radiation because of the need for anticoagulants chronically.  He could possibly be a cryo therapy candidate and we discussed continuous and intermittent hormonal therapy  CT scan reviewed and right renal mass is probably a dense or hemorrhagic cyst-no change in size and no enhancement.  The patient has many bilateral renal cysts as well that are benign.  Plan: Probably should have a repeat CT scan in a year, would like him to see Sukumar Oglesby MD for a second opinion regarding his prostate cancer and whether he should undergo cryotherapy or continue surveillance or intermittent hormonal therapy.  He could then follow with Dr. Oglesby after my detention this spring.

## 2021-02-04 NOTE — LETTER
2/4/2021       RE: Iraida Hernandez  [address] [facility] [id number]     Dear Colleague,    Thank you for referring your patient, Iraida Hernandez, to the [facility] UROLOGY [facility] [place] at [facility]. Please see a copy of my visit note below.    Mr. 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  He had clinical stage T1c disease at that time with a PSA of 9.7.  His Oncotype DX test suggested a very low probability of grade 4 disease or progression.  His PSA is recently 12.9.  He has not been cycling and had no ejaculations for a week prior to the PSA.  He is voiding comfortably.  Exam: Alert and oriented, normal appearance, normal vital signs.  Normal respirations, neuro grossly intact.  Normal sphincter tone, no rectal mass or impaction, small hard nodule at left apex now  Normal seminal vesicles and right prostate lobe  Other past medical history significant for cardiac disease and he is on daily aspirin and Plavix  Assessment: Grade 3+3 adenocarcinoma of the prostate with increasing PSA and now a palpable nodule at the left apex.  Discussed whether or not he needs another biopsy and we discussed treatment options, as we did 2 years ago.  He is a poor candidate for radical prostatectomy because of heart disease and a long-term smoker.  He is a poor candidate for radiation because of the need for anticoagulants chronically.  He could possibly be a cryo therapy candidate and we discussed continuous and intermittent hormonal therapy  CT scan reviewed and right renal mass is probably a dense or hemorrhagic cyst-no change in size and no enhancement.  The patient has many bilateral renal cysts as well that are benign.  Plan: Probably should have a repeat CT scan in a year, would like him to see Sukumar Oglesby MD for a second opinion regarding his prostate cancer  and whether he should undergo cryotherapy or continue surveillance or intermittent hormonal therapy.  He could then follow with Dr. Oglesby after my [facility]PC this spring.      Romain Weiss MD

## 2021-02-08 DIAGNOSIS — I21.9 ACUTE MYOCARDIAL INFARCTION (H): ICD-10-CM

## 2021-02-08 DIAGNOSIS — I21.02 ACUTE ST ELEVATION MYOCARDIAL INFARCTION (STEMI) INVOLVING LEFT ANTERIOR DESCENDING (LAD) CORONARY ARTERY (H): ICD-10-CM

## 2021-02-08 RX ORDER — ATORVASTATIN CALCIUM 40 MG/1
40 TABLET, FILM COATED ORAL DAILY
Qty: 90 TABLET | Refills: 2 | Status: SHIPPED | OUTPATIENT
Start: 2021-02-08 | End: 2021-12-06

## 2021-02-08 RX ORDER — SPIRONOLACTONE 25 MG/1
25 TABLET ORAL DAILY
Qty: 90 TABLET | Refills: 2 | Status: SHIPPED | OUTPATIENT
Start: 2021-02-08 | End: 2021-12-06

## 2021-02-12 ENCOUNTER — VIRTUAL VISIT (OUTPATIENT)
Dept: UROLOGY | Facility: CLINIC | Age: 66
End: 2021-02-12
Payer: MEDICARE

## 2021-02-12 VITALS — WEIGHT: 158 LBS | HEIGHT: 72 IN | BODY MASS INDEX: 21.4 KG/M2

## 2021-02-12 DIAGNOSIS — I42.9 CARDIOMYOPATHY, UNSPECIFIED TYPE (H): ICD-10-CM

## 2021-02-12 DIAGNOSIS — C61 PROSTATE CANCER (H): Primary | ICD-10-CM

## 2021-02-12 PROCEDURE — 99215 OFFICE O/P EST HI 40 MIN: CPT | Mod: 95 | Performed by: UROLOGY

## 2021-02-12 ASSESSMENT — MIFFLIN-ST. JEOR: SCORE: 1521.74

## 2021-02-12 ASSESSMENT — PAIN SCALES - GENERAL: PAINLEVEL: NO PAIN (0)

## 2021-02-12 NOTE — PROGRESS NOTES
Iraida Hernandez is a 65 year old male who is being evaluated via a billable video visit.      How would you like to obtain your AVS? MyChart  If the video visit is dropped, the invitation should be resent by: Text to cell phone: [phone]  Will anyone else be joining your video visit? No    Video Start Time: 8:17 AM        Chief Complaint:    Prostate Cancer         History of Present Illness:    Iraida Hernandez is a very pleasant 65 year old male who presents with a history of Prostate Cancer.    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  He had clinical stage T1c disease at that time with a PSA of 9.7.  His Oncotype DX test suggested a very low probability of grade 4 disease or progression.  His PSA is recently 12.9  Small hard nodule at left apex now per ANDREW by Dr. Weiss.  Oncotype GPS is 5 - very low risk    PSA  2/2/2021 - 12.90  3/31/2020 - 10.70  2/5/2020 - 14.60  2/15/2019 - 9.79  12/2/2013 - 6.19    TRUS 3/2019  F: Prostate, left apex, needle core biopsy: Adenocarcinoma, [place] score   3 + 3 = 6 (of 10) (Grade Group 1),   involving 1 of 1 needle core(s) and 1 mm of 10 mm of biopsy tissue   (approximately 10% of biopsy tissue).   Perineural invasion not identified.     K: Prostate, right mid, needle core biopsy: Adenocarcinoma, [place] score   3 + 3 = 6 (of 10) (Grade Group 1),   involving 1 of 1 needle core(s) and 1 mm of 10 mm of biopsy tissue   (approximately 10% of biopsy tissue).   Perineural invasion not identified.          Past Medical History:     Past Medical History:   Diagnosis Date     Calculus of kidney      Cancer (H)     basel cell on nose     Cardiac arrest (H)     V-fib, 2/16/2014     Cardiomyopathy (H)      Coronary artery disease      Hyperlipidaemia      Hyperlipidemia with target LDL less than 70 10/31/2010     Diagnosis updated by automated process. Provider to review and confirm.     ICD (implantable cardiac defibrillator) in place      12-1-2014 EF 29%     Mild intermittent asthma      Myocardial infarction (H) 2/2014     Anterior infarct     Tobacco dependence           Past Surgical History:     Past Surgical History:   Procedure Laterality Date     CARDIAC SURGERY       HEART CATH, ANGIOPLASTY  2/2014    Emergent cath,thrombectomy-pt had cardiac arrest-severe 3 vessel CAD-MACY proximal LAD, LAD diagonal kissing balloon, and stent to proximal RCA     HERNIA REPAIR       NO HISTORY OF SURGERY       PROSTATE SURGERY  03/29/2019          Medications     Current Outpatient Medications   Medication     aspirin EC 81 MG EC tablet     atorvastatin (LIPITOR) 40 MG tablet     carvedilol (COREG) 12.5 MG tablet     clopidogrel (PLAVIX) 75 MG tablet     lisinopril (ZESTRIL) 5 MG tablet     spironolactone (ALDACTONE) 25 MG tablet     nitroGLYcerin (NITROSTAT) 0.4 MG sublingual tablet     Current Facility-Administered Medications   Medication     methylPREDNISolone (DEPO-MEDROL) injection 40 mg     methylPREDNISolone (DEPO-MEDROL) injection 40 mg          Family History:     Family History   Problem Relation Age of Onset     Cerebrovascular Disease Father      Heart Disease Father         MI     Cancer Mother      Unknown/Adopted Maternal Grandmother      Unknown/Adopted Maternal Grandfather      Unknown/Adopted Paternal Grandmother      Unknown/Adopted Paternal Grandfather      Diabetes Son      Family History Negative Son      Cancer Maternal Uncle           Social History:     Social History     Socioeconomic History     Marital status:      Spouse name: Jessica     Number of children: 2     Years of education: Not on file     Highest education level: Not on file   Occupational History     Occupation:      Employer: [employer]     Comment: [employer]   Social Needs     Financial resource strain: Not on file     Food insecurity     Worry: Not on file     Inability: Not on file     Transportation needs     Medical: Not on  file     Non-medical: Not on file   Tobacco Use     Smoking status: Current Every Day Smoker     Packs/day: 0.25     Years: 42.00     Pack years: 10.50     Smokeless tobacco: Never Used     Tobacco comment: 1/3 pack a day    Substance and Sexual Activity     Alcohol use: Yes     Comment: rare     Drug use: No     Sexual activity: Yes     Partners: Female   Lifestyle     Physical activity     Days per week: Not on file     Minutes per session: Not on file     Stress: Not on file   Relationships     Social connections     Talks on phone: Not on file     Gets together: Not on file     Attends Episcopal service: Not on file     Active member of club or organization: Not on file     Attends meetings of clubs or organizations: Not on file     Relationship status: Not on file     Intimate partner violence     Fear of current or ex partner: Not on file     Emotionally abused: Not on file     Physically abused: Not on file     Forced sexual activity: Not on file   Other Topics Concern      Service No     Blood Transfusions No     Caffeine Concern No     Comment: soda occasionally      Occupational Exposure No     Hobby Hazards No     Sleep Concern No     Stress Concern No     Weight Concern No     Special Diet Yes     Comment: eats healthy      Back Care No     Exercise Yes     Comment: golf 2x/week, tredmill daily     Bike Helmet Not Asked     Comment: NA     Seat Belt Yes     Self-Exams Yes     Parent/sibling w/ CABG, MI or angioplasty before 65F 55M? Not Asked   Social History Narrative     Not on file          Allergies:   Penicillins and Tetracyclines         Review of Systems:      Constitutional, HEENT, cardiovascular, pulmonary, gi and gu systems are negative, except as otherwise noted.         Physical Exam:     Vitals:  No vitals were obtained today due to virtual visit.    Physical Exam   GENERAL: Healthy, alert and no distress  EYES: Eyes grossly normal to inspection.  No discharge or erythema, or obvious  scleral/conjunctival abnormalities.  RESP: No audible wheeze, cough, or visible cyanosis.  No visible retractions or increased work of breathing.    SKIN: Visible skin clear. No significant rash, abnormal pigmentation or lesions.  NEURO: Cranial nerves grossly intact.  Mentation and speech appropriate for age.  PSYCH: Mentation appears normal, affect normal/bright, judgement and insight intact, normal speech and appearance well-groomed.      Labs and Pathology:    I reviewed all applicable laboratory and pathology data and went over findings with patient  Significant for   Lab Results   Component Value Date    CR 0.91 02/15/2019    CR 1.11 02/23/2018    CR 0.99 12/01/2014    CR 1.0 04/01/2014    CR 1.1 03/04/2014    CR 1.01 02/26/2014    CR 0.89 02/19/2014    CR 0.75 02/17/2014    CR 1.06 02/16/2014    CR 0.89 12/02/2013     PSA   Date Value Ref Range Status   03/31/2020 10.70 (H) 0 - 4 ug/L Final     Comment:     Assay Method:  Chemiluminescence using Siemens Vista analyzer   02/05/2020 14.60 (H) 0 - 4 ug/L Final     Comment:     Assay Method:  Chemiluminescence using Siemens Vista analyzer   02/15/2019 9.79 (H) 0 - 4 ug/L Final     Comment:     Assay Method:  Chemiluminescence using Siemens Vista analyzer   12/02/2013 6.19 (H) 0 - 4 ug/L Final   06/15/2012 4.56 (H) 0 - 4 ug/L Final   01/07/2011 3.67 0 - 4 ug/L Final   07/02/2009 2.74 0 - 4 ug/L Final   12/10/2007 2.81 0 - 4 ug/L Final   04/06/2005 1.26 0 - 4 ug/L Final       Outside and Past Medical records:    Review of relevant notes as documented in Epic   Review of the result(s) of each unique test - PSA, prostate biopsy pathology, Oncotype DX         Assessment and Plan:     Assessment: 65 year old male with [place] 3+3 disease diagnosed 2 years ago. GPS score 5. On active surveillance. Recent PSA 12.9 and has nodule on prostate per ANDREW with Dr. Weiss. We discussed the potential significance of these findings. With this in mind, we did review the potential  options for treatment, including surgery and radiation. I would not advise cryotherapy or ADT alone in this circumstance. At this point, however, given his low-grade pathology, would be prudent to re-assess his disease with MRI and/or confirmatory re-biopsy, prior to proceeding with definitive therapy. He does have a pacemaker, but was able to have MRI done last year safely. Mr. Hernandez and his spouse had many excellent questions today which were discussed in detail. Will plan for MRI and will follow-up to discuss these results.    Plan:  MRI prostate and visit to review results    Orders  Orders Placed This Encounter   Procedures     MR Prostate wo & w Contrast       Video-Visit Details    Type of service:  Video Visit    Video End Time:8:55 AM    Originating Location (pt. Location): Home    Distant Location (provider location):  [facility]OLOG[facility] [place]     Platform used for Video Visit: Doximity     Greater than 40 total minutes spent on the date of the encounter including direct interaction with the patient, performing chart review, documentation and further activities as noted above.    Sukumar Oglesby MD  Urology  [facility]

## 2021-02-17 ENCOUNTER — TELEPHONE (OUTPATIENT)
Dept: UROLOGY | Facility: CLINIC | Age: 66
End: 2021-02-17

## 2021-02-17 NOTE — TELEPHONE ENCOUNTER
----- Message from Letty Donato sent at 2/17/2021 10:46 AM CST -----  Return in about 4 weeks (around 3/12/2021) after MR Prostate wo & w Kerrie DISLA

## 2021-02-22 LAB
MDC_IDC_LEAD_IMPLANT_DT: NORMAL
MDC_IDC_LEAD_LOCATION: NORMAL
MDC_IDC_LEAD_LOCATION_DETAIL_1: NORMAL
MDC_IDC_LEAD_MFG: NORMAL
MDC_IDC_LEAD_MODEL: NORMAL
MDC_IDC_LEAD_POLARITY_TYPE: NORMAL
MDC_IDC_LEAD_SERIAL: NORMAL
MDC_IDC_MSMT_BATTERY_DTM: NORMAL
MDC_IDC_MSMT_BATTERY_REMAINING_LONGEVITY: 102 MO
MDC_IDC_MSMT_BATTERY_REMAINING_PERCENTAGE: 100 %
MDC_IDC_MSMT_BATTERY_STATUS: NORMAL
MDC_IDC_MSMT_CAP_CHARGE_DTM: NORMAL
MDC_IDC_MSMT_CAP_CHARGE_TIME: 10 S
MDC_IDC_MSMT_CAP_CHARGE_TYPE: NORMAL
MDC_IDC_MSMT_LEADCHNL_RV_IMPEDANCE_VALUE: 655 OHM
MDC_IDC_MSMT_LEADCHNL_RV_PACING_THRESHOLD_AMPLITUDE: 0.5 V
MDC_IDC_MSMT_LEADCHNL_RV_PACING_THRESHOLD_PULSEWIDTH: 0.5 MS
MDC_IDC_PG_IMPLANT_DTM: NORMAL
MDC_IDC_PG_MFG: NORMAL
MDC_IDC_PG_MODEL: NORMAL
MDC_IDC_PG_SERIAL: NORMAL
MDC_IDC_PG_TYPE: NORMAL
MDC_IDC_SESS_CLINIC_NAME: NORMAL
MDC_IDC_SESS_DTM: NORMAL
MDC_IDC_SESS_TYPE: NORMAL
MDC_IDC_SET_BRADY_LOWRATE: 40 {BEATS}/MIN
MDC_IDC_SET_BRADY_MODE: NORMAL
MDC_IDC_SET_LEADCHNL_RV_PACING_AMPLITUDE: 2 V
MDC_IDC_SET_LEADCHNL_RV_PACING_POLARITY: NORMAL
MDC_IDC_SET_LEADCHNL_RV_PACING_PULSEWIDTH: 0.5 MS
MDC_IDC_SET_LEADCHNL_RV_SENSING_ADAPTATION_MODE: NORMAL
MDC_IDC_SET_LEADCHNL_RV_SENSING_POLARITY: NORMAL
MDC_IDC_SET_LEADCHNL_RV_SENSING_SENSITIVITY: 0.6 MV
MDC_IDC_SET_ZONE_DETECTION_INTERVAL: 250 MS
MDC_IDC_SET_ZONE_DETECTION_INTERVAL: 300 MS
MDC_IDC_SET_ZONE_DETECTION_INTERVAL: 353 MS
MDC_IDC_SET_ZONE_TYPE: NORMAL
MDC_IDC_SET_ZONE_VENDOR_TYPE: NORMAL
MDC_IDC_STAT_BRADY_DTM_END: NORMAL
MDC_IDC_STAT_BRADY_DTM_START: NORMAL
MDC_IDC_STAT_BRADY_RV_PERCENT_PACED: 0 %
MDC_IDC_STAT_EPISODE_RECENT_COUNT: 0
MDC_IDC_STAT_EPISODE_RECENT_COUNT_DTM_END: NORMAL
MDC_IDC_STAT_EPISODE_RECENT_COUNT_DTM_START: NORMAL
MDC_IDC_STAT_EPISODE_TYPE: NORMAL
MDC_IDC_STAT_EPISODE_VENDOR_TYPE: NORMAL
MDC_IDC_STAT_TACHYTHERAPY_ATP_DELIVERED_RECENT: 0
MDC_IDC_STAT_TACHYTHERAPY_ATP_DELIVERED_TOTAL: 0
MDC_IDC_STAT_TACHYTHERAPY_RECENT_DTM_END: NORMAL
MDC_IDC_STAT_TACHYTHERAPY_RECENT_DTM_START: NORMAL
MDC_IDC_STAT_TACHYTHERAPY_SHOCKS_ABORTED_RECENT: 0
MDC_IDC_STAT_TACHYTHERAPY_SHOCKS_ABORTED_TOTAL: 0
MDC_IDC_STAT_TACHYTHERAPY_SHOCKS_DELIVERED_RECENT: 0
MDC_IDC_STAT_TACHYTHERAPY_SHOCKS_DELIVERED_TOTAL: 0
MDC_IDC_STAT_TACHYTHERAPY_TOTAL_DTM_END: NORMAL
MDC_IDC_STAT_TACHYTHERAPY_TOTAL_DTM_START: NORMAL

## 2021-03-09 ENCOUNTER — HOSPITAL ENCOUNTER (OUTPATIENT)
Dept: MRI IMAGING | Facility: CLINIC | Age: 66
End: 2021-03-09
Attending: UROLOGY
Payer: MEDICARE

## 2021-03-09 ENCOUNTER — ANCILLARY PROCEDURE (OUTPATIENT)
Dept: CARDIOLOGY | Facility: CLINIC | Age: 66
End: 2021-03-09
Attending: UROLOGY
Payer: MEDICARE

## 2021-03-09 DIAGNOSIS — Z45.02 ENCOUNTER FOR ADJUSTMENT AND MANAGEMENT OF AUTOMATIC IMPLANTABLE CARDIAC DEFIBRILLATOR: ICD-10-CM

## 2021-03-09 DIAGNOSIS — I42.9 CARDIOMYOPATHY (H): ICD-10-CM

## 2021-03-09 DIAGNOSIS — I42.9 CARDIOMYOPATHY (H): Primary | ICD-10-CM

## 2021-03-09 DIAGNOSIS — C61 PROSTATE CANCER (H): ICD-10-CM

## 2021-03-09 PROCEDURE — A9585 GADOBUTROL INJECTION: HCPCS | Performed by: STUDENT IN AN ORGANIZED HEALTH CARE EDUCATION/TRAINING PROGRAM

## 2021-03-09 PROCEDURE — 93287 PERI-PX DEVICE EVAL & PRGR: CPT

## 2021-03-09 PROCEDURE — G1004 CDSM NDSC: HCPCS | Performed by: RADIOLOGY

## 2021-03-09 PROCEDURE — 93287 PERI-PX DEVICE EVAL & PRGR: CPT | Mod: 26 | Performed by: INTERNAL MEDICINE

## 2021-03-09 PROCEDURE — G1004 CDSM NDSC: HCPCS

## 2021-03-09 PROCEDURE — 255N000002 HC RX 255 OP 636: Performed by: STUDENT IN AN ORGANIZED HEALTH CARE EDUCATION/TRAINING PROGRAM

## 2021-03-09 PROCEDURE — 72197 MRI PELVIS W/O & W/DYE: CPT | Mod: 26 | Performed by: RADIOLOGY

## 2021-03-09 PROCEDURE — 72197 MRI PELVIS W/O & W/DYE: CPT | Mod: ME

## 2021-03-09 RX ORDER — GADOBUTROL 604.72 MG/ML
7.5 INJECTION INTRAVENOUS ONCE
Status: COMPLETED | OUTPATIENT
Start: 2021-03-09 | End: 2021-03-09

## 2021-03-09 RX ADMIN — GADOBUTROL 7 ML: 604.72 INJECTION INTRAVENOUS at 14:41

## 2021-03-11 LAB
MDC_IDC_EPISODE_DTM: NORMAL
MDC_IDC_EPISODE_ID: NORMAL
MDC_IDC_EPISODE_TYPE: NORMAL
MDC_IDC_LEAD_IMPLANT_DT: NORMAL
MDC_IDC_LEAD_IMPLANT_DT: NORMAL
MDC_IDC_LEAD_LOCATION: NORMAL
MDC_IDC_LEAD_LOCATION: NORMAL
MDC_IDC_LEAD_LOCATION_DETAIL_1: NORMAL
MDC_IDC_LEAD_LOCATION_DETAIL_1: NORMAL
MDC_IDC_LEAD_MFG: NORMAL
MDC_IDC_LEAD_MFG: NORMAL
MDC_IDC_LEAD_MODEL: NORMAL
MDC_IDC_LEAD_MODEL: NORMAL
MDC_IDC_LEAD_POLARITY_TYPE: NORMAL
MDC_IDC_LEAD_POLARITY_TYPE: NORMAL
MDC_IDC_LEAD_SERIAL: NORMAL
MDC_IDC_LEAD_SERIAL: NORMAL
MDC_IDC_MSMT_BATTERY_DTM: NORMAL
MDC_IDC_MSMT_BATTERY_DTM: NORMAL
MDC_IDC_MSMT_BATTERY_REMAINING_LONGEVITY: 96 MO
MDC_IDC_MSMT_BATTERY_REMAINING_LONGEVITY: 96 MO
MDC_IDC_MSMT_BATTERY_STATUS: NORMAL
MDC_IDC_MSMT_BATTERY_STATUS: NORMAL
MDC_IDC_MSMT_CAP_CHARGE_DTM: NORMAL
MDC_IDC_MSMT_CAP_CHARGE_DTM: NORMAL
MDC_IDC_MSMT_CAP_CHARGE_ENERGY: 0 J
MDC_IDC_MSMT_CAP_CHARGE_ENERGY: 0 J
MDC_IDC_MSMT_CAP_CHARGE_TIME: 0 S
MDC_IDC_MSMT_CAP_CHARGE_TIME: 0 S
MDC_IDC_MSMT_CAP_CHARGE_TIME: 10.01
MDC_IDC_MSMT_CAP_CHARGE_TIME: 10.01
MDC_IDC_MSMT_CAP_CHARGE_TYPE: NORMAL
MDC_IDC_MSMT_LEADCHNL_RV_IMPEDANCE_VALUE: 799 OHM
MDC_IDC_MSMT_LEADCHNL_RV_IMPEDANCE_VALUE: 799 OHM
MDC_IDC_MSMT_LEADCHNL_RV_PACING_THRESHOLD_AMPLITUDE: 0.5 V
MDC_IDC_MSMT_LEADCHNL_RV_PACING_THRESHOLD_AMPLITUDE: 0.5 V
MDC_IDC_MSMT_LEADCHNL_RV_PACING_THRESHOLD_PULSEWIDTH: 0.5 MS
MDC_IDC_MSMT_LEADCHNL_RV_PACING_THRESHOLD_PULSEWIDTH: 0.5 MS
MDC_IDC_MSMT_LEADCHNL_RV_SENSING_INTR_AMPL: 5.2 MV
MDC_IDC_MSMT_LEADCHNL_RV_SENSING_INTR_AMPL: 5.2 MV
MDC_IDC_PG_IMPLANT_DTM: NORMAL
MDC_IDC_PG_IMPLANT_DTM: NORMAL
MDC_IDC_PG_MFG: NORMAL
MDC_IDC_PG_MFG: NORMAL
MDC_IDC_PG_MODEL: NORMAL
MDC_IDC_PG_MODEL: NORMAL
MDC_IDC_PG_SERIAL: NORMAL
MDC_IDC_PG_SERIAL: NORMAL
MDC_IDC_PG_TYPE: NORMAL
MDC_IDC_PG_TYPE: NORMAL
MDC_IDC_SESS_CLINIC_NAME: NORMAL
MDC_IDC_SESS_CLINIC_NAME: NORMAL
MDC_IDC_SESS_DTM: NORMAL
MDC_IDC_SESS_DTM: NORMAL
MDC_IDC_SESS_TYPE: NORMAL
MDC_IDC_SESS_TYPE: NORMAL
MDC_IDC_SET_BRADY_HYSTRATE: NORMAL
MDC_IDC_SET_BRADY_HYSTRATE: NORMAL
MDC_IDC_SET_BRADY_LOWRATE: 40 {BEATS}/MIN
MDC_IDC_SET_BRADY_LOWRATE: 40 {BEATS}/MIN
MDC_IDC_SET_BRADY_MODE: NORMAL
MDC_IDC_SET_BRADY_MODE: NORMAL
MDC_IDC_SET_LEADCHNL_RV_PACING_AMPLITUDE: 2 V
MDC_IDC_SET_LEADCHNL_RV_PACING_AMPLITUDE: 2 V
MDC_IDC_SET_LEADCHNL_RV_PACING_CAPTURE_MODE: NORMAL
MDC_IDC_SET_LEADCHNL_RV_PACING_CAPTURE_MODE: NORMAL
MDC_IDC_SET_LEADCHNL_RV_PACING_POLARITY: NORMAL
MDC_IDC_SET_LEADCHNL_RV_PACING_POLARITY: NORMAL
MDC_IDC_SET_LEADCHNL_RV_PACING_PULSEWIDTH: 0.5 MS
MDC_IDC_SET_LEADCHNL_RV_PACING_PULSEWIDTH: 0.5 MS
MDC_IDC_SET_LEADCHNL_RV_SENSING_ADAPTATION_MODE: NORMAL
MDC_IDC_SET_LEADCHNL_RV_SENSING_ADAPTATION_MODE: NORMAL
MDC_IDC_SET_LEADCHNL_RV_SENSING_POLARITY: NORMAL
MDC_IDC_SET_LEADCHNL_RV_SENSING_POLARITY: NORMAL
MDC_IDC_SET_LEADCHNL_RV_SENSING_SENSITIVITY: 0.6 MV
MDC_IDC_SET_LEADCHNL_RV_SENSING_SENSITIVITY: 0.6 MV
MDC_IDC_SET_ZONE_DETECTION_INTERVAL: 250 MS
MDC_IDC_SET_ZONE_DETECTION_INTERVAL: 250 MS
MDC_IDC_SET_ZONE_DETECTION_INTERVAL: 300 MS
MDC_IDC_SET_ZONE_DETECTION_INTERVAL: 300 MS
MDC_IDC_SET_ZONE_DETECTION_INTERVAL: 353 MS
MDC_IDC_SET_ZONE_DETECTION_INTERVAL: 353 MS
MDC_IDC_SET_ZONE_TYPE: NORMAL
MDC_IDC_SET_ZONE_VENDOR_TYPE: NORMAL
MDC_IDC_STAT_EPISODE_RECENT_COUNT: 0
MDC_IDC_STAT_EPISODE_RECENT_COUNT: 2
MDC_IDC_STAT_EPISODE_RECENT_COUNT: 2
MDC_IDC_STAT_EPISODE_RECENT_COUNT_DTM_END: NORMAL
MDC_IDC_STAT_EPISODE_RECENT_COUNT_DTM_START: NORMAL
MDC_IDC_STAT_EPISODE_TOTAL_COUNT: 0
MDC_IDC_STAT_EPISODE_TOTAL_COUNT: 64
MDC_IDC_STAT_EPISODE_TOTAL_COUNT: 64
MDC_IDC_STAT_EPISODE_TOTAL_COUNT_DTM_END: NORMAL
MDC_IDC_STAT_EPISODE_TYPE: NORMAL
MDC_IDC_STAT_EPISODE_VENDOR_TYPE: NORMAL
MDC_IDC_STAT_TACHYTHERAPY_ATP_DELIVERED_RECENT: 0
MDC_IDC_STAT_TACHYTHERAPY_ATP_DELIVERED_RECENT: 0
MDC_IDC_STAT_TACHYTHERAPY_ATP_DELIVERED_TOTAL: 0
MDC_IDC_STAT_TACHYTHERAPY_ATP_DELIVERED_TOTAL: 0
MDC_IDC_STAT_TACHYTHERAPY_RECENT_DTM_END: NORMAL
MDC_IDC_STAT_TACHYTHERAPY_RECENT_DTM_END: NORMAL
MDC_IDC_STAT_TACHYTHERAPY_RECENT_DTM_START: NORMAL
MDC_IDC_STAT_TACHYTHERAPY_RECENT_DTM_START: NORMAL
MDC_IDC_STAT_TACHYTHERAPY_SHOCKS_ABORTED_RECENT: 0
MDC_IDC_STAT_TACHYTHERAPY_SHOCKS_ABORTED_RECENT: 0
MDC_IDC_STAT_TACHYTHERAPY_SHOCKS_ABORTED_TOTAL: 0
MDC_IDC_STAT_TACHYTHERAPY_SHOCKS_ABORTED_TOTAL: 0
MDC_IDC_STAT_TACHYTHERAPY_SHOCKS_DELIVERED_RECENT: 0
MDC_IDC_STAT_TACHYTHERAPY_SHOCKS_DELIVERED_RECENT: 0
MDC_IDC_STAT_TACHYTHERAPY_SHOCKS_DELIVERED_TOTAL: 0
MDC_IDC_STAT_TACHYTHERAPY_SHOCKS_DELIVERED_TOTAL: 0
MDC_IDC_STAT_TACHYTHERAPY_TOTAL_DTM_END: NORMAL
MDC_IDC_STAT_TACHYTHERAPY_TOTAL_DTM_END: NORMAL

## 2021-03-19 ENCOUNTER — OFFICE VISIT (OUTPATIENT)
Dept: UROLOGY | Facility: CLINIC | Age: 66
End: 2021-03-19
Payer: MEDICARE

## 2021-03-19 VITALS
HEIGHT: 71 IN | OXYGEN SATURATION: 98 % | WEIGHT: 158 LBS | SYSTOLIC BLOOD PRESSURE: 115 MMHG | BODY MASS INDEX: 22.12 KG/M2 | HEART RATE: 80 BPM | DIASTOLIC BLOOD PRESSURE: 68 MMHG

## 2021-03-19 DIAGNOSIS — C61 PROSTATE CANCER (H): Primary | ICD-10-CM

## 2021-03-19 DIAGNOSIS — I25.5 ISCHEMIC CARDIOMYOPATHY: ICD-10-CM

## 2021-03-19 DIAGNOSIS — I25.10 CORONARY ARTERY DISEASE INVOLVING NATIVE CORONARY ARTERY OF NATIVE HEART WITHOUT ANGINA PECTORIS: ICD-10-CM

## 2021-03-19 DIAGNOSIS — Z79.2 PROPHYLACTIC ANTIBIOTIC: Primary | ICD-10-CM

## 2021-03-19 PROCEDURE — 99213 OFFICE O/P EST LOW 20 MIN: CPT | Performed by: UROLOGY

## 2021-03-19 RX ORDER — CIPROFLOXACIN 500 MG/1
500 TABLET, FILM COATED ORAL 2 TIMES DAILY
Qty: 6 TABLET | Refills: 0 | Status: SHIPPED | OUTPATIENT
Start: 2021-03-19 | End: 2021-03-22

## 2021-03-19 ASSESSMENT — MIFFLIN-ST. JEOR: SCORE: 1508.81

## 2021-03-19 ASSESSMENT — PAIN SCALES - GENERAL: PAINLEVEL: NO PAIN (0)

## 2021-03-19 NOTE — PROGRESS NOTES
"  CHIEF COMPLAINT   It was my pleasure to see Iraida Hernandez who is a 66 year old male for follow-up of prostate cancer.      HPI  Iraida Hernandez is a very pleasant 66 year old male who presents with a history of Prostate Cancer.    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  He had clinical stage T1c disease at that time with a PSA of 9.7.  His Oncotype DX test suggested a very low probability of grade 4 disease or progression.  His PSA is recently 12.9  Small hard nodule at left apex now per ANDREW by Dr. Weiss.  Oncotype GPS is 5 - very low risk    PSA  2/2/2021 - 12.90  3/31/2020 - 10.70  2/5/2020 - 14.60  2/15/2019 - 9.79  12/2/2013 - 6.19     TRUS 3/2019  F: Prostate, left apex, needle core biopsy: Adenocarcinoma, [place] score   3 + 3 = 6 (of 10) (Grade Group 1),   involving 1 of 1 needle core(s) and 1 mm of 10 mm of biopsy tissue   (approximately 10% of biopsy tissue).   Perineural invasion not identified.     K: Prostate, right mid, needle core biopsy: Adenocarcinoma, [place] score   3 + 3 = 6 (of 10) (Grade Group 1),   involving 1 of 1 needle core(s) and 1 mm of 10 mm of biopsy tissue   (approximately 10% of biopsy tissue).   Perineural invasion not identified.     MRI Prostate 3/9/2021  IMPRESSION:  1. Based on the most suspicious abnormality, this exam is  characterized as PIRADS 2 - Clinically significant cancer is unlikely  to be present.    2. No suspicious adenopathy or evidence of pelvic metastases.  3. Bilateral hip arthritis with effusion.    PHYSICAL EXAM  Patient is a 66 year old  male   Vitals: Blood pressure 115/68, pulse 80, height 1.803 m (5' 11\"), weight 71.7 kg (158 lb), SpO2 98 %.  General Appearance Adult: Body mass index is 22.04 kg/m .  Alert, no acute distress, oriented  Lungs: no respiratory distress, or pursed lip breathing  Abdomen: soft, nontender, no organomegaly or masses;  Back: no CVAT  Neuro: Alert, oriented, speech and mentation " normal  Psych: affect and mood normal    Outside and Past Medical records:  Review of the result(s) of each unique test - PSA, MRI, prostate biopsy pathology    ASSESSMENT and PLAN  66 year old male with [place] 3+3 disease diagnosed 2019. GPS score 5. On active surveillance. Recent PSA 12.9 and has nodule on prostate per ANDREW with Dr. Weiss. MRI demonstrates, again, that he has PIRADS 2 lesion on MRI. Given he new nodule, and rising PSA, a confirmatory re-biopsy would be prudent. Will plan TRUS biopsy in the coming weeks. Risks of this were discussed again today, and he elects to proceed.    Plan:  - Schedule TRUS biopsy    Greater than 20 minutes spent on the date of the encounter doing chart review, history and exam, documentation and further activities as noted above.    Sukumar Oglesby MD  Urology  [facility]

## 2021-03-19 NOTE — PATIENT INSTRUCTIONS
Ultrasound Guided Prostate Biopsy    What is a prostate biopsy? A prostate biopsy is a relatively painless procedure performed in the physician's office, outpatient facility or hospital.  A long, thin needle is inserted into the prostate to collect a sample of tissue from the prostate.  These samples are then examined by a pathologist for abnormal cells.    Why do I need a prostate biopsy? During a man's lifetime the prostate gradually increases in size.  The patient may or may not experience symptoms.  Symptoms of an enlarged prostate include:    Increased frequency of urination    Decreased force of urinary stream    Trouble with urination    Awakening at night to urinate    When the prostate is examined, the physician may feel a nodule (hard or firm growth) which would require a biopsy.    Another reason for having a prostate biopsy is an increase in the prostate specific androgen (PSA) in your blood.  A prostate biopsy may be necessary to determine the cause of the increased PSA.    Your physician will also perform an ultrasound (an image created by sound waves).  The ultrasound is performed by placing a small probe in the rectum to image the prostate gland.    Preparation for the Prostate Biopsy    1. Blood thinning medications must be stopped prior to your biopsy.  Please stop the following medication the appropriate number of days before:  a. 10 days-acetylsalicylic acid, vitamin E, fish oil, aspirin, baby aspirin  b. 7 days- Plavix, Brilinta, ibuprofen (Advil, Motrin, Aleve)  c. 5 days-Pradaxa  d. 4 days-Coumadin/warfarin  e. 3 days-Eliquis, Xarelto    2.  If you have any bleeding problems (thin blood), please tell your doctor.    3.  If you have been told by another doctor to take antibiotics before dental work or surgery, please tell the doctor.  This is common for patients that have had a joint replacement.    YOU HAVE BEEN PRESCRIBED AN ANTIBIOTIC.  Please follow the directions written on the bottle.   The directions usually will tell you to start the antibiotic the day before your biopsy.  You will continue taking the medication until it is gone.    The day of the biopsy you will be asked to use an enema one hour before you leave for your appointment.    Unless you have been prescribed a sedative (Valium or a similar drug) you will be able to drive to and from your appointment.  If you have taken a sedative you must have a ride.    AFTER THE BIOPSY    DANGER SIGNS    Small amount of blood in the urine for 10-14 days Excessive blood in the urine, stool or ejaculate  Small amount of blood in the stool for 48 hours Fever over 100 or chills  Small amount of blood in the ejaculate for up to Frequent urination or burning when you urinate   4 weeks          CALL THE DOCTOR'S OFFICE -[phone] IF YOU HAVE ANY OF THESE SYMPTOMS.  IF YOU CANNOT CONTACT THE OFFICE, GO TO THE EMERGENCY ROOM.          Your biopsy is scheduled on____6/25/21________ at our [place] office.  Please be at the office at    __9:00am______.      A follow up visit has been scheduled for you on ___7/2/21____@__10:30am_____ as a virtual     visit. Your doctor will discuss your results with you at this visit

## 2021-03-19 NOTE — NURSING NOTE
Chief Complaint   Patient presents with     Prostate Cancer     got MR prostate wo and with contrast     Heena Holt

## 2021-05-03 ENCOUNTER — ANCILLARY PROCEDURE (OUTPATIENT)
Dept: CARDIOLOGY | Facility: CLINIC | Age: 66
End: 2021-05-03
Attending: INTERNAL MEDICINE
Payer: MEDICARE

## 2021-05-03 DIAGNOSIS — Z95.810 ICD (IMPLANTABLE CARDIOVERTER-DEFIBRILLATOR) IN PLACE: ICD-10-CM

## 2021-05-03 DIAGNOSIS — I25.5 ISCHEMIC CARDIOMYOPATHY: ICD-10-CM

## 2021-05-03 LAB
MDC_IDC_EPISODE_DTM: NORMAL
MDC_IDC_EPISODE_DTM: NORMAL
MDC_IDC_EPISODE_DURATION: 8 S
MDC_IDC_EPISODE_ID: NORMAL
MDC_IDC_EPISODE_ID: NORMAL
MDC_IDC_EPISODE_TYPE: NORMAL
MDC_IDC_EPISODE_TYPE: NORMAL
MDC_IDC_LEAD_IMPLANT_DT: NORMAL
MDC_IDC_LEAD_LOCATION: NORMAL
MDC_IDC_LEAD_LOCATION_DETAIL_1: NORMAL
MDC_IDC_LEAD_MFG: NORMAL
MDC_IDC_LEAD_MODEL: NORMAL
MDC_IDC_LEAD_POLARITY_TYPE: NORMAL
MDC_IDC_LEAD_SERIAL: NORMAL
MDC_IDC_MSMT_BATTERY_DTM: NORMAL
MDC_IDC_MSMT_BATTERY_REMAINING_LONGEVITY: 96 MO
MDC_IDC_MSMT_BATTERY_REMAINING_PERCENTAGE: 100 %
MDC_IDC_MSMT_BATTERY_STATUS: NORMAL
MDC_IDC_MSMT_CAP_CHARGE_DTM: NORMAL
MDC_IDC_MSMT_CAP_CHARGE_TIME: 10 S
MDC_IDC_MSMT_CAP_CHARGE_TYPE: NORMAL
MDC_IDC_MSMT_LEADCHNL_RV_IMPEDANCE_VALUE: 725 OHM
MDC_IDC_MSMT_LEADCHNL_RV_PACING_THRESHOLD_AMPLITUDE: 0.5 V
MDC_IDC_MSMT_LEADCHNL_RV_PACING_THRESHOLD_PULSEWIDTH: 0.5 MS
MDC_IDC_PG_IMPLANT_DTM: NORMAL
MDC_IDC_PG_MFG: NORMAL
MDC_IDC_PG_MODEL: NORMAL
MDC_IDC_PG_SERIAL: NORMAL
MDC_IDC_PG_TYPE: NORMAL
MDC_IDC_SESS_CLINIC_NAME: NORMAL
MDC_IDC_SESS_DTM: NORMAL
MDC_IDC_SESS_TYPE: NORMAL
MDC_IDC_SET_BRADY_LOWRATE: 40 {BEATS}/MIN
MDC_IDC_SET_BRADY_MODE: NORMAL
MDC_IDC_SET_LEADCHNL_RV_PACING_AMPLITUDE: 2 V
MDC_IDC_SET_LEADCHNL_RV_PACING_POLARITY: NORMAL
MDC_IDC_SET_LEADCHNL_RV_PACING_PULSEWIDTH: 0.5 MS
MDC_IDC_SET_LEADCHNL_RV_SENSING_ADAPTATION_MODE: NORMAL
MDC_IDC_SET_LEADCHNL_RV_SENSING_POLARITY: NORMAL
MDC_IDC_SET_LEADCHNL_RV_SENSING_SENSITIVITY: 0.6 MV
MDC_IDC_SET_ZONE_DETECTION_INTERVAL: 250 MS
MDC_IDC_SET_ZONE_DETECTION_INTERVAL: 300 MS
MDC_IDC_SET_ZONE_DETECTION_INTERVAL: 353 MS
MDC_IDC_SET_ZONE_TYPE: NORMAL
MDC_IDC_SET_ZONE_VENDOR_TYPE: NORMAL
MDC_IDC_STAT_BRADY_DTM_END: NORMAL
MDC_IDC_STAT_BRADY_DTM_START: NORMAL
MDC_IDC_STAT_BRADY_RV_PERCENT_PACED: 0 %
MDC_IDC_STAT_EPISODE_RECENT_COUNT: 0
MDC_IDC_STAT_EPISODE_RECENT_COUNT: 3
MDC_IDC_STAT_EPISODE_RECENT_COUNT_DTM_END: NORMAL
MDC_IDC_STAT_EPISODE_RECENT_COUNT_DTM_START: NORMAL
MDC_IDC_STAT_EPISODE_TYPE: NORMAL
MDC_IDC_STAT_EPISODE_VENDOR_TYPE: NORMAL
MDC_IDC_STAT_TACHYTHERAPY_ATP_DELIVERED_RECENT: 0
MDC_IDC_STAT_TACHYTHERAPY_ATP_DELIVERED_TOTAL: 0
MDC_IDC_STAT_TACHYTHERAPY_RECENT_DTM_END: NORMAL
MDC_IDC_STAT_TACHYTHERAPY_RECENT_DTM_START: NORMAL
MDC_IDC_STAT_TACHYTHERAPY_SHOCKS_ABORTED_RECENT: 0
MDC_IDC_STAT_TACHYTHERAPY_SHOCKS_ABORTED_TOTAL: 0
MDC_IDC_STAT_TACHYTHERAPY_SHOCKS_DELIVERED_RECENT: 0
MDC_IDC_STAT_TACHYTHERAPY_SHOCKS_DELIVERED_TOTAL: 0
MDC_IDC_STAT_TACHYTHERAPY_TOTAL_DTM_END: NORMAL
MDC_IDC_STAT_TACHYTHERAPY_TOTAL_DTM_START: NORMAL

## 2021-05-03 PROCEDURE — 93295 DEV INTERROG REMOTE 1/2/MLT: CPT | Performed by: INTERNAL MEDICINE

## 2021-05-03 PROCEDURE — 93296 REM INTERROG EVL PM/IDS: CPT | Performed by: INTERNAL MEDICINE

## 2021-06-25 ENCOUNTER — ALLIED HEALTH/NURSE VISIT (OUTPATIENT)
Dept: UROLOGY | Facility: CLINIC | Age: 66
End: 2021-06-25
Payer: MEDICARE

## 2021-06-25 ENCOUNTER — OFFICE VISIT (OUTPATIENT)
Dept: UROLOGY | Facility: CLINIC | Age: 66
End: 2021-06-25
Payer: MEDICARE

## 2021-06-25 VITALS
SYSTOLIC BLOOD PRESSURE: 110 MMHG | BODY MASS INDEX: 22.12 KG/M2 | HEART RATE: 64 BPM | DIASTOLIC BLOOD PRESSURE: 64 MMHG | WEIGHT: 158 LBS | HEIGHT: 71 IN

## 2021-06-25 DIAGNOSIS — C61 MALIGNANT NEOPLASM OF PROSTATE (H): ICD-10-CM

## 2021-06-25 DIAGNOSIS — C61 PROSTATE CANCER (H): Primary | ICD-10-CM

## 2021-06-25 PROCEDURE — 88341 IMHCHEM/IMCYTCHM EA ADD ANTB: CPT | Performed by: PATHOLOGY

## 2021-06-25 PROCEDURE — 88305 TISSUE EXAM BY PATHOLOGIST: CPT | Performed by: PATHOLOGY

## 2021-06-25 PROCEDURE — 55700 PR BIOPSY OF PROSTATE,NEEDLE/PUNCH: CPT | Performed by: UROLOGY

## 2021-06-25 PROCEDURE — 76942 ECHO GUIDE FOR BIOPSY: CPT | Performed by: UROLOGY

## 2021-06-25 PROCEDURE — 88342 IMHCHEM/IMCYTCHM 1ST ANTB: CPT | Performed by: PATHOLOGY

## 2021-06-25 PROCEDURE — 96372 THER/PROPH/DIAG INJ SC/IM: CPT | Mod: 59 | Performed by: UROLOGY

## 2021-06-25 RX ORDER — LIDOCAINE HYDROCHLORIDE 10 MG/ML
10 INJECTION, SOLUTION INFILTRATION; PERINEURAL ONCE
Status: COMPLETED | OUTPATIENT
Start: 2021-06-25 | End: 2021-06-25

## 2021-06-25 RX ORDER — GENTAMICIN 40 MG/ML
80 INJECTION, SOLUTION INTRAMUSCULAR; INTRAVENOUS ONCE
Status: COMPLETED | OUTPATIENT
Start: 2021-06-25 | End: 2021-06-25

## 2021-06-25 RX ADMIN — GENTAMICIN 80 MG: 40 INJECTION, SOLUTION INTRAMUSCULAR; INTRAVENOUS at 09:11

## 2021-06-25 RX ADMIN — LIDOCAINE HYDROCHLORIDE 10 ML: 10 INJECTION, SOLUTION INFILTRATION; PERINEURAL at 10:10

## 2021-06-25 ASSESSMENT — MIFFLIN-ST. JEOR: SCORE: 1508.81

## 2021-06-25 NOTE — NURSING NOTE
Chief Complaint   Patient presents with     Prostate Cancer     Has Nodule-Patient is here for Transrectal Ultrasound Guided Prostate Biopsies    Prior to the start of the procedure and with procedural staff participation, I verbally confirmed the patient s identity using two indicators, relevant allergies, that the procedure was appropriate and matched the consent or emergent situation, and that the correct equipment/implants were available. Immediately prior to starting the procedure I conducted the Time Out with the procedural staff and re-confirmed the patient s name, procedure, and site/side. (The [facility] universal protocol was followed.)  Yes    Sedation (Moderate or Deep): None  Consent read and signed: Yes     Allergies   Allergen Reactions     Morphine Sulfate Nausea and Vomiting and Cramps     Pre-operative antibiotics taken: Yes  Aspirin or other blood thinning medications not taken in 7-10 days:  Yes  Time of Fleet's enema: 7:30am  Nithya Lam LPN

## 2021-06-25 NOTE — LETTER
6/25/2021       RE: Iraida Hernandez  [address] [facility] [address]     Dear Colleague,    Thank you for referring your patient, Iraida Hernandez, to the [facility] [place] at [facility]. Please see a copy of my visit note below.    PREPROCEDURE DIAGNOSIS: Prostate Cancer    POSTPROCEDURE DIAGNOSIS: Prostate Cancer    PROCEDURE: Transrectal ultrasound sizing and transrectal ultrasound guided prostatic needle biopsy for Saravanan Hernandez who is a 66 year old male.  History of [place] 3+3=6 prostate cancer. PSA 12.90.    SURGEON: Sukumar Oglesby    ANESTHESIA: 5 mL of 1% periprostatic block bilaterally     DESCRIPTION OF PROCEDURE: The procedure, the outcome, the anesthesia, and the risks were discussed with the patient. Informed consent was obtained and signed and a timeout was completed prior to the procedure. Digital rectal examination was performed with the below findings noted. Anesthesia was administered as noted above and the transrectal ultrasound probe was inserted, sizing was performed, and the below findings were noted. 12 core biopsies were taken as described below. The probe was then withdrawn. Patient tolerated the procedure well.     FINDINGS: Digital rectal exam reveals normal prostate. Total volume is 32 mL. Hypoechoic lesions noted bilaterally. US images were obtained.  We then performed site-directed sextant biopsies.  12 cores were taken with 6 on each side, base, mid and apex.    PLAN: Will follow-up next week to review results.    Sukumar Oglesby MD  Urology  [facility]

## 2021-06-25 NOTE — PROGRESS NOTES
PREPROCEDURE DIAGNOSIS: Prostate Cancer    POSTPROCEDURE DIAGNOSIS: Prostate Cancer    PROCEDURE: Transrectal ultrasound sizing and transrectal ultrasound guided prostatic needle biopsy for Saravanan Hernandez who is a 66 year old male.  History of [place] 3+3=6 prostate cancer. PSA 12.90.    SURGEON: Sukumar Oglesby    ANESTHESIA: 5 mL of 1% periprostatic block bilaterally     DESCRIPTION OF PROCEDURE: The procedure, the outcome, the anesthesia, and the risks were discussed with the patient. Informed consent was obtained and signed and a timeout was completed prior to the procedure. Digital rectal examination was performed with the below findings noted. Anesthesia was administered as noted above and the transrectal ultrasound probe was inserted, sizing was performed, and the below findings were noted. 12 core biopsies were taken as described below. The probe was then withdrawn. Patient tolerated the procedure well.     FINDINGS: Digital rectal exam reveals normal prostate. Total volume is 32 mL. Hypoechoic lesions noted bilaterally. US images were obtained.  We then performed site-directed sextant biopsies.  12 cores were taken with 6 on each side, base, mid and apex.    PLAN: Will follow-up next week to review results.    Sukumar Oglesby MD  Urology  [facility]

## 2021-06-25 NOTE — NURSING NOTE
The following medication was given:     MEDICATION:  Lidocaine 1% Soln  ROUTE: Local Infiltration   SITE: Prostate   DOSE: 100mg/10ml  LOT #: [id number]  : Hospira  EXPIRATION DATE: 01/01/2022  NDC#: 5812-4397-54  Was there drug waste? Yes  Amount of drug waste (mL): 10.  Reason for waste:  As per MD  Multi-dose vial: Yes    Nithya Lam LPN  June 25, 2021

## 2021-06-25 NOTE — PATIENT INSTRUCTIONS
[facility]  Transrectal Ultrasound  Post Operative Information    The physician who performed your Transrectal Ultrasound is Dr. Oglesby (telephone number [phone]).  Please contact this doctor if you have any problems or questions.  If unable to reach your doctor, please return to the Emergency Department.      Take one antibiotic the evening of the procedure and then as directed on your prescription.    Drink at least 6-8 glasses of fluids for the first 48 hours.    Avoid heavy lifting and strenuous activity for 48 hours.    Avoid sexual intercourse for the first 24 hours.    No aspirin or ibuprofen products (Motrin, Advil, Nuprin, ect.) for one week.  You may take acetaminophen (Tylenol) for pain.    You may notice a small amount of blood on the tissue after a bowel movement.    You may pass blood with clots in your urine following the procedure.  The amount will decrease with time but may be visible for up to two weeks.     You make have blood in your semen for 4 weeks after the procedure.    You may experience mild perineal (groin area) discomfort after the procedure.    Please call you doctor if you have any of the follow symptoms:  Fever  Increase in the amount of blood passed  Severe discomfort or pain

## 2021-06-25 NOTE — NURSING NOTE
The following medication was given:     MEDICATION: Gentamicin   ROUTE: IM  SITE: LUQ - Gluteus  DOSE: 80mg/2ml   LOT #: [id number]  : [employer]  EXPIRATION DATE: 11/21  NDC#: 24563-660-38  Was there drug waste? No  Multi-dose vial: Yes    Nithya Lam LPN  June 25, 2021

## 2021-07-01 LAB — COPATH REPORT: NORMAL

## 2021-07-16 ENCOUNTER — VIRTUAL VISIT (OUTPATIENT)
Dept: UROLOGY | Facility: CLINIC | Age: 66
End: 2021-07-16
Payer: MEDICARE

## 2021-07-16 VITALS — WEIGHT: 155 LBS | BODY MASS INDEX: 21.7 KG/M2 | HEIGHT: 71 IN

## 2021-07-16 DIAGNOSIS — C61 PROSTATE CANCER (H): Primary | ICD-10-CM

## 2021-07-16 PROBLEM — I47.20 VENTRICULAR TACHYCARDIA (H): Status: ACTIVE | Noted: 2021-07-16

## 2021-07-16 PROCEDURE — 99214 OFFICE O/P EST MOD 30 MIN: CPT | Mod: 95 | Performed by: UROLOGY

## 2021-07-16 ASSESSMENT — PAIN SCALES - GENERAL: PAINLEVEL: NO PAIN (0)

## 2021-07-16 ASSESSMENT — MIFFLIN-ST. JEOR: SCORE: 1495.21

## 2021-07-16 NOTE — NURSING NOTE
Chief Complaint   Patient presents with     CTRU     Patient is ready for a video visit    Heena Holt

## 2021-07-16 NOTE — PROGRESS NOTES
Iraida Hernandez is a 66 year old male who is being evaluated via a billable video visit.      How would you like to obtain your AVS? MyChart  If the video visit is dropped, the invitation should be resent by: Text to cell phone: [phone]  Will anyone else be joining your video visit? Wife will be listening     Video Start Time: 11:27 AM    CHIEF COMPLAINT   It was my pleasure to see Iraida Hernandez who is a 66 year old male for follow-up of Prostate Cancer.      HPI  Iraida Hernandez is a very pleasant 66 year old male who presents with a history of Prostate Cancer.    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  He had clinical stage T1c disease at that time with a PSA of 9.7.  His Oncotype DX test suggested a very low probability of grade 4 disease or progression.  His PSA is recently 12.9  Small hard nodule at left apex now per ANDREW by Dr. Weiss.  Oncotype GPS is 5 - very low risk    No issues since recent biopsy.     PSA  2/2/2021 - 12.90  3/31/2020 - 10.70  2/5/2020 - 14.60  2/15/2019 - 9.79  12/2/2013 - 6.19     TRUS 6/25/2021  FINAL DIAGNOSIS:   A.  Prostate, left base x2, biopsy   - Focal high-grade prostatic intraepithelial neoplasia. Negative for   invasive malignancy.     B.  Prostate, left mid x2, biopsy   - Benign prostatic tissue. Negative for malignancy.     C.  Prostate, left [place] x2, biopsy   - Focal high-grade prostatic intraepithelial neoplasia. Negative for   invasive malignancy.     D.  Prostate, right base x2, biopsy   - Benign prostatic tissue. Negative for malignancy     E.  Prostate, right mid x2, biopsy   - Atypical glands suspicious for malignancy (Please see comment)     F.  Prostate, right [place] x2, biopsy   - Prostatic adenocarcinoma, acinar type, Krista's grade 3+ 3 = score of   6, grade group is 1, number of cores involved is 2 of 2, total surface area involved is 10-15%, perineural   invasion is not identified, high-grade prostatic  intraepithelial neoplasia is present     TRUS 3/2019  F: Prostate, left apex, needle core biopsy: Adenocarcinoma, Marion score   3 + 3 = 6 (of 10) (Grade Group 1),   involving 1 of 1 needle core(s) and 1 mm of 10 mm of biopsy tissue   (approximately 10% of biopsy tissue).   Perineural invasion not identified.     K: Prostate, right mid, needle core biopsy: Adenocarcinoma, Krista score   3 + 3 = 6 (of 10) (Grade Group 1),   involving 1 of 1 needle core(s) and 1 mm of 10 mm of biopsy tissue   (approximately 10% of biopsy tissue).   Perineural invasion not identified.      MRI Prostate 3/9/2021  IMPRESSION:  1. Based on the most suspicious abnormality, this exam is  characterized as PIRADS 2 - Clinically significant cancer is unlikely  to be present.    2. No suspicious adenopathy or evidence of pelvic metastases.  3. Bilateral hip arthritis with effusion.         Allergies:   Penicillins and Tetracyclines         Review of Systems:  From intake questionnaire     Skin: negative  Eyes: negative  Ears/Nose/Throat: negative  Respiratory: No shortness of breath, dyspnea on exertion, cough, or hemoptysis  Cardiovascular: No chest pain or palpitations  Gastrointestinal: negative; no nausea/vomiting, constipation or diarrhea  Genitourinary: as per HPI  Musculoskeletal: negative  Neurologic: negative  Psychiatric: negative  Hematologic/Lymphatic/Immunologic: negative  Endocrine: negative         Physical Exam:     Vitals:  No vitals were obtained today due to virtual visit.    Physical Exam   GENERAL: Healthy, alert and no distress  EYES: Eyes grossly normal to inspection.  No discharge or erythema, or obvious scleral/conjunctival abnormalities.  RESP: No audible wheeze, cough, or visible cyanosis.  No visible retractions or increased work of breathing.    SKIN: Visible skin clear. No significant rash, abnormal pigmentation or lesions.  NEURO: Cranial nerves grossly intact.  Mentation and speech appropriate for age.  PSYCH:  Mentation appears normal, affect normal/bright, judgement and insight intact, normal speech and appearance well-groomed.      Outside and Past Medical records:    Review of the result(s) of each unique test - PSA, pathology, MRI         Assessment and Plan:     66 year old male with Krista 3+3 disease diagnosed 2019. GPS score 5. On active surveillance. Recent PSA 12.9 and has nodule on prostate per ANDREW with Dr. Weiss. MRI demonstrates, again, that he has PIRADS 2 lesion on MRI. Confirmatory re-biopsy with Coward 3+3=6 disease. We had a long discussion about the nature of low risk prostate cancer and the natural history of this disease. We discussed the potential for future disease progression and possible consideration of surgery or radiation in the future, but that surveillance remains reasonable at this time. Plans to continue active surveillance.     Plan:  Follow-up 6 months with PSA    Orders  Orders Placed This Encounter   Procedures     PSA tumor marker [[id number]]     Video-Visit Details    Type of service:  Video Visit    Video End Time:11:41 AM    Originating Location (pt. Location): Home    Distant Location (provider location):  [facility] [place]     Platform used for Video Visit: [employer]    25 minutes spent on the date of the encounter including direct interaction with the patient, performing chart review, history and exam, documentation and further activities as noted above.    Sukumar Oglesby MD  Urology  [facility]

## 2021-08-10 DIAGNOSIS — I42.9 CARDIOMYOPATHY, UNSPECIFIED TYPE (H): ICD-10-CM

## 2021-08-10 DIAGNOSIS — I25.10 CORONARY ARTERY DISEASE INVOLVING NATIVE CORONARY ARTERY OF NATIVE HEART WITHOUT ANGINA PECTORIS: ICD-10-CM

## 2021-08-10 DIAGNOSIS — I21.9 ACUTE MYOCARDIAL INFARCTION (H): ICD-10-CM

## 2021-08-10 RX ORDER — NITROGLYCERIN 0.4 MG/1
0.4 TABLET SUBLINGUAL EVERY 5 MIN PRN
Qty: 25 TABLET | Refills: 3 | Status: SHIPPED | OUTPATIENT
Start: 2021-08-10 | End: 2024-04-25

## 2021-08-10 RX ORDER — CLOPIDOGREL BISULFATE 75 MG/1
75 TABLET ORAL DAILY
Qty: 90 TABLET | Refills: 1 | Status: SHIPPED | OUTPATIENT
Start: 2021-08-10 | End: 2022-03-15

## 2021-08-12 ENCOUNTER — ANCILLARY PROCEDURE (OUTPATIENT)
Dept: CARDIOLOGY | Facility: CLINIC | Age: 66
End: 2021-08-12
Attending: INTERNAL MEDICINE
Payer: MEDICARE

## 2021-08-12 DIAGNOSIS — I25.5 ISCHEMIC CARDIOMYOPATHY: ICD-10-CM

## 2021-08-12 DIAGNOSIS — Z95.810 ICD (IMPLANTABLE CARDIOVERTER-DEFIBRILLATOR) IN PLACE: ICD-10-CM

## 2021-08-12 PROCEDURE — 93295 DEV INTERROG REMOTE 1/2/MLT: CPT | Performed by: INTERNAL MEDICINE

## 2021-08-12 PROCEDURE — 93296 REM INTERROG EVL PM/IDS: CPT | Performed by: INTERNAL MEDICINE

## 2021-08-16 NOTE — PROGRESS NOTES
[employer] (S) ICD Remote Device Check   : 0 %    Mode: VVI 40    Presenting Rhythm: Regular VS    Heart Rate: Stable with excellent variability     Sensing: WNL     Pacing Threshold: Not obtained     Impedance: Stable     Battery Status: 8 years     Ventricular Arrhythmia:     ATP: 0    Shocks: 0    Care Plan: Remote device check in 3 months. Patient due to follow up with Dr. Stevens in November 2021. Called patient with results and plan.     I have reviewed and interpreted the device interrogation, settings, programming and nurse's summary. The device is functioning within normal device parameters. I agree with the current findings, assessment and plan.

## 2021-08-26 LAB
MDC_IDC_EPISODE_DTM: NORMAL
MDC_IDC_EPISODE_DURATION: 8 S
MDC_IDC_EPISODE_DURATION: 8 S
MDC_IDC_EPISODE_ID: NORMAL
MDC_IDC_EPISODE_TYPE: NORMAL
MDC_IDC_LEAD_IMPLANT_DT: NORMAL
MDC_IDC_LEAD_LOCATION: NORMAL
MDC_IDC_LEAD_LOCATION_DETAIL_1: NORMAL
MDC_IDC_LEAD_MFG: NORMAL
MDC_IDC_LEAD_MODEL: NORMAL
MDC_IDC_LEAD_POLARITY_TYPE: NORMAL
MDC_IDC_LEAD_SERIAL: NORMAL
MDC_IDC_MSMT_BATTERY_DTM: NORMAL
MDC_IDC_MSMT_BATTERY_REMAINING_LONGEVITY: 96 MO
MDC_IDC_MSMT_BATTERY_REMAINING_PERCENTAGE: 98 %
MDC_IDC_MSMT_BATTERY_STATUS: NORMAL
MDC_IDC_MSMT_CAP_CHARGE_DTM: NORMAL
MDC_IDC_MSMT_CAP_CHARGE_TIME: 10.1 S
MDC_IDC_MSMT_CAP_CHARGE_TYPE: NORMAL
MDC_IDC_MSMT_LEADCHNL_RV_IMPEDANCE_VALUE: 675 OHM
MDC_IDC_MSMT_LEADCHNL_RV_PACING_THRESHOLD_AMPLITUDE: 0.5 V
MDC_IDC_MSMT_LEADCHNL_RV_PACING_THRESHOLD_PULSEWIDTH: 0.5 MS
MDC_IDC_PG_IMPLANT_DTM: NORMAL
MDC_IDC_PG_MFG: NORMAL
MDC_IDC_PG_MODEL: NORMAL
MDC_IDC_PG_SERIAL: NORMAL
MDC_IDC_PG_TYPE: NORMAL
MDC_IDC_SESS_CLINIC_NAME: NORMAL
MDC_IDC_SESS_DTM: NORMAL
MDC_IDC_SESS_TYPE: NORMAL
MDC_IDC_SET_BRADY_LOWRATE: 40 {BEATS}/MIN
MDC_IDC_SET_BRADY_MODE: NORMAL
MDC_IDC_SET_LEADCHNL_RV_PACING_AMPLITUDE: 2 V
MDC_IDC_SET_LEADCHNL_RV_PACING_POLARITY: NORMAL
MDC_IDC_SET_LEADCHNL_RV_PACING_PULSEWIDTH: 0.5 MS
MDC_IDC_SET_LEADCHNL_RV_SENSING_ADAPTATION_MODE: NORMAL
MDC_IDC_SET_LEADCHNL_RV_SENSING_POLARITY: NORMAL
MDC_IDC_SET_LEADCHNL_RV_SENSING_SENSITIVITY: 0.6 MV
MDC_IDC_SET_ZONE_DETECTION_INTERVAL: 250 MS
MDC_IDC_SET_ZONE_DETECTION_INTERVAL: 300 MS
MDC_IDC_SET_ZONE_DETECTION_INTERVAL: 353 MS
MDC_IDC_SET_ZONE_TYPE: NORMAL
MDC_IDC_SET_ZONE_VENDOR_TYPE: NORMAL
MDC_IDC_STAT_BRADY_DTM_END: NORMAL
MDC_IDC_STAT_BRADY_DTM_START: NORMAL
MDC_IDC_STAT_BRADY_RV_PERCENT_PACED: 0 %
MDC_IDC_STAT_EPISODE_RECENT_COUNT: 0
MDC_IDC_STAT_EPISODE_RECENT_COUNT: 8
MDC_IDC_STAT_EPISODE_RECENT_COUNT_DTM_END: NORMAL
MDC_IDC_STAT_EPISODE_RECENT_COUNT_DTM_START: NORMAL
MDC_IDC_STAT_EPISODE_TYPE: NORMAL
MDC_IDC_STAT_EPISODE_VENDOR_TYPE: NORMAL
MDC_IDC_STAT_TACHYTHERAPY_ATP_DELIVERED_RECENT: 0
MDC_IDC_STAT_TACHYTHERAPY_ATP_DELIVERED_TOTAL: 0
MDC_IDC_STAT_TACHYTHERAPY_RECENT_DTM_END: NORMAL
MDC_IDC_STAT_TACHYTHERAPY_RECENT_DTM_START: NORMAL
MDC_IDC_STAT_TACHYTHERAPY_SHOCKS_ABORTED_RECENT: 0
MDC_IDC_STAT_TACHYTHERAPY_SHOCKS_ABORTED_TOTAL: 0
MDC_IDC_STAT_TACHYTHERAPY_SHOCKS_DELIVERED_RECENT: 0
MDC_IDC_STAT_TACHYTHERAPY_SHOCKS_DELIVERED_TOTAL: 0
MDC_IDC_STAT_TACHYTHERAPY_TOTAL_DTM_END: NORMAL
MDC_IDC_STAT_TACHYTHERAPY_TOTAL_DTM_START: NORMAL

## 2021-09-27 ENCOUNTER — TELEPHONE (OUTPATIENT)
Dept: CARDIOLOGY | Facility: CLINIC | Age: 66
End: 2021-09-27

## 2021-09-27 NOTE — TELEPHONE ENCOUNTER
Call from Dr. Green who is a peridontist.  She needs to do a extraction.  Wondering if OK to hold Plavix.  Reviewed with Dr. Stevens.  OK to hold Plavix x 5 days and then restart after procedure.

## 2021-10-05 ENCOUNTER — TELEPHONE (OUTPATIENT)
Dept: CARDIOLOGY | Facility: CLINIC | Age: 66
End: 2021-10-05

## 2021-10-11 ASSESSMENT — ENCOUNTER SYMPTOMS
NAUSEA: 0
ABDOMINAL PAIN: 0
HEARTBURN: 0
NERVOUS/ANXIOUS: 0
SORE THROAT: 0
CHILLS: 0
ARTHRALGIAS: 1
WEAKNESS: 0
CONSTIPATION: 0
FEVER: 0
SHORTNESS OF BREATH: 0
DIARRHEA: 0
HEMATOCHEZIA: 0
JOINT SWELLING: 0
PALPITATIONS: 0
FREQUENCY: 0
COUGH: 0
MYALGIAS: 0
EYE PAIN: 0
DYSURIA: 0
HEADACHES: 0
PARESTHESIAS: 0
HEMATURIA: 0
DIZZINESS: 0

## 2021-10-11 ASSESSMENT — ACTIVITIES OF DAILY LIVING (ADL): CURRENT_FUNCTION: NO ASSISTANCE NEEDED

## 2021-10-18 ENCOUNTER — OFFICE VISIT (OUTPATIENT)
Dept: FAMILY MEDICINE | Facility: CLINIC | Age: 66
End: 2021-10-18
Payer: MEDICARE

## 2021-10-18 VITALS
SYSTOLIC BLOOD PRESSURE: 118 MMHG | HEIGHT: 71 IN | BODY MASS INDEX: 21.43 KG/M2 | WEIGHT: 153.1 LBS | DIASTOLIC BLOOD PRESSURE: 78 MMHG | HEART RATE: 72 BPM

## 2021-10-18 DIAGNOSIS — I25.5 ISCHEMIC CARDIOMYOPATHY: ICD-10-CM

## 2021-10-18 DIAGNOSIS — M25.551 HIP PAIN, RIGHT: ICD-10-CM

## 2021-10-18 DIAGNOSIS — Z00.00 ENCOUNTER FOR MEDICARE ANNUAL WELLNESS EXAM: Primary | ICD-10-CM

## 2021-10-18 DIAGNOSIS — I70.90 ASVD (ARTERIOSCLEROTIC VASCULAR DISEASE): ICD-10-CM

## 2021-10-18 DIAGNOSIS — I46.9 CARDIAC ARREST (H): ICD-10-CM

## 2021-10-18 DIAGNOSIS — E11.9 TYPE 2 DIABETES MELLITUS TREATED WITHOUT INSULIN (H): ICD-10-CM

## 2021-10-18 DIAGNOSIS — C61 PROSTATE CANCER (H): ICD-10-CM

## 2021-10-18 DIAGNOSIS — I47.20 VENTRICULAR TACHYCARDIA (H): ICD-10-CM

## 2021-10-18 PROCEDURE — G0439 PPPS, SUBSEQ VISIT: HCPCS | Performed by: FAMILY MEDICINE

## 2021-10-18 PROCEDURE — 99213 OFFICE O/P EST LOW 20 MIN: CPT | Mod: 25 | Performed by: FAMILY MEDICINE

## 2021-10-18 ASSESSMENT — ENCOUNTER SYMPTOMS
WEAKNESS: 0
SHORTNESS OF BREATH: 0
EYE PAIN: 0
DYSURIA: 0
MYALGIAS: 0
JOINT SWELLING: 0
HEADACHES: 0
HEMATURIA: 0
COUGH: 0
FEVER: 0
PALPITATIONS: 0
ARTHRALGIAS: 1
HEARTBURN: 0
HEMATOCHEZIA: 0
NERVOUS/ANXIOUS: 0
NAUSEA: 0
FREQUENCY: 0
CHILLS: 0
DIARRHEA: 0
PARESTHESIAS: 0
ABDOMINAL PAIN: 0
DIZZINESS: 0
SORE THROAT: 0
CONSTIPATION: 0

## 2021-10-18 ASSESSMENT — ACTIVITIES OF DAILY LIVING (ADL): CURRENT_FUNCTION: NO ASSISTANCE NEEDED

## 2021-10-18 ASSESSMENT — MIFFLIN-ST. JEOR: SCORE: 1486.59

## 2021-10-18 NOTE — PROGRESS NOTES
"SUBJECTIVE:   Iraida Hernandez is a 66 year old male who presents for Preventive Visit.      Patient has been advised of split billing requirements and indicates understanding: Yes   Are you in the first 12 months of your Medicare coverage?  No  He sees Dr. Raines for his prostate low grade tumor   Healthy Habits:     In general, how would you rate your overall health?  Good    Frequency of exercise:  2-3 days/week    Duration of exercise:  15-30 minutes    Do you usually eat at least 4 servings of fruit and vegetables a day, include whole grains    & fiber and avoid regularly eating high fat or \"junk\" foods?  Yes    Taking medications regularly:  Yes    Medication side effects:  None    Ability to successfully perform activities of daily living:  No assistance needed    Home Safety:  No safety concerns identified    Hearing Impairment:  No hearing concerns    In the past 6 months, have you been bothered by leaking of urine?  No    In general, how would you rate your overall mental or emotional health?  Excellent      PHQ-2 Total Score: 0    Additional concerns today:  Yes    Pt would like to discuss mole on back that recently appeared. Would like referral for leg pain he has been experiencing for a couple months.  Seb Keratosis to monitor   Do you feel safe in your environment? Yes    Have you ever done Advance Care Planning? (For example, a Health Directive, POLST, or a discussion with a medical provider or your loved ones about your wishes): Yes, patient states has an Advance Care Planning document and will bring a copy to the clinic.       Fall risk  Fallen 2 or more times in the past year?: No  Any fall with injury in the past year?: No  Cognitive Screening   1) Repeat 3 items (Leader, Season, Table)    2) Clock draw: NORMAL  3) 3 item recall: Recalls 1 object   Results: NORMAL clock, 1-2 items recalled: COGNITIVE IMPAIRMENT LESS LIKELY    Mini-CogTM Copyright [employer] [place]. Licensed by the author for use in [facility]; reprinted with permission (soob@[facility]). All rights reserved.                    Social History     Tobacco Use     Smoking status: Current Every Day Smoker     Packs/day: 0.25     Years: 42.00     Pack years: 10.50     Smokeless tobacco: Never Used     Tobacco comment: 1/3 pack a day    Substance Use Topics     Alcohol use: Yes     Comment: rare     If you drink alcohol do you typically have >3 drinks per day or >7 drinks per week? No    Alcohol Use 10/11/2021   Prescreen: >3 drinks/day or >7 drinks/week? No   Prescreen: >3 drinks/day or >7 drinks/week? -             Current providers sharing in care for this patient include:   Patient Care Team:  Lawson Mahoney MD as PCP - General (Family Practice)  Juancarlos Stevens MD as MD (Cardiology)  Jose Roberto Echevarria MD as MD (Urology)  Juancarlos Stevens MD as Assigned Heart and Vascular Provider  Lawson Mahoney MD as Assigned PCP  Romain Weiss MD as Assigned Surgical Provider  Sukumar Oglesby MD as Assigned Cancer Care Provider    The following health maintenance items are reviewed in Epic and correct as of today:  Health Maintenance Due   Topic Date Due     ANNUAL REVIEW OF HM ORDERS  Never done     ASTHMA ACTION PLAN  12/02/2014     DTAP/TDAP/TD IMMUNIZATION (2 - Td or Tdap) 04/06/2015     FALL RISK ASSESSMENT  Never done     Pneumococcal Vaccine: 65+ Years (2 of 2 - PPSV23) 02/17/2019     ASTHMA CONTROL TEST  08/15/2019     COLORECTAL CANCER SCREENING  02/06/2021     INFLUENZA VACCINE (1) 09/01/2021     BP Readings from Last 3 Encounters:   10/18/21 118/78   06/25/21 110/64   03/19/21 115/68    Wt Readings from Last 3 Encounters:   10/18/21 69.4 kg (153 lb 1.6 oz)   07/16/21 70.3 kg (155 lb)   06/25/21 71.7 kg (158 lb)                          Review of Systems   Constitutional: Negative for chills and fever.   HENT: Negative for congestion, ear pain, hearing loss and sore throat.    Eyes: Negative  "for pain and visual disturbance.   Respiratory: Negative for cough and shortness of breath.    Cardiovascular: Negative for chest pain, palpitations and peripheral edema.   Gastrointestinal: Negative for abdominal pain, constipation, diarrhea, heartburn, hematochezia and nausea.   Genitourinary: Negative for discharge, dysuria, frequency, genital sores, hematuria and impotence.   Musculoskeletal: Positive for arthralgias. Negative for joint swelling and myalgias.   Neurological: Negative for dizziness, weakness, headaches and paresthesias.   Psychiatric/Behavioral: Negative for mood changes. The patient is not nervous/anxious.          OBJECTIVE:   /78 (BP Location: Right arm, Patient Position: Sitting, Cuff Size: Adult Regular)   Pulse 72   Ht 1.803 m (5' 11\")   Wt 69.4 kg (153 lb 1.6 oz)   BMI 21.35 kg/m   Estimated body mass index is 21.35 kg/m  as calculated from the following:    Height as of this encounter: 1.803 m (5' 11\").    Weight as of this encounter: 69.4 kg (153 lb 1.6 oz).  Physical Exam  GENERAL: healthy, alert and no distress  EYES: Eyes grossly normal to inspection, PERRL and conjunctivae and sclerae normal  HENT: ear canals and TM's normal, nose and mouth without ulcers or lesions  NECK: no adenopathy, no asymmetry, masses, or scars and thyroid normal to palpation  RESP: lungs clear to auscultation - no rales, rhonchi or wheezes  CV: regular rate and rhythm, normal S1 S2, no S3 or S4, no murmur, click or rub, no peripheral edema and peripheral pulses strong  ABDOMEN: soft, nontender, no hepatosplenomegaly, no masses and bowel sounds normal  MS: no gross musculoskeletal defects noted, no edema  SKIN: no suspicious lesions or rashes  NEURO: Normal strength and tone, mentation intact and speech normal  PSYCH: mentation appears normal, affect normal/bright    Diagnostic Test Results:  Labs reviewed in Epic    ASSESSMENT / PLAN:   (Z00.00) Encounter for Medicare annual wellness exam  (primary " "encounter diagnosis)  Comment:   Plan:   Gave him a disability card and a referral back to Ortho for his right hip   (Z00.00) Encounter for Medicare annual wellness exam  (primary encounter diagnosis)  Comment:   Plan:     (I25.5) Ischemic cardiomyopathy  Comment:   Plan: Lipid panel reflex to direct LDL Fasting,         Comprehensive metabolic panel (BMP + Alb, Alk         Phos, ALT, AST, Total. Bili, TP)        No chest pain     (C61) Prostate cancer (H)  Comment:   Plan: watchful waiting     (M25.551) Hip pain, right  Comment:   Plan: Orthopedic  Referral            (I46.9) Cardiac arrest (H)  Comment:   Plan: no palpitations     (I47.2) Ventricular tachycardia (H)  Comment:   Plan:     (I70.90) ASVD (arteriosclerotic vascular disease)  Comment:   Plan: encouraged CVS follow up       Patient has been advised of split billing requirements and indicates understanding: Yes  COUNSELING:  Reviewed preventive health counseling, as reflected in patient instructions       Regular exercise       Healthy diet/nutrition       Vision screening    Estimated body mass index is 21.35 kg/m  as calculated from the following:    Height as of this encounter: 1.803 m (5' 11\").    Weight as of this encounter: 69.4 kg (153 lb 1.6 oz).    Weight management plan: Discussed healthy diet and exercise guidelines    He reports that he has been smoking. He has a 10.50 pack-year smoking history. He has never used smokeless tobacco.  Tobacco Cessation Action Plan:   Information offered: Patient not interested at this time      Appropriate preventive services were discussed with this patient, including applicable screening as appropriate for cardiovascular disease, diabetes, osteopenia/osteoporosis, and glaucoma.  As appropriate for age/gender, discussed screening for colorectal cancer, prostate cancer, breast cancer, and cervical cancer. Checklist reviewing preventive services available has been given to the patient.    Reviewed " patients plan of care and provided an AVS. The Basic Care Plan (routine screening as documented in Health Maintenance) for Dietmar meets the Care Plan requirement. This Care Plan has been established and reviewed with the Patient.    Counseling Resources:  ATP IV Guidelines  Pooled Cohorts Equation Calculator  Breast Cancer Risk Calculator  Breast Cancer: Medication to Reduce Risk  FRAX Risk Assessment  ICSI Preventive Guidelines  Dietary Guidelines for Americans, 2010  [employer]'s MyPlate  ASA Prophylaxis  Lung CA Screening    Lawson Mahoney MD  [facility]    Identified Health Risks:

## 2021-10-18 NOTE — PATIENT INSTRUCTIONS
Patient Education   Personalized Prevention Plan  You are due for the preventive services outlined below.  Your care team is available to assist you in scheduling these services.  If you have already completed any of these items, please share that information with your care team to update in your medical record.  Health Maintenance Due   Topic Date Due     ANNUAL REVIEW OF HM ORDERS  Never done     Asthma Action Plan - yearly  12/02/2014     Diptheria Tetanus Pertussis (DTAP/TDAP/TD) Vaccine (2 - Td or Tdap) 04/06/2015     FALL RISK ASSESSMENT  Never done     Pneumococcal Vaccine (2 of 2 - PPSV23) 02/17/2019     Asthma Control Test  08/15/2019     Annual Wellness Visit  02/15/2020     Colorectal Cancer Screening  02/06/2021     Flu Vaccine (1) 09/01/2021

## 2021-10-19 ASSESSMENT — ASTHMA QUESTIONNAIRES: ACT_TOTALSCORE: 25

## 2021-10-23 ENCOUNTER — HEALTH MAINTENANCE LETTER (OUTPATIENT)
Age: 66
End: 2021-10-23

## 2021-10-25 ENCOUNTER — LAB (OUTPATIENT)
Dept: LAB | Facility: CLINIC | Age: 66
End: 2021-10-25
Payer: MEDICARE

## 2021-10-25 DIAGNOSIS — I25.5 ISCHEMIC CARDIOMYOPATHY: ICD-10-CM

## 2021-10-25 DIAGNOSIS — Z12.11 SCREEN FOR COLON CANCER: Primary | ICD-10-CM

## 2021-10-25 DIAGNOSIS — E11.9 TYPE 2 DIABETES MELLITUS TREATED WITHOUT INSULIN (H): ICD-10-CM

## 2021-10-25 LAB
ALBUMIN SERPL-MCNC: 3.4 G/DL (ref 3.4–5)
ALP SERPL-CCNC: 84 U/L (ref 40–150)
ALT SERPL W P-5'-P-CCNC: 36 U/L (ref 0–70)
ANION GAP SERPL CALCULATED.3IONS-SCNC: 5 MMOL/L (ref 3–14)
AST SERPL W P-5'-P-CCNC: 19 U/L (ref 0–45)
BILIRUB SERPL-MCNC: 0.7 MG/DL (ref 0.2–1.3)
BUN SERPL-MCNC: 19 MG/DL (ref 7–30)
CALCIUM SERPL-MCNC: 8.7 MG/DL (ref 8.5–10.1)
CHLORIDE BLD-SCNC: 106 MMOL/L (ref 94–109)
CHOLEST SERPL-MCNC: 136 MG/DL
CO2 SERPL-SCNC: 25 MMOL/L (ref 20–32)
CREAT SERPL-MCNC: 0.97 MG/DL (ref 0.66–1.25)
FASTING STATUS PATIENT QL REPORTED: YES
GFR SERPL CREATININE-BSD FRML MDRD: 80 ML/MIN/1.73M2
GLUCOSE BLD-MCNC: 110 MG/DL (ref 70–99)
HBA1C MFR BLD: 5.5 % (ref 0–5.6)
HDLC SERPL-MCNC: 52 MG/DL
LDLC SERPL CALC-MCNC: 60 MG/DL
NONHDLC SERPL-MCNC: 84 MG/DL
POTASSIUM BLD-SCNC: 4.7 MMOL/L (ref 3.4–5.3)
PROT SERPL-MCNC: 7.3 G/DL (ref 6.8–8.8)
SODIUM SERPL-SCNC: 136 MMOL/L (ref 133–144)
TRIGL SERPL-MCNC: 118 MG/DL

## 2021-10-25 PROCEDURE — 80061 LIPID PANEL: CPT

## 2021-10-25 PROCEDURE — 36415 COLL VENOUS BLD VENIPUNCTURE: CPT

## 2021-10-25 PROCEDURE — 80053 COMPREHEN METABOLIC PANEL: CPT

## 2021-10-25 PROCEDURE — 83036 HEMOGLOBIN GLYCOSYLATED A1C: CPT

## 2021-10-25 PROCEDURE — 82043 UR ALBUMIN QUANTITATIVE: CPT

## 2021-10-27 LAB
CREAT UR-MCNC: 174 MG/DL
MICROALBUMIN UR-MCNC: 12 MG/L
MICROALBUMIN/CREAT UR: 6.9 MG/G CR (ref 0–17)

## 2021-10-27 NOTE — PROGRESS NOTES
ASSESSMENT & PLAN  Patient Instructions     1. Left hip pain    2. Hip pain, right    3. Primary osteoarthritis of right hip    4. Primary localized osteoarthritis of left hip      -Patient is following up for bilateral hip pain and dysfunction likely due to severe bilateral arthritis and possibly from multilevel lumbar degenerative disc disease and arthritis.  -Patient reports weakness and dysfunction of bilateral lower extremities that has dramatically affected his golf game over the past 2 years.  -Repeat x-ray of the hips and pelvis show severe arthritis of bilateral hips.  -I discussed the patient's case with Dr. Hughes, an orthopedic surgeon regarding the potential hip replacement surgery for long-term benefit.  He recommends that we try intra-articular cortisone injection to assess for temporary relief of symptoms.  If patient experience short-term temporary relief, will refer to Dr. Hughes for hip replacement surgery.  -If patient does not have any relief of symptoms after bilateral intra-articular cortisone injections, will refer to pain management for further work-up and treatment of lumbar spine  -Call direct clinic number [[phone]] at any time with questions or concerns.    Albert Yeo MD [facility]  [facility]          -----    SUBJECTIVE:  Iraida Hernandez is a 66 year old male who is seen in follow-up for bilateral hip pain .They were last seen 1/31/2020.  Patient states this is the same problem, thinks his pain may have gotten a little worse, notes he cannot walk long distances without rest for a few minutes. Notes pain with putting on socks, standing for extended periods of time, and walking up stairs.. He had to get a stool to sit down in the shower, and bought a new SUV because it is easier to get in and out of vs. his old sedan.  States this past season of golf was very hard, very painful at the end of 18 rounds.     Since their last visit  "reports worsening pain. They indicate that their current pain level is 1/10. Notes pain is bilateral posterolateral hips. They have tried corticosteroid injection (most recent date: 1/31/2020) that provided  4 week(s) of relief, physical therapy (a few visits), patient states physical therapy helped a little bit, but states the exercises made the pain worse.     The patient is seen by themselves.    Patient's past medical, surgical, social, and family histories were reviewed today and no changes are noted.    REVIEW OF SYSTEMS:  Constitutional: NEGATIVE for fever, chills, change in weight  Skin: NEGATIVE for worrisome rashes, moles or lesions  GI/: NEGATIVE for bowel or bladder changes  Neuro: NEGATIVE for weakness, dizziness or paresthesias    OBJECTIVE:  BP 98/66   Ht 1.803 m (5' 11\")   Wt 68.9 kg (152 lb)   BMI 21.20 kg/m     General: healthy, alert and in no distress  HEENT: no scleral icterus or conjunctival erythema  Skin: no suspicious lesions or rash. No jaundice.  CV: regular rhythm by palpation, no pedal edema  Resp: normal respiratory effort without conversational dyspnea   Psych: normal mood and affect  Gait: normal steady gait with appropriate coordination and balance  Neuro: normal light touch sensory exam of the extremities.    MSK:  THORACIC/LUMBAR SPINE  Inspection:    No gross deformity/asymmetry  Palpation:    landmarks are nontender.  Range of Motion:     Lumbar flexion limited by tightness    Lumbar extension limited by tightness  Strength:    Full strength throughout hips/quads/hamstrings  Special Tests:    Positive: none    Negative: straight leg raise (bilateral), slump test (bilateral), femoral stretch test (bilateral)     BILATERAL HIP  Inspection:    No obvious deformity or asymmetry, pelvis level  Palpation:   landmarks are nontender.  Active Range of Motion:     Flexion limited by tightness, IR limited by tightness, ER  limited by tightness  Strength:    Flexion 5-/5, " adduction 5-/5, abduction 5-/5  Special Tests:    Positive: none    Negative: Logroll, THEODORE, anterior impingement (FADIR), posterior impingement (EX/AB/ER)    Independent visualization of the below image:    Personal review of x-rays of bilateral hips and pelvis show severe bone-on-bone arthritis.  Patient had large osteophytes of bilateral femoral heads.  Acute fracture or dislocation.    Albert Yeo MD, [facility]

## 2021-10-28 ENCOUNTER — DOCUMENTATION ONLY (OUTPATIENT)
Dept: OTHER | Facility: CLINIC | Age: 66
End: 2021-10-28

## 2021-10-29 ENCOUNTER — LAB (OUTPATIENT)
Dept: LAB | Facility: CLINIC | Age: 66
End: 2021-10-29
Payer: MEDICARE

## 2021-10-29 DIAGNOSIS — Z12.11 SCREEN FOR COLON CANCER: ICD-10-CM

## 2021-10-29 LAB — HEMOCCULT STL QL IA: NEGATIVE

## 2021-10-29 PROCEDURE — 82274 ASSAY TEST FOR BLOOD FECAL: CPT

## 2021-11-03 ENCOUNTER — ANCILLARY PROCEDURE (OUTPATIENT)
Dept: GENERAL RADIOLOGY | Facility: CLINIC | Age: 66
End: 2021-11-03
Attending: FAMILY MEDICINE
Payer: MEDICARE

## 2021-11-03 ENCOUNTER — OFFICE VISIT (OUTPATIENT)
Dept: ORTHOPEDICS | Facility: CLINIC | Age: 66
End: 2021-11-03
Payer: MEDICARE

## 2021-11-03 VITALS
BODY MASS INDEX: 21.28 KG/M2 | DIASTOLIC BLOOD PRESSURE: 66 MMHG | HEIGHT: 71 IN | SYSTOLIC BLOOD PRESSURE: 98 MMHG | WEIGHT: 152 LBS

## 2021-11-03 DIAGNOSIS — M25.551 HIP PAIN, RIGHT: ICD-10-CM

## 2021-11-03 DIAGNOSIS — M25.552 LEFT HIP PAIN: Primary | ICD-10-CM

## 2021-11-03 DIAGNOSIS — M16.12 PRIMARY LOCALIZED OSTEOARTHRITIS OF LEFT HIP: ICD-10-CM

## 2021-11-03 DIAGNOSIS — M16.11 PRIMARY OSTEOARTHRITIS OF RIGHT HIP: ICD-10-CM

## 2021-11-03 PROCEDURE — 99214 OFFICE O/P EST MOD 30 MIN: CPT | Performed by: FAMILY MEDICINE

## 2021-11-03 PROCEDURE — 73522 X-RAY EXAM HIPS BI 3-4 VIEWS: CPT | Performed by: RADIOLOGY

## 2021-11-03 ASSESSMENT — MIFFLIN-ST. JEOR: SCORE: 1481.6

## 2021-11-03 NOTE — PATIENT INSTRUCTIONS
1. Left hip pain    2. Hip pain, right    3. Primary osteoarthritis of right hip    4. Primary localized osteoarthritis of left hip      -Patient is following up for bilateral hip pain and dysfunction likely due to severe bilateral arthritis and possibly from multilevel lumbar degenerative disc disease and arthritis.  -Patient reports weakness and dysfunction of bilateral lower extremities that has dramatically affected his golf game over the past 2 years.  -Repeat x-ray of the hips and pelvis show severe arthritis of bilateral hips.  -I discussed the patient's case with Dr. Hughes, an orthopedic surgeon regarding the potential hip replacement surgery for long-term benefit.  He recommends that we try intra-articular cortisone injection to assess for temporary relief of symptoms.  If patient experience short-term temporary relief, will refer to Dr. Hughes for hip replacement surgery.  -If patient does not have any relief of symptoms after bilateral intra-articular cortisone injections, will refer to pain management for further work-up and treatment of lumbar spine  -Call direct clinic number [[phone]] at any time with questions or concerns.    Albert Yeo MD [facility]  [facility] [place]

## 2021-11-03 NOTE — LETTER
11/3/2021         RE: Iraida Hernandez  [address] [facility] [address]        Dear Colleague,    Thank you for referring your patient, Iraida Hernandez, to the [facility] [place]. Please see a copy of my visit note below.    ASSESSMENT & PLAN  Patient Instructions     1. Left hip pain    2. Hip pain, right    3. Primary osteoarthritis of right hip    4. Primary localized osteoarthritis of left hip      -Patient is following up for bilateral hip pain and dysfunction likely due to severe bilateral arthritis and possibly from multilevel lumbar degenerative disc disease and arthritis.  -Patient reports weakness and dysfunction of bilateral lower extremities that has dramatically affected his golf game over the past 2 years.  -Repeat x-ray of the hips and pelvis show severe arthritis of bilateral hips.  -I discussed the patient's case with Dr. Hughes, an orthopedic surgeon regarding the potential hip replacement surgery for long-term benefit.  He recommends that we try intra-articular cortisone injection to assess for temporary relief of symptoms.  If patient experience short-term temporary relief, will refer to Dr. Hughes for hip replacement surgery.  -If patient does not have any relief of symptoms after bilateral intra-articular cortisone injections, will refer to pain management for further work-up and treatment of lumbar spine  -Call direct clinic number [[phone]] at any time with questions or concerns.    Albert Yeo MD [facility] and [facility] Medicine  [facility]          -----    SUBJECTIVE:  Iraida Hernandez is a 66 year old male who is seen in follow-up for bilateral hip pain .They were last seen 1/31/2020.  Patient states this is the same problem, thinks his pain may have gotten a little worse, notes he cannot walk long distances without rest for a few minutes. Notes pain with putting on socks, standing for extended periods of time, and  "walking up stairs.. He had to get a stool to sit down in the shower, and bought a new SUV because it is easier to get in and out of vs. his old sedan.  States this past season of golf was very hard, very painful at the end of 18 rounds.     Since their last visit reports worsening pain. They indicate that their current pain level is 1/10. Notes pain is bilateral posterolateral hips. They have tried corticosteroid injection (most recent date: 1/31/2020) that provided  4 week(s) of relief, physical therapy (a few visits), patient states physical therapy helped a little bit, but states the exercises made the pain worse.     The patient is seen by themselves.    Patient's past medical, surgical, social, and family histories were reviewed today and no changes are noted.    REVIEW OF SYSTEMS:  Constitutional: NEGATIVE for fever, chills, change in weight  Skin: NEGATIVE for worrisome rashes, moles or lesions  GI/: NEGATIVE for bowel or bladder changes  Neuro: NEGATIVE for weakness, dizziness or paresthesias    OBJECTIVE:  BP 98/66   Ht 1.803 m (5' 11\")   Wt 68.9 kg (152 lb)   BMI 21.20 kg/m     General: healthy, alert and in no distress  HEENT: no scleral icterus or conjunctival erythema  Skin: no suspicious lesions or rash. No jaundice.  CV: regular rhythm by palpation, no pedal edema  Resp: normal respiratory effort without conversational dyspnea   Psych: normal mood and affect  Gait: normal steady gait with appropriate coordination and balance  Neuro: normal light touch sensory exam of the extremities.    MSK:  THORACIC/LUMBAR SPINE  Inspection:    No gross deformity/asymmetry  Palpation:    landmarks are nontender.  Range of Motion:     Lumbar flexion limited by tightness    Lumbar extension limited by tightness  Strength:    Full strength throughout hips/quads/hamstrings  Special Tests:    Positive: none    Negative: straight leg raise (bilateral), slump test (bilateral), femoral stretch test " (bilateral)     BILATERAL HIP  Inspection:    No obvious deformity or asymmetry, pelvis level  Palpation:   landmarks are nontender.  Active Range of Motion:     Flexion limited by tightness, IR limited by tightness, ER  limited by tightness  Strength:    Flexion 5-/5, adduction 5-/5, abduction 5-/5  Special Tests:    Positive: none    Negative: Logroll, THEODORE, anterior impingement (FADIR), posterior impingement (EX/AB/ER)    Independent visualization of the below image:    Personal review of x-rays of bilateral hips and pelvis show severe bone-on-bone arthritis.  Patient had large osteophytes of bilateral femoral heads.  Acute fracture or dislocation.    Albert Yeo MD, [facility]              Again, thank you for allowing me to participate in the care of your patient.        Sincerely,        Albert Yeo, MD

## 2021-11-12 ENCOUNTER — HOSPITAL ENCOUNTER (OUTPATIENT)
Dept: CARDIOLOGY | Facility: CLINIC | Age: 66
Discharge: HOME | End: 2021-11-12
Attending: INTERNAL MEDICINE | Admitting: INTERNAL MEDICINE
Payer: MEDICARE

## 2021-11-12 DIAGNOSIS — I25.10 CORONARY ARTERY DISEASE INVOLVING NATIVE CORONARY ARTERY OF NATIVE HEART WITHOUT ANGINA PECTORIS: ICD-10-CM

## 2021-11-12 DIAGNOSIS — I25.5 ISCHEMIC CARDIOMYOPATHY: ICD-10-CM

## 2021-11-12 DIAGNOSIS — Z95.810 ICD (IMPLANTABLE CARDIOVERTER-DEFIBRILLATOR), SINGLE, IN SITU: ICD-10-CM

## 2021-11-12 DIAGNOSIS — I21.09 ACUTE ST ELEVATION MYOCARDIAL INFARCTION (STEMI) INVOLVING OTHER CORONARY ARTERY OF ANTERIOR WALL (H): ICD-10-CM

## 2021-11-12 LAB — LVEF ECHO: NORMAL

## 2021-11-12 PROCEDURE — 93306 TTE W/DOPPLER COMPLETE: CPT | Mod: 26 | Performed by: INTERNAL MEDICINE

## 2021-11-12 PROCEDURE — 93306 TTE W/DOPPLER COMPLETE: CPT

## 2021-11-15 ENCOUNTER — ANCILLARY PROCEDURE (OUTPATIENT)
Dept: CARDIOLOGY | Facility: CLINIC | Age: 66
End: 2021-11-15
Attending: INTERNAL MEDICINE
Payer: MEDICARE

## 2021-11-15 DIAGNOSIS — Z95.810 ICD (IMPLANTABLE CARDIOVERTER-DEFIBRILLATOR) IN PLACE: ICD-10-CM

## 2021-11-15 DIAGNOSIS — I25.5 ISCHEMIC CARDIOMYOPATHY: ICD-10-CM

## 2021-11-15 LAB
MDC_IDC_EPISODE_DTM: NORMAL
MDC_IDC_EPISODE_DURATION: 6 S
MDC_IDC_EPISODE_DURATION: 7 S
MDC_IDC_EPISODE_DURATION: 7 S
MDC_IDC_EPISODE_ID: NORMAL
MDC_IDC_EPISODE_TYPE: NORMAL
MDC_IDC_LEAD_IMPLANT_DT: NORMAL
MDC_IDC_LEAD_LOCATION: NORMAL
MDC_IDC_LEAD_LOCATION_DETAIL_1: NORMAL
MDC_IDC_LEAD_MFG: NORMAL
MDC_IDC_LEAD_MODEL: NORMAL
MDC_IDC_LEAD_POLARITY_TYPE: NORMAL
MDC_IDC_LEAD_SERIAL: NORMAL
MDC_IDC_MSMT_BATTERY_DTM: NORMAL
MDC_IDC_MSMT_BATTERY_REMAINING_LONGEVITY: 90 MO
MDC_IDC_MSMT_BATTERY_REMAINING_PERCENTAGE: 95 %
MDC_IDC_MSMT_BATTERY_STATUS: NORMAL
MDC_IDC_MSMT_CAP_CHARGE_DTM: NORMAL
MDC_IDC_MSMT_CAP_CHARGE_TIME: 10.1 S
MDC_IDC_MSMT_CAP_CHARGE_TYPE: NORMAL
MDC_IDC_MSMT_LEADCHNL_RV_IMPEDANCE_VALUE: 810 OHM
MDC_IDC_PG_IMPLANT_DTM: NORMAL
MDC_IDC_PG_MFG: NORMAL
MDC_IDC_PG_MODEL: NORMAL
MDC_IDC_PG_SERIAL: NORMAL
MDC_IDC_PG_TYPE: NORMAL
MDC_IDC_SESS_CLINIC_NAME: NORMAL
MDC_IDC_SESS_DTM: NORMAL
MDC_IDC_SESS_TYPE: NORMAL
MDC_IDC_SET_BRADY_LOWRATE: 40 {BEATS}/MIN
MDC_IDC_SET_BRADY_MODE: NORMAL
MDC_IDC_SET_LEADCHNL_RV_PACING_AMPLITUDE: 2 V
MDC_IDC_SET_LEADCHNL_RV_PACING_POLARITY: NORMAL
MDC_IDC_SET_LEADCHNL_RV_PACING_PULSEWIDTH: 0.5 MS
MDC_IDC_SET_LEADCHNL_RV_SENSING_ADAPTATION_MODE: NORMAL
MDC_IDC_SET_LEADCHNL_RV_SENSING_POLARITY: NORMAL
MDC_IDC_SET_LEADCHNL_RV_SENSING_SENSITIVITY: 0.6 MV
MDC_IDC_SET_ZONE_DETECTION_INTERVAL: 250 MS
MDC_IDC_SET_ZONE_DETECTION_INTERVAL: 300 MS
MDC_IDC_SET_ZONE_DETECTION_INTERVAL: 353 MS
MDC_IDC_SET_ZONE_TYPE: NORMAL
MDC_IDC_SET_ZONE_VENDOR_TYPE: NORMAL
MDC_IDC_STAT_BRADY_DTM_END: NORMAL
MDC_IDC_STAT_BRADY_DTM_START: NORMAL
MDC_IDC_STAT_BRADY_RV_PERCENT_PACED: 0 %
MDC_IDC_STAT_EPISODE_RECENT_COUNT: 0
MDC_IDC_STAT_EPISODE_RECENT_COUNT: 11
MDC_IDC_STAT_EPISODE_RECENT_COUNT_DTM_END: NORMAL
MDC_IDC_STAT_EPISODE_RECENT_COUNT_DTM_START: NORMAL
MDC_IDC_STAT_EPISODE_TYPE: NORMAL
MDC_IDC_STAT_EPISODE_VENDOR_TYPE: NORMAL
MDC_IDC_STAT_TACHYTHERAPY_ATP_DELIVERED_RECENT: 0
MDC_IDC_STAT_TACHYTHERAPY_ATP_DELIVERED_TOTAL: 0
MDC_IDC_STAT_TACHYTHERAPY_RECENT_DTM_END: NORMAL
MDC_IDC_STAT_TACHYTHERAPY_RECENT_DTM_START: NORMAL
MDC_IDC_STAT_TACHYTHERAPY_SHOCKS_ABORTED_RECENT: 0
MDC_IDC_STAT_TACHYTHERAPY_SHOCKS_ABORTED_TOTAL: 0
MDC_IDC_STAT_TACHYTHERAPY_SHOCKS_DELIVERED_RECENT: 0
MDC_IDC_STAT_TACHYTHERAPY_SHOCKS_DELIVERED_TOTAL: 0
MDC_IDC_STAT_TACHYTHERAPY_TOTAL_DTM_END: NORMAL
MDC_IDC_STAT_TACHYTHERAPY_TOTAL_DTM_START: NORMAL

## 2021-11-15 PROCEDURE — 93295 DEV INTERROG REMOTE 1/2/MLT: CPT | Performed by: INTERNAL MEDICINE

## 2021-11-15 PROCEDURE — 93296 REM INTERROG EVL PM/IDS: CPT | Performed by: INTERNAL MEDICINE

## 2021-11-15 NOTE — PROGRESS NOTES
ASSESSMENT & PLAN  Patient Instructions     1. Primary osteoarthritis of right hip    2. Primary localized osteoarthritis of left hip      -Patient is following up for bilateral hip pain due to severe arthritis  -Patient tolerated bilateral intra-articular cortisone injection today without complications.  Patient shifted his right leg and hip while changing syringes causing some cortisone and Ropivacaine medication to be pushed out of the needle that was still in the hip joint.  Patient given postprocedure instructions  -Patient will start home exercise program.  Handouts were given today  -Patient will assess response to the cortisone injections.  Patient will be playing golf tomorrow.  If patient gets temporary relief of his symptoms from the cortisone injections, we will refer to Dr. Hughes to discuss hip replacement surgery.  If no improvement in pain and function, will refer to spine for treatment of the lower back.  -Call direct clinic number [[phone]] at any time with questions or concerns.    Albert Yeo MD [facility]oped[facility] Medicine            -----    SUBJECTIVE:  Iraida Hernandez is a 66 year old male who is seen for bilateral hip IA corticosteriod injections     Large Joint Injection/Arthocentesis: bilateral hip joint    Date/Time: 11/19/2021 10:59 AM  Performed by: Yeo, Albert, MD  Authorized by: Yeo, Albert, MD     Indications:  Pain and osteoarthritis  Needle Size:  22 G  Guidance: ultrasound    Approach:  Anterior  Location:  Hip  Laterality:  Bilateral      Site:  Bilateral hip joint  Medications (Right):  40 mg methylPREDNISolone 40 MG/ML  Medications (Left):  40 mg methylPREDNISolone 40 MG/ML  Outcome:  Tolerated well, no immediate complications  Procedure discussed: discussed risks, benefits, and alternatives    Consent Given by:  Patient  Timeout: timeout called immediately prior to procedure    Prep: patient was prepped and draped in usual sterile  fashion          Patient rates pain as 2/10 pre-procedure. Patient rates pain as 0/10 post-procedure.      Albert Yeo MD, [facility]

## 2021-11-18 ENCOUNTER — OFFICE VISIT (OUTPATIENT)
Dept: CARDIOLOGY | Facility: CLINIC | Age: 66
End: 2021-11-18
Payer: MEDICARE

## 2021-11-18 VITALS
WEIGHT: 152 LBS | SYSTOLIC BLOOD PRESSURE: 106 MMHG | DIASTOLIC BLOOD PRESSURE: 63 MMHG | HEIGHT: 71 IN | BODY MASS INDEX: 21.28 KG/M2 | OXYGEN SATURATION: 98 % | HEART RATE: 84 BPM

## 2021-11-18 DIAGNOSIS — I25.10 CORONARY ARTERY DISEASE INVOLVING NATIVE CORONARY ARTERY OF NATIVE HEART WITHOUT ANGINA PECTORIS: ICD-10-CM

## 2021-11-18 DIAGNOSIS — Z95.810 ICD (IMPLANTABLE CARDIOVERTER-DEFIBRILLATOR) IN PLACE: Primary | ICD-10-CM

## 2021-11-18 DIAGNOSIS — I25.5 ISCHEMIC CARDIOMYOPATHY: ICD-10-CM

## 2021-11-18 PROCEDURE — 99214 OFFICE O/P EST MOD 30 MIN: CPT | Performed by: INTERNAL MEDICINE

## 2021-11-18 ASSESSMENT — MIFFLIN-ST. JEOR: SCORE: 1481.6

## 2021-11-18 NOTE — LETTER
11/18/2021    Lawson Mahoney [place]  [address] [address] [facility] [address]    RE: Iraida Hernandez       Dear Colleague,    I had the pleasure of seeing Iraida Hernandez in the [facility].    HPI and Plan:     Saravanan is a delightful 65 y/o male with history of ischemic CM, feeling overall well.  Plays golf with handicap going from 11 to 17.  Needs bilateral hip repair.  Denies chest pain, SOB, syncope near syncope.     Reiviewed images of echocardiogram and agree slight improvement in LVEF.. I have reviewed the device interrogation and device checks.  He has had no mode switching.  He had 3 episodes of very short nonsustained ventricular tachycardia 4-5 beats.  I reviewed his electrolytes lab kidney function is normal.  His LDL is less than 70.    I also did prescription review.  He is on appropriate doses of a atorvastatin at 40 mg daily Coreg 12.5 twice daily and lisinopril 5 mg daily.    Mr. Hernandez is doing extremely well from a cardiac standpoint really without any heart failure symptoms slight improvement in his ejection fraction.  Recommend continue medical therapy and follow-up in 1 years time.    Orders Placed This Encounter   Procedures     Follow-Up with Cardiologist     Echocardiogram Complete     No orders of the defined types were placed in this encounter.    There are no discontinued medications.      Encounter Diagnoses   Name Primary?     ICD (implantable cardioverter-defibrillator) in place Yes     Ischemic cardiomyopathy      Coronary artery disease involving native coronary artery of native heart without angina pectoris        CURRENT MEDICATIONS:  Current Outpatient Medications   Medication Sig Dispense Refill     aspirin EC 81 MG EC tablet Take 1 tablet (81 mg) by mouth daily       atorvastatin (LIPITOR) 40 MG tablet Take 1 tablet (40 mg) by mouth daily 90 tablet 2     carvedilol (COREG) 12.5 MG tablet Take 1 tablet (12.5 mg) by mouth 2  times daily (with meals) 180 tablet 3     clopidogrel (PLAVIX) 75 MG tablet Take 1 tablet (75 mg) by mouth daily 90 tablet 1     lisinopril (ZESTRIL) 5 MG tablet Take 1 tablet (5 mg) by mouth daily 90 tablet 3     nitroGLYcerin (NITROSTAT) 0.4 MG sublingual tablet Place 1 tablet (0.4 mg) under the tongue every 5 minutes as needed for chest pain 25 tablet 3     spironolactone (ALDACTONE) 25 MG tablet Take 1 tablet (25 mg) by mouth daily 90 tablet 2       ALLERGIES     Allergies   Allergen Reactions     Penicillins      Tetracyclines        PAST MEDICAL HISTORY:  Past Medical History:   Diagnosis Date     Calculus of kidney      Cancer (H)     basel cell on nose     Cardiac arrest (H)     V-fib, 2/16/2014     Cardiomyopathy (H)      Coronary artery disease      Hyperlipidaemia      Hyperlipidemia with target LDL less than 70 10/31/2010     Diagnosis updated by automated process. Provider to review and confirm.     ICD (implantable cardiac defibrillator) in place     12-1-2014 EF 29%     Mild intermittent asthma      Myocardial infarction (H) 2/2014     Anterior infarct     Tobacco dependence        PAST SURGICAL HISTORY:  Past Surgical History:   Procedure Laterality Date     CARDIAC SURGERY       HEART CATH, ANGIOPLASTY  2/2014    Emergent cath,thrombectomy-pt had cardiac arrest-severe 3 vessel CAD-MACY proximal LAD, LAD diagonal kissing balloon, and stent to proximal RCA     HERNIA REPAIR       NO HISTORY OF SURGERY       PROSTATE SURGERY  03/29/2019       FAMILY HISTORY:  Family History   Problem Relation Age of Onset     Cerebrovascular Disease Father      Heart Disease Father         MI     Cancer Mother      Unknown/Adopted Maternal Grandmother      Unknown/Adopted Maternal Grandfather      Unknown/Adopted Paternal Grandmother      Unknown/Adopted Paternal Grandfather      Diabetes Son      Family History Negative Son      Cancer Maternal Uncle        SOCIAL HISTORY:  Social History     Socioeconomic History      "Marital status:      Spouse name: Jessica     Number of children: 2     Years of education: None     Highest education level: None   Occupational History     Occupation:      Employer: shelter supply     Comment: [employer]   Tobacco Use     Smoking status: Current Every Day Smoker     Packs/day: 0.25     Years: 42.00     Pack years: 10.50     Smokeless tobacco: Never Used     Tobacco comment: 1/3 pack a day    Substance and Sexual Activity     Alcohol use: Yes     Comment: rare     Drug use: No     Sexual activity: Yes     Partners: Female   Other Topics Concern      Service No     Blood Transfusions No     Caffeine Concern No     Comment: soda occasionally      Occupational Exposure No     Hobby Hazards No     Sleep Concern No     Stress Concern No     Weight Concern No     Special Diet Yes     Comment: eats healthy      Back Care No     Exercise Yes     Comment: golf 2x/week, tredmill daily     Bike Helmet Not Asked     Comment: NA     Seat Belt Yes     Self-Exams Yes     Parent/sibling w/ CABG, MI or angioplasty before 65F 55M? Not Asked   Social History Narrative     None     Social Determinants of Health     Financial Resource Strain: Not on file   Food Insecurity: Not on file   Transportation Needs: Not on file   Physical Activity: Not on file   Stress: Not on file   Social Connections: Not on file   Intimate Partner Violence: Not on file   Housing Stability: Not on file       Review of Systems:  Skin:  Positive for itching   Eyes:  Positive for glasses  ENT:  Negative    Respiratory:  Negative    Cardiovascular:  Negative fatigue;Positive for  Gastroenterology: Negative    Genitourinary:  Negative    Musculoskeletal:  Positive for joint pain;arthritis  Neurologic:  Negative    Psychiatric:  Negative    Heme/Lymph/Imm:  Positive for allergies  Endocrine:  Negative            Physical Exam:  Vitals: /63   Pulse 84   Ht 1.803 m (5' 11\")   Wt 68.9 kg (152 lb)   SpO2 " 98%   BMI 21.20 kg/m    Constitutional: cooperative, alert and oriented, well developed, well nourished, in no acute distress   Skin: warm and dry to the touch, no apparent skin lesions or masses noted   Head: normocephalic, no masses or lesions   Eyes: pupils equal and round, conjunctivae and lids unremarkable, sclera white, no xanthalasma, EOMS intact, no nystagmus   ENT: no pallor or cyanosis, dentition good   Neck: carotid pulses are full and equal bilaterally, JVP normal, no carotid bruit, no thyromegaly   Chest: normal breath sounds, clear to auscultation, normal A-P diameter, normal symmetry, normal respiratory excursion, no use of accessory muscles   Cardiac: regular rhythm, normal S1/S2, no S3 or S4, apical impulse not displaced, no murmurs, gallops or rubs no presence of murmur   Abdomen: abdomen soft, non-tender, BS normoactive, no mass, no HSM, no bruits   Vascular: pulses full and equal, no bruits auscultated   Extremities and Back: no deformities, clubbing, cyanosis, erythema observed   Neurological: affect appropriate, oriented to time, person and place     Recent Lab Results:  LIPID RESULTS:  Lab Results   Component Value Date    CHOL 136 10/25/2021    CHOL 142 05/24/2019    HDL 52 10/25/2021    HDL 48 05/24/2019    LDL 60 10/25/2021    LDL 74 05/24/2019    TRIG 118 10/25/2021    TRIG 99 05/24/2019    CHOLHDLRATIO 3.1 08/15/2014       LIVER ENZYME RESULTS:  Lab Results   Component Value Date    AST 19 10/25/2021    AST 14 02/15/2019    ALT 36 10/25/2021    ALT 6 05/24/2019       CBC RESULTS:  Lab Results   Component Value Date    WBC 8.4 12/01/2014    RBC 4.63 12/01/2014    HGB 15.2 12/01/2014    HCT 45.7 12/01/2014    MCV 99 12/01/2014    MCH 32.8 12/01/2014    MCHC 33.3 12/01/2014    RDW 12.5 12/01/2014     12/01/2014       BMP RESULTS:  Lab Results   Component Value Date     10/25/2021     02/15/2019    POTASSIUM 4.7 10/25/2021    POTASSIUM 4.2 02/15/2019    CHLORIDE 106  10/25/2021    CHLORIDE 103 02/15/2019    CO2 25 10/25/2021    CO2 27 02/15/2019    ANIONGAP 5 10/25/2021    ANIONGAP 4 02/15/2019     (H) 10/25/2021    GLC 99 02/15/2019    BUN 19 10/25/2021    BUN 14 02/15/2019    CR 0.97 10/25/2021    CR 0.91 02/15/2019    GFRESTIMATED 80 10/25/2021    GFRESTIMATED 75 01/28/2021    GFRESTBLACK >90 01/28/2021    DIEGO 8.7 10/25/2021    DIEGO 8.4 (L) 02/15/2019        A1C RESULTS:  Lab Results   Component Value Date    A1C 5.5 10/25/2021    A1C 4.8 12/10/2007       INR RESULTS:  Lab Results   Component Value Date    INR 0.90 12/01/2014    INR 0.89 02/16/2014           CC  No referring provider defined for this encounter.    Thank you for allowing me to participate in the care of your patient.      Sincerely,     ELIANA KHAN MD     [facility]  cc:   No referring provider defined for this encounter.

## 2021-11-18 NOTE — PROGRESS NOTES
HPI and Plan:     Saravanan is a delightful 67 y/o male with history of ischemic CM, feeling overall well.  Plays golf with handicap going from 11 to 17.  Needs bilateral hip repair.  Denies chest pain, SOB, syncope near syncope.     Reiviewed images of echocardiogram and agree slight improvement in LVEF.. I have reviewed the device interrogation and device checks.  He has had no mode switching.  He had 3 episodes of very short nonsustained ventricular tachycardia 4-5 beats.  I reviewed his electrolytes lab kidney function is normal.  His LDL is less than 70.    I also did prescription review.  He is on appropriate doses of a atorvastatin at 40 mg daily Coreg 12.5 twice daily and lisinopril 5 mg daily.    Mr. Hernandez is doing extremely well from a cardiac standpoint really without any heart failure symptoms slight improvement in his ejection fraction.  Recommend continue medical therapy and follow-up in 1 years time.    Orders Placed This Encounter   Procedures     Follow-Up with Cardiologist     Echocardiogram Complete     No orders of the defined types were placed in this encounter.    There are no discontinued medications.      Encounter Diagnoses   Name Primary?     ICD (implantable cardioverter-defibrillator) in place Yes     Ischemic cardiomyopathy      Coronary artery disease involving native coronary artery of native heart without angina pectoris        CURRENT MEDICATIONS:  Current Outpatient Medications   Medication Sig Dispense Refill     aspirin EC 81 MG EC tablet Take 1 tablet (81 mg) by mouth daily       atorvastatin (LIPITOR) 40 MG tablet Take 1 tablet (40 mg) by mouth daily 90 tablet 2     carvedilol (COREG) 12.5 MG tablet Take 1 tablet (12.5 mg) by mouth 2 times daily (with meals) 180 tablet 3     clopidogrel (PLAVIX) 75 MG tablet Take 1 tablet (75 mg) by mouth daily 90 tablet 1     lisinopril (ZESTRIL) 5 MG tablet Take 1 tablet (5 mg) by mouth daily 90 tablet 3     nitroGLYcerin (NITROSTAT) 0.4 MG  sublingual tablet Place 1 tablet (0.4 mg) under the tongue every 5 minutes as needed for chest pain 25 tablet 3     spironolactone (ALDACTONE) 25 MG tablet Take 1 tablet (25 mg) by mouth daily 90 tablet 2       ALLERGIES     Allergies   Allergen Reactions     Penicillins      Tetracyclines        PAST MEDICAL HISTORY:  Past Medical History:   Diagnosis Date     Calculus of kidney      Cancer (H)     basel cell on nose     Cardiac arrest (H)     V-fib, 2/16/2014     Cardiomyopathy (H)      Coronary artery disease      Hyperlipidaemia      Hyperlipidemia with target LDL less than 70 10/31/2010     Diagnosis updated by automated process. Provider to review and confirm.     ICD (implantable cardiac defibrillator) in place     12-1-2014 EF 29%     Mild intermittent asthma      Myocardial infarction (H) 2/2014     Anterior infarct     Tobacco dependence        PAST SURGICAL HISTORY:  Past Surgical History:   Procedure Laterality Date     CARDIAC SURGERY       HEART CATH, ANGIOPLASTY  2/2014    Emergent cath,thrombectomy-pt had cardiac arrest-severe 3 vessel CAD-MACY proximal LAD, LAD diagonal kissing balloon, and stent to proximal RCA     HERNIA REPAIR       NO HISTORY OF SURGERY       PROSTATE SURGERY  03/29/2019       FAMILY HISTORY:  Family History   Problem Relation Age of Onset     Cerebrovascular Disease Father      Heart Disease Father         MI     Cancer Mother      Unknown/Adopted Maternal Grandmother      Unknown/Adopted Maternal Grandfather      Unknown/Adopted Paternal Grandmother      Unknown/Adopted Paternal Grandfather      Diabetes Son      Family History Negative Son      Cancer Maternal Uncle        SOCIAL HISTORY:  Social History     Socioeconomic History     Marital status:      Spouse name: Jessica     Number of children: 2     Years of education: None     Highest education level: None   Occupational History     Occupation:      Employer: shelter supply     Comment: [employer]   Tobacco Use     Smoking status: Current Every Day Smoker     Packs/day: 0.25     Years: 42.00     Pack years: 10.50     Smokeless tobacco: Never Used     Tobacco comment: 1/3 pack a day    Substance and Sexual Activity     Alcohol use: Yes     Comment: rare     Drug use: No     Sexual activity: Yes     Partners: Female   Other Topics Concern      Service No     Blood Transfusions No     Caffeine Concern No     Comment: soda occasionally      Occupational Exposure No     Hobby Hazards No     Sleep Concern No     Stress Concern No     Weight Concern No     Special Diet Yes     Comment: eats healthy      Back Care No     Exercise Yes     Comment: golf 2x/week, tredmill daily     Bike Helmet Not Asked     Comment: NA     Seat Belt Yes     Self-Exams Yes     Parent/sibling w/ CABG, MI or angioplasty before 65F 55M? Not Asked   Social History Narrative     None     Social Determinants of Health     Financial Resource Strain: Not on file   Food Insecurity: Not on file   Transportation Needs: Not on file   Physical Activity: Not on file   Stress: Not on file   Social Connections: Not on file   Intimate Partner Violence: Not on file   Housing Stability: Not on file       Review of Systems:  Skin:  Positive for itching   Eyes:  Positive for glasses  ENT:  Negative    Respiratory:  Negative    Cardiovascular:  Negative fatigue;Positive for  Gastroenterology: Negative    Genitourinary:  Negative    Musculoskeletal:  Positive for joint pain;arthritis  Neurologic:  Negative    Psychiatric:  Negative    Heme/Lymph/Imm:  Positive for allergies  Endocrine:  Negative            Physical Exam:  Vitals: /63   Pulse 84   Ht 1.803 m (5' 11\")   Wt 68.9 kg (152 lb)   SpO2 98%   BMI 21.20 kg/m    Constitutional: cooperative, alert and oriented, well developed, well nourished, in no acute distress   Skin: warm and dry to the touch, no apparent skin lesions or masses noted   Head: normocephalic, no masses or lesions "   Eyes: pupils equal and round, conjunctivae and lids unremarkable, sclera white, no xanthalasma, EOMS intact, no nystagmus   ENT: no pallor or cyanosis, dentition good   Neck: carotid pulses are full and equal bilaterally, JVP normal, no carotid bruit, no thyromegaly   Chest: normal breath sounds, clear to auscultation, normal A-P diameter, normal symmetry, normal respiratory excursion, no use of accessory muscles   Cardiac: regular rhythm, normal S1/S2, no S3 or S4, apical impulse not displaced, no murmurs, gallops or rubs no presence of murmur   Abdomen: abdomen soft, non-tender, BS normoactive, no mass, no HSM, no bruits   Vascular: pulses full and equal, no bruits auscultated   Extremities and Back: no deformities, clubbing, cyanosis, erythema observed   Neurological: affect appropriate, oriented to time, person and place     Recent Lab Results:  LIPID RESULTS:  Lab Results   Component Value Date    CHOL 136 10/25/2021    CHOL 142 05/24/2019    HDL 52 10/25/2021    HDL 48 05/24/2019    LDL 60 10/25/2021    LDL 74 05/24/2019    TRIG 118 10/25/2021    TRIG 99 05/24/2019    CHOLHDLRATIO 3.1 08/15/2014       LIVER ENZYME RESULTS:  Lab Results   Component Value Date    AST 19 10/25/2021    AST 14 02/15/2019    ALT 36 10/25/2021    ALT 6 05/24/2019       CBC RESULTS:  Lab Results   Component Value Date    WBC 8.4 12/01/2014    RBC 4.63 12/01/2014    HGB 15.2 12/01/2014    HCT 45.7 12/01/2014    MCV 99 12/01/2014    MCH 32.8 12/01/2014    MCHC 33.3 12/01/2014    RDW 12.5 12/01/2014     12/01/2014       BMP RESULTS:  Lab Results   Component Value Date     10/25/2021     02/15/2019    POTASSIUM 4.7 10/25/2021    POTASSIUM 4.2 02/15/2019    CHLORIDE 106 10/25/2021    CHLORIDE 103 02/15/2019    CO2 25 10/25/2021    CO2 27 02/15/2019    ANIONGAP 5 10/25/2021    ANIONGAP 4 02/15/2019     (H) 10/25/2021    GLC 99 02/15/2019    BUN 19 10/25/2021    BUN 14 02/15/2019    CR 0.97 10/25/2021    CR 0.91  02/15/2019    GFRESTIMATED 80 10/25/2021    GFRESTIMATED 75 01/28/2021    GFRESTBLACK >90 01/28/2021    DIEGO 8.7 10/25/2021    DIEGO 8.4 (L) 02/15/2019        A1C RESULTS:  Lab Results   Component Value Date    A1C 5.5 10/25/2021    A1C 4.8 12/10/2007       INR RESULTS:  Lab Results   Component Value Date    INR 0.90 12/01/2014    INR 0.89 02/16/2014           CC  No referring provider defined for this encounter.

## 2021-11-19 ENCOUNTER — OFFICE VISIT (OUTPATIENT)
Dept: ORTHOPEDICS | Facility: CLINIC | Age: 66
End: 2021-11-19
Payer: MEDICARE

## 2021-11-19 VITALS
WEIGHT: 152 LBS | DIASTOLIC BLOOD PRESSURE: 62 MMHG | BODY MASS INDEX: 21.28 KG/M2 | SYSTOLIC BLOOD PRESSURE: 100 MMHG | HEIGHT: 71 IN

## 2021-11-19 DIAGNOSIS — M16.12 PRIMARY LOCALIZED OSTEOARTHRITIS OF LEFT HIP: ICD-10-CM

## 2021-11-19 DIAGNOSIS — M16.11 PRIMARY OSTEOARTHRITIS OF RIGHT HIP: Primary | ICD-10-CM

## 2021-11-19 PROCEDURE — 20611 DRAIN/INJ JOINT/BURSA W/US: CPT | Mod: 50 | Performed by: FAMILY MEDICINE

## 2021-11-19 RX ORDER — METHYLPREDNISOLONE ACETATE 40 MG/ML
40 INJECTION, SUSPENSION INTRA-ARTICULAR; INTRALESIONAL; INTRAMUSCULAR; SOFT TISSUE
Status: DISCONTINUED | OUTPATIENT
Start: 2021-11-19 | End: 2022-09-21

## 2021-11-19 RX ADMIN — METHYLPREDNISOLONE ACETATE 40 MG: 40 INJECTION, SUSPENSION INTRA-ARTICULAR; INTRALESIONAL; INTRAMUSCULAR; SOFT TISSUE at 10:59

## 2021-11-19 ASSESSMENT — MIFFLIN-ST. JEOR: SCORE: 1481.6

## 2021-11-19 NOTE — LETTER
11/19/2021         RE: Iraida Hernandez  [address] [facility] [address]        Dear Colleague,    Thank you for referring your patient, Iraida Hernandez, to the [facility] [place]. Please see a copy of my visit note below.    ASSESSMENT & PLAN  Patient Instructions     1. Primary osteoarthritis of right hip    2. Primary localized osteoarthritis of left hip      -Patient is following up for bilateral hip pain due to severe arthritis  -Patient tolerated bilateral intra-articular cortisone injection today without complications.  Patient shifted his right leg and hip while changing syringes causing some cortisone and Ropivacaine medication to be pushed out of the needle that was still in the hip joint.  Patient given postprocedure instructions  -Patient will start home exercise program.  Handouts were given today  -Patient will assess response to the cortisone injections.  Patient will be playing golf tomorrow.  If patient gets temporary relief of his symptoms from the cortisone injections, we will refer to Dr. Hughes to discuss hip replacement surgery.  If no improvement in pain and function, will refer to spine for treatment of the lower back.  -Call direct clinic number [[phone]] at any time with questions or concerns.    Albert Yeo MD [facility]  [facility] [place]          -----    SUBJECTIVE:  Iraida Hernandez is a 66 year old male who is seen for bilateral hip IA corticosteriod injections     Large Joint Injection/Arthocentesis: bilateral hip joint    Date/Time: 11/19/2021 10:59 AM  Performed by: Yeo, Albert, MD  Authorized by: Yeo, Albert, MD     Indications:  Pain and osteoarthritis  Needle Size:  22 G  Guidance: ultrasound    Approach:  Anterior  Location:  Hip  Laterality:  Bilateral      Site:  Bilateral hip joint  Medications (Right):  40 mg methylPREDNISolone 40 MG/ML  Medications (Left):  40 mg methylPREDNISolone  40 MG/ML  Outcome:  Tolerated well, no immediate complications  Procedure discussed: discussed risks, benefits, and alternatives    Consent Given by:  Patient  Timeout: timeout called immediately prior to procedure    Prep: patient was prepped and draped in usual sterile fashion          Patient rates pain as 2/10 pre-procedure. Patient rates pain as 0/10 post-procedure.      Albert Yeo MD, [facility]        Again, thank you for allowing me to participate in the care of your patient.        Sincerely,        Albert Yeo, MD

## 2021-11-19 NOTE — PATIENT INSTRUCTIONS
1. Primary osteoarthritis of right hip    2. Primary localized osteoarthritis of left hip      -Patient is following up for bilateral hip pain due to severe arthritis  -Patient tolerated bilateral intra-articular cortisone injection today without complications.  Patient shifted his right leg and hip while changing syringes causing some cortisone and Ropivacaine medication to be pushed out of the needle that was still in the hip joint.  Patient given postprocedure instructions  -Patient will start home exercise program.  Handouts were given today  -Patient will assess response to the cortisone injections.  Patient will be playing golf tomorrow.  If patient gets temporary relief of his symptoms from the cortisone injections, we will refer to Dr. Hughes to discuss hip replacement surgery.  If no improvement in pain and function, will refer to spine for treatment of the lower back.  -Call direct clinic number [[phone]] at any time with questions or concerns.    Albert Yeo MD CAQSM  [facility]  [facility] [place]

## 2021-12-02 ENCOUNTER — TELEPHONE (OUTPATIENT)
Dept: ORTHOPEDICS | Facility: CLINIC | Age: 66
End: 2021-12-02
Payer: MEDICARE

## 2021-12-02 NOTE — TELEPHONE ENCOUNTER
Reason for call:  Patient said Dr. Yeo requested he call 2 weeks following injections to give update.    Home number on file [phone] (home)

## 2021-12-02 NOTE — TELEPHONE ENCOUNTER
Patient states after bilateral hip corticosteriod injection feels a little bit better. States he had immediate relief with lidocaine that lasted a few hours, this was almost 100% relief.   He states he has golfed 3 times since have injections, notes definite improvement, thinks he has more mobility and can turn swing his golf clubs without as much stiffness and pain.   Also states sleeping on his side is less painful     Notes 25% improvement overall     Patient states he is going to start doing at home exercises while not golfing     Just FYI - please advise if any changes In treatment plan     Marsha Yi, ATC

## 2021-12-06 DIAGNOSIS — I21.02 ACUTE ST ELEVATION MYOCARDIAL INFARCTION (STEMI) INVOLVING LEFT ANTERIOR DESCENDING (LAD) CORONARY ARTERY (H): ICD-10-CM

## 2021-12-06 DIAGNOSIS — I21.9 ACUTE MYOCARDIAL INFARCTION (H): ICD-10-CM

## 2021-12-06 RX ORDER — ATORVASTATIN CALCIUM 40 MG/1
40 TABLET, FILM COATED ORAL DAILY
Qty: 90 TABLET | Refills: 2 | Status: SHIPPED | OUTPATIENT
Start: 2021-12-06 | End: 2022-09-20

## 2021-12-06 RX ORDER — SPIRONOLACTONE 25 MG/1
25 TABLET ORAL DAILY
Qty: 90 TABLET | Refills: 2 | Status: SHIPPED | OUTPATIENT
Start: 2021-12-06 | End: 2022-09-02

## 2021-12-06 RX ORDER — LISINOPRIL 5 MG/1
5 TABLET ORAL DAILY
Qty: 90 TABLET | Refills: 2 | Status: SHIPPED | OUTPATIENT
Start: 2021-12-06 | End: 2022-09-02

## 2021-12-08 NOTE — TELEPHONE ENCOUNTER
I called patient to let him know that he may continue with home exercises.  However, due to the severity of his arthritis, it is unlikely he will get significant benefit from his exercises.  I recommend that he follow-up with the surgeon to undergo hip replacement surgery to provide long-term relief.  Patient may call us whenever he is ready to do so

## 2021-12-22 DIAGNOSIS — I21.9 ACUTE MYOCARDIAL INFARCTION (H): ICD-10-CM

## 2021-12-22 RX ORDER — CARVEDILOL 12.5 MG/1
12.5 TABLET ORAL 2 TIMES DAILY WITH MEALS
Qty: 180 TABLET | Refills: 3 | Status: SHIPPED | OUTPATIENT
Start: 2021-12-22 | End: 2022-12-26

## 2022-01-20 ENCOUNTER — OFFICE VISIT (OUTPATIENT)
Dept: FAMILY MEDICINE | Facility: CLINIC | Age: 67
End: 2022-01-20
Payer: MEDICARE

## 2022-01-20 ENCOUNTER — OFFICE VISIT (OUTPATIENT)
Dept: UROLOGY | Facility: CLINIC | Age: 67
End: 2022-01-20
Payer: MEDICARE

## 2022-01-20 VITALS
WEIGHT: 155 LBS | OXYGEN SATURATION: 98 % | HEART RATE: 74 BPM | BODY MASS INDEX: 20.99 KG/M2 | DIASTOLIC BLOOD PRESSURE: 68 MMHG | SYSTOLIC BLOOD PRESSURE: 100 MMHG | HEIGHT: 72 IN

## 2022-01-20 VITALS
TEMPERATURE: 97.5 F | SYSTOLIC BLOOD PRESSURE: 116 MMHG | DIASTOLIC BLOOD PRESSURE: 79 MMHG | BODY MASS INDEX: 21.76 KG/M2 | OXYGEN SATURATION: 100 % | HEART RATE: 74 BPM | WEIGHT: 158.25 LBS

## 2022-01-20 DIAGNOSIS — C61 PROSTATE CANCER (H): Primary | ICD-10-CM

## 2022-01-20 DIAGNOSIS — R59.1 LA (LYMPHADENOPATHY): ICD-10-CM

## 2022-01-20 DIAGNOSIS — R21 RASH: Primary | ICD-10-CM

## 2022-01-20 LAB — PSA SERPL-MCNC: 13.7 UG/L (ref 0–4)

## 2022-01-20 PROCEDURE — 99213 OFFICE O/P EST LOW 20 MIN: CPT | Performed by: UROLOGY

## 2022-01-20 PROCEDURE — 99213 OFFICE O/P EST LOW 20 MIN: CPT | Performed by: PHYSICIAN ASSISTANT

## 2022-01-20 PROCEDURE — 36415 COLL VENOUS BLD VENIPUNCTURE: CPT | Performed by: UROLOGY

## 2022-01-20 PROCEDURE — 84153 ASSAY OF PSA TOTAL: CPT | Performed by: UROLOGY

## 2022-01-20 RX ORDER — TRIAMCINOLONE ACETONIDE 1 MG/G
CREAM TOPICAL 2 TIMES DAILY
Qty: 45 G | Refills: 0 | Status: SHIPPED | OUTPATIENT
Start: 2022-01-20 | End: 2022-03-11

## 2022-01-20 RX ORDER — PREDNISONE 20 MG/1
40 TABLET ORAL
Qty: 10 TABLET | Refills: 0 | Status: SHIPPED | OUTPATIENT
Start: 2022-01-20 | End: 2022-01-25

## 2022-01-20 ASSESSMENT — PAIN SCALES - GENERAL: PAINLEVEL: NO PAIN (0)

## 2022-01-20 ASSESSMENT — MIFFLIN-ST. JEOR: SCORE: 1503.14

## 2022-01-20 NOTE — PROGRESS NOTES
Assessment & Plan     Rash    Treat with steroid and steroid cream. Refer to derm if not improving.    - predniSONE (DELTASONE) 20 MG tablet; Take 2 tablets (40 mg) by mouth daily (with breakfast) for 5 days  - triamcinolone (KENALOG) 0.1 % external cream; Apply topically 2 times daily      LA (lymphadenopathy)    Improving. Continue to monitor.               Patient Instructions   Take the complete course of the steroid (prednisone).    Apply steroid cream (triamcinolone) twice a day.    Call or follow-up if not improving in 2 weeks or sooner if worsening. At that point, we can put in a referral to dermatology.      No follow-ups on file.    RACHEL Grimaldo [facility] HOLLIS Coe is a 66 year old who presents for the following health issues     HPI     Rash  Onset/Duration: two months   Description  Location: right shoulder, back, left arm   Character: blotchy, raised, flakey, red  Itching: moderate  Intensity:  moderate  Progression of Symptoms:  improving  Accompanying signs and symptoms:   Fever: no  Body aches or joint pain: no  Sore throat symptoms: no  Recent cold symptoms: no  History:           Previous episodes of similar rash: None  New exposures:  None  Recent travel: no  Exposure to similar rash: no  Precipitating or alleviating factors: none  Therapies tried and outcome: none      Concern - bump on neck   Onset: two weeks   Description: pt has a bump on the right side of his neck   Progression of Symptoms:  improving  Accompanying Signs & Symptoms: pt felt a lump in his neck that was the size of a marble   Previous history of similar problem: none  Precipitating factors:        Worsened by: none  Alleviating factors:        Improved by: none  Therapies tried and outcome: None        Review of Systems   Constitutional, HEENT, cardiovascular, pulmonary, gi and gu systems are negative, except as otherwise noted.        Objective    /79 (BP Location:  Right arm, Patient Position: Chair, Cuff Size: Adult Regular)   Pulse 74   Temp 97.5  F (36.4  C) (Oral)   Wt 71.8 kg (158 lb 4 oz)   SpO2 100%   BMI 21.76 kg/m    Body mass index is 21.76 kg/m .       Physical Exam   GENERAL: healthy, alert and no distress  EYES: Eyes grossly normal to inspection, PERRL and conjunctivae and sclerae normal  MS: no gross musculoskeletal defects noted, no edema  SKIN: Rash on back consisting of erythematous papules that are open from scratching.  NEURO: Normal strength and tone, mentation intact and speech normal  PSYCH: mentation appears normal, affect normal/bright  Very slight LA of right anterior cervical.

## 2022-01-20 NOTE — PATIENT INSTRUCTIONS
Take the complete course of the steroid (prednisone).    Apply steroid cream (triamcinolone) twice a day.    Call or follow-up if not improving in 2 weeks or sooner if worsening. At that point, we can put in a referral to dermatology.

## 2022-01-20 NOTE — NURSING NOTE
Chief Complaint   Patient presents with     History of prostate cancer     PSA review   Jany Santiago LPN

## 2022-01-21 ENCOUNTER — MYC MEDICAL ADVICE (OUTPATIENT)
Dept: FAMILY MEDICINE | Facility: CLINIC | Age: 67
End: 2022-01-21
Payer: MEDICARE

## 2022-01-25 ENCOUNTER — TRANSFERRED RECORDS (OUTPATIENT)
Dept: HEALTH INFORMATION MANAGEMENT | Facility: CLINIC | Age: 67
End: 2022-01-25
Payer: MEDICARE

## 2022-01-25 LAB
RETINOPATHY: NEGATIVE
RETINOPATHY: NORMAL

## 2022-01-27 NOTE — TELEPHONE ENCOUNTER
Patient Quality Outreach    Patient is due for the following:   Diabetes -  Eye Exam    NEXT STEPS:   Chart routed to abstraction.    [facility] on 1-25-22    Type of outreach:    Chart review performed, no outreach needed.      Questions for provider review:    None     Brianda Ibarra MA

## 2022-02-12 ENCOUNTER — HEALTH MAINTENANCE LETTER (OUTPATIENT)
Age: 67
End: 2022-02-12

## 2022-02-14 ENCOUNTER — ANCILLARY PROCEDURE (OUTPATIENT)
Dept: CARDIOLOGY | Facility: CLINIC | Age: 67
End: 2022-02-14
Attending: INTERNAL MEDICINE
Payer: MEDICARE

## 2022-02-14 DIAGNOSIS — I25.5 ISCHEMIC CARDIOMYOPATHY: ICD-10-CM

## 2022-02-14 DIAGNOSIS — Z95.810 ICD (IMPLANTABLE CARDIOVERTER-DEFIBRILLATOR) IN PLACE: ICD-10-CM

## 2022-02-14 PROCEDURE — 93282 PRGRMG EVAL IMPLANTABLE DFB: CPT | Performed by: INTERNAL MEDICINE

## 2022-02-17 LAB
MDC_IDC_LEAD_IMPLANT_DT: NORMAL
MDC_IDC_LEAD_LOCATION: NORMAL
MDC_IDC_LEAD_LOCATION_DETAIL_1: NORMAL
MDC_IDC_LEAD_MFG: NORMAL
MDC_IDC_LEAD_MODEL: NORMAL
MDC_IDC_LEAD_POLARITY_TYPE: NORMAL
MDC_IDC_LEAD_SERIAL: NORMAL
MDC_IDC_MSMT_BATTERY_STATUS: NORMAL
MDC_IDC_MSMT_CAP_CHARGE_DTM: NORMAL
MDC_IDC_MSMT_CAP_CHARGE_ENERGY: 0 J
MDC_IDC_MSMT_CAP_CHARGE_TIME: 0 S
MDC_IDC_MSMT_CAP_CHARGE_TIME: 10.13
MDC_IDC_MSMT_CAP_CHARGE_TYPE: NORMAL
MDC_IDC_MSMT_CAP_CHARGE_TYPE: NORMAL
MDC_IDC_MSMT_LEADCHNL_RV_IMPEDANCE_VALUE: 766 OHM
MDC_IDC_MSMT_LEADCHNL_RV_PACING_THRESHOLD_AMPLITUDE: 0.6 V
MDC_IDC_MSMT_LEADCHNL_RV_PACING_THRESHOLD_PULSEWIDTH: 0.5 MS
MDC_IDC_MSMT_LEADCHNL_RV_SENSING_INTR_AMPL: 6.6 MV
MDC_IDC_PG_IMPLANT_DTM: NORMAL
MDC_IDC_PG_MFG: NORMAL
MDC_IDC_PG_MODEL: NORMAL
MDC_IDC_PG_SERIAL: NORMAL
MDC_IDC_PG_TYPE: NORMAL
MDC_IDC_SESS_CLINIC_NAME: NORMAL
MDC_IDC_SESS_DTM: NORMAL
MDC_IDC_SESS_TYPE: NORMAL
MDC_IDC_SET_BRADY_HYSTRATE: NORMAL
MDC_IDC_SET_BRADY_LOWRATE: 40 {BEATS}/MIN
MDC_IDC_SET_BRADY_MODE: NORMAL
MDC_IDC_SET_LEADCHNL_RV_PACING_AMPLITUDE: 2 V
MDC_IDC_SET_LEADCHNL_RV_PACING_CAPTURE_MODE: NORMAL
MDC_IDC_SET_LEADCHNL_RV_PACING_POLARITY: NORMAL
MDC_IDC_SET_LEADCHNL_RV_PACING_PULSEWIDTH: 0.5 MS
MDC_IDC_SET_LEADCHNL_RV_SENSING_ADAPTATION_MODE: NORMAL
MDC_IDC_SET_LEADCHNL_RV_SENSING_POLARITY: NORMAL
MDC_IDC_SET_LEADCHNL_RV_SENSING_SENSITIVITY: 0.6 MV
MDC_IDC_SET_ZONE_DETECTION_INTERVAL: 250 MS
MDC_IDC_SET_ZONE_DETECTION_INTERVAL: 300 MS
MDC_IDC_SET_ZONE_DETECTION_INTERVAL: 353 MS
MDC_IDC_SET_ZONE_TYPE: NORMAL
MDC_IDC_SET_ZONE_VENDOR_TYPE: NORMAL
MDC_IDC_STAT_EPISODE_RECENT_COUNT: 0
MDC_IDC_STAT_EPISODE_RECENT_COUNT: 0
MDC_IDC_STAT_EPISODE_RECENT_COUNT: 13
MDC_IDC_STAT_EPISODE_RECENT_COUNT_DTM_END: NORMAL
MDC_IDC_STAT_EPISODE_RECENT_COUNT_DTM_START: NORMAL
MDC_IDC_STAT_EPISODE_TOTAL_COUNT: 0
MDC_IDC_STAT_EPISODE_TOTAL_COUNT: 0
MDC_IDC_STAT_EPISODE_TOTAL_COUNT: 75
MDC_IDC_STAT_EPISODE_TOTAL_COUNT_DTM_END: NORMAL
MDC_IDC_STAT_EPISODE_TYPE: NORMAL
MDC_IDC_STAT_EPISODE_VENDOR_TYPE: NORMAL
MDC_IDC_STAT_TACHYTHERAPY_ATP_DELIVERED_RECENT: 0
MDC_IDC_STAT_TACHYTHERAPY_ATP_DELIVERED_TOTAL: 0
MDC_IDC_STAT_TACHYTHERAPY_RECENT_DTM_END: NORMAL
MDC_IDC_STAT_TACHYTHERAPY_RECENT_DTM_START: NORMAL
MDC_IDC_STAT_TACHYTHERAPY_SHOCKS_ABORTED_RECENT: 0
MDC_IDC_STAT_TACHYTHERAPY_SHOCKS_ABORTED_TOTAL: 0
MDC_IDC_STAT_TACHYTHERAPY_SHOCKS_DELIVERED_RECENT: 0
MDC_IDC_STAT_TACHYTHERAPY_SHOCKS_DELIVERED_TOTAL: 0
MDC_IDC_STAT_TACHYTHERAPY_TOTAL_DTM_END: NORMAL

## 2022-03-11 ENCOUNTER — MYC REFILL (OUTPATIENT)
Dept: FAMILY MEDICINE | Facility: CLINIC | Age: 67
End: 2022-03-11
Payer: MEDICARE

## 2022-03-11 DIAGNOSIS — R21 RASH: ICD-10-CM

## 2022-03-11 RX ORDER — TRIAMCINOLONE ACETONIDE 1 MG/G
CREAM TOPICAL 2 TIMES DAILY
Qty: 45 G | Refills: 0 | Status: ON HOLD | OUTPATIENT
Start: 2022-03-11 | End: 2022-09-21

## 2022-03-11 NOTE — TELEPHONE ENCOUNTER
Prescription approved per [facility] Refill Protocol.    Mimi Gonzalez, Registered Nurse, PAL (Patient Advocate Liaison)   [facility]     [phone]

## 2022-03-14 NOTE — PROGRESS NOTES
ASSESSMENT & PLAN  Patient Instructions     1. Primary osteoarthritis of right hip    2. Primary localized osteoarthritis of left hip      -Patient is following up for bilateral chronic hip pain and dysfunction due to severe end-stage bilateral hip arthritis patient tolerated bilateral intra-articular cortisone injection today without complications.  Patient was given postprocedure instructions  -We had an in-depth discussion regarding next step treatment options which would entail bilateral hip replacement surgery.  Patient is planning a trip next week to [place] for approximately a month.  Patient is also planning a large golf trip this fall for which she would like to participate in.  We talked about timing of his surgeries and 1 versus bilateral surgeries.  Ultimately, we decided to perform intra-articular cortisone injections every 3 to 4 months through the fall and then plan to have his surgeries and recovery through this winter.  All questions were answered  -Call direct clinic number [[phone]] at any time with questions or concerns.    Albert Yeo MD [facility] Medicine  [facility]          -----    SUBJECTIVE:  Iraida Hernandez is a 67 year old male who is seen in follow-up for bilateral hip pain.They were last seen 11/19/2021.    Since their last visit reports worsening pain. They indicate that their current pain level is 4/10. They have tried corticosteroid injection (most recent date: 11/19/2021) that provided  2-3 month(s) of relief, Tylenol as needed, and physical therapy in 2021.      The patient is seen by themselves.    Patient's past medical, surgical, social, and family histories were reviewed today and no changes are noted.    REVIEW OF SYSTEMS:  Constitutional: NEGATIVE for fever, chills, change in weight  Skin: NEGATIVE for worrisome rashes, moles or lesions  GI/: NEGATIVE for bowel or bladder changes  Neuro: NEGATIVE for weakness, dizziness or  "paresthesias    OBJECTIVE:  /78   Ht 1.816 m (5' 11.5\")   Wt 70.3 kg (155 lb)   BMI 21.32 kg/m     General: healthy, alert and in no distress  HEENT: no scleral icterus or conjunctival erythema  Skin: no suspicious lesions or rash. No jaundice.  CV: regular rhythm by palpation, no pedal edema  Resp: normal respiratory effort without conversational dyspnea   Psych: normal mood and affect  Gait: normal steady gait with appropriate coordination and balance  Neuro: normal light touch sensory exam of the extremities.    MSK:     BILATERAL HIP  Inspection:    No obvious deformity or asymmetry, pelvis level  Palpation:   landmarks are nontender.  Active Range of Motion:     Flexion limited by tightness, IR limited by tightness, ER  limited by tightness  Strength:    Flexion 5-/5, adduction 5-/5, abduction 5-/5  Special Tests:    Positive: none    Negative: Logroll, THEODORE, anterior impingement (FADIR), posterior impingement (EX/AB/ER)    Independent visualization of the below image:  Large Joint Injection/Arthocentesis: bilateral hip joint    Date/Time: 3/23/2022 2:49 PM  Performed by: Yeo, Albert, MD  Authorized by: Yeo, Albert, MD     Indications:  Pain  Needle Size:  22 G  Guidance: ultrasound    Approach:  Anterior  Location:  Hip  Laterality:  Bilateral      Site:  Bilateral hip joint  Medications (Right):  40 mg methylPREDNISolone 40 MG/ML  Medications (Left):  40 mg methylPREDNISolone 40 MG/ML  Outcome:  Tolerated well, no immediate complications  Procedure discussed: discussed risks, benefits, and alternatives    Consent Given by:  Patient  Prep: patient was prepped and draped in usual sterile fashion          Patient's conditions were thoroughly discussed during today's visit with total time spent face-to-face with the patient and documentation being 35 minutes.    Albert Yeo MD, [facility]        "

## 2022-03-15 DIAGNOSIS — I42.9 CARDIOMYOPATHY, UNSPECIFIED TYPE (H): ICD-10-CM

## 2022-03-15 DIAGNOSIS — I21.9 ACUTE MYOCARDIAL INFARCTION (H): ICD-10-CM

## 2022-03-15 RX ORDER — CLOPIDOGREL BISULFATE 75 MG/1
75 TABLET ORAL DAILY
Qty: 90 TABLET | Refills: 1 | Status: SHIPPED | OUTPATIENT
Start: 2022-03-15 | End: 2022-10-20

## 2022-03-23 ENCOUNTER — OFFICE VISIT (OUTPATIENT)
Dept: ORTHOPEDICS | Facility: CLINIC | Age: 67
End: 2022-03-23
Payer: MEDICARE

## 2022-03-23 VITALS
HEIGHT: 72 IN | SYSTOLIC BLOOD PRESSURE: 117 MMHG | BODY MASS INDEX: 20.99 KG/M2 | DIASTOLIC BLOOD PRESSURE: 78 MMHG | WEIGHT: 155 LBS

## 2022-03-23 DIAGNOSIS — M16.12 PRIMARY LOCALIZED OSTEOARTHRITIS OF LEFT HIP: ICD-10-CM

## 2022-03-23 DIAGNOSIS — M16.11 PRIMARY OSTEOARTHRITIS OF RIGHT HIP: Primary | ICD-10-CM

## 2022-03-23 PROCEDURE — 20611 DRAIN/INJ JOINT/BURSA W/US: CPT | Mod: 50 | Performed by: FAMILY MEDICINE

## 2022-03-23 PROCEDURE — 99214 OFFICE O/P EST MOD 30 MIN: CPT | Mod: 25 | Performed by: FAMILY MEDICINE

## 2022-03-23 RX ORDER — METHYLPREDNISOLONE ACETATE 40 MG/ML
40 INJECTION, SUSPENSION INTRA-ARTICULAR; INTRALESIONAL; INTRAMUSCULAR; SOFT TISSUE
Status: DISCONTINUED | OUTPATIENT
Start: 2022-03-23 | End: 2022-09-21

## 2022-03-23 RX ADMIN — METHYLPREDNISOLONE ACETATE 40 MG: 40 INJECTION, SUSPENSION INTRA-ARTICULAR; INTRALESIONAL; INTRAMUSCULAR; SOFT TISSUE at 14:49

## 2022-03-23 NOTE — LETTER
3/23/2022         RE: Iraida Hernandez  [address] [address] [facility] [address]        Dear Colleague,    Thank you for referring your patient, Iraida Hernandez, to the [facility] [place]. Please see a copy of my visit note below.    ASSESSMENT & PLAN  Patient Instructions     1. Primary osteoarthritis of right hip    2. Primary localized osteoarthritis of left hip      -Patient is following up for bilateral chronic hip pain and dysfunction due to severe end-stage bilateral hip arthritis patient tolerated bilateral intra-articular cortisone injection today without complications.  Patient was given postprocedure instructions  -We had an in-depth discussion regarding next step treatment options which would entail bilateral hip replacement surgery.  Patient is planning a trip next week to [place] for approximately a month.  Patient is also planning a large golf trip this fall for which she would like to participate in.  We talked about timing of his surgeries and 1 versus bilateral surgeries.  Ultimately, we decided to perform intra-articular cortisone injections every 3 to 4 months through the fall and then plan to have his surgeries and recovery through this winter.  All questions were answered  -Call direct clinic number [[phone]] at any time with questions or concerns.    Albert Yeo MD [facility]  [facility] [place]          -----    SUBJECTIVE:  Iraida Hernandez is a 67 year old male who is seen in follow-up for bilateral hip pain.They were last seen 11/19/2021.    Since their last visit reports worsening pain. They indicate that their current pain level is 4/10. They have tried corticosteroid injection (most recent date: 11/19/2021) that provided  2-3 month(s) of relief, Tylenol as needed, and physical therapy in 2021.      The patient is seen by themselves.    Patient's past medical, surgical, social, and family histories were  "reviewed today and no changes are noted.    REVIEW OF SYSTEMS:  Constitutional: NEGATIVE for fever, chills, change in weight  Skin: NEGATIVE for worrisome rashes, moles or lesions  GI/: NEGATIVE for bowel or bladder changes  Neuro: NEGATIVE for weakness, dizziness or paresthesias    OBJECTIVE:  /78   Ht 1.816 m (5' 11.5\")   Wt 70.3 kg (155 lb)   BMI 21.32 kg/m     General: healthy, alert and in no distress  HEENT: no scleral icterus or conjunctival erythema  Skin: no suspicious lesions or rash. No jaundice.  CV: regular rhythm by palpation, no pedal edema  Resp: normal respiratory effort without conversational dyspnea   Psych: normal mood and affect  Gait: normal steady gait with appropriate coordination and balance  Neuro: normal light touch sensory exam of the extremities.    MSK:     BILATERAL HIP  Inspection:    No obvious deformity or asymmetry, pelvis level  Palpation:   landmarks are nontender.  Active Range of Motion:     Flexion limited by tightness, IR limited by tightness, ER  limited by tightness  Strength:    Flexion 5-/5, adduction 5-/5, abduction 5-/5  Special Tests:    Positive: none    Negative: Logroll, THEODORE, anterior impingement (FADIR), posterior impingement (EX/AB/ER)    Independent visualization of the below image:  Large Joint Injection/Arthocentesis: bilateral hip joint    Date/Time: 3/23/2022 2:49 PM  Performed by: Yeo, Albert, MD  Authorized by: Yeo, Albert, MD     Indications:  Pain  Needle Size:  22 G  Guidance: ultrasound    Approach:  Anterior  Location:  Hip  Laterality:  Bilateral      Site:  Bilateral hip joint  Medications (Right):  40 mg methylPREDNISolone 40 MG/ML  Medications (Left):  40 mg methylPREDNISolone 40 MG/ML  Outcome:  Tolerated well, no immediate complications  Procedure discussed: discussed risks, benefits, and alternatives    Consent Given by:  Patient  Prep: patient was prepped and draped in usual sterile fashion          Patient's conditions were " thoroughly discussed during today's visit with total time spent face-to-face with the patient and documentation being 35 minutes.    Albert Yeo MD, [facility]            Again, thank you for allowing me to participate in the care of your patient.        Sincerely,        Albert Yeo, MD

## 2022-03-23 NOTE — PATIENT INSTRUCTIONS
1. Primary osteoarthritis of right hip    2. Primary localized osteoarthritis of left hip      -Patient is following up for bilateral chronic hip pain and dysfunction due to severe end-stage bilateral hip arthritis patient tolerated bilateral intra-articular cortisone injection today without complications.  Patient was given postprocedure instructions  -We had an in-depth discussion regarding next step treatment options which would entail bilateral hip replacement surgery.  Patient is planning a trip next week to [place] for approximately a month.  Patient is also planning a large golf trip this fall for which she would like to participate in.  We talked about timing of his surgeries and 1 versus bilateral surgeries.  Ultimately, we decided to perform intra-articular cortisone injections every 3 to 4 months through the fall and then plan to have his surgeries and recovery through this winter.  All questions were answered  -Call direct clinic number [[phone]] at any time with questions or concerns.    Albert Yeo MD CAM  [place] [facility]  [facility] [place]

## 2022-04-26 ENCOUNTER — TELEPHONE (OUTPATIENT)
Dept: ORTHOPEDICS | Facility: CLINIC | Age: 67
End: 2022-04-26
Payer: MEDICARE

## 2022-04-26 DIAGNOSIS — M16.11 PRIMARY OSTEOARTHRITIS OF RIGHT HIP: Primary | ICD-10-CM

## 2022-04-26 DIAGNOSIS — M16.12 PRIMARY LOCALIZED OSTEOARTHRITIS OF LEFT HIP: ICD-10-CM

## 2022-04-26 NOTE — TELEPHONE ENCOUNTER
Reason for call:  Patient has some questions about hip following injection and what next steps are.    Home number on file [phone] (home)

## 2022-04-26 NOTE — TELEPHONE ENCOUNTER
Phone call to patient    States bilateral intra-articular hip corticosteriod injections he had 3/23/22 lasted only 1 month.    He thinks he wants to try and have one of his hips replaced this summer before his golf trip he wants to go on 9/6/22.    He states he researched online and said some surgeons will do hip replacements after 3 months. Informed patient he would need to discuss with a surgeon.     Patient would like to consult with surgeon.    Huddled with Dr.Yeo, ok to place referral     Marsha Jeffery ATC

## 2022-05-09 NOTE — TELEPHONE ENCOUNTER
DIAGNOSIS: consult for bilateral BART /Dr Yeo   APPOINTMENT DATE: 05.19.2022   NOTES STATUS DETAILS   OFFICE NOTE from referring provider Internal 04.26.2022 Yeo, Albert, MD   OFFICE NOTE from other specialist     DISCHARGE SUMMARY from hospital     DISCHARGE REPORT from the ER     OPERATIVE REPORT     MEDICATION LIST Internal    EMG (for Spine)     IMPLANT RECORD/STICKER     LABS     CBC/DIFF     CULTURES     INJECTIONS DONE IN RADIOLOGY     MRI     CT SCAN     XRAYS (IMAGES & REPORTS) Internal 02.15.2019 RIGHT HIP TWO VIEWS     TUMOR     PATHOLOGY  Slides & report

## 2022-05-12 ASSESSMENT — HOOS JR
HOOS JR TOTAL INTERVAL SCORE: 58.93
GOING UP OR DOWN STAIRS: MODERATE
WALKING ON UNEVEN SURFACE: MODERATE
RISING FROM SITTING: MODERATE
SITTING: MILD
BENDING TO THE FLOOR TO PICK UP OBJECT: MODERATE
LYING IN BED (TURNING OVER, MAINTAINING HIP POSITION): MILD

## 2022-05-16 ENCOUNTER — ANCILLARY PROCEDURE (OUTPATIENT)
Dept: CARDIOLOGY | Facility: CLINIC | Age: 67
End: 2022-05-16
Attending: INTERNAL MEDICINE
Payer: MEDICARE

## 2022-05-16 DIAGNOSIS — I25.5 ISCHEMIC CARDIOMYOPATHY: ICD-10-CM

## 2022-05-16 DIAGNOSIS — Z95.810 ICD (IMPLANTABLE CARDIOVERTER-DEFIBRILLATOR) IN PLACE: ICD-10-CM

## 2022-05-16 PROCEDURE — 93295 DEV INTERROG REMOTE 1/2/MLT: CPT | Performed by: INTERNAL MEDICINE

## 2022-05-16 PROCEDURE — 93296 REM INTERROG EVL PM/IDS: CPT | Performed by: INTERNAL MEDICINE

## 2022-05-19 ENCOUNTER — PRE VISIT (OUTPATIENT)
Dept: ORTHOPEDICS | Facility: CLINIC | Age: 67
End: 2022-05-19
Payer: MEDICARE

## 2022-05-19 ENCOUNTER — OFFICE VISIT (OUTPATIENT)
Dept: ORTHOPEDICS | Facility: CLINIC | Age: 67
End: 2022-05-19
Payer: MEDICARE

## 2022-05-19 VITALS — WEIGHT: 155 LBS | BODY MASS INDEX: 20.99 KG/M2 | HEIGHT: 72 IN

## 2022-05-19 DIAGNOSIS — M16.11 PRIMARY OSTEOARTHRITIS OF RIGHT HIP: ICD-10-CM

## 2022-05-19 DIAGNOSIS — M16.12 PRIMARY LOCALIZED OSTEOARTHRITIS OF LEFT HIP: ICD-10-CM

## 2022-05-19 PROCEDURE — 99204 OFFICE O/P NEW MOD 45 MIN: CPT | Performed by: ORTHOPAEDIC SURGERY

## 2022-05-19 NOTE — LETTER
5/19/2022         RE: Iraida Hernandez  [address] [address] [facility] [address]        Dear Colleague,    Thank you for referring your patient, Iraida Hernandez, to the [facility] [place]. Please see a copy of my visit note below.    Assessment: This is a 67 year old with bilateral severe OA hips with right worse than left OA associated with severe, ADLs-limiting pain despite comprehensive non-operative management. We discussed the diagnosis and the treatment options including living with it and total hip. We spent twenty minutes discussing total hip arthroplasty.  We discussed the implants, the procedure, the risks and benefits, and the post-operative course.  We discussed blood clots, blood clots to the lungs, injury to blood vessels and nerves, dislocation, infection, and leg length difference.  We reviewed templated radiographs. We discussed that I do not recommend he have a hip surgery until he has not been smoking for a total of 8 weeks. All the patients questions were answered to the best of my ability.    Plan:  Staged total hip, right side first       Chief Complaint: Consult (Bilateral hip pain/osteoarthritis. Bothersome worsening for 3 years. Right is slightly worse. Loves to golf. Want's both replaced. No surgery on hips or knees. Has had cortisone to greater trochs, and joints. Joint injections helped, bursa ones did not. Sore after golf, ice helps. )      Physician:  Albert Yeo    HPI: Iraida Hernandez is a 67 year old male who presents today for evaluation of    Symptom Profile  Location of symptoms:  Groin and lateral hip   Onset: insidious  Trend: getting worse   Duration of symptoms: multiple years   Quality of symptoms: aching, sharp/stabbing  Severity: severe  Alleviate:activity modification   Exacerbating: activities, walking, walking on an incline, in-and-out of a car, shoes and socks  Previous Treatments: Previous treatments include activity modification, oral  pain medication  Trochanter injections: no improvement  Intra-articular injections, bilateral x 2 last in March, with good improvement but short term    HOOS Jr: 58.93  [facility] Activity Score: 8    MEDICAL HISTORY:   Past Medical History:   Diagnosis Date     Calculus of kidney      Cancer (H)     basel cell on nose     Cardiac arrest (H)     V-fib, 2/16/2014     Cardiomyopathy (H)      Coronary artery disease      Hyperlipidaemia      Hyperlipidemia with target LDL less than 70 10/31/2010     Diagnosis updated by automated process. Provider to review and confirm.     ICD (implantable cardiac defibrillator) in place     12-1-2014 EF 29%     Mild intermittent asthma      Myocardial infarction (H) 2/2014     Anterior infarct     Tobacco dependence      Stent x 1, on plavix    Medications:     Current Outpatient Medications:      aspirin EC 81 MG EC tablet, Take 1 tablet (81 mg) by mouth daily, Disp: , Rfl:      atorvastatin (LIPITOR) 40 MG tablet, Take 1 tablet (40 mg) by mouth daily, Disp: 90 tablet, Rfl: 2     carvedilol (COREG) 12.5 MG tablet, Take 1 tablet (12.5 mg) by mouth 2 times daily (with meals), Disp: 180 tablet, Rfl: 3     clopidogrel (PLAVIX) 75 MG tablet, Take 1 tablet (75 mg) by mouth daily, Disp: 90 tablet, Rfl: 1     lisinopril (ZESTRIL) 5 MG tablet, Take 1 tablet (5 mg) by mouth daily, Disp: 90 tablet, Rfl: 2     nitroGLYcerin (NITROSTAT) 0.4 MG sublingual tablet, Place 1 tablet (0.4 mg) under the tongue every 5 minutes as needed for chest pain, Disp: 25 tablet, Rfl: 3     spironolactone (ALDACTONE) 25 MG tablet, Take 1 tablet (25 mg) by mouth daily, Disp: 90 tablet, Rfl: 2     triamcinolone (KENALOG) 0.1 % external cream, Apply topically 2 times daily, Disp: 45 g, Rfl: 0    Current Facility-Administered Medications:      methylPREDNISolone (DEPO-MEDROL) injection 40 mg, 40 mg, , , Yeo, Albert, MD, 40 mg at 03/23/22 1449     methylPREDNISolone (DEPO-MEDROL) injection 40 mg, 40 mg, , , Yeo, Albert, MD,  40 mg at 03/23/22 1449     methylPREDNISolone (DEPO-MEDROL) injection 40 mg, 40 mg, , , Yeo, Albert, MD, 40 mg at 11/19/21 1059     methylPREDNISolone (DEPO-MEDROL) injection 40 mg, 40 mg, , , Yeo, Albert, MD, 40 mg at 11/19/21 1059     methylPREDNISolone (DEPO-MEDROL) injection 40 mg, 40 mg, , , Yeo, Albert, MD, 40 mg at 01/31/20 1350     methylPREDNISolone (DEPO-MEDROL) injection 40 mg, 40 mg, , , Yeo, Albert, MD, 40 mg at 01/31/20 1350    Allergies: Penicillins and Tetracyclines    SURGICAL HISTORY:   Past Surgical History:   Procedure Laterality Date     CARDIAC SURGERY       HEART CATH, ANGIOPLASTY  2/2014    Emergent cath,thrombectomy-pt had cardiac arrest-severe 3 vessel CAD-MACY proximal LAD, LAD diagonal kissing balloon, and stent to proximal RCA     HERNIA REPAIR       NO HISTORY OF SURGERY       PROSTATE SURGERY  03/29/2019       FAMILY HISTORY:   Family History   Problem Relation Age of Onset     Cerebrovascular Disease Father      Heart Disease Father         MI     Cancer Mother      Unknown/Adopted Maternal Grandmother      Unknown/Adopted Maternal Grandfather      Unknown/Adopted Paternal Grandmother      Unknown/Adopted Paternal Grandfather      Diabetes Son      Family History Negative Son      Cancer Maternal Uncle        SOCIAL HISTORY:   Social History     Tobacco Use     Smoking status: Current Every Day Smoker     Packs/day: 0.25     Years: 42.00     Pack years: 10.50     Smokeless tobacco: Never Used     Tobacco comment: 1/3 pack a day    Substance Use Topics     Alcohol use: Yes     Comment: rare   semi-retired  , her with wife     REVIEW OF SYSTEMS:  The comprehensive review of systems from the intake form was reviewed with the patient.  No fever, weight change or fatigue. No dry eyes. No oral ulcers, sore throat or voice change. No palpitations, syncope, angina or edema.  No chest pain, excessive sleepiness, shortness of breath or hemoptysis.   No abdominal pain, nausea, vomiting,  "diarrhea or heartburn.  No skin rash. No focal weakness or numbness. No bleeding or lymphadenopathy. No rhinitis or hives.     Exam:  On physical examination the patient appears the stated age, is in no acute distress, affectThe is appropriate, and breathing is non-labored.  Vitals are documented in the EMR and have been reviewed:    Ht 1.829 m (6')   Wt 70.3 kg (155 lb)   BMI 21.02 kg/m    6' 0\"  Body mass index is 21.02 kg/m .    Rises from chair: easily   Gait: mild antalgic with less celena ronal the right, slow steps   Trendelenburg test:  Gains the exam table: easily, tall     RIGHT hip subjective: a little irritated   Abd: 15  Add:  PFC:  Flexion: 80  IRF:-10  ERF:20  Impingement test: + groin     LEFT hip subjective: a little irritated   Abd: 10  Add: 0  PFC:  Flexion: 90  IRF: 5  ERF: 20  Impingement test:+ groin     Distally, the circulatory, motor, and sensation exam is intact with 5/5 EHL, gastroc-soleus, and tibialis anterior.  Sensation to light touch is intact.  Dorsalis pedis and posterior tibialis pulses are palpable.  There are no sores on the feet, no bruising, and no lymphedema.    X-rays:   Bilateral advnaced OA, right more involved than the left, right with bone loss and lateralization.     Answers for HPI/ROS submitted by the patient on 5/12/2022  General Symptoms: No  Skin Symptoms: No  HENT Symptoms: No  EYE SYMPTOMS: No  HEART SYMPTOMS: No  LUNG SYMPTOMS: No  INTESTINAL SYMPTOMS: No  URINARY SYMPTOMS: No  REPRODUCTIVE SYMPTOMS: No  SKELETAL SYMPTOMS: No  BLOOD SYMPTOMS: No  NERVOUS SYSTEM SYMPTOMS: No  MENTAL HEALTH SYMPTOMS: No        Ok Jolly MD    "

## 2022-05-19 NOTE — NURSING NOTE
Reason For Visit:   Chief Complaint   Patient presents with     Consult     Bilateral hip pain/osteoarthritis. Bothersome worsening for 3 years. Right is slightly worse. Loves to golf. Want's both replaced. No surgery on hips or knees. Has had cortisone to greater trochs, and joints. Joint injections helped, bursa ones did not. Sore after golf, ice helps.        Ht 1.829 m (6')   Wt 70.3 kg (155 lb)   BMI 21.02 kg/m           Ping Allan, ATC

## 2022-05-20 ENCOUNTER — TELEPHONE (OUTPATIENT)
Dept: ORTHOPEDICS | Facility: CLINIC | Age: 67
End: 2022-05-20
Payer: MEDICARE

## 2022-05-20 LAB
MDC_IDC_EPISODE_DTM: NORMAL
MDC_IDC_EPISODE_DURATION: 7 S
MDC_IDC_EPISODE_DURATION: 7 S
MDC_IDC_EPISODE_ID: NORMAL
MDC_IDC_EPISODE_TYPE: NORMAL
MDC_IDC_LEAD_IMPLANT_DT: NORMAL
MDC_IDC_LEAD_LOCATION: NORMAL
MDC_IDC_LEAD_LOCATION_DETAIL_1: NORMAL
MDC_IDC_LEAD_MFG: NORMAL
MDC_IDC_LEAD_MODEL: NORMAL
MDC_IDC_LEAD_POLARITY_TYPE: NORMAL
MDC_IDC_LEAD_SERIAL: NORMAL
MDC_IDC_MSMT_BATTERY_DTM: NORMAL
MDC_IDC_MSMT_BATTERY_REMAINING_LONGEVITY: 84 MO
MDC_IDC_MSMT_BATTERY_REMAINING_PERCENTAGE: 88 %
MDC_IDC_MSMT_BATTERY_STATUS: NORMAL
MDC_IDC_MSMT_CAP_CHARGE_DTM: NORMAL
MDC_IDC_MSMT_CAP_CHARGE_TIME: 10.2 S
MDC_IDC_MSMT_CAP_CHARGE_TYPE: NORMAL
MDC_IDC_MSMT_LEADCHNL_RV_IMPEDANCE_VALUE: 761 OHM
MDC_IDC_MSMT_LEADCHNL_RV_PACING_THRESHOLD_AMPLITUDE: 0.6 V
MDC_IDC_MSMT_LEADCHNL_RV_PACING_THRESHOLD_PULSEWIDTH: 0.5 MS
MDC_IDC_PG_IMPLANT_DTM: NORMAL
MDC_IDC_PG_MFG: NORMAL
MDC_IDC_PG_MODEL: NORMAL
MDC_IDC_PG_SERIAL: NORMAL
MDC_IDC_PG_TYPE: NORMAL
MDC_IDC_SESS_CLINIC_NAME: NORMAL
MDC_IDC_SESS_DTM: NORMAL
MDC_IDC_SESS_TYPE: NORMAL
MDC_IDC_SET_BRADY_LOWRATE: 40 {BEATS}/MIN
MDC_IDC_SET_BRADY_MODE: NORMAL
MDC_IDC_SET_LEADCHNL_RV_PACING_AMPLITUDE: 2 V
MDC_IDC_SET_LEADCHNL_RV_PACING_POLARITY: NORMAL
MDC_IDC_SET_LEADCHNL_RV_PACING_PULSEWIDTH: 0.5 MS
MDC_IDC_SET_LEADCHNL_RV_SENSING_ADAPTATION_MODE: NORMAL
MDC_IDC_SET_LEADCHNL_RV_SENSING_POLARITY: NORMAL
MDC_IDC_SET_LEADCHNL_RV_SENSING_SENSITIVITY: 0.6 MV
MDC_IDC_SET_ZONE_DETECTION_INTERVAL: 250 MS
MDC_IDC_SET_ZONE_DETECTION_INTERVAL: 300 MS
MDC_IDC_SET_ZONE_DETECTION_INTERVAL: 353 MS
MDC_IDC_SET_ZONE_TYPE: NORMAL
MDC_IDC_SET_ZONE_VENDOR_TYPE: NORMAL
MDC_IDC_STAT_BRADY_DTM_END: NORMAL
MDC_IDC_STAT_BRADY_DTM_START: NORMAL
MDC_IDC_STAT_BRADY_RV_PERCENT_PACED: 0 %
MDC_IDC_STAT_EPISODE_RECENT_COUNT: 0
MDC_IDC_STAT_EPISODE_RECENT_COUNT: 2
MDC_IDC_STAT_EPISODE_RECENT_COUNT_DTM_END: NORMAL
MDC_IDC_STAT_EPISODE_RECENT_COUNT_DTM_START: NORMAL
MDC_IDC_STAT_EPISODE_TYPE: NORMAL
MDC_IDC_STAT_EPISODE_VENDOR_TYPE: NORMAL
MDC_IDC_STAT_TACHYTHERAPY_ATP_DELIVERED_RECENT: 0
MDC_IDC_STAT_TACHYTHERAPY_ATP_DELIVERED_TOTAL: 0
MDC_IDC_STAT_TACHYTHERAPY_RECENT_DTM_END: NORMAL
MDC_IDC_STAT_TACHYTHERAPY_RECENT_DTM_START: NORMAL
MDC_IDC_STAT_TACHYTHERAPY_SHOCKS_ABORTED_RECENT: 0
MDC_IDC_STAT_TACHYTHERAPY_SHOCKS_ABORTED_TOTAL: 0
MDC_IDC_STAT_TACHYTHERAPY_SHOCKS_DELIVERED_RECENT: 0
MDC_IDC_STAT_TACHYTHERAPY_SHOCKS_DELIVERED_TOTAL: 0
MDC_IDC_STAT_TACHYTHERAPY_TOTAL_DTM_END: NORMAL
MDC_IDC_STAT_TACHYTHERAPY_TOTAL_DTM_START: NORMAL

## 2022-05-20 NOTE — TELEPHONE ENCOUNTER
Patient is scheduled for surgery with Dr. Jolly    Spoke with: Saravanan    Date of Surgery: 9/21/2022    Location: [place]    Informed patient they will need an adult  yes    Pre op with Provider n/a    H&P: Scheduled with pcp    Pre-procedure COVID-19 Test: [place] on 9/19/2022    Additional imaging/appointments: n/a    Surgery packet: Mailed on 5/20/22     Additional comments: n/a

## 2022-06-04 ENCOUNTER — HEALTH MAINTENANCE LETTER (OUTPATIENT)
Age: 67
End: 2022-06-04

## 2022-06-21 NOTE — PROGRESS NOTES
ASSESSMENT & PLAN  Patient Instructions     1. Primary osteoarthritis of right hip    2. Primary localized osteoarthritis of left hip      -Patient is following up for bilateral hip pain due to arthritis  -Patient tolerated bilateral hip intra-articular cortisone injection today without complications.  Patient was given postprocedure instructions  -Patient is scheduled to have right hip replacement performed on 9/21/2022.  I contacted Dr. Jolly to let him know about today's cortisone injection.  Dr. Jolly said it was okay to proceed to communicate the risks of undergoing a cortisone injection within 3 months of his hip replacement surgery.  Patient understands and is willing to take the risks for today's cortisone injection  -Patient will follow up with Dr. Jolly as scheduled  -Call direct clinic number [[phone]] at any time with questions or concerns.    Albert Yeo MD [facility] and [facility] Medicine  [facility]          -----    SUBJECTIVE:  Iraida Hernandez is a 67 year old male who is seen in follow-up for bilateral hip pain.They were last seen 3/23/22    Since their last visit reports 0% - (About the same as last time). They indicate that their current pain level is 5/10. They have tried corticosteroid injection (most recent date: 3/23/22) that provided  1 month(s) of relief.      The patient is seen by themselves.    Patient's past medical, surgical, social, and family histories were reviewed today and no changes are noted.    REVIEW OF SYSTEMS:  Constitutional: NEGATIVE for fever, chills, change in weight  Skin: NEGATIVE for worrisome rashes, moles or lesions  GI/: NEGATIVE for bowel or bladder changes  Neuro: NEGATIVE for weakness, dizziness or paresthesias    OBJECTIVE:  /68   Ht 1.829 m (6')   Wt 69.2 kg (152 lb 9.6 oz)   BMI 20.70 kg/m     General: healthy, alert and in no distress  HEENT: no scleral icterus or conjunctival erythema  Skin: no suspicious  lesions or rash. No jaundice.  CV: regular rhythm by palpation, no pedal edema  Resp: normal respiratory effort without conversational dyspnea   Psych: normal mood and affect  Gait: normal steady gait with appropriate coordination and balance  Neuro: normal light touch sensory exam of the extremities.    MSK:/  BILATERAL HIP  Inspection:    No obvious deformity or asymmetry, pelvis level  Palpation:   landmarks are nontender.  Active Range of Motion:     Flexion limited by tightness, IR limited by tightness, ER  limited by tightness  Strength:    Flexion 5-/5, adduction 5-/5, abduction 5-/5  Special Tests:    Positive: none    Negative: Logroll, THEODORE, anterior impingement (FADIR), posterior impingement (EX/AB/ER)    Independent visualization of the below image:  Large Joint Injection/Arthocentesis: bilateral hip joint    Date/Time: 6/24/2022 1:45 PM  Performed by: Yeo, Albert, MD  Authorized by: Yeo, Albert, MD     Indications:  Pain  Needle Size:  22 G  Guidance: ultrasound    Approach:  Anterior  Location:  Hip  Laterality:  Bilateral      Site:  Bilateral hip joint  Medications (Right):  40 mg methylPREDNISolone 40 MG/ML  Medications (Left):  40 mg methylPREDNISolone 40 MG/ML  Outcome:  Tolerated well, no immediate complications  Procedure discussed: discussed risks, benefits, and alternatives    Consent Given by:  Patient  Prep: patient was prepped and draped in usual sterile fashion          Patient's conditions were thoroughly discussed during today's visit with total time spent face-to-face with the patient and documentation being 25 minutes.    Albert Yeo MD, [facility]

## 2022-06-24 ENCOUNTER — OFFICE VISIT (OUTPATIENT)
Dept: ORTHOPEDICS | Facility: CLINIC | Age: 67
End: 2022-06-24
Payer: MEDICARE

## 2022-06-24 VITALS
HEIGHT: 72 IN | BODY MASS INDEX: 20.67 KG/M2 | SYSTOLIC BLOOD PRESSURE: 110 MMHG | WEIGHT: 152.6 LBS | DIASTOLIC BLOOD PRESSURE: 68 MMHG

## 2022-06-24 DIAGNOSIS — M16.12 PRIMARY LOCALIZED OSTEOARTHRITIS OF LEFT HIP: ICD-10-CM

## 2022-06-24 DIAGNOSIS — M16.11 PRIMARY OSTEOARTHRITIS OF RIGHT HIP: Primary | ICD-10-CM

## 2022-06-24 PROCEDURE — 20611 DRAIN/INJ JOINT/BURSA W/US: CPT | Mod: 50 | Performed by: FAMILY MEDICINE

## 2022-06-24 PROCEDURE — 99213 OFFICE O/P EST LOW 20 MIN: CPT | Mod: 25 | Performed by: FAMILY MEDICINE

## 2022-06-24 RX ORDER — METHYLPREDNISOLONE ACETATE 40 MG/ML
40 INJECTION, SUSPENSION INTRA-ARTICULAR; INTRALESIONAL; INTRAMUSCULAR; SOFT TISSUE
Status: DISCONTINUED | OUTPATIENT
Start: 2022-06-24 | End: 2023-01-04

## 2022-06-24 RX ADMIN — METHYLPREDNISOLONE ACETATE 40 MG: 40 INJECTION, SUSPENSION INTRA-ARTICULAR; INTRALESIONAL; INTRAMUSCULAR; SOFT TISSUE at 13:45

## 2022-06-24 NOTE — LETTER
6/24/2022         RE: Iraida Hernandez  [address] [address] [address]        Dear Colleague,    Thank you for referring your patient, Iraida Hernandez, to the [facility] [place]. Please see a copy of my visit note below.    ASSESSMENT & PLAN  Patient Instructions     1. Primary osteoarthritis of right hip    2. Primary localized osteoarthritis of left hip      -Patient is following up for bilateral hip pain due to arthritis  -Patient tolerated bilateral hip intra-articular cortisone injection today without complications.  Patient was given postprocedure instructions  -Patient is scheduled to have right hip replacement performed on 9/21/2022.  I contacted Dr. Jolly to let him know about today's cortisone injection.  Dr. Jolly said it was okay to proceed to communicate the risks of undergoing a cortisone injection within 3 months of his hip replacement surgery.  Patient understands and is willing to take the risks for today's cortisone injection  -Patient will follow up with Dr. Jolly as scheduled  -Call direct clinic number [[phone]] at any time with questions or concerns.    Albert Yeo MD [facility] Orthopedics and Sports Medicine  [facility] [place]          -----    SUBJECTIVE:  Iraida Hernandez is a 67 year old male who is seen in follow-up for bilateral hip pain.They were last seen 3/23/22    Since their last visit reports 0% - (About the same as last time). They indicate that their current pain level is 5/10. They have tried corticosteroid injection (most recent date: 3/23/22) that provided  1 month(s) of relief.      The patient is seen by themselves.    Patient's past medical, surgical, social, and family histories were reviewed today and no changes are noted.    REVIEW OF SYSTEMS:  Constitutional: NEGATIVE for fever, chills, change in weight  Skin: NEGATIVE for worrisome rashes, moles or lesions  GI/: NEGATIVE for bowel or bladder  changes  Neuro: NEGATIVE for weakness, dizziness or paresthesias    OBJECTIVE:  /68   Ht 1.829 m (6')   Wt 69.2 kg (152 lb 9.6 oz)   BMI 20.70 kg/m     General: healthy, alert and in no distress  HEENT: no scleral icterus or conjunctival erythema  Skin: no suspicious lesions or rash. No jaundice.  CV: regular rhythm by palpation, no pedal edema  Resp: normal respiratory effort without conversational dyspnea   Psych: normal mood and affect  Gait: normal steady gait with appropriate coordination and balance  Neuro: normal light touch sensory exam of the extremities.    MSK:/  BILATERAL HIP  Inspection:    No obvious deformity or asymmetry, pelvis level  Palpation:   landmarks are nontender.  Active Range of Motion:     Flexion limited by tightness, IR limited by tightness, ER  limited by tightness  Strength:    Flexion 5-/5, adduction 5-/5, abduction 5-/5  Special Tests:    Positive: none    Negative: Logroll, THEODORE, anterior impingement (FADIR), posterior impingement (EX/AB/ER)    Independent visualization of the below image:  Large Joint Injection/Arthocentesis: bilateral hip joint    Date/Time: 6/24/2022 1:45 PM  Performed by: Yeo, Albert, MD  Authorized by: Yeo, Albert, MD     Indications:  Pain  Needle Size:  22 G  Guidance: ultrasound    Approach:  Anterior  Location:  Hip  Laterality:  Bilateral      Site:  Bilateral hip joint  Medications (Right):  40 mg methylPREDNISolone 40 MG/ML  Medications (Left):  40 mg methylPREDNISolone 40 MG/ML  Outcome:  Tolerated well, no immediate complications  Procedure discussed: discussed risks, benefits, and alternatives    Consent Given by:  Patient  Prep: patient was prepped and draped in usual sterile fashion          Patient's conditions were thoroughly discussed during today's visit with total time spent face-to-face with the patient and documentation being 25 minutes.    Albert Yeo MD, [facility]            Again, thank you for  allowing me to participate in the care of your patient.        Sincerely,        Albert Yeo, MD

## 2022-06-24 NOTE — PATIENT INSTRUCTIONS
1. Primary osteoarthritis of right hip    2. Primary localized osteoarthritis of left hip      -Patient is following up for bilateral hip pain due to arthritis  -Patient tolerated bilateral hip intra-articular cortisone injection today without complications.  Patient was given postprocedure instructions  -Patient is scheduled to have right hip replacement performed on 9/21/2022.  I contacted Dr. Jolly to let him know about today's cortisone injection.  Dr. Jolly said it was okay to proceed to communicate the risks of undergoing a cortisone injection within 3 months of his hip replacement surgery.  Patient understands and is willing to take the risks for today's cortisone injection  -Patient will follow up with Dr. Jolly as scheduled  -Call direct clinic number [[phone]] at any time with questions or concerns.    Albert Yeo MD [facility]  [facility] [place]

## 2022-08-09 ENCOUNTER — PREP FOR PROCEDURE (OUTPATIENT)
Dept: ORTHOPEDICS | Facility: CLINIC | Age: 67
End: 2022-08-09

## 2022-08-09 ENCOUNTER — TELEPHONE (OUTPATIENT)
Dept: ORTHOPEDICS | Facility: CLINIC | Age: 67
End: 2022-08-09

## 2022-08-09 DIAGNOSIS — M25.552 LEFT HIP PAIN: Primary | ICD-10-CM

## 2022-08-09 NOTE — TELEPHONE ENCOUNTER
Returned patients call to finish scheduling for surgery.   Patient stated that he cannot talk at the moment.     Patient and I established a phone call meeting to finish scheduling his surgery with Dr. Jolly for 8/10/2022.

## 2022-08-09 NOTE — TELEPHONE ENCOUNTER
The surgery phone number [phone] is not working can someone from surgery scheduling call pt back

## 2022-08-09 NOTE — TELEPHONE ENCOUNTER
Phoned patient to get him schedule for his surgery with Dr. Jolly.     Patient was unavailable, provided call back number: [phone].

## 2022-08-10 ENCOUNTER — DOCUMENTATION ONLY (OUTPATIENT)
Dept: ORTHOPEDICS | Facility: CLINIC | Age: 67
End: 2022-08-10

## 2022-08-10 NOTE — PROGRESS NOTES
Patient is scheduled for surgery with Dr. Jolly    Spoke with: Saravanan    Date of Surgery: 1/04/23    Location: [facility]    Informed patient they will need an adult  Yes    H&P: Scheduled with PCP    Pre-procedure COVID-19 Test: 1/02/2023    Additional imaging/appointments: POP made    Surgery packet: Received in Clinic     Additional comments: N/A

## 2022-08-17 ENCOUNTER — ANCILLARY PROCEDURE (OUTPATIENT)
Dept: CARDIOLOGY | Facility: CLINIC | Age: 67
End: 2022-08-17
Attending: INTERNAL MEDICINE
Payer: MEDICARE

## 2022-08-17 DIAGNOSIS — I25.5 ISCHEMIC CARDIOMYOPATHY: ICD-10-CM

## 2022-08-17 DIAGNOSIS — Z95.810 ICD (IMPLANTABLE CARDIOVERTER-DEFIBRILLATOR) IN PLACE: ICD-10-CM

## 2022-08-17 PROCEDURE — 93296 REM INTERROG EVL PM/IDS: CPT | Performed by: INTERNAL MEDICINE

## 2022-08-17 PROCEDURE — 93295 DEV INTERROG REMOTE 1/2/MLT: CPT | Performed by: INTERNAL MEDICINE

## 2022-08-18 DIAGNOSIS — I25.5 ISCHEMIC CARDIOMYOPATHY: Primary | ICD-10-CM

## 2022-08-18 DIAGNOSIS — Z95.810 ICD (IMPLANTABLE CARDIOVERTER-DEFIBRILLATOR) IN PLACE: ICD-10-CM

## 2022-08-22 ENCOUNTER — MYC MEDICAL ADVICE (OUTPATIENT)
Dept: CARDIOLOGY | Facility: CLINIC | Age: 67
End: 2022-08-22

## 2022-08-22 ENCOUNTER — TELEPHONE (OUTPATIENT)
Dept: CARDIOLOGY | Facility: CLINIC | Age: 67
End: 2022-08-22

## 2022-08-22 NOTE — CONFIDENTIAL NOTE
Left message for Mariya and explained that we have patients hold aspiring for 10 days before surgery, but leave the Plavix dosing up to the prescribing providers.   Asked her to call back with questions.  Maritza Linares RN

## 2022-08-22 NOTE — TELEPHONE ENCOUNTER
Called patient to discuss. He states that his ortho surgery team wanted to know about holding blood thinners. We discussed that he is on aspirin and plavix, not true anticoagulation.  Patient was unsure what his ortho team would like regarding hold time. Recommended that he discuss this with his ortho team to see what their preferred hold time would be from a surgical standpoint. Offered that his ortho team could contact me directly with any questions or concerns.     Will send message to scheduling to get patient sent up with TREASURE visit in November.       ----- Message from Librado Fletcher sent at 8/22/2022 12:01 PM CDT -----  Regarding: Medications, Needs Appointment  Saravanan Grayson would like a call from someone in Dr Stevens's nurse team. He is scheduled for a procedure on September 21st and was instructed to call Dr Stevens for instructions regarding cardiac medications leading to procedure.     He also needs an appointment sometime in November before a 2nd procedure January 4th. Unfortunately there is no availability besides TAVR slots. Please reach out to patient then advise for scheduling.     Thanks,  Librado Fletcher on 8/22/2022 at 12:05 PM

## 2022-08-23 NOTE — TELEPHONE ENCOUNTER
Left return voicemail for Maritza CAZARES. Informed her that patient's stents were placed in 2014 so he should be able to hold plavix without concern. I asked that their surgery team make a recommendation regarding plavix hold which can then be reviewed with Dr. Stevens for approval.

## 2022-08-24 LAB
MDC_IDC_EPISODE_DTM: NORMAL
MDC_IDC_EPISODE_DURATION: 7 S
MDC_IDC_EPISODE_DURATION: 7 S
MDC_IDC_EPISODE_DURATION: 8 S
MDC_IDC_EPISODE_ID: NORMAL
MDC_IDC_EPISODE_TYPE: NORMAL
MDC_IDC_LEAD_IMPLANT_DT: NORMAL
MDC_IDC_LEAD_LOCATION: NORMAL
MDC_IDC_LEAD_LOCATION_DETAIL_1: NORMAL
MDC_IDC_LEAD_MFG: NORMAL
MDC_IDC_LEAD_MODEL: NORMAL
MDC_IDC_LEAD_POLARITY_TYPE: NORMAL
MDC_IDC_LEAD_SERIAL: NORMAL
MDC_IDC_MSMT_BATTERY_DTM: NORMAL
MDC_IDC_MSMT_BATTERY_REMAINING_LONGEVITY: 78 MO
MDC_IDC_MSMT_BATTERY_REMAINING_PERCENTAGE: 85 %
MDC_IDC_MSMT_BATTERY_STATUS: NORMAL
MDC_IDC_MSMT_CAP_CHARGE_DTM: NORMAL
MDC_IDC_MSMT_CAP_CHARGE_TIME: 10.2 S
MDC_IDC_MSMT_CAP_CHARGE_TYPE: NORMAL
MDC_IDC_MSMT_LEADCHNL_RV_IMPEDANCE_VALUE: 678 OHM
MDC_IDC_MSMT_LEADCHNL_RV_PACING_THRESHOLD_AMPLITUDE: 0.6 V
MDC_IDC_MSMT_LEADCHNL_RV_PACING_THRESHOLD_PULSEWIDTH: 0.5 MS
MDC_IDC_PG_IMPLANT_DTM: NORMAL
MDC_IDC_PG_MFG: NORMAL
MDC_IDC_PG_MODEL: NORMAL
MDC_IDC_PG_SERIAL: NORMAL
MDC_IDC_PG_TYPE: NORMAL
MDC_IDC_SESS_CLINIC_NAME: NORMAL
MDC_IDC_SESS_DTM: NORMAL
MDC_IDC_SESS_TYPE: NORMAL
MDC_IDC_SET_BRADY_LOWRATE: 40 {BEATS}/MIN
MDC_IDC_SET_BRADY_MODE: NORMAL
MDC_IDC_SET_LEADCHNL_RV_PACING_AMPLITUDE: 2 V
MDC_IDC_SET_LEADCHNL_RV_PACING_POLARITY: NORMAL
MDC_IDC_SET_LEADCHNL_RV_PACING_PULSEWIDTH: 0.5 MS
MDC_IDC_SET_LEADCHNL_RV_SENSING_ADAPTATION_MODE: NORMAL
MDC_IDC_SET_LEADCHNL_RV_SENSING_POLARITY: NORMAL
MDC_IDC_SET_LEADCHNL_RV_SENSING_SENSITIVITY: 0.6 MV
MDC_IDC_SET_ZONE_DETECTION_INTERVAL: 250 MS
MDC_IDC_SET_ZONE_DETECTION_INTERVAL: 300 MS
MDC_IDC_SET_ZONE_DETECTION_INTERVAL: 353 MS
MDC_IDC_SET_ZONE_TYPE: NORMAL
MDC_IDC_SET_ZONE_VENDOR_TYPE: NORMAL
MDC_IDC_STAT_BRADY_DTM_END: NORMAL
MDC_IDC_STAT_BRADY_DTM_START: NORMAL
MDC_IDC_STAT_BRADY_RV_PERCENT_PACED: 0 %
MDC_IDC_STAT_EPISODE_RECENT_COUNT: 0
MDC_IDC_STAT_EPISODE_RECENT_COUNT: 5
MDC_IDC_STAT_EPISODE_RECENT_COUNT_DTM_END: NORMAL
MDC_IDC_STAT_EPISODE_RECENT_COUNT_DTM_START: NORMAL
MDC_IDC_STAT_EPISODE_TYPE: NORMAL
MDC_IDC_STAT_EPISODE_VENDOR_TYPE: NORMAL
MDC_IDC_STAT_TACHYTHERAPY_ATP_DELIVERED_RECENT: 0
MDC_IDC_STAT_TACHYTHERAPY_ATP_DELIVERED_TOTAL: 0
MDC_IDC_STAT_TACHYTHERAPY_RECENT_DTM_END: NORMAL
MDC_IDC_STAT_TACHYTHERAPY_RECENT_DTM_START: NORMAL
MDC_IDC_STAT_TACHYTHERAPY_SHOCKS_ABORTED_RECENT: 0
MDC_IDC_STAT_TACHYTHERAPY_SHOCKS_ABORTED_TOTAL: 0
MDC_IDC_STAT_TACHYTHERAPY_SHOCKS_DELIVERED_RECENT: 0
MDC_IDC_STAT_TACHYTHERAPY_SHOCKS_DELIVERED_TOTAL: 0
MDC_IDC_STAT_TACHYTHERAPY_TOTAL_DTM_END: NORMAL
MDC_IDC_STAT_TACHYTHERAPY_TOTAL_DTM_START: NORMAL

## 2022-08-24 SDOH — ECONOMIC STABILITY: INCOME INSECURITY: HOW HARD IS IT FOR YOU TO PAY FOR THE VERY BASICS LIKE FOOD, HOUSING, MEDICAL CARE, AND HEATING?: NOT HARD AT ALL

## 2022-08-24 SDOH — ECONOMIC STABILITY: TRANSPORTATION INSECURITY
IN THE PAST 12 MONTHS, HAS LACK OF TRANSPORTATION KEPT YOU FROM MEETINGS, WORK, OR FROM GETTING THINGS NEEDED FOR DAILY LIVING?: NO

## 2022-08-24 SDOH — HEALTH STABILITY: PHYSICAL HEALTH: ON AVERAGE, HOW MANY DAYS PER WEEK DO YOU ENGAGE IN MODERATE TO STRENUOUS EXERCISE (LIKE A BRISK WALK)?: 2 DAYS

## 2022-08-24 SDOH — ECONOMIC STABILITY: FOOD INSECURITY: WITHIN THE PAST 12 MONTHS, YOU WORRIED THAT YOUR FOOD WOULD RUN OUT BEFORE YOU GOT MONEY TO BUY MORE.: NEVER TRUE

## 2022-08-24 SDOH — HEALTH STABILITY: PHYSICAL HEALTH: ON AVERAGE, HOW MANY MINUTES DO YOU ENGAGE IN EXERCISE AT THIS LEVEL?: 60 MIN

## 2022-08-24 SDOH — ECONOMIC STABILITY: FOOD INSECURITY: WITHIN THE PAST 12 MONTHS, THE FOOD YOU BOUGHT JUST DIDN'T LAST AND YOU DIDN'T HAVE MONEY TO GET MORE.: NEVER TRUE

## 2022-08-24 SDOH — ECONOMIC STABILITY: INCOME INSECURITY: IN THE LAST 12 MONTHS, WAS THERE A TIME WHEN YOU WERE NOT ABLE TO PAY THE MORTGAGE OR RENT ON TIME?: NO

## 2022-08-24 SDOH — ECONOMIC STABILITY: TRANSPORTATION INSECURITY
IN THE PAST 12 MONTHS, HAS THE LACK OF TRANSPORTATION KEPT YOU FROM MEDICAL APPOINTMENTS OR FROM GETTING MEDICATIONS?: NO

## 2022-08-24 ASSESSMENT — ASTHMA QUESTIONNAIRES
ACT_TOTALSCORE: 25
QUESTION_2 LAST FOUR WEEKS HOW OFTEN HAVE YOU HAD SHORTNESS OF BREATH: NOT AT ALL
QUESTION_1 LAST FOUR WEEKS HOW MUCH OF THE TIME DID YOUR ASTHMA KEEP YOU FROM GETTING AS MUCH DONE AT WORK, SCHOOL OR AT HOME: NONE OF THE TIME
QUESTION_3 LAST FOUR WEEKS HOW OFTEN DID YOUR ASTHMA SYMPTOMS (WHEEZING, COUGHING, SHORTNESS OF BREATH, CHEST TIGHTNESS OR PAIN) WAKE YOU UP AT NIGHT OR EARLIER THAN USUAL IN THE MORNING: NOT AT ALL
ACT_TOTALSCORE: 25
QUESTION_4 LAST FOUR WEEKS HOW OFTEN HAVE YOU USED YOUR RESCUE INHALER OR NEBULIZER MEDICATION (SUCH AS ALBUTEROL): NOT AT ALL
QUESTION_5 LAST FOUR WEEKS HOW WOULD YOU RATE YOUR ASTHMA CONTROL: COMPLETELY CONTROLLED

## 2022-08-24 ASSESSMENT — LIFESTYLE VARIABLES
AUDIT-C TOTAL SCORE: 3
HOW OFTEN DO YOU HAVE A DRINK CONTAINING ALCOHOL: 2-3 TIMES A WEEK
HOW MANY STANDARD DRINKS CONTAINING ALCOHOL DO YOU HAVE ON A TYPICAL DAY: 1 OR 2
SKIP TO QUESTIONS 9-10: 1
HOW OFTEN DO YOU HAVE SIX OR MORE DRINKS ON ONE OCCASION: NEVER

## 2022-08-24 ASSESSMENT — SOCIAL DETERMINANTS OF HEALTH (SDOH)
IN A TYPICAL WEEK, HOW MANY TIMES DO YOU TALK ON THE PHONE WITH FAMILY, FRIENDS, OR NEIGHBORS?: MORE THAN THREE TIMES A WEEK
HOW OFTEN DO YOU GET TOGETHER WITH FRIENDS OR RELATIVES?: TWICE A WEEK
DO YOU BELONG TO ANY CLUBS OR ORGANIZATIONS SUCH AS CHURCH GROUPS UNIONS, FRATERNAL OR ATHLETIC GROUPS, OR SCHOOL GROUPS?: NO
HOW OFTEN DO YOU ATTEND CHURCH OR RELIGIOUS SERVICES?: 1 TO 4 TIMES PER YEAR

## 2022-08-30 ASSESSMENT — ASTHMA QUESTIONNAIRES
ACT_TOTALSCORE_PEDS: 27
QUESTION_7 LAST FOUR WEEKS HOW MANY DAYS DID YOUR CHILD WAKE UP DURING THE NIGHT BECAUSE OF ASTHMA: NOT AT ALL
QUESTION_2 HOW MUCH OF A PROBLEM IS YOUR ASTHMA WHEN YOU RUN, EXCERCISE OR PLAY SPORTS: IT'S NOT A PROBLEM.
QUESTION_4 DO YOU WAKE UP DURING THE NIGHT BECAUSE OF YOUR ASTHMA: NO, NONE OF THE TIME.
ACT_TOTALSCORE_PEDS: 27
QUESTION_5 LAST FOUR WEEKS HOW MANY DAYS DID YOUR CHILD HAVE ANY DAYTIME ASTHMA SYMPTOMS: NOT AT ALL
QUESTION_6 LAST FOUR WEEKS HOW MANY DAYS DID YOUR CHILD WHEEZE DURING THE DAY BECAUSE OF ASTHMA: NOT AT ALL
QUESTION_1 HOW IS YOUR ASTHMA TODAY: VERY GOOD
QUESTION_3 DO YOU COUGH BECAUSE OF YOUR ASTHMA: NO, NONE OF THE TIME.

## 2022-08-31 ENCOUNTER — OFFICE VISIT (OUTPATIENT)
Dept: FAMILY MEDICINE | Facility: CLINIC | Age: 67
End: 2022-08-31
Payer: MEDICARE

## 2022-08-31 ENCOUNTER — TELEPHONE (OUTPATIENT)
Dept: FAMILY MEDICINE | Facility: CLINIC | Age: 67
End: 2022-08-31

## 2022-08-31 VITALS
TEMPERATURE: 97.9 F | HEART RATE: 68 BPM | BODY MASS INDEX: 21.01 KG/M2 | DIASTOLIC BLOOD PRESSURE: 81 MMHG | WEIGHT: 154.9 LBS | SYSTOLIC BLOOD PRESSURE: 133 MMHG | OXYGEN SATURATION: 100 % | RESPIRATION RATE: 16 BRPM

## 2022-08-31 DIAGNOSIS — I47.20 VENTRICULAR TACHYCARDIA (H): ICD-10-CM

## 2022-08-31 DIAGNOSIS — I25.10 CORONARY ARTERY DISEASE INVOLVING NATIVE CORONARY ARTERY OF NATIVE HEART WITHOUT ANGINA PECTORIS: ICD-10-CM

## 2022-08-31 DIAGNOSIS — Z01.818 PREOP GENERAL PHYSICAL EXAM: Primary | ICD-10-CM

## 2022-08-31 DIAGNOSIS — C61 PROSTATE CANCER (H): ICD-10-CM

## 2022-08-31 DIAGNOSIS — Z87.891 PERSONAL HISTORY OF TOBACCO USE: ICD-10-CM

## 2022-08-31 DIAGNOSIS — I46.9 CARDIAC ARREST (H): ICD-10-CM

## 2022-08-31 DIAGNOSIS — I42.9 CARDIOMYOPATHY, UNSPECIFIED TYPE (H): ICD-10-CM

## 2022-08-31 DIAGNOSIS — M16.12 ARTHRITIS OF LEFT HIP: ICD-10-CM

## 2022-08-31 LAB
ERYTHROCYTE [DISTWIDTH] IN BLOOD BY AUTOMATED COUNT: 13.2 % (ref 10–15)
HCT VFR BLD AUTO: 42.1 % (ref 40–53)
HGB BLD-MCNC: 14.7 G/DL (ref 13.3–17.7)
MCH RBC QN AUTO: 34.9 PG (ref 26.5–33)
MCHC RBC AUTO-ENTMCNC: 34.9 G/DL (ref 31.5–36.5)
MCV RBC AUTO: 100 FL (ref 78–100)
PLATELET # BLD AUTO: 232 10E3/UL (ref 150–450)
RBC # BLD AUTO: 4.21 10E6/UL (ref 4.4–5.9)
WBC # BLD AUTO: 9 10E3/UL (ref 4–11)

## 2022-08-31 PROCEDURE — 93000 ELECTROCARDIOGRAM COMPLETE: CPT | Performed by: PHYSICIAN ASSISTANT

## 2022-08-31 PROCEDURE — 80048 BASIC METABOLIC PNL TOTAL CA: CPT | Performed by: PHYSICIAN ASSISTANT

## 2022-08-31 PROCEDURE — 36415 COLL VENOUS BLD VENIPUNCTURE: CPT | Performed by: PHYSICIAN ASSISTANT

## 2022-08-31 PROCEDURE — 84153 ASSAY OF PSA TOTAL: CPT | Performed by: PHYSICIAN ASSISTANT

## 2022-08-31 PROCEDURE — 99214 OFFICE O/P EST MOD 30 MIN: CPT | Performed by: PHYSICIAN ASSISTANT

## 2022-08-31 PROCEDURE — 85027 COMPLETE CBC AUTOMATED: CPT | Performed by: PHYSICIAN ASSISTANT

## 2022-08-31 PROCEDURE — G0296 VISIT TO DETERM LDCT ELIG: HCPCS | Performed by: PHYSICIAN ASSISTANT

## 2022-08-31 NOTE — PROGRESS NOTES
[facility]  [address] [address] [place] [address]  Phone: [phone]  Primary Provider: Tavares Campbell  Pre-op Performing Provider: TAVARES CAMPBELL    6}  PREOPERATIVE EVALUATION:  Today's date: 8/31/2022    Iraida Hernandez is a 67 year old male who presents for a preoperative evaluation.    Surgical Information:  Surgery/Procedure: ARTHROPLASTY, LEFT HIP, TOTAL  Surgery Location:  [facility]   Surgeon: Ok Jolly MD  Surgery Date:9/21/2022  Time of Surgery: TBD  Where patient plans to recover: At home with family  Fax number for surgical facility: [phone]     Type of Anesthesia Anticipated: General    Assessment & Plan     The proposed surgical procedure is considered INTERMEDIATE risk.    Preop general physical exam  Stable exam.   - Basic metabolic panel  (Ca, Cl, CO2, Creat, Gluc, K, Na, BUN); Future  - CBC with platelets; Future  - EKG 12-lead complete w/read - Clinics  - Basic metabolic panel  (Ca, Cl, CO2, Creat, Gluc, K, Na, BUN)  - CBC with platelets    Arthritis of left hip    - Basic metabolic panel  (Ca, Cl, CO2, Creat, Gluc, K, Na, BUN); Future  - CBC with platelets; Future  - EKG 12-lead complete w/read - Clinics  - Basic metabolic panel  (Ca, Cl, CO2, Creat, Gluc, K, Na, BUN)  - CBC with platelets    Coronary artery disease involving native coronary artery of native heart without angina pectoris  History of this. Stable. Followed by cardiology. ekg unchanged.   - Basic metabolic panel  (Ca, Cl, CO2, Creat, Gluc, K, Na, BUN); Future  - CBC with platelets; Future  - EKG 12-lead complete w/read - Clinics  - Basic metabolic panel  (Ca, Cl, CO2, Creat, Gluc, K, Na, BUN)  - CBC with platelets    Cardiomyopathy, unspecified type (H)  As above     Cardiac arrest (H)  As above     Ventricular tachycardia (H)  As above     Prostate cancer (H)    - PSA tumor marker [[id number]]    Personal history of tobacco use  Due.   - Prof fee: Shared  Decision Making for Lung Cancer Screening  - CT Chest Lung Cancer Scrn Low Dose wo; Future     Implanted Device:   - Type of device: ICD Patient advised to bring device information on day of surgery.      Risks and Recommendations:  The patient has the following additional risks and recommendations for perioperative complications:  Cardiovascular:   - METS>4. Cleared for surgery.     Medication Instructions:  Patient is to take all scheduled medications on the day of surgery EXCEPT for modifications listed below:   - aspirin: Discontinue aspirin 7-10 days prior to procedure to reduce bleeding risk. It should be resumed postoperatively.    - clopidrogel (Plavix), prasugrel (Effient), ticagrelor (Brilinta): No contraindication to stopping Plavix, HOLD 5-7 days before surgery.    - ACE/ARB: continue due to hf history.    - Beta Blockers: Continue taking on the day of surgery.   - Diuretics: continue due to HF history.     RECOMMENDATION:  APPROVAL GIVEN to proceed with proposed procedure pending review of diagnostic evaluation.      Subjective     HPI related to upcoming procedure: history of left hip arthritis. The above procedure was deemed the next best step in management.     Preop Questions 8/24/2022   1. Have you ever had a heart attack or stroke? YES -    2. Have you ever had surgery on your heart or blood vessels, such as a stent placement, a coronary artery bypass, or surgery on an artery in your head, neck, heart, or legs? YES - s/p MI   3. Do you have chest pain with activity? No   4. Do you have a history of  heart failure? YES - ischemic cardiomyopathy reduced EF present at last echo on 11/2021   5. Do you currently have a cold, bronchitis or symptoms of other infection? No   6. Do you have a cough, shortness of breath, or wheezing? No   7. Do you or anyone in your family have previous history of blood clots? No   8. Do you or does anyone in your family have a serious bleeding problem such as prolonged  bleeding following surgeries or cuts? No   9. Have you ever had problems with anemia or been told to take iron pills? No   10. Have you had any abnormal blood loss such as black, tarry or bloody stools? No   11. Have you ever had a blood transfusion? No   12. Are you willing to have a blood transfusion if it is medically needed before, during, or after your surgery? Yes   13. Have you or any of your relatives ever had problems with anesthesia? No   14. Do you have sleep apnea, excessive snoring or daytime drowsiness? No   15. Do you have any artifical heart valves or other implanted medical devices like a pacemaker, defibrillator, or continuous glucose monitor? YES - icd   15a. What type of device do you have? ICD [employer]   15b. Name of the clinic that manages your device:  [facility]   16. Do you have artificial joints? No   17. Are you allergic to latex? No       Health Care Directive:  Patient has a Health Care Directive on file      Preoperative Review of :   reviewed - no record of controlled substances prescribed.      Status of Chronic Conditions:  See problem list for active medical problems.  Problems all longstanding and stable, except as noted/documented.  See ROS for pertinent symptoms related to these conditions.    CAD - Patient has a longstanding history of moderate-severe CAD. Patient denies recent chest pain or NTG use, denies exercise induced dyspnea or PND. Last Stress test 2/22/16, EKG 8/31/22.     CHF - Patient has a longstanding history of moderate-severe CHF. Exacerbating conditions include none. Currently the patient's condition is same. Current treatment regimen includes ACE inhibitor, Coreg, ASA and spirolactone. The patient denies chest pain, edema, orthopnea, SOB or recent weight gain. Last Echocardiogram 11/21, EKG 8/31/22.     HYPERLIPIDEMIA - Patient has a long history of significant Hyperlipidemia requiring medication for treatment with recent good  control. Patient reports no problems or side effects with the medication.     HYPERTENSION - Patient has longstanding history of HTN , currently denies any symptoms referable to elevated blood pressure. Specifically denies chest pain, palpitations, dyspnea, orthopnea, PND or peripheral edema. Blood pressure readings have been in normal range. Current medication regimen is as listed below. Patient denies any side effects of medication.       Review of Systems  CONSTITUTIONAL: NEGATIVE for fever, chills, change in weight  INTEGUMENTARY/SKIN: NEGATIVE for worrisome rashes, moles or lesions  EYES: NEGATIVE for vision changes or irritation  ENT/MOUTH: NEGATIVE for ear, mouth and throat problems  RESP: NEGATIVE for significant cough or SOB  CV: NEGATIVE for chest pain, palpitations or peripheral edema  GI: NEGATIVE for nausea, abdominal pain, heartburn, or change in bowel habits  : NEGATIVE for frequency, dysuria, or hematuria  MUSCULOSKELETAL: NEGATIVE for significant arthralgias or myalgia  NEURO: NEGATIVE for weakness, dizziness or paresthesias  ENDOCRINE: NEGATIVE for temperature intolerance, skin/hair changes  HEME: NEGATIVE for bleeding problems  PSYCHIATRIC: NEGATIVE for changes in mood or affect    Patient Active Problem List    Diagnosis Date Noted     Ventricular tachycardia (H) 07/16/2021     Priority: Medium     Prostate cancer (H) 02/05/2020     Priority: Medium     Hyperlipidemia      Priority: Medium     Diagnosis updated by automated process. Provider to review and confirm.       Coronary artery disease      Priority: Medium     Myocardial infarction (H)      Priority: Medium     Cardiomyopathy (H)      Priority: Medium     Cardiac arrest (H)      Priority: Medium     V-fib       Health Care Home 03/04/2014     Priority: Medium       Status:  Unable to contact   Care Coordinator:  Alexandra Liao [phone]  See Letters for [facility] Plan             Acute myocardial infarction (H)  02/16/2014     Priority: Medium     Nevus 01/07/2011     Priority: Medium     IMO update changed this record. Please review for accuracy  Do you wish to do the replacement in the background? yes         Tobacco abuse 01/07/2011     Priority: Medium     Hyperlipidemia with target LDL less than 70 10/31/2010     Priority: Medium     Diagnosis updated by automated process. Provider to review and confirm.        Past Medical History:   Diagnosis Date     Arthritis Age related     Calculus of kidney      Cancer (H)     basel cell on nose     Cardiac arrest (H)     V-fib, 2/16/2014     Cardiomyopathy (H)      Coronary artery disease      Hyperlipidaemia      Hyperlipidemia with target LDL less than 70 10/31/2010     Diagnosis updated by automated process. Provider to review and confirm.     ICD (implantable cardiac defibrillator) in place     12-1-2014 EF 29%     Myocardial infarction (H) 02/2014     Anterior infarct     Prostate cancer (H)      Tobacco dependence      Past Surgical History:   Procedure Laterality Date     ABDOMEN SURGERY  15 years ago    Hearnia     BIOPSY  February 2019    Prostate     CARDIAC SURGERY       COLONOSCOPY  2005     HEART CATH, ANGIOPLASTY  02/2014    Emergent cath,thrombectomy-pt had cardiac arrest-severe 3 vessel CAD-MACY proximal LAD, LAD diagonal kissing balloon, and stent to proximal RCA     HERNIA REPAIR       NO HISTORY OF SURGERY       PROSTATE SURGERY  03/29/2019     Current Outpatient Medications   Medication Sig Dispense Refill     aspirin EC 81 MG EC tablet Take 1 tablet (81 mg) by mouth daily       atorvastatin (LIPITOR) 40 MG tablet Take 1 tablet (40 mg) by mouth daily 90 tablet 2     carvedilol (COREG) 12.5 MG tablet Take 1 tablet (12.5 mg) by mouth 2 times daily (with meals) 180 tablet 3     clopidogrel (PLAVIX) 75 MG tablet Take 1 tablet (75 mg) by mouth daily 90 tablet 1     lisinopril (ZESTRIL) 5 MG tablet Take 1 tablet (5 mg) by mouth daily 90 tablet 2     nitroGLYcerin  (NITROSTAT) 0.4 MG sublingual tablet Place 1 tablet (0.4 mg) under the tongue every 5 minutes as needed for chest pain 25 tablet 3     spironolactone (ALDACTONE) 25 MG tablet Take 1 tablet (25 mg) by mouth daily 90 tablet 2     triamcinolone (KENALOG) 0.1 % external cream Apply topically 2 times daily 45 g 0       Allergies   Allergen Reactions     Penicillins      Tetracyclines         Social History     Tobacco Use     Smoking status: Light Tobacco Smoker     Packs/day: 0.25     Years: 42.00     Pack years: 10.50     Types: Cigarettes     Start date: 1975     Last attempt to quit: 2014     Years since quittin.5     Smokeless tobacco: Never Used     Tobacco comment: 1/3 pack a day    Substance Use Topics     Alcohol use: Yes     Comment: 1-3 drinks per week     Family History   Problem Relation Age of Onset     Cerebrovascular Disease Father      Heart Disease Father         MI     Cancer Mother      Other Cancer Mother      Unknown/Adopted Maternal Grandmother      Unknown/Adopted Maternal Grandfather      Unknown/Adopted Paternal Grandmother      Unknown/Adopted Paternal Grandfather      Diabetes Son      Family History Negative Son      Cancer Maternal Uncle      History   Drug Use No         Objective     /81 (BP Location: Right arm, Patient Position: Chair, Cuff Size: Adult Regular)   Pulse 68   Temp 97.9  F (36.6  C) (Oral)   Resp 16   Wt 70.3 kg (154 lb 14.4 oz)   SpO2 100%   BMI 21.01 kg/m      Physical Exam    GENERAL APPEARANCE: healthy, alert and no distress     EYES: EOMI,  PERRL     HENT: ear canals and TM's normal and nose and mouth without ulcers or lesions     NECK: no adenopathy, no asymmetry, masses, or scars and thyroid normal to palpation     RESP: lungs clear to auscultation - no rales, rhonchi or wheezes     CV: regular rates and rhythm, normal S1 S2, no S3 or S4 and no murmur, click or rub     ABDOMEN:  soft, nontender, no HSM or masses and bowel sounds normal     MS:  extremities normal- no gross deformities noted, no evidence of inflammation in joints, FROM in all extremities.     SKIN: no suspicious lesions or rashes     NEURO: Normal strength and tone, sensory exam grossly normal, mentation intact and speech normal     PSYCH: mentation appears normal. and affect normal/bright     LYMPHATICS: No cervical adenopathy    Recent Labs   Lab Test 10/25/21  0954      POTASSIUM 4.7   CR 0.97   A1C 5.5        Diagnostics:  Labs pending at this time.  Results will be reviewed when available.  Recent Results (from the past 24 hour(s))   CBC with platelets    Collection Time: 08/31/22  2:14 PM   Result Value Ref Range    WBC Count 9.0 4.0 - 11.0 10e3/uL    RBC Count 4.21 (L) 4.40 - 5.90 10e6/uL    Hemoglobin 14.7 13.3 - 17.7 g/dL    Hematocrit 42.1 40.0 - 53.0 %     78 - 100 fL    MCH 34.9 (H) 26.5 - 33.0 pg    MCHC 34.9 31.5 - 36.5 g/dL    RDW 13.2 10.0 - 15.0 %    Platelet Count 232 150 - 450 10e3/uL      EKG: appears normal, NSR, anteroseptal infarct old    Revised Cardiac Risk Index (RCRI):  The patient has the following serious cardiovascular risks for perioperative complications:   - Coronary Artery Disease (MI, positive stress test, angina, Qs on EKG) = 1 point   - Congestive Heart Failure (pulmonary edema, PND, s3 lenard, CXR with pulmonary congestion, basilar rales) = 1 point     RCRI Interpretation: 2 points: Class III (moderate risk - 6.6% complication rate)     Estimated Functional Capacity: Duke Activity Status Index (DASI) score: 58.2           Signed Electronically by: Cesar Marie PA-C  Copy of this evaluation report is provided to requesting physician.      Lung Cancer Screening Shared Decision Making Visit     Iraida Hernandez, a 67 year old male, is eligible for lung cancer screening    History   Smoking Status     Light Tobacco Smoker     Packs/day: 0.25     Years: 42.00     Types: Cigarettes     Start date: 1/1/1975     Last attempt to quit:  2/16/2014   Smokeless Tobacco     Never Used     Comment: 1/3 pack a day        I have discussed with patient the risks and benefits of screening for lung cancer with low-dose CT.     The risks include:    radiation exposure: one low dose chest CT has as much ionizing radiation as about 15 chest x-rays, or 6 months of background radiation living in [place]      false positives: most findings/nodules are NOT cancer, but some might still require additional diagnostic evaluation, including biopsy    over-diagnosis: some slow growing cancers that might never have been clinically significant will be detected and treated unnecessarily     The benefit of early detection of lung cancer is contingent upon adherence to annual screening or more frequent follow up if indicated.     Furthermore, to benefit from screening, Dietmar must be willing and able to undergo diagnostic procedures, if indicated. Although no specific guide is available for determining severity of comorbidities, it is reasonable to withhold screening in patients who have greater mortality risk from other diseases.     We did discuss that the best way to prevent lung cancer is to not smoke.    Some patients may value a numeric estimation of lung cancer risk when evaluating if lung cancer screening is right for them, here is one calculator:    ShouldIScreen

## 2022-08-31 NOTE — PROGRESS NOTES
24 hour chart check completed    [facility]  [id number] [place] [id number]  Phone: [phone]  Primary Provider: Tavares Campbell  Pre-op Performing Provider: TAVARES CAMPBELL    PREOPERATIVE EVALUATION:  Today's date: 8/31/2022    Iraida Hernandez is a 67 year old male who presents for a preoperative evaluation.    Surgical Information:  Surgery/Procedure: ARTHROPLASTY, LEFT HIP, TOTAL  Surgery Location:  [facility]   Surgeon: Ok Jolly MD  Surgery Date:9/21/2022  Time of Surgery: TBD  Where patient plans to recover: At home with family  Fax number for surgical facility: [phone]    Type of Anesthesia Anticipated: General    Assessment & Plan     The proposed surgical procedure is considered INTERMEDIATE risk.    Preop general physical exam  Arthritis of left hip  Stable on exam today. Cleared for surgical procedure pending laboratory testing.   - EKG 12-lead complete w/read - Clinics  - Basic metabolic panel  (Ca, Cl, CO2, Creat, Gluc, K, Na, BUN)  - CBC with platelets    Coronary artery disease involving native coronary artery of native heart without angina pectoris  Cardiomyopathy, unspecified type (H)  Cardiac arrest (H)  Ventricular tachycardia (H)  Followed by Cardiology. Unchanged from baseline. No new chest pain or shortness of breath.   - EKG 12-lead complete w/read - Clinics  - Basic metabolic panel  (Ca, Cl, CO2, Creat, Gluc, K, Na, BUN)  - CBC with platelets    Prostate cancer (H)  Followed by Urology. Missed most recent appointment and states his Urologist recommended getting PSA checked during his pre-operative evaluation. PSA ordered and pending.  - PSA tumor marker [[id number]]     Implanted Device:   - Type of device: ICD Patient advised to bring device information on day of surgery.      Risks and Recommendations:  The patient has the following additional risks and recommendations for perioperative complications:  Cardiovascular:   - Followed by Cardiology.  Has been stable for years. Discussed Plavix and Aspirin use regarding upcoming surgical procedure with Cardiology team.    Medication Instructions:  - baby aspirin: No contraindication to stopping baby aspirin, HOLD 10 days before surgery.   - clopidrogel (Plavix), prasugrel (Effient), ticagrelor (Brilinta): No contraindication to stopping Plavix, HOLD 5 days before surgery.     RECOMMENDATION:  APPROVAL GIVEN to proceed with proposed procedure pending review of diagnostic evaluation.      Subjective     HPI related to upcoming procedure: Arthritis in both hips. Scheduled to have left hip on 9/21 and then plans to do the right hip next year. Pain has been progressively worsening. Is affecting his gait and his quality of life.     Preop Questions 8/24/2022   1. Have you ever had a heart attack or stroke? YES - CAD with hx MI and cardiac arrest   2. Have you ever had surgery on your heart or blood vessels, such as a stent placement, a coronary artery bypass, or surgery on an artery in your head, neck, heart, or legs? YES - cardiac stenting. Has ICD.   3. Do you have chest pain with activity? No   4. Do you have a history of  heart failure? YES - Most recent echocardiogram November 2021 and EF 35-40%   5. Do you currently have a cold, bronchitis or symptoms of other infection? No   6. Do you have a cough, shortness of breath, or wheezing? No   7. Do you or anyone in your family have previous history of blood clots? No   8. Do you or does anyone in your family have a serious bleeding problem such as prolonged bleeding following surgeries or cuts? No   9. Have you ever had problems with anemia or been told to take iron pills? No   10. Have you had any abnormal blood loss such as black, tarry or bloody stools? No   11. Have you ever had a blood transfusion? No   12. Are you willing to have a blood transfusion if it is medically needed before, during, or after your surgery? Yes   13. Have you or any of your relatives ever  had problems with anesthesia? No   14. Do you have sleep apnea, excessive snoring or daytime drowsiness? No   15. Do you have any artifical heart valves or other implanted medical devices like a pacemaker, defibrillator, or continuous glucose monitor? YES - ICD   15a. What type of device do you have? ICD [employer]   15b. Name of the clinic that manages your device:  [facility]   16. Do you have artificial joints? No   17. Are you allergic to latex? No       Health Care Directive:  Patient has a Health Care Directive on file    Preoperative Review of :   reviewed - no record of controlled substances prescribed.    Status of Chronic Conditions:  CAD - Patient has a longstanding history of moderate-severe CAD. Patient denies recent chest pain or NTG use, denies exercise induced dyspnea or PND. Will complete EKG today.    Review of Systems  Constitutional, neuro, ENT, endocrine, pulmonary, cardiac, gastrointestinal, genitourinary, musculoskeletal, integument and psychiatric systems are negative, except as otherwise noted.    Patient Active Problem List    Diagnosis Date Noted    Ventricular tachycardia (H) 07/16/2021     Priority: Medium    Prostate cancer (H) 02/05/2020     Priority: Medium    Hyperlipidemia      Priority: Medium     Diagnosis updated by automated process. Provider to review and confirm.      Coronary artery disease      Priority: Medium    Myocardial infarction (H)      Priority: Medium    Cardiomyopathy (H)      Priority: Medium    Cardiac arrest (H)      Priority: Medium     V-fib      Health Care Home 03/04/2014     Priority: Medium       Status:  Unable to contact   Care Coordinator:  Alexandra Liao [phone]  See Letters for [facility] Emergency Care Plan            Acute myocardial infarction (H) 02/16/2014     Priority: Medium    Nevus 01/07/2011     Priority: Medium     IMO update changed this record. Please review for accuracy  Do you wish to do the  replacement in the background? yes        Tobacco abuse 01/07/2011     Priority: Medium    Hyperlipidemia with target LDL less than 70 10/31/2010     Priority: Medium     Diagnosis updated by automated process. Provider to review and confirm.      Mild intermittent asthma 09/20/2006     Priority: Medium      Past Medical History:   Diagnosis Date    Calculus of kidney     Cancer (H)     basel cell on nose    Cardiac arrest (H)     V-fib, 2/16/2014    Cardiomyopathy (H)     Coronary artery disease     Hyperlipidaemia     Hyperlipidemia with target LDL less than 70 10/31/2010     Diagnosis updated by automated process. Provider to review and confirm.    ICD (implantable cardiac defibrillator) in place     12-1-2014 EF 29%    Mild intermittent asthma     Myocardial infarction (H) 2/2014     Anterior infarct    Tobacco dependence      Past Surgical History:   Procedure Laterality Date    CARDIAC SURGERY      HEART CATH, ANGIOPLASTY  2/2014    Emergent cath,thrombectomy-pt had cardiac arrest-severe 3 vessel CAD-MACY proximal LAD, LAD diagonal kissing balloon, and stent to proximal RCA    HERNIA REPAIR      NO HISTORY OF SURGERY      PROSTATE SURGERY  03/29/2019     Current Outpatient Medications   Medication Sig Dispense Refill    aspirin EC 81 MG EC tablet Take 1 tablet (81 mg) by mouth daily      atorvastatin (LIPITOR) 40 MG tablet Take 1 tablet (40 mg) by mouth daily 90 tablet 2    carvedilol (COREG) 12.5 MG tablet Take 1 tablet (12.5 mg) by mouth 2 times daily (with meals) 180 tablet 3    clopidogrel (PLAVIX) 75 MG tablet Take 1 tablet (75 mg) by mouth daily 90 tablet 1    lisinopril (ZESTRIL) 5 MG tablet Take 1 tablet (5 mg) by mouth daily 90 tablet 2    nitroGLYcerin (NITROSTAT) 0.4 MG sublingual tablet Place 1 tablet (0.4 mg) under the tongue every 5 minutes as needed for chest pain 25 tablet 3    spironolactone (ALDACTONE) 25 MG tablet Take 1 tablet (25 mg) by mouth daily 90 tablet 2    triamcinolone (KENALOG) 0.1  % external cream Apply topically 2 times daily 45 g 0       Allergies   Allergen Reactions    Penicillins     Tetracyclines         Social History     Tobacco Use    Smoking status: Current Every Day Smoker     Packs/day: 0.25     Years: 42.00     Pack years: 10.50    Smokeless tobacco: Never Used    Tobacco comment: 1/3 pack a day    Substance Use Topics    Alcohol use: Yes     Comment: rare     Family History   Problem Relation Age of Onset    Cerebrovascular Disease Father     Heart Disease Father         MI    Cancer Mother     Unknown/Adopted Maternal Grandmother     Unknown/Adopted Maternal Grandfather     Unknown/Adopted Paternal Grandmother     Unknown/Adopted Paternal Grandfather     Diabetes Son     Family History Negative Son     Cancer Maternal Uncle      History   Drug Use No         Objective     /81 (BP Location: Right arm, Patient Position: Chair, Cuff Size: Adult Regular)   Pulse 68   Temp 97.9  F (36.6  C) (Oral)   Resp 16   Wt 70.3 kg (154 lb 14.4 oz)   SpO2 100%   BMI 21.01 kg/m      Physical Exam    GENERAL APPEARANCE: healthy, alert and no distress     EYES: EOMI,  PERRL     HENT: ear canals and TM's normal and nose and mouth without ulcers or lesions     NECK: no adenopathy, no asymmetry, masses, or scars     RESP: lungs clear to auscultation - no rales, rhonchi or wheezes     CV: regular rates and rhythm, normal S1 S2, no S3 or S4 and no murmur     ABDOMEN:  soft, nontender, no HSM or masses and bowel sounds normal     SKIN: no suspicious lesions or rashes     NEURO: Sensory exam grossly normal, mentation intact and speech normal     PSYCH: mentation appears normal. and affect normal/bright     LYMPHATICS: No cervical adenopathy    Recent Labs   Lab Test 10/25/21  0954      POTASSIUM 4.7   CR 0.97   A1C 5.5        Diagnostics:  Labs pending at this time.  Results will be reviewed when available.   EKG required for known coronary heart disease and not completed in the last 90  days.     Revised Cardiac Risk Index (RCRI):  The patient has the following serious cardiovascular risks for perioperative complications:   - Coronary Artery Disease (MI, positive stress test, angina, Qs on EKG) = 1 point   - Congestive Heart Failure (pulmonary edema, PND, s3 lenard, CXR with pulmonary congestion, basilar rales) = 1 point     RCRI Interpretation: 2 points: Class III (moderate risk - 6.6% complication rate)     Estimated Functional Capacity: Performs 4 METS exercise without symptoms (e.g., light housework, stairs, 4 mph walk, 7 mph bike, slow step dance)         RADHIKA Reynoso  [employer] [facility]

## 2022-08-31 NOTE — PATIENT INSTRUCTIONS
Medications to HOLD:  10 Days Before: Stop your baby aspirin  7 Days Before: Do not take Ibuprofen/Aleve/NSAIDs  5 Days Before: Stop taking your Plavix  24 Hours Before: No alcohol use    Restart baby aspirin, Plavix on the day after surgery (unless directed otherwise by surgeon).    Preparing for Your Surgery  Getting started  A nurse will call you to review your health history and instructions. They will give you an arrival time based on your scheduled surgery time. Please be ready to share:  Your doctor's clinic name and phone number  Your medical, surgical and anesthesia history  A list of allergies and sensitivities  A list of medicines, including herbal treatments and over-the-counter drugs  Whether the patient has a legal guardian (ask how to send us the papers in advance)  Please tell us if you're pregnant--or if there's any chance you might be pregnant. Some surgeries may injure a fetus (unborn baby), so they require a pregnancy test. Surgeries that are safe for a fetus don't always need a test, and you can choose whether to have one.   If you have a child who's having surgery, please ask for a copy of Preparing for Your Child's Surgery.    Preparing for surgery  Within 30 days of surgery: Have a pre-op exam (sometimes called an H&P, or History and Physical). This can be done at a clinic or pre-operative center.  If you're having a , you may not need this exam. Talk to your care team.  At your pre-op exam, talk to your care team about all medicines you take. If you need to stop any medicines before surgery, ask when to start taking them again.  We do this for your safety. Many medicines can make you bleed too much during surgery. Some change how well surgery (anesthesia) drugs work.  Call your insurance company to let them know you're having surgery. (If you don't have insurance, call [phone].)  Call your clinic if there's any change in your health. This includes signs of a cold or flu (sore  throat, runny nose, cough, rash, fever). It also includes a scrape or scratch near the surgery site.  If you have questions on the day of surgery, call your hospital or surgery center.  COVID testing  You may need to be tested for COVID-19 before having surgery. If so, we will give you instructions.  Eating and drinking guidelines  For your safety: Unless your surgeon tells you otherwise, follow the guidelines below.  Eat and drink as usual until 8 hours before surgery. After that, no food or milk.  Drink clear liquids until 2 hours before surgery. These are liquids you can see through, like water, Gatorade and Propel Water. You may also have black coffee and tea (no cream or milk).  Nothing by mouth within 2 hours of surgery. This includes gum, candy and breath mints.  If you drink alcohol: Stop drinking it the night before surgery.  If your care team tells you to take medicine on the morning of surgery, it's okay to take it with a sip of water.  Preventing infection  Shower or bathe the night before and morning of your surgery. Follow the instructions your clinic gave you. (If no instructions, use regular soap.)  Don't shave or clip hair near your surgery site. We'll remove the hair if needed.  Don't smoke or vape the morning of surgery. You may chew nicotine gum up to 2 hours before surgery. A nicotine patch is okay.  Note: Some surgeries require you to completely quit smoking and nicotine. Check with your surgeon.  Your care team will make every effort to keep you safe from infection. We will:  Clean our hands often with soap and water (or an alcohol-based hand rub).  Clean the skin at your surgery site with a special soap that kills germs.  Give you a special gown to keep you warm. (Cold raises the risk of infection.)  Wear special hair covers, masks, gowns and gloves during surgery.  Give antibiotic medicine, if prescribed. Not all surgeries need antibiotics.  What to bring on the day of surgery  Photo ID and  insurance card  Copy of your health care directive, if you have one  Glasses and hearing aides (bring cases)  You can't wear contacts during surgery  Inhaler and eye drops, if you use them (tell us about these when you arrive)  CPAP machine or breathing device, if you use them  A few personal items, if spending the night  If you have . . .  A pacemaker, ICD (cardiac defibrillator) or other implant: Bring the ID card.  An implanted stimulator: Bring the remote control.  A legal guardian: Bring a copy of the certified (court-stamped) guardianship papers.  Please remove any jewelry, including body piercings. Leave jewelry and other valuables at home.  If you're going home the day of surgery  You must have a responsible adult drive you home. They should stay with you overnight as well.  If you don't have someone to stay with you, and you aren't safe to go home alone, we may keep you overnight. Insurance often won't pay for this.  After surgery  If it's hard to control your pain or you need more pain medicine, please call your surgeon's office.  Questions?   If you have any questions for your care team, list them here: _________________________________________________________________________________________________________________________________________________________________________ ____________________________________ ____________________________________ ____________________________________  For informational purposes only. Not to replace the advice of your health care provider. Copyright   2003, 2019 [facility]. All rights reserved. Clinically reviewed by Maria Ines Khan MD. [employer] [id number] - REV 07/21.    Lung Cancer Screening   Frequently Asked Questions  If you are at high-risk for lung cancer, getting screened with low-dose computed tomography (LDCT) every year can help save your life. This handout offers answers to some of the most common questions about lung cancer screening. If you have other  questions, please call [phone] ([phone]).     What is it?  Lung cancer screening uses special X-ray technology to create an image of your lung tissue. The exam is quick and easy and takes less than 10 seconds. We don t give you any medicine or use any needles. You can eat before and after the exam. You don t need to change your clothes as long as the clothing on your chest doesn t contain metal. But, you do need to be able to hold your breath for at least 6 seconds during the exam.    What is the goal of lung cancer screening?  The goal of lung cancer screening is to save lives. Many times, lung cancer is not found until a person starts having physical symptoms. Lung cancer screening can help detect lung cancer in the earliest stages when it may be easier to treat.    Who should be screened for lung cancer?  We suggest lung cancer screening for anyone who is at high-risk for lung cancer. You are in the high-risk group if you:     are between the ages of 55 and 79, and   have smoked at least 1 pack of cigarettes a day for 20 or more years, and   still smoke or have quit within the past 15 years.    However, if you have a new cough or shortness of breath, you should talk to your doctor before being screened.    Why does it matter if I have symptoms?  Certain symptoms can be a sign that you have a condition in your lungs that should be checked and treated by your doctor. These symptoms include fever, chest pain, a new or changing cough, shortness of breath that you have never felt before, coughing up blood or unexplained weight loss. Having any of these symptoms can greatly affect the results of lung cancer screening.       Should all smokers get an LDCT lung cancer screening exam?  It depends. Lung cancer screening is for a very specific group of men and women who have a history of heavy smoking over a long period of time (see  Who should be screened for lung cancer  above).  I am in the high-risk  group, but have been diagnosed with cancer in the past. Is LDCT lung cancer screening right for me?  In some cases, you should not have LDCT lung screening, such as when your doctor is already following your cancer with CT scan studies. Your doctor will help you decide if LDCT lung screening is right for you.  Do I need to have a screening exam every year?  Yes. If you are in the high-risk group described earlier, you should get an LDCT lung cancer screening exam every year until you are 79, or are no longer willing or able to undergo screening and possible procedures to diagnose and treat lung cancer.  How effective is LDCT at preventing death from lung cancer?  Studies have shown that LDCT lung cancer screening can lower the risk of death from lung cancer by 20 percent in people who are at high-risk.  What are the risks?  There are some risks and limitations of LDCT lung cancer screening. We want to make sure you understand the risks and benefits, so please let us know if you have any questions. Your doctor may want to talk with you more about these risks.   Radiation exposure: As with any exam that uses radiation, there is a very small increased risk of cancer. The amount of radiation in LDCT is small--about the same amount a person would get from a mammogram. Your doctor orders the exam when he or she feels the potential benefits outweigh the risks.   False negatives: No test is perfect, including LDCT. It is possible that you may have a medical condition, including lung cancer, that is not found during your exam. This is called a false negative result.   False positives and more testing: LDCT very often finds something in the lung that could be cancer, but in fact is not. This is called a false positive result. False positive tests often cause anxiety. To make sure these findings are not cancer, you may need to have more tests. These tests will be done only if you give us permission. Sometimes patients need a  treatment that can have side effects, such as a biopsy. For more information on false positives, see  What can I expect from the results?    Findings not related to lung cancer: Your LDCT exam also takes pictures of areas of your body next to your lungs. In a very small number of cases, the CT scan will show an abnormal finding in one of these areas, such as your kidneys, adrenal glands, liver or thyroid. This finding may not be serious, but you may need more tests. Your doctor can help you decide what other tests you may need, if any.  What can I expect from the results?  About 1 out of 4 LDCT exams will find something that may need more tests. Most of the time, these findings are lung nodules. Lung nodules are very small collections of tissue in the lung. These nodules are very common, and the vast majority--more than 97 percent--are not cancer (benign). Most are normal lymph nodes or small areas of scarring from past infections.  But, if a small lung nodule is found to be cancer, the cancer can be cured more than 90 percent of the time. To know if the nodule is cancer, we may need to get more images before your next yearly screening exam. If the nodule has suspicious features (for example, it is large, has an odd shape or grows over time), we will refer you to a specialist for further testing.  Will my doctor also get the results?  Yes. Your doctor will get a copy of your results.  Is it okay to keep smoking now that there s a cancer screening exam?  No. Tobacco is one of the strongest cancer-causing agents. It causes not only lung cancer, but other cancers and cardiovascular (heart) diseases as well. The damage caused by smoking builds over time. This means that the longer you smoke, the higher your risk of disease. While it is never too late to quit, the sooner you quit, the better.  Where can I find help to quit smoking?  The best way to prevent lung cancer is to stop smoking. If you have already quit smoking,  congratulations and keep it up! For help on quitting smoking, please call [employer] at 8-395-QUIT-NOW (1-358.802.5537) or the American [employer] Society at 1-154.725.8313 to find local resources near you.  One-on-one health coaching:  If you d prefer to work individually with a health care provider on tobacco cessation, we offer:     Medication Therapy Management:  Our specially trained pharmacists work closely with you and your doctor to help you quit smoking.  Call [phone] or [phone] (toll free).

## 2022-09-01 LAB
ANION GAP SERPL CALCULATED.3IONS-SCNC: 6 MMOL/L (ref 3–14)
BUN SERPL-MCNC: 17 MG/DL (ref 7–30)
CALCIUM SERPL-MCNC: 8.2 MG/DL (ref 8.5–10.1)
CHLORIDE BLD-SCNC: 106 MMOL/L (ref 94–109)
CO2 SERPL-SCNC: 25 MMOL/L (ref 20–32)
CREAT SERPL-MCNC: 0.8 MG/DL (ref 0.66–1.25)
GFR SERPL CREATININE-BSD FRML MDRD: >90 ML/MIN/1.73M2
GLUCOSE BLD-MCNC: 100 MG/DL (ref 70–99)
POTASSIUM BLD-SCNC: 4.1 MMOL/L (ref 3.4–5.3)
PSA SERPL-MCNC: 14.9 UG/L (ref 0–4)
SODIUM SERPL-SCNC: 137 MMOL/L (ref 133–144)

## 2022-09-02 DIAGNOSIS — I21.9 ACUTE MYOCARDIAL INFARCTION (H): ICD-10-CM

## 2022-09-02 RX ORDER — LISINOPRIL 5 MG/1
5 TABLET ORAL DAILY
Qty: 90 TABLET | Refills: 0 | Status: SHIPPED | OUTPATIENT
Start: 2022-09-02 | End: 2022-11-18 | Stop reason: ALTCHOICE

## 2022-09-02 RX ORDER — SPIRONOLACTONE 25 MG/1
25 TABLET ORAL DAILY
Qty: 90 TABLET | Refills: 0 | Status: SHIPPED | OUTPATIENT
Start: 2022-09-02 | End: 2022-12-01

## 2022-09-02 NOTE — PROGRESS NOTES
[facility] Refill Guideline reviewed.  Medication meets criteria for refill. JEAN-PIERRE Eastman RN

## 2022-09-12 ENCOUNTER — PRE VISIT (OUTPATIENT)
Dept: UROLOGY | Facility: CLINIC | Age: 67
End: 2022-09-12

## 2022-09-12 NOTE — TELEPHONE ENCOUNTER
Reason for visit: PSA recheck     Relevant information: Prostate cancer    Records/imaging/labs/orders: PSA on 8/31    Pt called: N/A    At Rooming: Video

## 2022-09-19 ENCOUNTER — LAB (OUTPATIENT)
Dept: LAB | Facility: CLINIC | Age: 67
End: 2022-09-19
Payer: MEDICARE

## 2022-09-19 ENCOUNTER — TELEPHONE (OUTPATIENT)
Dept: ORTHOPEDICS | Facility: CLINIC | Age: 67
End: 2022-09-19

## 2022-09-19 DIAGNOSIS — M16.12 PRIMARY LOCALIZED OSTEOARTHRITIS OF LEFT HIP: ICD-10-CM

## 2022-09-19 PROCEDURE — U0003 INFECTIOUS AGENT DETECTION BY NUCLEIC ACID (DNA OR RNA); SEVERE ACUTE RESPIRATORY SYNDROME CORONAVIRUS 2 (SARS-COV-2) (CORONAVIRUS DISEASE [COVID-19]), AMPLIFIED PROBE TECHNIQUE, MAKING USE OF HIGH THROUGHPUT TECHNOLOGIES AS DESCRIBED BY CMS-2020-01-R: HCPCS

## 2022-09-19 PROCEDURE — U0005 INFEC AGEN DETEC AMPLI PROBE: HCPCS

## 2022-09-19 NOTE — TELEPHONE ENCOUNTER
Received incoming call from patient who wanted to verify the time of his surgery.     Patient stated that he was told that his surgery was scheduled for the afternoon of 9/21/22.    I informed the patient that he does have two surgeries scheduled at the moment with Dr. Jolly and the later surgery is scheduled in the afternoon. Explained that his surgery on Wednesday, September 21st is scheduled at 7:30AM with arrival time of 5:30AM. I informed the patient that he will get a call from the [facility] office with a better arrival time.     Patient understood and will await for the call.   Patient had no further questions or concerns.

## 2022-09-20 ENCOUNTER — ANESTHESIA EVENT (OUTPATIENT)
Dept: SURGERY | Facility: CLINIC | Age: 67
End: 2022-09-20
Payer: MEDICARE

## 2022-09-20 DIAGNOSIS — E78.5 HYPERLIPIDEMIA WITH TARGET LDL LESS THAN 70: Primary | ICD-10-CM

## 2022-09-20 DIAGNOSIS — I21.02 ACUTE ST ELEVATION MYOCARDIAL INFARCTION (STEMI) INVOLVING LEFT ANTERIOR DESCENDING (LAD) CORONARY ARTERY (H): ICD-10-CM

## 2022-09-20 LAB — SARS-COV-2 RNA RESP QL NAA+PROBE: NEGATIVE

## 2022-09-20 RX ORDER — ATORVASTATIN CALCIUM 40 MG/1
40 TABLET, FILM COATED ORAL DAILY
Qty: 90 TABLET | Refills: 0 | Status: SHIPPED | OUTPATIENT
Start: 2022-09-20 | End: 2022-12-26

## 2022-09-20 NOTE — ANESTHESIA PREPROCEDURE EVALUATION
Anesthesia Pre-Procedure Evaluation    Patient: Iraida Hernandez   MRN: [id number] : 1955        Procedure : Procedure(s):  ARTHROPLASTY, LEFT HIP, TOTAL            66yo for hip replacement considered moderate risk with h/o significant CADz, s/p arrest event, emergent cath lab stent placement  and placement of ICD () .   Denies any current symptomatology. BMI is WNL. Activity is currently limited due to hip pain. Also hx of prostrate cancer. Echo details below function at low normal level with EF of about 30%.  Mets of exercise limited per pain according to pt. Social alcohol use. No signif resp history but is a tobacco smoker daily 1/3 ppd. No angina/can lay flat in bed/denies significant MARY KAY issues.  Past Medical History:   Diagnosis Date     Antiplatelet or antithrombotic long-term use      Arthritis Age related     Calculus of kidney      Cancer (H)     basel cell on nose     Cardiac arrest (H)     V-fib, 2014     Cardiomyopathy (H)      Coronary artery disease      Hyperlipidaemia      Hyperlipidemia with target LDL less than 70 10/31/2010     Diagnosis updated by automated process. Provider to review and confirm.     ICD (implantable cardiac defibrillator) in place     2014 EF 29%     Myocardial infarction (H) 2014     Anterior infarct     Pacemaker      Prostate cancer (H)      Tobacco dependence       Past Surgical History:   Procedure Laterality Date     ABDOMEN SURGERY  15 years ago    Hearnia     BIOPSY  2019    Prostate     CARDIAC SURGERY       COLONOSCOPY       HEART CATH, ANGIOPLASTY  2014    Emergent cath,thrombectomy-pt had cardiac arrest-severe 3 vessel CAD-MACY proximal LAD, LAD diagonal kissing balloon, and stent to proximal RCA     HERNIA REPAIR       NO HISTORY OF SURGERY       PROSTATE SURGERY  2019      Allergies   Allergen Reactions     Penicillins      Tetracyclines       Social History     Tobacco Use     Smoking status: Light Tobacco Smoker      Packs/day: 0.25     Years: 42.00     Pack years: 10.50     Types: Cigarettes     Start date: 1975     Last attempt to quit: 2014     Years since quittin.5     Smokeless tobacco: Never Used     Tobacco comment: 1/3 pack a day - cutting back (only just doing 1/day as of 22)   Substance Use Topics     Alcohol use: Yes     Comment: 1-3 drinks per week      Wt Readings from Last 1 Encounters:   22 70.3 kg (154 lb 14.4 oz)        Anesthesia Evaluation   Pt has had prior anesthetic.         ROS/MED HX  ENT/Pulmonary:    (-) MARY KAY risk factors   Neurologic:  - neg neurologic ROS     Cardiovascular:     (+) --CAD -past MI -stent-. Taking blood thinners pacemaker, Reason placed: v fib arrest. - Patientt is not dependent on pacemaker. ICD     METS/Exercise Tolerance:     Hematologic:       Musculoskeletal:       GI/Hepatic:  - neg GI/hepatic ROS     Renal/Genitourinary:       Endo:  - neg endo ROS     Psychiatric/Substance Use:  - neg psychiatric ROS     Infectious Disease:       Malignancy:       Other:               OUTSIDE LABS:  CBC:   Lab Results   Component Value Date    WBC 9.0 2022    WBC 8.4 2014    HGB 14.7 2022    HGB 15.2 2014    HCT 42.1 2022    HCT 45.7 2014     2022     2014     BMP:   Lab Results   Component Value Date     2022     10/25/2021    POTASSIUM 4.1 2022    POTASSIUM 4.7 10/25/2021    CHLORIDE 106 2022    CHLORIDE 106 10/25/2021    CO2 25 2022    CO2 25 10/25/2021    BUN 17 2022    BUN 19 10/25/2021    CR 0.80 2022    CR 0.97 10/25/2021     (H) 2022     (H) 10/25/2021     COAGS:   Lab Results   Component Value Date    INR 0.90 2014     POC: No results found for: BGM, HCG, HCGS  HEPATIC:   Lab Results   Component Value Date    ALBUMIN 3.4 10/25/2021    PROTTOTAL 7.3 10/25/2021    ALT 36 10/25/2021    AST 19 10/25/2021    ALKPHOS 84 10/25/2021     BILITOTAL 0.7 10/25/2021     OTHER:   Lab Results   Component Value Date    A1C 5.5 10/25/2021    DIEGO 8.2 (L) 08/31/2022    MAG 2.2 02/19/2014    TSH 1.62 12/10/2007       Anesthesia Plan    ASA Status:  3   NPO Status:  NPO Appropriate    Anesthesia Type: General.   Induction: Intravenous.   Maintenance: Balanced.   Techniques and Equipment:     - Lines/Monitors: 2nd IV     Consents    Anesthesia Plan(s) and associated risks, benefits, and realistic alternatives discussed. Questions answered and patient/representative(s) expressed understanding.     - Discussed: Risks, Benefits and Alternatives for BOTH SEDATION and the PROCEDURE were discussed     - Discussed with:  Patient      - Extended Intubation/Ventilatory Support Discussed: No.      - Patient is DNR/DNI Status: No    Use of blood products discussed: No .     Postoperative Care    Pain management: IV analgesics, Oral pain medications.   PONV prophylaxis: Ondansetron (or other 5HT-3), Dexamethasone or Solumedrol     Comments:    Other Comments: GETA with full monitoring ;             Andie Diaz MD

## 2022-09-21 ENCOUNTER — APPOINTMENT (OUTPATIENT)
Dept: GENERAL RADIOLOGY | Facility: CLINIC | Age: 67
End: 2022-09-21
Payer: MEDICARE

## 2022-09-21 ENCOUNTER — ANESTHESIA (OUTPATIENT)
Dept: SURGERY | Facility: CLINIC | Age: 67
End: 2022-09-21
Payer: MEDICARE

## 2022-09-21 ENCOUNTER — HOSPITAL ENCOUNTER (OUTPATIENT)
Facility: CLINIC | Age: 67
Discharge: HOME | End: 2022-09-23
Attending: ORTHOPAEDIC SURGERY | Admitting: ORTHOPAEDIC SURGERY
Payer: MEDICARE

## 2022-09-21 DIAGNOSIS — M16.12 PRIMARY OSTEOARTHRITIS OF LEFT HIP: Primary | ICD-10-CM

## 2022-09-21 LAB
ABO/RH(D): NORMAL
ANION GAP SERPL CALCULATED.3IONS-SCNC: 6 MMOL/L (ref 3–14)
ANTIBODY SCREEN: NEGATIVE
BASE EXCESS BLDV CALC-SCNC: -2.8 MMOL/L (ref -7.7–1.9)
BUN SERPL-MCNC: 25 MG/DL (ref 7–30)
CA-I BLD-MCNC: 5 MG/DL (ref 4.4–5.2)
CALCIUM SERPL-MCNC: 8.3 MG/DL (ref 8.5–10.1)
CHLORIDE BLD-SCNC: 108 MMOL/L (ref 94–109)
CO2 SERPL-SCNC: 24 MMOL/L (ref 20–32)
CREAT SERPL-MCNC: 1.01 MG/DL (ref 0.66–1.25)
GFR SERPL CREATININE-BSD FRML MDRD: 81 ML/MIN/1.73M2
GLUCOSE BLD-MCNC: 119 MG/DL (ref 70–99)
GLUCOSE BLD-MCNC: 144 MG/DL (ref 70–99)
GLUCOSE BLDC GLUCOMTR-MCNC: 111 MG/DL (ref 70–99)
HCO3 BLDV-SCNC: 24 MMOL/L (ref 21–28)
HGB BLD-MCNC: 14 G/DL (ref 13.3–17.7)
MAGNESIUM SERPL-MCNC: 2.2 MG/DL (ref 1.6–2.3)
O2/TOTAL GAS SETTING VFR VENT: 33 %
PCO2 BLDV: 46 MM HG (ref 40–50)
PH BLDV: 7.32 [PH] (ref 7.32–7.43)
PO2 BLDV: 74 MM HG (ref 25–47)
POTASSIUM BLD-SCNC: 4.3 MMOL/L (ref 3.4–5.3)
POTASSIUM BLD-SCNC: 4.4 MMOL/L (ref 3.4–5.3)
SODIUM SERPL-SCNC: 138 MMOL/L (ref 133–144)
SODIUM SERPL-SCNC: 148 MMOL/L (ref 133–144)
SPECIMEN EXPIRATION DATE: NORMAL

## 2022-09-21 PROCEDURE — 250N000011 HC RX IP 250 OP 636

## 2022-09-21 PROCEDURE — C1713 ANCHOR/SCREW BN/BN,TIS/BN: HCPCS | Performed by: ORTHOPAEDIC SURGERY

## 2022-09-21 PROCEDURE — 370N000017 HC ANESTHESIA TECHNICAL FEE, PER MIN: Performed by: ORTHOPAEDIC SURGERY

## 2022-09-21 PROCEDURE — 360N000077 HC SURGERY LEVEL 4, PER MIN: Performed by: ORTHOPAEDIC SURGERY

## 2022-09-21 PROCEDURE — 27130 TOTAL HIP ARTHROPLASTY: CPT | Mod: LT | Performed by: ORTHOPAEDIC SURGERY

## 2022-09-21 PROCEDURE — 250N000011 HC RX IP 250 OP 636: Performed by: NURSE ANESTHETIST, CERTIFIED REGISTERED

## 2022-09-21 PROCEDURE — 84132 ASSAY OF SERUM POTASSIUM: CPT | Performed by: STUDENT IN AN ORGANIZED HEALTH CARE EDUCATION/TRAINING PROGRAM

## 2022-09-21 PROCEDURE — 99202 OFFICE O/P NEW SF 15 MIN: CPT | Performed by: STUDENT IN AN ORGANIZED HEALTH CARE EDUCATION/TRAINING PROGRAM

## 2022-09-21 PROCEDURE — 999N000141 HC STATISTIC PRE-PROCEDURE NURSING ASSESSMENT: Performed by: ORTHOPAEDIC SURGERY

## 2022-09-21 PROCEDURE — 258N000003 HC RX IP 258 OP 636: Performed by: NURSE ANESTHETIST, CERTIFIED REGISTERED

## 2022-09-21 PROCEDURE — 36415 COLL VENOUS BLD VENIPUNCTURE: CPT | Performed by: ORTHOPAEDIC SURGERY

## 2022-09-21 PROCEDURE — 36415 COLL VENOUS BLD VENIPUNCTURE: CPT | Performed by: STUDENT IN AN ORGANIZED HEALTH CARE EDUCATION/TRAINING PROGRAM

## 2022-09-21 PROCEDURE — 250N000011 HC RX IP 250 OP 636: Performed by: ANESTHESIOLOGY

## 2022-09-21 PROCEDURE — 258N000003 HC RX IP 258 OP 636: Performed by: STUDENT IN AN ORGANIZED HEALTH CARE EDUCATION/TRAINING PROGRAM

## 2022-09-21 PROCEDURE — 258N000001 HC RX 258: Performed by: ORTHOPAEDIC SURGERY

## 2022-09-21 PROCEDURE — 272N000001 HC OR GENERAL SUPPLY STERILE: Performed by: ORTHOPAEDIC SURGERY

## 2022-09-21 PROCEDURE — 86901 BLOOD TYPING SEROLOGIC RH(D): CPT | Performed by: ORTHOPAEDIC SURGERY

## 2022-09-21 PROCEDURE — 250N000013 HC RX MED GY IP 250 OP 250 PS 637: Performed by: STUDENT IN AN ORGANIZED HEALTH CARE EDUCATION/TRAINING PROGRAM

## 2022-09-21 PROCEDURE — 710N000010 HC RECOVERY PHASE 1, LEVEL 2, PER MIN: Performed by: ORTHOPAEDIC SURGERY

## 2022-09-21 PROCEDURE — 258N000003 HC RX IP 258 OP 636

## 2022-09-21 PROCEDURE — 84132 ASSAY OF SERUM POTASSIUM: CPT | Performed by: NURSE ANESTHETIST, CERTIFIED REGISTERED

## 2022-09-21 PROCEDURE — 250N000009 HC RX 250: Performed by: NURSE ANESTHETIST, CERTIFIED REGISTERED

## 2022-09-21 PROCEDURE — 73502 X-RAY EXAM HIP UNI 2-3 VIEWS: CPT | Mod: 26 | Performed by: RADIOLOGY

## 2022-09-21 PROCEDURE — 999N000065 XR PELVIS AND HIP PORTABLE LEFT 1 VIEW

## 2022-09-21 PROCEDURE — 83735 ASSAY OF MAGNESIUM: CPT | Performed by: NURSE ANESTHETIST, CERTIFIED REGISTERED

## 2022-09-21 PROCEDURE — 250N000013 HC RX MED GY IP 250 OP 250 PS 637: Performed by: ANESTHESIOLOGY

## 2022-09-21 PROCEDURE — C1776 JOINT DEVICE (IMPLANTABLE): HCPCS | Performed by: ORTHOPAEDIC SURGERY

## 2022-09-21 PROCEDURE — 250N000013 HC RX MED GY IP 250 OP 250 PS 637

## 2022-09-21 PROCEDURE — 250N000025 HC SEVOFLURANE, PER MIN: Performed by: ORTHOPAEDIC SURGERY

## 2022-09-21 DEVICE — COBALT CHROME 12/14 TAPER FEMORAL                                    HEAD 36MM +4: Type: IMPLANTABLE DEVICE | Site: HIP | Status: FUNCTIONAL

## 2022-09-21 DEVICE — IMPLANTABLE DEVICE: Type: IMPLANTABLE DEVICE | Site: HIP | Status: FUNCTIONAL

## 2022-09-21 DEVICE — REFLECTION SPHERICAL HEAD SCREW 35MM
Type: IMPLANTABLE DEVICE | Site: HIP | Status: FUNCTIONAL
Brand: REFLECTION

## 2022-09-21 DEVICE — R3 3 HOLE ACETABULAR SHELL 52MM
Type: IMPLANTABLE DEVICE | Site: HIP | Status: FUNCTIONAL
Brand: R3 ACETABULAR

## 2022-09-21 DEVICE — R3 0 DEGREE XLPE ACETABULAR LINER                                    36MM INNER DIAMETER X OUTER DIAMETER 52MM
Type: IMPLANTABLE DEVICE | Site: HIP | Status: FUNCTIONAL
Brand: R3

## 2022-09-21 RX ORDER — ONDANSETRON 2 MG/ML
INJECTION INTRAMUSCULAR; INTRAVENOUS PRN
Status: DISCONTINUED | OUTPATIENT
Start: 2022-09-21 | End: 2022-09-21

## 2022-09-21 RX ORDER — LIDOCAINE HYDROCHLORIDE 20 MG/ML
INJECTION, SOLUTION INFILTRATION; PERINEURAL PRN
Status: DISCONTINUED | OUTPATIENT
Start: 2022-09-21 | End: 2022-09-21

## 2022-09-21 RX ORDER — ACETAMINOPHEN 325 MG/1
650 TABLET ORAL EVERY 4 HOURS PRN
Status: DISCONTINUED | OUTPATIENT
Start: 2022-09-24 | End: 2022-09-23 | Stop reason: HOSPADM

## 2022-09-21 RX ORDER — SODIUM CHLORIDE, SODIUM LACTATE, POTASSIUM CHLORIDE, CALCIUM CHLORIDE 600; 310; 30; 20 MG/100ML; MG/100ML; MG/100ML; MG/100ML
INJECTION, SOLUTION INTRAVENOUS CONTINUOUS
Status: DISCONTINUED | OUTPATIENT
Start: 2022-09-21 | End: 2022-09-21 | Stop reason: HOSPADM

## 2022-09-21 RX ORDER — FENTANYL CITRATE 50 UG/ML
INJECTION, SOLUTION INTRAMUSCULAR; INTRAVENOUS PRN
Status: DISCONTINUED | OUTPATIENT
Start: 2022-09-21 | End: 2022-09-21

## 2022-09-21 RX ORDER — POLYETHYLENE GLYCOL 3350 17 G/17G
17 POWDER, FOR SOLUTION ORAL DAILY
Status: DISCONTINUED | OUTPATIENT
Start: 2022-09-22 | End: 2022-09-23 | Stop reason: HOSPADM

## 2022-09-21 RX ORDER — CEFAZOLIN SODIUM 1 G/3ML
1 INJECTION, POWDER, FOR SOLUTION INTRAMUSCULAR; INTRAVENOUS EVERY 8 HOURS
Status: COMPLETED | OUTPATIENT
Start: 2022-09-21 | End: 2022-09-22

## 2022-09-21 RX ORDER — HYDROMORPHONE HCL IN WATER/PF 6 MG/30 ML
0.2 PATIENT CONTROLLED ANALGESIA SYRINGE INTRAVENOUS
Status: DISCONTINUED | OUTPATIENT
Start: 2022-09-21 | End: 2022-09-23 | Stop reason: HOSPADM

## 2022-09-21 RX ORDER — SODIUM CHLORIDE, SODIUM LACTATE, POTASSIUM CHLORIDE, CALCIUM CHLORIDE 600; 310; 30; 20 MG/100ML; MG/100ML; MG/100ML; MG/100ML
INJECTION, SOLUTION INTRAVENOUS CONTINUOUS
Status: DISCONTINUED | OUTPATIENT
Start: 2022-09-21 | End: 2022-09-21

## 2022-09-21 RX ORDER — DEXAMETHASONE SODIUM PHOSPHATE 4 MG/ML
INJECTION, SOLUTION INTRA-ARTICULAR; INTRALESIONAL; INTRAMUSCULAR; INTRAVENOUS; SOFT TISSUE PRN
Status: DISCONTINUED | OUTPATIENT
Start: 2022-09-21 | End: 2022-09-21

## 2022-09-21 RX ORDER — ONDANSETRON 2 MG/ML
4 INJECTION INTRAMUSCULAR; INTRAVENOUS EVERY 6 HOURS PRN
Status: DISCONTINUED | OUTPATIENT
Start: 2022-09-21 | End: 2022-09-23 | Stop reason: HOSPADM

## 2022-09-21 RX ORDER — TRANEXAMIC ACID 650 MG/1
1950 TABLET ORAL ONCE
Status: COMPLETED | OUTPATIENT
Start: 2022-09-21 | End: 2022-09-21

## 2022-09-21 RX ORDER — CEFAZOLIN SODIUM/WATER 2 G/20 ML
2 SYRINGE (ML) INTRAVENOUS SEE ADMIN INSTRUCTIONS
Status: DISCONTINUED | OUTPATIENT
Start: 2022-09-21 | End: 2022-09-21 | Stop reason: HOSPADM

## 2022-09-21 RX ORDER — PROPOFOL 10 MG/ML
INJECTION, EMULSION INTRAVENOUS PRN
Status: DISCONTINUED | OUTPATIENT
Start: 2022-09-21 | End: 2022-09-21

## 2022-09-21 RX ORDER — OXYCODONE HYDROCHLORIDE 5 MG/1
10 TABLET ORAL EVERY 4 HOURS PRN
Status: DISCONTINUED | OUTPATIENT
Start: 2022-09-21 | End: 2022-09-23 | Stop reason: HOSPADM

## 2022-09-21 RX ORDER — CEFAZOLIN SODIUM/WATER 2 G/20 ML
2 SYRINGE (ML) INTRAVENOUS
Status: COMPLETED | OUTPATIENT
Start: 2022-09-21 | End: 2022-09-21

## 2022-09-21 RX ORDER — ONDANSETRON 4 MG/1
4 TABLET, ORALLY DISINTEGRATING ORAL EVERY 30 MIN PRN
Status: DISCONTINUED | OUTPATIENT
Start: 2022-09-21 | End: 2022-09-21 | Stop reason: HOSPADM

## 2022-09-21 RX ORDER — LIDOCAINE 40 MG/G
CREAM TOPICAL
Status: DISCONTINUED | OUTPATIENT
Start: 2022-09-21 | End: 2022-09-23 | Stop reason: HOSPADM

## 2022-09-21 RX ORDER — HYDROMORPHONE HCL IN WATER/PF 6 MG/30 ML
0.4 PATIENT CONTROLLED ANALGESIA SYRINGE INTRAVENOUS
Status: DISCONTINUED | OUTPATIENT
Start: 2022-09-21 | End: 2022-09-23 | Stop reason: HOSPADM

## 2022-09-21 RX ORDER — ONDANSETRON 2 MG/ML
4 INJECTION INTRAMUSCULAR; INTRAVENOUS EVERY 30 MIN PRN
Status: DISCONTINUED | OUTPATIENT
Start: 2022-09-21 | End: 2022-09-21 | Stop reason: HOSPADM

## 2022-09-21 RX ORDER — HYDROMORPHONE HCL IN WATER/PF 6 MG/30 ML
0.2 PATIENT CONTROLLED ANALGESIA SYRINGE INTRAVENOUS EVERY 5 MIN PRN
Status: DISCONTINUED | OUTPATIENT
Start: 2022-09-21 | End: 2022-09-21 | Stop reason: HOSPADM

## 2022-09-21 RX ORDER — NALOXONE HYDROCHLORIDE 0.4 MG/ML
0.2 INJECTION, SOLUTION INTRAMUSCULAR; INTRAVENOUS; SUBCUTANEOUS
Status: DISCONTINUED | OUTPATIENT
Start: 2022-09-21 | End: 2022-09-23 | Stop reason: HOSPADM

## 2022-09-21 RX ORDER — FENTANYL CITRATE 50 UG/ML
25 INJECTION, SOLUTION INTRAMUSCULAR; INTRAVENOUS EVERY 5 MIN PRN
Status: DISCONTINUED | OUTPATIENT
Start: 2022-09-21 | End: 2022-09-21 | Stop reason: HOSPADM

## 2022-09-21 RX ORDER — ACETAMINOPHEN 325 MG/1
975 TABLET ORAL ONCE
Status: DISCONTINUED | OUTPATIENT
Start: 2022-09-21 | End: 2022-09-21 | Stop reason: HOSPADM

## 2022-09-21 RX ORDER — SODIUM CHLORIDE, SODIUM LACTATE, POTASSIUM CHLORIDE, CALCIUM CHLORIDE 600; 310; 30; 20 MG/100ML; MG/100ML; MG/100ML; MG/100ML
INJECTION, SOLUTION INTRAVENOUS CONTINUOUS PRN
Status: DISCONTINUED | OUTPATIENT
Start: 2022-09-21 | End: 2022-09-21

## 2022-09-21 RX ORDER — ONDANSETRON 4 MG/1
4 TABLET, ORALLY DISINTEGRATING ORAL EVERY 6 HOURS PRN
Status: DISCONTINUED | OUTPATIENT
Start: 2022-09-21 | End: 2022-09-23 | Stop reason: HOSPADM

## 2022-09-21 RX ORDER — ACETAMINOPHEN 325 MG/1
975 TABLET ORAL EVERY 8 HOURS
Status: DISCONTINUED | OUTPATIENT
Start: 2022-09-21 | End: 2022-09-23 | Stop reason: HOSPADM

## 2022-09-21 RX ORDER — NALOXONE HYDROCHLORIDE 0.4 MG/ML
0.4 INJECTION, SOLUTION INTRAMUSCULAR; INTRAVENOUS; SUBCUTANEOUS
Status: DISCONTINUED | OUTPATIENT
Start: 2022-09-21 | End: 2022-09-23 | Stop reason: HOSPADM

## 2022-09-21 RX ORDER — AMOXICILLIN 250 MG
1 CAPSULE ORAL 2 TIMES DAILY
Status: DISCONTINUED | OUTPATIENT
Start: 2022-09-21 | End: 2022-09-23 | Stop reason: HOSPADM

## 2022-09-21 RX ORDER — PROCHLORPERAZINE MALEATE 5 MG
5 TABLET ORAL EVERY 6 HOURS PRN
Status: DISCONTINUED | OUTPATIENT
Start: 2022-09-21 | End: 2022-09-23 | Stop reason: HOSPADM

## 2022-09-21 RX ORDER — ACETAMINOPHEN 325 MG/1
975 TABLET ORAL ONCE
Status: COMPLETED | OUTPATIENT
Start: 2022-09-21 | End: 2022-09-21

## 2022-09-21 RX ORDER — OXYCODONE HYDROCHLORIDE 5 MG/1
5 TABLET ORAL EVERY 4 HOURS PRN
Status: DISCONTINUED | OUTPATIENT
Start: 2022-09-21 | End: 2022-09-21 | Stop reason: HOSPADM

## 2022-09-21 RX ORDER — ATORVASTATIN CALCIUM 20 MG/1
40 TABLET, FILM COATED ORAL EVERY EVENING
Status: DISCONTINUED | OUTPATIENT
Start: 2022-09-21 | End: 2022-09-23 | Stop reason: HOSPADM

## 2022-09-21 RX ORDER — CARVEDILOL 6.25 MG/1
12.5 TABLET ORAL 2 TIMES DAILY WITH MEALS
Status: DISCONTINUED | OUTPATIENT
Start: 2022-09-21 | End: 2022-09-23 | Stop reason: HOSPADM

## 2022-09-21 RX ORDER — BISACODYL 10 MG
10 SUPPOSITORY, RECTAL RECTAL DAILY PRN
Status: DISCONTINUED | OUTPATIENT
Start: 2022-09-21 | End: 2022-09-23 | Stop reason: HOSPADM

## 2022-09-21 RX ORDER — OXYCODONE HYDROCHLORIDE 5 MG/1
5 TABLET ORAL EVERY 4 HOURS PRN
Status: DISCONTINUED | OUTPATIENT
Start: 2022-09-21 | End: 2022-09-23 | Stop reason: HOSPADM

## 2022-09-21 RX ORDER — LISINOPRIL 5 MG/1
5 TABLET ORAL DAILY
Status: DISCONTINUED | OUTPATIENT
Start: 2022-09-22 | End: 2022-09-23 | Stop reason: HOSPADM

## 2022-09-21 RX ORDER — SPIRONOLACTONE 25 MG/1
25 TABLET ORAL DAILY
Status: DISCONTINUED | OUTPATIENT
Start: 2022-09-22 | End: 2022-09-23 | Stop reason: HOSPADM

## 2022-09-21 RX ADMIN — SODIUM CHLORIDE 500 ML: 9 INJECTION, SOLUTION INTRAVENOUS at 20:58

## 2022-09-21 RX ADMIN — FENTANYL CITRATE 50 MCG: 50 INJECTION, SOLUTION INTRAMUSCULAR; INTRAVENOUS at 08:40

## 2022-09-21 RX ADMIN — FENTANYL CITRATE 100 MCG: 50 INJECTION, SOLUTION INTRAMUSCULAR; INTRAVENOUS at 07:49

## 2022-09-21 RX ADMIN — ACETAMINOPHEN 975 MG: 325 TABLET, FILM COATED ORAL at 18:17

## 2022-09-21 RX ADMIN — FENTANYL CITRATE 25 MCG: 50 INJECTION, SOLUTION INTRAMUSCULAR; INTRAVENOUS at 09:56

## 2022-09-21 RX ADMIN — TRANEXAMIC ACID 1950 MG: 650 TABLET ORAL at 06:28

## 2022-09-21 RX ADMIN — CEFAZOLIN 1 G: 1 INJECTION, POWDER, FOR SOLUTION INTRAMUSCULAR; INTRAVENOUS at 18:19

## 2022-09-21 RX ADMIN — PHENYLEPHRINE HYDROCHLORIDE 100 MCG: 10 INJECTION INTRAVENOUS at 09:03

## 2022-09-21 RX ADMIN — PROPOFOL 110 MG: 10 INJECTION, EMULSION INTRAVENOUS at 07:49

## 2022-09-21 RX ADMIN — PHENYLEPHRINE HYDROCHLORIDE 200 MCG: 10 INJECTION INTRAVENOUS at 07:55

## 2022-09-21 RX ADMIN — Medication 2 G: at 07:54

## 2022-09-21 RX ADMIN — PHENYLEPHRINE HYDROCHLORIDE 100 MCG: 10 INJECTION INTRAVENOUS at 08:32

## 2022-09-21 RX ADMIN — PHENYLEPHRINE HYDROCHLORIDE 100 MCG: 10 INJECTION INTRAVENOUS at 08:13

## 2022-09-21 RX ADMIN — Medication 40 MG: at 07:49

## 2022-09-21 RX ADMIN — ATORVASTATIN CALCIUM 40 MG: 20 TABLET, FILM COATED ORAL at 20:58

## 2022-09-21 RX ADMIN — ACETAMINOPHEN 975 MG: 325 TABLET, FILM COATED ORAL at 06:28

## 2022-09-21 RX ADMIN — ONDANSETRON 4 MG: 2 INJECTION INTRAMUSCULAR; INTRAVENOUS at 09:19

## 2022-09-21 RX ADMIN — SODIUM CHLORIDE 1000 ML: 9 INJECTION, SOLUTION INTRAVENOUS at 22:21

## 2022-09-21 RX ADMIN — FENTANYL CITRATE 25 MCG: 50 INJECTION, SOLUTION INTRAMUSCULAR; INTRAVENOUS at 10:22

## 2022-09-21 RX ADMIN — PHENYLEPHRINE HYDROCHLORIDE 100 MCG: 10 INJECTION INTRAVENOUS at 08:51

## 2022-09-21 RX ADMIN — FENTANYL CITRATE 50 MCG: 50 INJECTION, SOLUTION INTRAMUSCULAR; INTRAVENOUS at 09:45

## 2022-09-21 RX ADMIN — SODIUM CHLORIDE, POTASSIUM CHLORIDE, SODIUM LACTATE AND CALCIUM CHLORIDE: 600; 310; 30; 20 INJECTION, SOLUTION INTRAVENOUS at 07:35

## 2022-09-21 RX ADMIN — HYDROMORPHONE HYDROCHLORIDE 0.25 MG: 1 INJECTION, SOLUTION INTRAMUSCULAR; INTRAVENOUS; SUBCUTANEOUS at 09:15

## 2022-09-21 RX ADMIN — HYDROMORPHONE HYDROCHLORIDE 0.25 MG: 1 INJECTION, SOLUTION INTRAMUSCULAR; INTRAVENOUS; SUBCUTANEOUS at 09:23

## 2022-09-21 RX ADMIN — PHENYLEPHRINE HYDROCHLORIDE 100 MCG: 10 INJECTION INTRAVENOUS at 09:11

## 2022-09-21 RX ADMIN — SUGAMMADEX 300 MG: 100 INJECTION, SOLUTION INTRAVENOUS at 09:36

## 2022-09-21 RX ADMIN — Medication 10 MG: at 08:27

## 2022-09-21 RX ADMIN — FENTANYL CITRATE 25 MCG: 50 INJECTION, SOLUTION INTRAMUSCULAR; INTRAVENOUS at 10:07

## 2022-09-21 RX ADMIN — LIDOCAINE HYDROCHLORIDE 80 MG: 20 INJECTION, SOLUTION INFILTRATION; PERINEURAL at 07:49

## 2022-09-21 RX ADMIN — DEXAMETHASONE SODIUM PHOSPHATE 6 MG: 4 INJECTION, SOLUTION INTRAMUSCULAR; INTRAVENOUS at 08:29

## 2022-09-21 RX ADMIN — PHENYLEPHRINE HYDROCHLORIDE 100 MCG: 10 INJECTION INTRAVENOUS at 09:19

## 2022-09-21 RX ADMIN — CARVEDILOL 12.5 MG: 6.25 TABLET, FILM COATED ORAL at 18:19

## 2022-09-21 RX ADMIN — FENTANYL CITRATE 25 MCG: 50 INJECTION, SOLUTION INTRAMUSCULAR; INTRAVENOUS at 10:27

## 2022-09-21 RX ADMIN — MIDAZOLAM 2 MG: 1 INJECTION INTRAMUSCULAR; INTRAVENOUS at 07:35

## 2022-09-21 RX ADMIN — Medication 10 MG: at 08:21

## 2022-09-21 RX ADMIN — OXYCODONE HYDROCHLORIDE 5 MG: 5 TABLET ORAL at 10:48

## 2022-09-21 ASSESSMENT — ACTIVITIES OF DAILY LIVING (ADL)
ADLS_ACUITY_SCORE: 22
ADLS_ACUITY_SCORE: 20
ADLS_ACUITY_SCORE: 20
ADLS_ACUITY_SCORE: 22

## 2022-09-21 NOTE — OR NURSING
Pt came out to PACU very restless and uncomfortable due to his positioning. He was bending his left knee up and frequently requesting RN to remove wedge pillow so his legs were not as wide apart. Paged Dr. Gibbs to ask about changing his positioning to promote comfort. He came to bedside and was okay with switching it out for two standard pillows between his knees and Saravanan feels most comfortable with left knee bent up. After pain meds, ice pack, and removing the wedge pillow his level dropped from 9/10 to 3/10. Will continue to monitor.

## 2022-09-21 NOTE — OR NURSING
PACU to Inpatient Nursing Handoff    Patient Iraida Hernandez is a 67 year old male who speaks English.   Procedure Procedure(s):  ARTHROPLASTY, LEFT HIP, TOTAL   Surgeon(s) Primary: Ok Jolly MD  Resident - Assisting: Christian Gibbs MD     Allergies   Allergen Reactions     Penicillins      Tetracyclines        Isolation  [unfilled]     Past Medical History   has a past medical history of Antiplatelet or antithrombotic long-term use, Arthritis (Age related), Calculus of kidney, Cancer (H), Cardiac arrest (H), Cardiomyopathy (H), Coronary artery disease, Hyperlipidaemia, Hyperlipidemia with target LDL less than 70 (10/31/2010), ICD (implantable cardiac defibrillator) in place, Myocardial infarction (H) (02/2014), Pacemaker, Prostate cancer (H), and Tobacco dependence.    Anesthesia General   Dermatome Level     Preop Meds acetaminophen (Tylenol) - time given: 0630   Nerve block Not applicable   Intraop Meds dexamethasone (Decadron)  fentanyl (Sublimaze): 200 mcg total  hydromorphone (Dilaudid): 0.5 mg total  ondansetron (Zofran): last given at 0919   Local Meds Yes - Local Cocktail (morphine, ropivacaine, epinephrine, Toradol)   Antibiotics cefazolin (Ancef) - last given at 0754     Pain Patient Currently in Pain: yes   PACU meds  fentanyl (Sublimaze): 100 mcg (total dose) last given at 1027   oxycodone (Roxicodone): 5 mg (total dose) last given at 1048    PCA / epidural No   Capnography     Telemetry ECG Rhythm: Normal sinus rhythm;Paced rhythm   Inpatient Telemetry Monitor Ordered? Yes        Labs Glucose Lab Results   Component Value Date     09/21/2022     09/21/2022     08/31/2022    GLC 99 02/15/2019       Hgb Lab Results   Component Value Date    HGB 14.0 09/21/2022    HGB 14.7 08/31/2022    HGB 15.2 12/01/2014       INR Lab Results   Component Value Date    INR 0.90 12/01/2014      PACU Imaging Completed     Wound/Incision Incision/Surgical Site 09/21/22 Left;Lateral  Hip (Active)   Incision Assessment UTV 09/21/22 1050   Closure Liquid bandage;Adhesive strip(s) 09/21/22 1050   Dressing Intervention Clean, dry, intact 09/21/22 1050   Number of days: 0      CMS        Equipment ice pack   Other LDA       IV Access Peripheral IV 09/21/22 Right;Posterior Hand (Active)   Site Assessment WDL 09/21/22 0943   Line Status Infusing 09/21/22 0943   Phlebitis Scale 0-->no symptoms 09/21/22 0943   Infiltration Scale 0 09/21/22 0719   Number of days: 0       Peripheral IV 09/21/22 Left Wrist (Active)   Site Assessment [facility] 09/21/22 0943   Line Status Saline locked 09/21/22 0943   Phlebitis Scale 0-->no symptoms 09/21/22 0943   Number of days: 0      Blood Products Not applicable EBL 50 mL   Intake/Output Date 09/21/22 0700 - 09/22/22 0659   Shift 5431-0048 0705-3176 1101-4821 24 Hour Total   INTAKE   I.V. 750   750   Shift Total(mL/kg) 750(10.89)   750(10.89)   OUTPUT   Blood 50   50   Shift Total(mL/kg) 50(0.73)   50(0.73)   Weight (kg) 68.9 68.9 68.9 68.9      Drains / Ochoa     Time of void PreOp Void Prior to Procedure: 0615 (09/21/22 0636)    PostOp      Diapered? No   Bladder Scan  130ml @ 1130   PO    tolerating sips     Vitals    B/P: 124/78  T: 97.9  F (36.6  C)    Temp src: Axillary  P:  Pulse: 71 (09/21/22 1015)          R: 15  O2:  SpO2: 100 %    O2 Device: Nasal cannula (09/21/22 1000)    Oxygen Delivery: 3 LPM (09/21/22 1000)         Family/support present significant other Clarice   Patient belongings     Patient transported on cart   DC meds/scripts (obs/outpt) Not applicable   Inpatient Pain Meds Released? Yes       Special needs/considerations No need for pink foam pillow, just two regular pillows between legs   Tasks needing completion none       Shayne Mark, SAGE  ASCOM [id number]

## 2022-09-21 NOTE — ANESTHESIA PROCEDURE NOTES
Airway       Patient location during procedure: OR       Procedure Start/Stop Times: 9/21/2022 7:53 AM  Staff -        Anesthesiologist:  Andie Diaz MD       CRNA: Luis Pope APRN CRNA       Performed By: CRNAIndications and Patient Condition       Indications for airway management: mary-procedural       Induction type:intravenous       Mask difficulty assessment: 1 - vent by mask    Final Airway Details       Final airway type: endotracheal airway       Successful airway: ETT - single  Endotracheal Airway Details        ETT size (mm): 7.5       Cuffed: yes       Successful intubation technique: video laryngoscopy       VL Blade Size: MAC 4       Grade View of Cords: 1       Adjucts: stylet       Position: Right       Measured from: lips       Secured at (cm): 23       Bite block used: None    Post intubation assessment        Placement verified by: capnometry, equal breath sounds and chest rise        Number of attempts at approach: 1       Number of other approaches attempted: 0       Secured with: silk tape       Ease of procedure: easy       Dentition: Intact and Unchanged    Medication(s) Administered   Medication Administration Time: 9/21/2022 7:53 AM

## 2022-09-21 NOTE — OP NOTE
OPERATIVE REPORT    DATE OF SERVICE: 9/21/22    SURGEON: Ok Jolly MD.    ASSISTANT(S):  Ofelia LPUNKETT and Mc Gibbs MD     PREOPERATIVE DIAGNOSIS:  Osteoarthritis    POSTOPERATIVE DIAGNOSIS:  Osteoarthritis    OPERATION PERFORMED:  Left total hip arthroplasty    IMPLANTS:    Implant Name Type Inv. Item Serial No.  Lot No. LRB No. Used Action   IMP SCR ACET SNN SPHERICAL HEAD 6.5X35MM [id number] - [id number] Metallic Hardware/[place] IMP SCR ACET SNN SPHERICAL HEAD 6.5X35MM [id number]  [employer] [id number] Left 1 Implanted   IMP SHELL SNR ACET R3 3H 52MM [id number] - [id number] Total Joint Component/Insert IMP SHELL SNR ACET R3 3H 52MM [id number]  [employer] [id number] Left 1 Implanted   IMP LINER SNR ACET R3 XLPE 0DEG [id number] - [id number] Total Joint Component/Insert IMP LINER SNR ACET R3 XLPE 0DEG [id number]  [employer] [id number] Left 1 Implanted   [employer] Femoral component Sz 6 HO Total Joint Component/Insert   [employer] [id number] Left 1 Implanted   IMP HEAD FEMORAL SNN BIPOLAR [place] 36MM +4 [id number] - [id number] Total Joint Component/Insert IMP HEAD FEMORAL SNN BIPOLAR [place] 36MM +[id number]  [employer] [id number] Left 1 Implanted       ANESTHETIC: general     OPERATIVE FINDINGS:  End stage arthrosis of the hip    BLOOD LOSS: about 50 cc    COMPLICATIONS:  None apparent    OPERATIVE INDICATIONS:  The patient has a long history of debilitating pain secondary to ostearthritis of the hip.  Despite comprehensive non-operative management these symptoms continued to interfere with activities of daily living.  After discussion of further treatment options including the risks and benefits that patient elected to proceed with a total hip.    DESCRIPTION OF THE PROCEDURE:  The patient was identified in the preoperative holding area.  The consent form including the risks and benefits were reviewed with the patient.  The operative  limb was identified and marked.  The patient was brought back to the operating room and placed supine on the operating table.  An anesthetic was induced by the anesthesia team.   The patient was placed in the lateral decubitus position and prepped and draped in the normal standard fashion for a hip replacement.  A time-out was called.  Antibiotics were given.  We utilized an approximately 15 cm curvilinear incision, centered on the vastus ridge, and performed a standard posterior approach to the hip.  The tensor fascia was split.  A small portion of gluteus trinity was split in line with its fibers.  The sciatic nerve was palpated.  The east-west retractor was placed.  The posterior border of gluteus medius was exposed and retracted.  The tendon of piriformis and that of the obturators was released from their attachments.  A trapdoor posterior capsulotomy was performed.  The hip was dislocated.  The lesser trochanter was exposed.  A ruler was used to measure and electrocautery was used to alberto our neck cut as preoperatively templated.  The head was measured with a caliper and found to be 48 mm.  This measurement was used to choose our first reamer.  The neck cut was re-measured. The femur was elevated.  A Hohmann was placed over the anterior rim of the acetabulum and the femur was subluxed anterior.  A split was made in the inferior capsule.  The transverse acetabular ligament was left intact and used a guide for the anterversion of the acetabular component.  Circumferential retractors were placed.  We began reaming and went up by two until sufficient contact was made with the acetabular rim.  We then went up by one millimeter for a one millimeter press-fit.  We were within one size of our preoperative plan.   A trail was placed.  It had an excellent press fit.  We then placed out final component in 40 degrees of inclination and approximately 20 degrees of anteversion, parallel to the transverse ligament.  The press  "fit was excellent.  Screws were placed for additional initial fixation.  A flat liner was then placed. It locked into place.  Attention was turned to the femur.  Retractors were placed to elevated the proximal femur and to protect the tendon of gluteus medius.  Remaining lateral neck was removed and the piriformis fossa was cleared of soft-tissue.  A box osteotome and canal finder were used to prepare for broaching.  A sharp broach was used to lateralize slightly.  We then broached up sequentially to a size 6.  It was rotationally stable and sat up 1-2 millimeters from the neck-cut.  Preoperatively the patient had templated to a high offset stem.  The high offset stem appropriately tensioned the abductors.  We trialed the following femoral heads: 0 and +4.  The +4 appropriately tensioned the abductors and clinically equalized the leg-lengths.  The stability exam was excellent.  The hip was stable and there was no impingement posteriorly with hyper-extension and maximal external rotation.  With full extension, the knee could be fixed to bring the foot nearly to the buttock.  With the hip in ninety degrees of flexion and neutral rotation there was greater than 60 degrees of internal rotation before subluxation.  There appropriate movement with a \"nick\" test.  Happy with our stability exam, the final implant was placed in approximately twenty degrees of anteversion.  It sat within 2 mm of the broach.  We then trailed with a 4 head.  The stability exam was identical.  We then placed the final head on a clean, dry neck+ and impacted it into place. The hip was reduced after directly visualizing the entire acetabulum.  The wound was then irrigated.  The posterior capsule and short external rotators were sutured to the greater trochanter with non-absorbable suture through bone tunnels.The fascia was closed with interrupted Vicryl, the dermis with interrupted Vicryl, and skin with running monocryl, Dermabond and " steri-strips.  At the end of the procedure the sponge and needle counts were correct times two.  The patient tolerated the procedure well and returned to the PAR extubated and stable.    POSTOPERATIVE PLAN:  1. Weight bearing as tolerated  2. Standard posterior hip precautions  3. DVT prophylaxis   4. 24 hours of prophylactic antibiotics  5. Follow-up:  Wound clinic in 2 weeks and with Rik in clinic in 6 weeks for x-rays and a rehabilitation check.

## 2022-09-21 NOTE — CONSULTS
[facility]  Consult Note - Hospitalist Service, GOLD TEAM   Date of Admission:  9/21/2022  Consult Requested by: Ok Jolly MD  Reason for Consult: post-operative medical co-managment    Assessment & Plan   Iraida Hernandez is a 67 year old male with PMHx CAD, s/p ICD (2014, prostate cancer, HLD admitted on 9/21/2022 for a left hip total arthroplasty.    left hip total arthroplasty (9/21/22)  arthritis  -pain management: acetaminophen, dilaudid, oxycodone  -antiemetics PRN  -bowel regimen: miralax and senna-docusate  -DVT prophy: plan to restart ASA and clopidogrel on POD1  -cefazolin x24h post-op  -WBAT (no hip flexion >90, no adduction, no internal rotation)    CAD  HFrEF (EF 30%)  s/p ICD (2014)  -hold aspirin until POD1 (stopped 10 days prior to surgery)  -hold clopidogrel 75mg until POD1 (stopped 5 days prior to surgery)  -continue carvedilol 12.5mg BID  -restart lisinopril 5mg  -restart spironolactone 25mg    HLD  -restart atorvastatin 40mg    hypernatremia  Na 148 on admission.  -trend Na       The patient's care was discussed with the Patient.    Chantel Abbasi MD  [facility]  Securely message with the Vocera Web Console (learn more here)  Text page via [facility] Paging/Directory   Please see signed in provider for up to date coverage information    Hospitalist Service, GOLD TEAM     ______________________________________________________________________    Chief Complaint   arthritis    History is obtained from the patient and chart    History of Present Illness   Iraida Hernandez is a 67 year old male with PMHx CAD, s/p ICD (2014, prostate cancer, HLD admitted on 9/21/2022 for a left hip total arthroplasty. Surgery was performed without complication. Denied any nausea or vomiting. Able to eat. Patient is not having any CP or SOB. His pain is better controlled with a pillow under his leg.    Review of Systems   The  5 point Review of Systems is negative other than noted in the HPI or here.     Past Medical History    I have reviewed this patient's medical history and updated it with pertinent information if needed.   Past Medical History:   Diagnosis Date     Antiplatelet or antithrombotic long-term use      Arthritis Age related     Calculus of kidney      Cancer (H)     basel cell on nose     Cardiac arrest (H)     V-fib, 2/16/2014     Cardiomyopathy (H)      Coronary artery disease      Hyperlipidaemia      Hyperlipidemia with target LDL less than 70 10/31/2010     Diagnosis updated by automated process. Provider to review and confirm.     ICD (implantable cardiac defibrillator) in place     12-1-2014 EF 29%     Myocardial infarction (H) 02/2014     Anterior infarct     Pacemaker      Prostate cancer (H)      Tobacco dependence        Past Surgical History   I have reviewed this patient's surgical history and updated it with pertinent information if needed.  Past Surgical History:   Procedure Laterality Date     ABDOMEN SURGERY  15 years ago    Hearnia     BIOPSY  February 2019    Prostate     CARDIAC SURGERY       COLONOSCOPY  2005     HEART CATH, ANGIOPLASTY  02/2014    Emergent cath,thrombectomy-pt had cardiac arrest-severe 3 vessel CAD-MACY proximal LAD, LAD diagonal kissing balloon, and stent to proximal RCA     HERNIA REPAIR       NO HISTORY OF SURGERY       PROSTATE SURGERY  03/29/2019       Social History   I have reviewed this patient's social history and updated it with pertinent information if needed.  Social History     Tobacco Use     Smoking status: Light Tobacco Smoker     Packs/day: 0.25     Years: 42.00     Pack years: 10.50     Types: Cigarettes     Start date: 1/1/1975     Smokeless tobacco: Never Used     Tobacco comment: 1/3 pack a day - cutting back (only just doing 1/day as of 9/18/22)   Substance Use Topics     Alcohol use: Yes     Comment: 1-3 drinks per week     Drug use: No       Family History   I  have reviewed this patient's family history and updated it with pertinent information if needed.  Family History   Problem Relation Age of Onset     Cerebrovascular Disease Father      Heart Disease Father         MI     Cancer Mother      Other Cancer Mother      Unknown/Adopted Maternal Grandmother      Unknown/Adopted Maternal Grandfather      Unknown/Adopted Paternal Grandmother      Unknown/Adopted Paternal Grandfather      Diabetes Son      Family History Negative Son      Cancer Maternal Uncle        Medications   I have reviewed this patient's current medications    Allergies   Allergies   Allergen Reactions     Penicillins      Tetracyclines        Physical Exam   Vital Signs: Temp: 98.2  F (36.8  C) Temp src: Axillary BP: 107/71 Pulse: 79   Resp: 19 SpO2: 96 % O2 Device: None (Room air) Oxygen Delivery: 3 LPM  Weight: 151 lbs 14.35 oz    General Appearance: pleasant man, eating in bed, no acute distress  Eyes: EOMI, no scleral icterus  HEENT: MMM  Respiratory: CTAB  Cardiovascular: RRR, no peripheral edema  GI: soft, nontender, nondistended  Skin: WWP  Musculoskeletal: no gross deformities  Neurologic: alert and oriented  Psychiatric: appropriate mood and affect    Data   Results for orders placed or performed during the hospital encounter of 09/21/22 (from the past 24 hour(s))   Glucose by meter   Result Value Ref Range    GLUCOSE BY METER POCT 111 (H) 70 - 99 mg/dL   ABO/Rh type and screen    Narrative    The following orders were created for panel order ABO/Rh type and screen.  Procedure                               Abnormality         Status                     ---------                               -----------         ------                     Adult Type and Screen[[id number]]                            Final result                 Please view results for these tests on the individual orders.   Adult Type and Screen   Result Value Ref Range    ABO/RH(D) O POS     Antibody Screen Negative Negative     SPECIMEN EXPIRATION DATE [id number]    Venous Panel (aka VBAT)   Result Value Ref Range    pH Venous 7.32 7.32 - 7.43    pCO2 Venous 46 40 - 50 mm Hg    pO2 Venous 74 (H) 25 - 47 mm Hg    Bicarbonate Venous 24 21 - 28 mmol/L    Base Excess/Deficit (+/-) -2.8 -7.7 - 1.9 mmol/L    FIO2 33     Sodium 148 (H) 133 - 144 mmol/L    Potassium 4.4 3.4 - 5.3 mmol/L    Glucose 119 (H) 70 - 99 mg/dL    Calcium Ionized 5.0 4.4 - 5.2 mg/dL    Hemoglobin 14.0 13.3 - 17.7 g/dL   Magnesium   Result Value Ref Range    Magnesium 2.2 1.6 - 2.3 mg/dL   XR Pelvis w Hip Port Left  1 View    Narrative    EXAM: XR PELVIS AND HIP PORTABLE LEFT 1 VIEW  9/21/2022 10:20 AM      HISTORY: Status post Hip surgery    COMPARISON: 11/3/2021    FINDINGS: Portable postoperative AP pelvis and crosstable lateral left  hip views were obtained. Bony detail on the crosstable lateral limited  by overlapping densities.    New total left hip arthroplasty. Components appear well seated.  Postsurgical subcutaneous gas. Severe right hip degenerative change.  Vascular calcifications.        Impression    IMPRESSION:   1. New total left hip arthroplasty.  2. Severe right hip degenerative change.    RUIZ UMANA MD (Joe)         SYSTEM ID:  [id number]

## 2022-09-21 NOTE — PHARMACY-ADMISSION MEDICATION HISTORY
Admission Medication History Completed by Pharmacy    See [facility] Admission Navigator for allergy information, preferred outpatient pharmacy, prior to admission medications and immunization status.     Medication History Sources:     Patient's medication list per spouse    RN interview w/ patient's spouse    [facility] ([facility])    Changes made to PTA medication list (reason):    Added: None    Deleted: None    Changed: None    Additional Information:    Patient held aspirin and clopidogrel for 10 days prior to surgery as instructed.    Prior to Admission medications    Medication Sig Last Dose Taking? Auth Provider Long Term End Date   aspirin EC 81 MG EC tablet Take 1 tablet (81 mg) by mouth daily 9/11/2022 Yes Veronika Camacho, APRN CNP     atorvastatin (LIPITOR) 40 MG tablet Take 1 tablet (40 mg) by mouth daily 9/20/2022 at 2000 Yes Juancarlos Stevens MD Yes    carvedilol (COREG) 12.5 MG tablet Take 1 tablet (12.5 mg) by mouth 2 times daily (with meals) 9/21/2022 at 0430 Yes Juancarlos Stevens MD Yes    clopidogrel (PLAVIX) 75 MG tablet Take 1 tablet (75 mg) by mouth daily 9/11/2022 Yes Juancarlos Stevens MD Yes    lisinopril (ZESTRIL) 5 MG tablet Take 1 tablet (5 mg) by mouth daily 9/21/2022 at 0430 Yes Juancarlos Stevens MD Yes    nitroGLYcerin (NITROSTAT) 0.4 MG sublingual tablet Place 1 tablet (0.4 mg) under the tongue every 5 minutes as needed for chest pain  Yes Juancarlos Stevens MD Yes    spironolactone (ALDACTONE) 25 MG tablet Take 1 tablet (25 mg) by mouth daily 9/21/2022 at 0430 Yes Juancarlos Stevens MD Yes      Date completed: 09/21/22    Medication history completed by:   Annabella Garcia, Coleman, BCPS  Clinical Pharmacist

## 2022-09-21 NOTE — ANESTHESIA POSTPROCEDURE EVALUATION
Patient: Iraida Hernandez    Procedure: Procedure(s):  ARTHROPLASTY, LEFT HIP, TOTAL       Anesthesia Type:  General    Note:  Disposition: Inpatient   Postop Pain Control: Uneventful            Sign Out: Well controlled pain   PONV: No   Neuro/Psych: Uneventful            Sign Out: Acceptable/Baseline neuro status   Airway/Respiratory: Uneventful            Sign Out: Acceptable/Baseline resp. status   CV/Hemodynamics: Uneventful            Sign Out: Acceptable CV status; No obvious hypovolemia; No obvious fluid overload   Other NRE: NONE   DID A NON-ROUTINE EVENT OCCUR? No    Event details/Postop Comments:  Saravanan made a completely uneventful recovery. We did change his overnight stay locale to a telemetry floor given the one single approx 10 beat run of VT that occurred intraoperativley. Of note , He's had no other arrhythmias of any sort. Lab values on electrolytes were normal with the exception of a Na of 148.            Last vitals:  Vitals Value Taken Time   /71 09/21/22 1220   Temp 36.7  C (98.1  F) 09/21/22 1137   Pulse 70 09/21/22 1248   Resp 20 09/21/22 1248   SpO2 100 % 09/21/22 1248   Vitals shown include unvalidated device data.    Electronically Signed By: Andie Diaz MD  September 21, 2022  12:49 PM

## 2022-09-21 NOTE — PLAN OF CARE
Pt arrived around 1700. A&Ox4. Pt denies numbness or tingling. Surgical site dressing was clean, dry and intact. He has scattered scabs on left shin, otherwise skin was warm, dry and intact. Pt's pain was around a 1 when not moving, but jumped to a 5 with movement. Was given tylenol for pain per MAR orders. Pt first urinated post surgery around 1900. Pt was educated on using IS. Pt was very pleasant and cooperative. Wife was visiting during shift, she was very receptive to education as well. Pt was given carvedilol and pressures dropped to around 92/59, otherwise asymptomatic. Started 500 mL N.S around 2100. BP increased to 96/59, then decreased again to 88/57. Started on 1000mL N.S around 2200. Have not ambulated d/t low BP. Will continue POC.     Discharge goals - Orthopedic  (Discharge Orders - Overnight Stay (OUTPATIENT in bed))  PRIOR TO DISCHARGE        Comments: Discharge Goals that MUST be met PRIOR to Discharge IF PATIENT IS REGISTERED AS OUTPATIENT:   ~ Patient vital signs are at baseline: NOT MET    ~ Patient able to ambulate as they were prior to admission or with assist devices provided by therapies during their stay. NOT MET    ~ Patient MUST void prior to discharge: MET    ~ Patient able to tolerate oral intake to maintain hydration status: MET    ~ Patient has adequate pain control using Oral analgesics: MET    ~ Hypercapnia, hypoventilation or hypoxia resolved for at least 2 hours without supplemental oxygen: MET     ~ Deficits in sensation, mobility or coordination have resolved if spinal or regional anesthesia was used: MET    ~ Patient has returned to baseline mental status: MET    ~ Notify Provider IF patient FAILS to meet Discharge Goal criteria:   ~ IF Provider has already entered all discharge goals and/or cleared patient for discharge, you do NOT need to notify Provider PRIOR to patient discharge.

## 2022-09-21 NOTE — OR NURSING
Unable to transfer pt to [facility] [place] due to unavailablity of a nurse.

## 2022-09-21 NOTE — ANESTHESIA CARE TRANSFER NOTE
Patient: Iraida Hernandez    Procedure: Procedure(s):  ARTHROPLASTY, LEFT HIP, TOTAL       Diagnosis: Left hip pain [M25.552]  Diagnosis Additional Information: No value filed.    Anesthesia Type:   General     Note:    Oropharynx: oropharynx clear of all foreign objects and spontaneously breathing  Level of Consciousness: awake  Oxygen Supplementation: face mask  Level of Supplemental Oxygen (L/min / FiO2): 8  Independent Airway: airway patency satisfactory and stable  Dentition: dentition unchanged  Vital Signs Stable: post-procedure vital signs reviewed and stable  Report to RN Given: handoff report given  Patient transferred to: PACU    Handoff Report: Identifed the Patient, Identified the Reponsible Provider, Reviewed the pertinent medical history, Discussed the surgical course, Reviewed Intra-OP anesthesia mangement and issues during anesthesia, Set expectations for post-procedure period and Allowed opportunity for questions and acknowledgement of understanding      Vitals:  Vitals Value Taken Time   BP     Temp     Pulse 72 09/21/22 0950   Resp 10 09/21/22 0950   SpO2 97 % 09/21/22 0950   Vitals shown include unvalidated device data.    Electronically Signed By: CARA Peace CRNA  September 21, 2022  9:51 AM

## 2022-09-21 NOTE — BRIEF OP NOTE
[facility]    Brief Operative Note    Pre-operative diagnosis: Left hip pain [M25.552]  Post-operative diagnosis Same as pre-operative diagnosis    Procedure: Procedure(s):  ARTHROPLASTY, LEFT HIP, TOTAL  Surgeon: Surgeon(s) and Role:     * Ok Jolly MD - Primary     * Christian Gibbs MD - Resident - Assisting  Anesthesia: General   Estimated Blood Loss: 50cc    Drains: None  Specimens: * No specimens in log *  Findings:   See op note.  Complications: None.  Implants:   Implant Name Type Inv. Item Serial No.  Lot No. LRB No. Used Action   IMP SCR ACET SNN SPHERICAL HEAD 6.5X35MM [id number] - [id number] Metallic Hardware/[employer] IMP SCR ACET SNN SPHERICAL HEAD 6.5X35MM [id number]  [employer]R [id number] Left 1 Implanted   IMP SHELL SNR ACET R3 3H 52MM [id number] - [id number] Total Joint Component/Insert IMP SHELL SNR ACET R3 3H 52MM [id number]  [employer] [id number] Left 1 Implanted   IMP LINER SNR ACET R3 XLPE 0DEG [id number] - [id number] Total Joint Component/Insert IMP LINER SNR ACET R3 XLPE 0DEG [id number]  [employer] [id number] Left 1 Implanted   [employer] Femoral component Sz 6 HO Total Joint Component/Insert   [employer] [id number] Left 1 Implanted   IMP HEAD FEMORAL SNN BIPOLAR [place] 36MM +4 [id number] - [id number] Total Joint Component/Insert IMP HEAD FEMORAL SNN BIPOLAR [place] 36MM +4 [id number]  [employer] [id number] Left 1 Implanted     Assessment/Plan: Iraida Hernandez is a 67 year old male s/p Procedure(s):  ARTHROPLASTY, LEFT HIP, TOTAL on 9/21/2022 with Dr. Jolly.    Activity: Up with assist and assistive devices as needed until independent. Posterior hip precautions to operative side x 3 months:  1) No hip flexion beyond 90 degrees  2) No adduction beyond midline  3) No internal rotation beyond neutral   Weight bearing status: WBAT  Antibiotics: Cefazolin x 24 hours  Diet:  Begin with clear fluids and progress diet as tolerated. Bowel regimen. Anti-emetics PRN.    DVT prophylaxis: Resume PTA AC POD1  Elevation: Elevate heels off of bed on pillows   Wound Care: Tegaderm and alginate x 2 weeks   Drains: none  Pain management: Orals PRN, IV for breakthrough only  X-rays: AP Pelvis and Lateral operative hip in PACU.   Physical Therapy: Mobilization, ROM, ADL's, Posterior hip precautions.    Occupational Therapy: ADL's  Labs: Trend Hgb on POD #1, 2  Consults: PT, OT. Hospitalist, appreciate assistance in caring for this patient throughout the perioperative period    Future Appointments   Date Time Provider Department Center   9/21/2022  3:00 PM Ofelia Victor Pt, PT [facility] [place]   9/22/2022  9:00 AM Ofelia Victor Pt, PT [facility] [place]   9/22/2022 10:15 AM Zaida Gievns, ALFREDO [facility] [place]   9/22/2022  2:15 PM Ofelia Victor Pt, PT [facility] [place]   9/27/2022  1:30 PM Sukumar Oglesby MD [facility]   10/5/2022  3:00 PM Ofelia Hendricks PA-C [facility]   11/10/2022  9:45 AM Ok Jolly MD [facility]   11/17/2022  1:30 PM RSCCECHO1 [facility] RSCC   11/18/2022 12:30 PM Ellen Toney APRN CNP RUUMHT UMP PSA CLIN   12/19/2022 11:00 AM Cesar Marie PA-C CRFP CR   12/27/2022 12:15 [facility]   1/2/2023 11:00 AM CR COVID LAB CRLABR CR   1/18/2023  3:00 PM Ofelia Hendricks PA-C [facility]   2/2/2023  1:15 PM Juancarlos Stevens MD [facility] PSA CLIN   2/16/2023 10:45 AM Ok Jolly MD [facility]     Disposition: Pending progress with therapies, pain control on orals, and medical stability, anticipate discharge to Home vs TCU on POD #1-2.    Christian Gibbs MD  Orthopaedic Surgery PGY-4

## 2022-09-22 ENCOUNTER — APPOINTMENT (OUTPATIENT)
Dept: PHYSICAL THERAPY | Facility: CLINIC | Age: 67
End: 2022-09-22
Attending: ORTHOPAEDIC SURGERY
Payer: MEDICARE

## 2022-09-22 ENCOUNTER — APPOINTMENT (OUTPATIENT)
Dept: OCCUPATIONAL THERAPY | Facility: CLINIC | Age: 67
End: 2022-09-22
Attending: ORTHOPAEDIC SURGERY
Payer: MEDICARE

## 2022-09-22 LAB
GLUCOSE BLDC GLUCOMTR-MCNC: 100 MG/DL (ref 70–99)
HGB BLD-MCNC: 11.8 G/DL (ref 13.3–17.7)

## 2022-09-22 PROCEDURE — 258N000003 HC RX IP 258 OP 636: Performed by: INTERNAL MEDICINE

## 2022-09-22 PROCEDURE — 97116 GAIT TRAINING THERAPY: CPT | Mod: GP | Performed by: PHYSICAL THERAPIST

## 2022-09-22 PROCEDURE — 97161 PT EVAL LOW COMPLEX 20 MIN: CPT | Mod: GP | Performed by: PHYSICAL THERAPIST

## 2022-09-22 PROCEDURE — 250N000013 HC RX MED GY IP 250 OP 250 PS 637

## 2022-09-22 PROCEDURE — 97535 SELF CARE MNGMENT TRAINING: CPT | Mod: GO

## 2022-09-22 PROCEDURE — 82962 GLUCOSE BLOOD TEST: CPT

## 2022-09-22 PROCEDURE — 36415 COLL VENOUS BLD VENIPUNCTURE: CPT

## 2022-09-22 PROCEDURE — 97110 THERAPEUTIC EXERCISES: CPT | Mod: GP | Performed by: PHYSICAL THERAPIST

## 2022-09-22 PROCEDURE — 250N000011 HC RX IP 250 OP 636

## 2022-09-22 PROCEDURE — 97165 OT EVAL LOW COMPLEX 30 MIN: CPT | Mod: GO

## 2022-09-22 PROCEDURE — 97530 THERAPEUTIC ACTIVITIES: CPT | Mod: GP | Performed by: PHYSICAL THERAPIST

## 2022-09-22 PROCEDURE — 85018 HEMOGLOBIN: CPT

## 2022-09-22 PROCEDURE — 99213 OFFICE O/P EST LOW 20 MIN: CPT | Performed by: INTERNAL MEDICINE

## 2022-09-22 PROCEDURE — 97530 THERAPEUTIC ACTIVITIES: CPT | Mod: GO

## 2022-09-22 PROCEDURE — 250N000013 HC RX MED GY IP 250 OP 250 PS 637: Performed by: CLINICAL NURSE SPECIALIST

## 2022-09-22 PROCEDURE — 250N000013 HC RX MED GY IP 250 OP 250 PS 637: Performed by: STUDENT IN AN ORGANIZED HEALTH CARE EDUCATION/TRAINING PROGRAM

## 2022-09-22 RX ORDER — SODIUM CHLORIDE, SODIUM LACTATE, POTASSIUM CHLORIDE, CALCIUM CHLORIDE 600; 310; 30; 20 MG/100ML; MG/100ML; MG/100ML; MG/100ML
INJECTION, SOLUTION INTRAVENOUS CONTINUOUS
Status: DISCONTINUED | OUTPATIENT
Start: 2022-09-22 | End: 2022-09-23 | Stop reason: HOSPADM

## 2022-09-22 RX ORDER — CLOPIDOGREL BISULFATE 75 MG/1
75 TABLET ORAL DAILY
Status: DISCONTINUED | OUTPATIENT
Start: 2022-09-22 | End: 2022-09-23 | Stop reason: HOSPADM

## 2022-09-22 RX ORDER — ASPIRIN 81 MG/1
81 TABLET ORAL DAILY
Status: DISCONTINUED | OUTPATIENT
Start: 2022-09-22 | End: 2022-09-23 | Stop reason: HOSPADM

## 2022-09-22 RX ORDER — AMOXICILLIN 250 MG
1 CAPSULE ORAL 2 TIMES DAILY
Qty: 30 TABLET | Refills: 0 | Status: SHIPPED | OUTPATIENT
Start: 2022-09-22 | End: 2022-11-10

## 2022-09-22 RX ORDER — ACETAMINOPHEN 325 MG/1
650 TABLET ORAL EVERY 4 HOURS PRN
Qty: 60 TABLET | Refills: 0 | Status: ON HOLD | OUTPATIENT
Start: 2022-09-24 | End: 2023-01-04

## 2022-09-22 RX ORDER — POLYETHYLENE GLYCOL 3350 17 G/17G
17 POWDER, FOR SOLUTION ORAL DAILY
Qty: 10 PACKET | Refills: 0 | Status: SHIPPED | OUTPATIENT
Start: 2022-09-22 | End: 2022-11-10

## 2022-09-22 RX ORDER — OXYCODONE HYDROCHLORIDE 5 MG/1
5 TABLET ORAL EVERY 4 HOURS PRN
Qty: 33 TABLET | Refills: 0 | Status: SHIPPED | OUTPATIENT
Start: 2022-09-22 | End: 2022-11-10

## 2022-09-22 RX ORDER — SODIUM CHLORIDE 9 MG/ML
INJECTION, SOLUTION INTRAVENOUS
Status: DISPENSED
Start: 2022-09-22 | End: 2022-09-22

## 2022-09-22 RX ADMIN — ACETAMINOPHEN 975 MG: 325 TABLET, FILM COATED ORAL at 18:43

## 2022-09-22 RX ADMIN — ATORVASTATIN CALCIUM 40 MG: 20 TABLET, FILM COATED ORAL at 19:53

## 2022-09-22 RX ADMIN — ACETAMINOPHEN 975 MG: 325 TABLET, FILM COATED ORAL at 10:11

## 2022-09-22 RX ADMIN — POLYETHYLENE GLYCOL 3350 17 G: 17 POWDER, FOR SOLUTION ORAL at 10:14

## 2022-09-22 RX ADMIN — SODIUM CHLORIDE, POTASSIUM CHLORIDE, SODIUM LACTATE AND CALCIUM CHLORIDE: 600; 310; 30; 20 INJECTION, SOLUTION INTRAVENOUS at 11:46

## 2022-09-22 RX ADMIN — OXYCODONE HYDROCHLORIDE 5 MG: 5 TABLET ORAL at 15:14

## 2022-09-22 RX ADMIN — SODIUM CHLORIDE 500 ML: 9 INJECTION, SOLUTION INTRAVENOUS at 10:21

## 2022-09-22 RX ADMIN — OXYCODONE HYDROCHLORIDE 5 MG: 5 TABLET ORAL at 19:52

## 2022-09-22 RX ADMIN — ACETAMINOPHEN 975 MG: 325 TABLET, FILM COATED ORAL at 01:03

## 2022-09-22 RX ADMIN — ASPIRIN 81 MG: 81 TABLET, COATED ORAL at 15:16

## 2022-09-22 RX ADMIN — CEFAZOLIN 1 G: 1 INJECTION, POWDER, FOR SOLUTION INTRAMUSCULAR; INTRAVENOUS at 01:08

## 2022-09-22 RX ADMIN — CLOPIDOGREL BISULFATE 75 MG: 75 TABLET, FILM COATED ORAL at 15:14

## 2022-09-22 RX ADMIN — SENNOSIDES AND DOCUSATE SODIUM 1 TABLET: 50; 8.6 TABLET ORAL at 10:13

## 2022-09-22 ASSESSMENT — ACTIVITIES OF DAILY LIVING (ADL)
PREVIOUS_RESPONSIBILITIES: MEAL PREP
ADLS_ACUITY_SCORE: 22

## 2022-09-22 NOTE — PROGRESS NOTES
[facility]    Hospitalist Progress Note    Date of Service (when I saw the patient): 09/22/2022    Assessment & Plan   Iraida Hernandez is a 67 year old male with PMHx CAD, s/p ICD (2014, prostate cancer, HLD admitted on 9/21/2022 for a left hip total arthroplasty.     S/p left BART  ---   POD #1  ---   Received prophylactic Ancef  ---   PT/OT following    DVT prophylaxis  ---   I recommend Ortho reintroduced patient's PTA ASA    Acute postop left hip pain  ---   Tylenol, oxycodone and IV Dilaudid    Symptomatic postop hypotension  ---   Likely due to residual anesthesia and hypovolemia  ---   Patient has already received 1500 cc LR overnight  ---   Orthostatic vitals are positive this morning  ---   Will treat him with another 500 cc LR bolus  ---   Closely monitor his breathing status in light of EF of 30%  ---   Hold PTA Coreg, lisinopril and spironolactone    Acute postop blood loss anemia  ---   Hgb was 14.0 g yesterday and it is 11.8 g today  ---   Patient was lightheaded and dizzy this morning but without CP or SOB  ---   Orthostatic vitals were positive  ---   Hold of PRBC transfusion  ---   IVF ordered  ---   Check Hgb in am    CAD  HFrEF (EF 30%)  s/p ICD (2014)  ---   Reintroduce PTA baby ASA which was held for 10 days prior to left BART  ---   Reintroduced PTA clopidogrel 75 mg daily when is safe to do so per Ortho (clopidogrel was on hold for 5 days prior to left BART)  ----   Hold PTA carvedilol 12.5mg bid, Lisinopril 5mg daily and spironolactone 25 mg daily due to hypotension     HLD  ---   Resume PTA atorvastatin 40mg     Hypernatremia  ---   Na level was 148 yesterday  ---   Hyponatremia was due to hypovolemia  ---   Na back to normal range of anterograde following IVF hydration       DVT Prophylaxis:   Per ortho  Code Status: Full Code  Disposition:   Not quite ready for discharge to home today because of orthostatic hypotension. Anticipate discharge to home  tomorrow if BP improves w/ IVF hydration.          Pierre Bautista MD  Hospitalist Service, GOLD TEAM 17  [facility]  Securely message with the Vocera Web Console (learn more here)  Text page via [facility] Paging/Directory   Please see signed in provider for up to date coverage information        Interval History   BP has been soft since last night  Patient endorses lightheadedness and dizziness  Patient denies CP or SOB    -Data reviewed today: I reviewed all new labs and imaging results over the last 24 hours.     Physical Exam   Temp: 98  F (36.7  C) Temp src: Oral BP: 97/64 Pulse: 66   Resp: 21 SpO2: 97 % O2 Device: None (Room air)    Vitals:    09/21/22 0542   Weight: 68.9 kg (151 lb 14.4 oz)     Vital Signs with Ranges  Temp:  [97.8  F (36.6  C)-99.2  F (37.3  C)] 98  F (36.7  C)  Pulse:  [65-79] 66  Resp:  [9-30] 21  BP: ()/(57-96) 97/64  SpO2:  [92 %-100 %] 97 %  I/O last 3 completed shifts:  In: 1450 [P.O.:700; I.V.:750]  Out: 700 [Urine:650; Blood:50]    General: aao x 3, NAD.  HEENT:  NC/AT, PERRL, EOMI, neck supple, no thyromegaly, op clear, mmm.  CVS:  NL s 1 and s2, no m/r/g.  Lungs:  CTA B/L.   Abd:  Soft, + bs, NT, no rebound or gaurding, no fluid shift.  Ext: Left hip dressing clear/dry/intact  Lymph:  No edema.  Neuro:  Nonfocal.  Musculoskeletal: No calf tenderness to palpation.    Skin:  No rash.  Psychiatry:  Mood and affect appropriate.    Medications       sodium chloride 0.9%  500 mL Intravenous Once     acetaminophen  975 mg Oral Q8H     atorvastatin  40 mg Oral QPM     carvedilol  12.5 mg Oral BID w/meals     lisinopril  5 mg Oral Daily     polyethylene glycol  17 g Oral Daily     senna-docusate  1 tablet Oral BID     sodium chloride (PF)  3 mL Intracatheter Q8H     sodium chloride         spironolactone  25 mg Oral Daily       Data   Recent Labs   Lab 09/22/22  0758 09/22/22  0727 09/21/22  1828 09/21/22  0841   HGB 11.8*  --   --  14.0   NA   --   --  138 148*   POTASSIUM  --   --  4.3 4.4   CHLORIDE  --   --  108  --    CO2  --   --  24  --    BUN  --   --  25  --    CR  --   --  1.01  --    ANIONGAP  --   --  6  --    DIEGO  --   --  8.3*  --    GLC  --  100* 144* 119*       No results found for this or any previous visit (from the past 24 hour(s)).

## 2022-09-22 NOTE — PLAN OF CARE
Kishan BLANCO notified at 2030 that pt's BP is 85/59. Pt is asymptomatic and all other vitals stable.

## 2022-09-22 NOTE — PLAN OF CARE
Alert,well orientated.  L hip alginate drsg with thin tegaderm drsg,CDI.  Capno readings WNL.Tolerating well on room air.ICD on L chest area.Tele sinus.  BP gradually improving over the course of the night.  Asymptomatic entirety.  Voided on urinal of good amts clear,yellow urine.  PVR zero.  LBM 9/20.22.  Denies any numbness/tingling sensation.  Does endorse some tolerable pain with movement.  On scheduled tylenol.Ice pack applied.Declines oxycodone when offered.  Continue to refuse foam wedge abducto pillow,States too wide and too uncomfortable.  Kept hip abducted with pillows.  Aware of oxycodone availability and will call if pain gets worse.

## 2022-09-22 NOTE — PROGRESS NOTES
Orthopaedic Surgery Progress Note 09/22/2022    S: Low BP overnight s/p 1L NS, otherwise no acute events overnight.  Pain controlled. Denies numbness or tingling. Denies chest pain, SOB, nausea/vomiting. Tolerating diet. Voiding spontaneously.  Has not been OOB.  Comfortable with discharge home today pending therapy.     O:  Temp: 97.8  F (36.6  C) Temp src: Oral BP: (!) 129/96 Pulse: 70   Resp: 16 SpO2: 98 % O2 Device: None (Room air) Oxygen Delivery: 3 LPM    Exam:  Gen: No acute distress, resting comfortably in bed.  Resp: Non-labored breathing  MSK:    LLE:  - Dressings c/d/i  - SILT femoral/tibial/sural/saphenous/DP/SP nerves  - Fires Quad, TA, EHL, FHL, GaSC  - PT/DP pulses 2+, foot wwp    Recent Labs   Lab 09/21/22  0841   HGB 14.0     Assessment/Plan: Iraida Hernandez is a 67 year old male s/p Procedure(s):  ARTHROPLASTY, LEFT HIP, TOTAL on 9/21/2022 with Dr. Jolly.     Activity: Up with assist and assistive devices as needed until independent. Posterior hip precautions to operative side x 3 months:  1) No hip flexion beyond 90 degrees  2) No adduction beyond midline  3) No internal rotation beyond neutral   Weight bearing status: WBAT  Antibiotics: Cefazolin x 24 hours  Diet: Begin with clear fluids and progress diet as tolerated. Bowel regimen. Anti-emetics PRN.    DVT prophylaxis: Resume PTA AC POD1  Elevation: Elevate heels off of bed on pillows   Wound Care: Tegaderm and alginate x 2 weeks   Drains: none  Pain management: Orals PRN, IV for breakthrough only  X-rays: AP Pelvis and Lateral operative hip in PACU.   Physical Therapy: Mobilization, ROM, ADL's, Posterior hip precautions.    Occupational Therapy: ADL's  Labs: Trend Hgb on POD #1, 2  Consults: PT, OT. Hospitalist, appreciate assistance in caring for this patient throughout the perioperative period            Future Appointments   Date Time Provider Department Center   9/21/2022  3:00 PM Ofelia Victor Pt, PT [facility] [place]   9/22/2022  9:00 AM  Ofelia Victor Pt, PT [facility]PT [place]   9/22/2022 10:15 AM Zaida Givens, OT UROT [place]   9/22/2022  2:15 PM Ofelia Victor Pt, PT [facility]PT [place]   9/27/2022  1:30 PM Sukumar Oglesby MD [facility]   10/5/2022  3:00 PM Ofelia Hendricks PA-C [facility]   11/10/2022  9:45 AM Ok Jolly MD [facility]   11/17/2022  1:30 PM RSCCECHO1 RHCVCC RSCC   11/18/2022 12:30 PM Ellen Toney APRN CNP RUUMHT [facility] PSA CLIN   12/19/2022 11:00 AM Cesar Marie PA-C CRFP CR   12/27/2022 12:15 AM SINHA DCR2 [facility] PSA CLIN   1/2/2023 11:00 AM CR COVID LAB CRLABR CR   1/18/2023  3:00 PM Ofelia Hendricks PA-C [facility]   2/2/2023  1:15 PM Juancarlos Stevens MD [facility] PSA CLIN   2/16/2023 10:45 AM Ok Jolly MD [facility]      Disposition: Pending progress with therapies, pain control on orals, and medical stability, anticipate discharge to Home vs TCU on POD #1-2.     Christian Gibbs MD  Orthopaedic Surgery PGY-4

## 2022-09-22 NOTE — PROVIDER NOTIFICATION
BP recheck at completion of bolus was 88/57 at 2212 -- crosscover provider notified at 2215 of BP reading. 1000mL bolus ordered -- will continue to monitor patient.

## 2022-09-22 NOTE — PLAN OF CARE
"BP 97/64 (BP Location: Left arm)   Pulse 66   Temp 98  F (36.7  C) (Oral)   Resp 21   Ht 1.816 m (5' 11.5\")   Wt 68.9 kg (151 lb 14.4 oz)   SpO2 97%   BMI 20.89 kg/m      Patient is A&O x4. Able to make needs known.   Room air.  Ax1 with walker and gait belt.   Refused abductor foam, Pillows in use.   Hypotensive during shift. 500ml saline bolus this Am per provider.   Carvedilol, lisinopril, and spironolactone held this AM per provider.   No BM this shift. Given scheduled miralax and senna. voids spontaneously in bedside urinal.  Up with OT and PT  5mg oxy given for L hip pain.  LR running at 75ml/hr in RPIV.  LPIV SL.     Continue with POC    "

## 2022-09-22 NOTE — PLAN OF CARE
[facility]      OUTPATIENT PHYSICAL THERAPY EVALUATION  PLAN OF TREATMENT FOR OUTPATIENT REHABILITATION  (COMPLETE FOR INITIAL CLAIMS ONLY)  Patient's Last Name, First Name, M.I.  YOB: 1955  Iraida Hernandez                           Provider's Name  [facility] Medical Record No.  [id number]                               Onset Date:  09/21/22   Start of Care Date:  09/22/22      Type:     _X_PT   ___OT   ___SLP Medical Diagnosis:  s/p left BART                        PT Diagnosis:  Impaired functional mobility, LE strength, and ROM.   Visits from SOC:  1   _________________________________________________________________________________  Plan of Treatment/Functional Goals    Planned Interventions: gait training, stair training, strengthening, transfer training, ROM (range of motion), home exercise program, bed mobility training     Goals: See Physical Therapy Goals on Care Plan in [employer] [facility] electronic health record.    Therapy Frequency: 2x/day  Predicted Duration of Therapy Intervention: 09/22/22  _________________________________________________________________________________    I CERTIFY THE NEED FOR THESE SERVICES FURNISHED UNDER        THIS PLAN OF TREATMENT AND WHILE UNDER MY CARE     (Physician co-signature of this document indicates review and certification of the therapy plan).              Certification date from: 09/22/22, Certification date to: 09/23/22    Referring Physician: Ok Jolly MD            Initial Assessment        See Physical Therapy evaluation dated 09/22/22 in Epic electronic health record.

## 2022-09-22 NOTE — DISCHARGE SUMMARY
ORTHOPAEDIC SURGERY DISCHARGE SUMMARY     Date of Admission: 9/21/2022  Date of Discharge: 9/23/2022 12:06 PM  Disposition: Home  Staff Physician: Ok Jolly MD  Primary Care Provider: Cesar Marie      ADMISSION DIAGNOSIS:  Left hip pain [M25.552]    DISCHARGE DIAGNOSIS:  Left hip pain [M25.552]    PROCEDURES: Procedure(s):  Left total hip arthroplasty on 9/21/2022    BRIEF HISTORY:  The patient has a long history of debilitating pain secondary to ostearthritis of the hip.  Despite comprehensive non-operative management these symptoms continued to interfere with activities of daily living.  After discussion of further treatment options including the risks and benefits that patient elected to proceed with a total hip.    HOSPITAL COURSE:    The patient was admitted following the above listed procedures for pain control and rehabilitation. Iraida Hernandez did well post-operatively. Medicine was consulted post operatively to aid in management of medical co-morbidities. The patient received routine nursing cares and at the time of discharge was medically stable. Vital signs were stable throughout admission. The patient is tolerating a regular diet and is voiding spontaneously. All PT/OT goals have been met for safe mobility. Pain is now controlled on oral medications which will be available on discharge. Stool softeners have been used while taking pain medications to help prevent constipation. Iraida Hrenandez is deemed medically safe to discharge on POD1.     Antibiotics:  Ancef given periop and 24 hours postop.   DVT prophylaxis:  Resume PTA AC POD1  PT Progress:  Has met PT/OT goals for safe mobility. PT recommending home   Pain Meds:  Weaned off all IV pain meds by discharge.  Inpatient Events: No significant events or complications.     PHYSICAL EXAM:    Gen: No acute distress, resting comfortably in bed.  Resp: Non-labored breathing  MSK:     LLE:  - Dressings c/d/i  - SILT  femoral/tibial/sural/saphenous/DP/SP nerves  - Fires Quad, TA, EHL, FHL, GaSC  - PT/DP pulses 2+, foot wwp     FOLLOWUP:      Future Appointments   Date Time Provider Department Center   9/22/2022  2:45 PM Ofelia Victor Pt, PT [facility] [place]   9/23/2022  9:30 AM Ofelia Victor Pt, PT [facility] [place]   9/23/2022  2:45 PM Ofelia Victor Pt, PT [facility] [place]   9/27/2022  1:30 PM Sukumar Oglesby MD [facility]   10/5/2022  3:00 PM Ofelia Hendricks PA-C [facility]   11/10/2022  9:45 AM Ok Jolly MD [facility]   11/17/2022  1:30 PM [id number] RHCVCC [facility]   11/18/2022 12:30 PM Ellen Toney APRN CNP RUUMHT UMP PSA CLIN   12/19/2022 11:00 AM Cesar Marie PA-C CRFP CR   12/27/2022 12:15 AM SINHA DCR2 [facility] PSA CLIN   1/2/2023 11:00 AM CR COVID LAB CRLABR CR   1/18/2023  3:00 PM Ofelia Hendricks PA-C [facility]   2/2/2023  1:15 PM Juancarlos Stevens MD [facility] PSA CLIN   2/16/2023 10:45 AM Ok Jolly MD [facility]     Orthopaedic Surgery appointments are at the [facility] ([address] [place] [id number]). Call [phone] to schedule a follow-up appointment at this location with your provider.     PLANNED DISCHARGE ORDERS:     DVT Prophylaxis: Resume PTA AC POD1     Activity: Up with assist and assistive devices as needed until independent. Posterior hip precautions to operative side x 3 months:  1) No hip flexion beyond 90 degrees  2) No adduction beyond midline  3) No internal rotation beyond neutral     Weight bearing status: WBAT     Wound Care: see Below      Discharge Medication List as of 9/23/2022 10:26 AM      START taking these medications    Details   acetaminophen (TYLENOL) 325 MG tablet Take 2 tablets (650 mg) by mouth every 4 hours as needed for other, Disp-60 tablet, R-0, E-Prescribe      oxyCODONE (ROXICODONE) 5 MG tablet Take 1 tablet (5 mg) by mouth every 4 hours as needed, Disp-33 tablet, R-0,  "E-Prescribe      polyethylene glycol (MIRALAX) 17 g packet Take 17 g by mouth daily, Disp-10 packet, R-0, E-Prescribe      senna-docusate (SENOKOT-S/PERICOLACE) 8.6-50 MG tablet Take 1 tablet by mouth 2 times daily, Disp-30 tablet, R-0, E-Prescribe         CONTINUE these medications which have NOT CHANGED    Details   aspirin EC 81 MG EC tablet Take 1 tablet (81 mg) by mouth daily, OTC      atorvastatin (LIPITOR) 40 MG tablet Take 1 tablet (40 mg) by mouth daily, Disp-90 tablet, R-0, E-Prescribe      carvedilol (COREG) 12.5 MG tablet Take 1 tablet (12.5 mg) by mouth 2 times daily (with meals), Disp-180 tablet, R-3, E-Prescribe      clopidogrel (PLAVIX) 75 MG tablet Take 1 tablet (75 mg) by mouth daily, Disp-90 tablet, R-1, E-Prescribe      lisinopril (ZESTRIL) 5 MG tablet Take 1 tablet (5 mg) by mouth daily, Disp-90 tablet, R-0, E-Prescribe      nitroGLYcerin (NITROSTAT) 0.4 MG sublingual tablet Place 1 tablet (0.4 mg) under the tongue every 5 minutes as needed for chest pain, Disp-25 tablet, R-3, E-Prescribe      spironolactone (ALDACTONE) 25 MG tablet Take 1 tablet (25 mg) by mouth daily, Disp-90 tablet, R-0, E-Prescribe               Discharge Procedure Orders   Physical Therapy Referral   Standing Status: Future   Referral Priority: Routine Referral Type: Rehab Therapy Physical Therapy   Number of Visits Requested: 1     Reason for your hospital stay   Order Comments: You stayed in the hospital overnight following your surgery     Contact Surgeon Team   Order Comments: You may experience symptoms that require follow-up before your scheduled appointment. Refer to the \"[employer]\" for instructions on when to contact Dr. Jolly's office if you are concerned about pain control, blood clots, constipation, or if you are unable to urinate.    Regular business hours (Monday - Friday, 8am - 5pm):  [facility]: [phone]  [facility]: [phone]  [facility]: [phone]    After hours and weekends:  [facility] on call Orthopedic resident: [phone]     Orthopedic Urgent Care   Order Comments: If you are not able to reach Dr. Jolly's office and you need immediate care, go to the Orthopedic Urgent Care at your Surgeon's office.  Do NOT go to the Emergency Room unless you have shortness of breath, chest pain, or other signs of a medical emergency.     Call 911   Order Comments: Call 911 immediately if you experience sudden-onset chest pain, arm weakness/numbness, slurred speech, or shortness of breath     Breathing exercises   Order Comments: Perform breathing exercises using your Incentive Spirometer 10 times per hour while awake for 2 weeks.     Fever Management   Order Comments: A low grade fever can be expected after surgery.  Use acetaminophen (TYLENOL) as needed for fever management.  Contact your Surgeon Team if you have a fever greater than 101.5 F, chills, and/or night sweats.     Constipation management   Order Comments: Constipation (hard, dry bowel movements) is expected after surgery due to the combination of being less active, the anesthetic, and the opioid pain medication.  You can do the following to help reduce constipation:  ~  FLUIDS:  Drink clear liquids (water or Gatorade), or juice (apple/prune).  ~  DIET:  Eat a fiber rich diet.    ~  ACTIVITY:  Get up and move around several times a day.  Increase your activity as you are able.  MEDICATIONS:  Reduce the risk of constipation by starting medications before you are constipated.  You can take Miralax   (1 packet as directed) and/or a stool softener (Senokot 1-2 tablets 1-2 times a day).  If you already have constipation and these medications are not working, you can get magnesium citrate and use as directed.  If you continue to have constipation you can try an over the counter suppository or enema.  Call your Surgeon Team if it has been  greater than 3 days since your last bowel movement.     Reduced Urine Output   Order Comments: Changes in the amount of fluids you drank before and after surgery may result in problems urinating.  It is important to stay well-hydrated after surgery and drink plenty of water. If it has been greater than 8 hours since you have urinated despite drinking plenty of water, call your Surgeon Team.     Activity - Exercises to prevent blood clots   Order Comments: Unless otherwise directed by your Surgeon team, perform the following exercises at least three times per day for the first four weeks after surgery to prevent blood clots in your legs: 1) Point and flex your feet (Ankle Pumps), 2) Move your ankle around in big circles, 3) Wiggle your toes, 4) Walk, even for short distances, several times a day, will help decrease the risk of blood clots.     Order Specific Question Answer Comments   Is discharge order? Yes      Pain after Surgery   Order Comments: Pain after surgery is normal and expected.  You will have some amount of pain for several weeks after surgery.  Your pain will improve with time.  There are several things you can do to help reduce your pain including: rest, ice, elevation, and using pain medications as needed. Contact your Surgeon Team if you have pain that persists or worsens after surgery despite rest, ice, elevation, and taking your medication(s) as prescribed. Contact your Surgeon Team if you have new numbness, tingling, or weakness in your operative extremity.     Swelling after Surgery   Order Comments: Swelling and/or bruising of the surgical extremity is common and may persist for several months after surgery. In addition to frequent icing and elevation, gentle compressive support with an ACE wrap or tubigrip may help with swelling. Apply compression regularly, removing at least twice daily to perform skin checks. Contact your Surgeon Team if your swelling increases and is NOT associated with an  "increase in your activity level, or if your swelling increases and is associated with redness and pain.     LOWER Extremity Elevation   Order Comments: Swelling is expected for several months after surgery. This type of swelling is usually associated with gravity and activity, and can be improved with elevation.   The best way to do this is to get your \"toes above your nose\" by laying down and placing several pillows lengthwise under your calf and heel. When elevating your leg keep your knee completely straight. Perform this elevation as often as possible especially for the first two weeks after surgery.     Cold therapy   Order Comments: Ice can be used to control swelling and discomfort after surgery. Place a thin towel over your operative site and apply the ice pack overtop. Leave ice pack in place for 20 minutes, then remove for 20 minutes. Repeat this 20 minutes on/20 minutes off routine as often as tolerated.     acetaminophen (TYLENOL) Instructions   Order Comments: You were discharged with acetaminophen (TYLENOL) for pain management after surgery. Acetaminophen most effectively manages pain symptoms when it is taken on a schedule without missing doses (every four, six, or eight hours). Your Provider will prescribe a safe daily dose between 3000 - 4000 mg.  Do NOT exceed this daily dose. Most patients use acetaminophen for pain control for the first four weeks after surgery.  You can wean from this medication as your pain decreases.     NSAID Instructions   Order Comments: You were discharged with an anti-inflammatory medication for pain management to use in combination with acetaminophen (TYLENOL) and the narcotic pain medication.  Take this medication exactly as directed.  You should only take one anti-inflammatory at a time.  Some common anti-inflammatories include: ibuprofen (ADVIL, MOTRIN), naproxen (ALEVE, NAPROSYN), celecoxib (CELEBREX), meloxicam (MOBIC), ketorolac (TORADOL).  Take this medication " "with food and water.     Opioids - Tapering Instructions   Order Comments: In the first three days following surgery, your symptoms may warrant use of the narcotic pain medication every four to six hours as prescribed. This is normal. As your pain symptoms improve, focus your efforts on decreasing (tapering) use of narcotic medications. The most successful tapering strategy is to first, decrease the number of tablets you take every 4-6 hours to the minimum prescribed. Then, increase the amount of time between doses.  For example:  First, taper to   or 1 tablet every 4-6 hours.  Then, taper to   or 1 tablet every 6-8 hours.  Then, taper to   or 1 tablet every 8-10 hours.  Then, taper to   or 1 tablet every 10-12 hours.  Then, taper to   or 1 tablet at bedtime.  The bedtime dose can help with comfort during sleep and is typically the last dose to be discontinued after surgery.     Follow Up Care   Order Comments: You are scheduled for a post operative wound check with Dr. Jolly's clinic approximately two weeks after surgery. At approximately six weeks after surgery, you will see Dr. Jolly in clinic. During this visit, repeat X rays of your operative hip will be performed.      Your follow up appointments will be at the location that you regularly see Dr. Jolly:    [facility]  [address] [place] [address] [phone]    [address] [address] [phone]    [facility]  [address] [place] [address] [phone]     Activity - Total Hip Arthroplasty   Order Comments: Refer to the [facility] \"Your Guide to Total Joint Replacement\" for recommendations on activities and Exercises.     Order Specific Question Answer Comments   Is discharge order? Yes      Return to Driving   Order Comments: Return to driving - Driving is NOT permitted until " directed by your provider. Under no circumstance are you permitted to drive while using narcotic pain medications.     Order Specific Question Answer Comments   Is discharge order? Yes      Dressing / Wound Care - Wound   Order Comments: You have a clean dressing on your surgical wound. Dressing change instructions as follows: dressing will be removed at your follow-up appointment. Contact your Surgeon Team if you have increased redness, warmth around the surgical wound, and/or drainage from the surgical wound.     Dressing / Wound Care - NO Tub Bathing   Order Comments: Tub bathing, swimming, or any other activities that will cause your incision to be submerged in water should be avoided until allowed by your Surgeon.     No pharmacologic VTE prophylaxis prescribed   Order Comments: Your Surgeon did not prescribe medication for anticoagulation. Resume prior anticoagulation.     Opioid Instructions (Greater than or equal to 65 years)   Order Comments: You were discharged with an opioid medication (hydromorphone, oxycodone, hydrocodone, or tramadol). This medication should only be taken for breakthrough pain that is not controlled with acetaminophen (TYLENOL). If you rate your pain less than 3 you do not need this medication.  Pain rating 0-3:  You do not need this medication  Pain rating 4-6:  Take 1/2 tablet every 4-6 hours as needed  Pain rating 7-10:  Take 1 tablet every 4-6 hours as needed  Do not exceed 4 tablets per day     No flexion past 90 degrees   Order Comments: No bending at waist past 90 degrees.     Order Specific Question Answer Comments   Is discharge order? Yes      No internal rotation   Order Comments: No pivoting on your operative leg.     Order Specific Question Answer Comments   Is discharge order? Yes      No adduction past midline   Order Comments: No crossing your operative leg.     Order Specific Question Answer Comments   Is discharge order? Yes      Weight bearing as tolerated   Order  Comments: Weight bearing as tolerated on your operative extremity.     Order Specific Question Answer Comments   Is discharge order? Yes      Shower with wound/dressing NOT covered   Order Comments: You do not need to cover your dressing or incision in the shower, you may allow water and soap to run over top of the surgical dressing or incision. You may shower 2 days after surgery.  You are strictly prohibited from soaking or submerging the surgical wound underwater.     Crutches DME   Order Comments: DME Documentation: Describe the reason for need to support medical necessity: Impaired gait status post hip surgery. I, the undersigned, certify that the above prescribed supplies are medically necessary for this patient and is both reasonable and necessary in reference to accepted standards of medical practice in the treatment of this patient's condition and is not prescribed as a convenience.     Order Specific Question Answer Comments   DME Provider: [facility]    Crutch Type: Standard    Crutches Add On: NA    Length of Need: Lifetime      Cane DME   Order Comments: DME Documentation: Describe the reason for need to support medical necessity: Impaired gait status post hip surgery. I, the undersigned, certify that the above prescribed supplies are medically necessary for this patient and is both reasonable and necessary in reference to accepted standards of medical practice in the treatment of this patient's condition and is not prescribed as a convenience.     Order Specific Question Answer Comments   DME Provider: [facility]    Cane Type: Single Tip    Reminder: Patient can typically get 1 every 5 years      Walker DME   Order Comments: : DME Documentation: Describe the reason for need to support medical necessity: Impaired gait status post hip surgery. I, the undersigned, certify that the above prescribed supplies are medically necessary for this patient and is both reasonable and necessary in reference  to accepted standards of medical practice in the treatment of this patient's condition and is not prescribed as a convenience.     Order Specific Question Answer Comments   DME Provider: [facility]    Walker Type: Standard (2 Wheel)    Accessories: N/A      Regular Diet Adult     Order Specific Question Answer Comments   Is discharge order? Yes        Christian Gibbs MD  Orthopaedic Surgery PGY-4

## 2022-09-23 ENCOUNTER — APPOINTMENT (OUTPATIENT)
Dept: PHYSICAL THERAPY | Facility: CLINIC | Age: 67
End: 2022-09-23
Attending: ORTHOPAEDIC SURGERY
Payer: MEDICARE

## 2022-09-23 ENCOUNTER — APPOINTMENT (OUTPATIENT)
Dept: OCCUPATIONAL THERAPY | Facility: CLINIC | Age: 67
End: 2022-09-23
Attending: ORTHOPAEDIC SURGERY
Payer: MEDICARE

## 2022-09-23 VITALS
TEMPERATURE: 98.7 F | HEIGHT: 72 IN | SYSTOLIC BLOOD PRESSURE: 113 MMHG | HEART RATE: 74 BPM | OXYGEN SATURATION: 97 % | WEIGHT: 151.9 LBS | RESPIRATION RATE: 18 BRPM | DIASTOLIC BLOOD PRESSURE: 66 MMHG | BODY MASS INDEX: 20.57 KG/M2

## 2022-09-23 LAB — HGB BLD-MCNC: 11.6 G/DL (ref 13.3–17.7)

## 2022-09-23 PROCEDURE — 97530 THERAPEUTIC ACTIVITIES: CPT | Mod: GP | Performed by: PHYSICAL THERAPIST

## 2022-09-23 PROCEDURE — 97535 SELF CARE MNGMENT TRAINING: CPT | Mod: GO

## 2022-09-23 PROCEDURE — 250N000013 HC RX MED GY IP 250 OP 250 PS 637

## 2022-09-23 PROCEDURE — 258N000003 HC RX IP 258 OP 636: Performed by: INTERNAL MEDICINE

## 2022-09-23 PROCEDURE — 85018 HEMOGLOBIN: CPT

## 2022-09-23 PROCEDURE — 36415 COLL VENOUS BLD VENIPUNCTURE: CPT

## 2022-09-23 PROCEDURE — 250N000013 HC RX MED GY IP 250 OP 250 PS 637: Performed by: CLINICAL NURSE SPECIALIST

## 2022-09-23 RX ADMIN — POLYETHYLENE GLYCOL 3350 17 G: 17 POWDER, FOR SOLUTION ORAL at 08:09

## 2022-09-23 RX ADMIN — SODIUM CHLORIDE, POTASSIUM CHLORIDE, SODIUM LACTATE AND CALCIUM CHLORIDE: 600; 310; 30; 20 INJECTION, SOLUTION INTRAVENOUS at 01:38

## 2022-09-23 RX ADMIN — SENNOSIDES AND DOCUSATE SODIUM 1 TABLET: 50; 8.6 TABLET ORAL at 08:10

## 2022-09-23 RX ADMIN — ACETAMINOPHEN 975 MG: 325 TABLET, FILM COATED ORAL at 01:37

## 2022-09-23 RX ADMIN — ASPIRIN 81 MG: 81 TABLET, COATED ORAL at 08:09

## 2022-09-23 RX ADMIN — OXYCODONE HYDROCHLORIDE 5 MG: 5 TABLET ORAL at 11:45

## 2022-09-23 RX ADMIN — OXYCODONE HYDROCHLORIDE 5 MG: 5 TABLET ORAL at 05:13

## 2022-09-23 RX ADMIN — CLOPIDOGREL BISULFATE 75 MG: 75 TABLET, FILM COATED ORAL at 08:10

## 2022-09-23 RX ADMIN — ACETAMINOPHEN 975 MG: 325 TABLET, FILM COATED ORAL at 10:36

## 2022-09-23 ASSESSMENT — ACTIVITIES OF DAILY LIVING (ADL)
ADLS_ACUITY_SCORE: 22

## 2022-09-23 NOTE — PLAN OF CARE
Occupational Therapy Discharge Summary    Reason for therapy discharge:    All goals and outcomes met, no further needs identified.    Progress towards therapy goal(s). See goals on Care Plan in [facility] electronic health record for goal details.  Goals met    Therapy recommendation(s):    No further therapy is recommended.

## 2022-09-23 NOTE — PLAN OF CARE
"/66 (BP Location: Left arm)   Pulse 74   Temp 98.7  F (37.1  C) (Oral)   Resp 18   Ht 1.816 m (5' 11.5\")   Wt 68.9 kg (151 lb 14.4 oz)   SpO2 97%   BMI 20.89 kg/m      Patient is A&O x4. Able to make needs known.   On RA  AM Lisinopril and spironolactone held due to low BP.  Ax1 with walker and gait belt.   L hip dressing CDI.  Voids spontaneously into bedside urinal.   Pain managed with tylenol, PRN oxy, and ice packs.   Refused abductor foam wedge. Pillows between legs.       Discharge education completed. Patient and wife verbalized understanding.   Medications given to patient.   Patient discharged to home with wife via wheelchair transport at 1200 with all personal belongings.   "

## 2022-09-23 NOTE — PROGRESS NOTES
[facility]    Hospitalist Progress Note    Date of Service (when I saw the patient): 09/23/2022    Assessment & Plan   Iraida Hernandez is a 67 year old male with PMHx CAD, s/p ICD (2014, prostate cancer, HLD admitted on 9/21/2022 for a left hip total arthroplasty.     S/p left BART  ---   POD #2  ---   Received prophylactic Ancef  ---   Continue PT/OT     DVT prophylaxis  ---   Continue PTA ASA    Acute postop left hip pain  ---   Tylenol, oxycodone and IV Dilaudid    Symptomatic postop hypotension  ---   Likely due to residual anesthesia and hypovolemia  ---   Treated w/ IVF w/ complete resolution    Acute postop blood loss anemia  ---   Hgb was 14.0 ---> 11.8 ---> 11.6 g today  ---   Denied CP, SOB, lightheadedness and dizziness  ---   No indications for transfusion    CAD  HFrEF (EF 30%)  s/p ICD (2014)  ---   Reintroduce PTA baby ASA and Plavix today  ---   Ok to resume PTA carvedilol 12.5mg bid, Lisinopril 5mg daily and spironolactone 25 mg daily today since he is normotensive     HLD  ---   Resume PTA atorvastatin 40mg     Hypernatremia  ---   Na level was 148 yesterday  ---   Hyponatremia was due to hypovolemia  ---   Na back to normal range of anterograde following IVF hydration       DVT Prophylaxis:   Per ortho  Code Status: Full Code  Disposition:   Medically cleared for discharge to home.          Pierre Bautista MD  Hospitalist Service, GOLD TEAM 17  [facility]  Securely message with the Vocera Web Console (learn more here)  Text page via [employer] Paging/Directory   Please see signed in provider for up to date coverage information        Interval History   BP has been soft since last night  Patient endorses lightheadedness and dizziness  Patient denies CP or SOB    -Data reviewed today: I reviewed all new labs and imaging results over the last 24 hours.     Physical Exam   Temp: 98.7  F (37.1  C) Temp src: Oral BP:  113/66 Pulse: 74   Resp: 18 SpO2: 97 % O2 Device: None (Room air)    Vitals:    09/21/22 0542   Weight: 68.9 kg (151 lb 14.4 oz)     Vital Signs with Ranges  Temp:  [98.7  F (37.1  C)] 98.7  F (37.1  C)  Pulse:  [74-76] 74  Resp:  [18] 18  BP: (113-120)/(66-75) 113/66  SpO2:  [97 %-98 %] 97 %  I/O last 3 completed shifts:  In: -   Out: 1250 [Urine:1250]    General: aao x 3, NAD.  HEENT:  NC/AT, PERRL, EOMI, neck supple, no thyromegaly, op clear, mmm.  CVS:  NL s 1 and s2, no m/r/g.  Lungs:  CTA B/L.   Abd:  Soft, + bs, NT, no rebound or gaurding, no fluid shift.  Ext: Left hip dressing clear/dry/intact  Lymph:  No edema.  Neuro:  Nonfocal.  Musculoskeletal: No calf tenderness to palpation.    Skin:  No rash.  Psychiatry:  Mood and affect appropriate.    Medications     lactated ringers 75 mL/hr at 09/23/22 0138       acetaminophen  975 mg Oral Q8H     aspirin  81 mg Oral Daily     atorvastatin  40 mg Oral QPM     [Held by provider] carvedilol  12.5 mg Oral BID w/meals     clopidogrel  75 mg Oral Daily     lisinopril  5 mg Oral Daily     polyethylene glycol  17 g Oral Daily     senna-docusate  1 tablet Oral BID     sodium chloride (PF)  3 mL Intracatheter Q8H     spironolactone  25 mg Oral Daily       Data   Recent Labs   Lab 09/23/22  0535 09/22/22  0758 09/22/22  0727 09/21/22  1828 09/21/22  0841   HGB 11.6* 11.8*  --   --  14.0   NA  --   --   --  138 148*   POTASSIUM  --   --   --  4.3 4.4   CHLORIDE  --   --   --  108  --    CO2  --   --   --  24  --    BUN  --   --   --  25  --    CR  --   --   --  1.01  --    ANIONGAP  --   --   --  6  --    DIEGO  --   --   --  8.3*  --    GLC  --   --  100* 144* 119*       No results found for this or any previous visit (from the past 24 hour(s)).

## 2022-09-23 NOTE — PLAN OF CARE
Pt A&Ox4. No numbness or tingling. Pt was up with PT today and had increased pain afterwards. Pain was controlled per pt report with oxycodone PRN Pt receiving continuous LR to help with BP's. Provider held carvedilol and PB have maintained around 120/75 during shift. Pt stated that he has increased urination. Pt reported no dizziness. Ambulation tolerated well. Pt used IS multiple times throughout shift and tolerated it well. Dressing dry and intact with minimal bruising around it. Pt refusing to use wedge in between legs but uses a pillow instead. Pt was pleasant throughout shift. Will continue POC.

## 2022-09-23 NOTE — PROGRESS NOTES
Orthopaedic Surgery Progress Note 09/23/2022    S: No acute events overnight. BP better today. Pain controlled. Denies numbness or tingling. Denies chest pain, SOB, nausea/vomiting. Tolerating diet. Voiding spontaneously.  OOB, working with therapy, recommending home with outpatient physical therapy;home with assist. No concerns this AM.     O:  Temp: 98.7  F (37.1  C) Temp src: Oral BP: 118/75 Pulse: 74   Resp: 18 SpO2: 97 % O2 Device: None (Room air)      Exam:  Gen: No acute distress, resting comfortably in bed.  Resp: Non-labored breathing  MSK:    LLE:  - Dressings c/d/i  - SILT femoral/tibial/sural/saphenous/DP/SP nerves  - Fires Quad, TA, EHL, FHL, GaSC  - PT/DP pulses 2+, foot wwp    Recent Labs   Lab 09/22/22  0758 09/21/22  0841   HGB 11.8* 14.0     Assessment/Plan: Iraida Hernandez is a 67 year old male s/p Procedure(s):  ARTHROPLASTY, LEFT HIP, TOTAL on 9/21/2022 with Dr. Jolly.     Activity: Up with assist and assistive devices as needed until independent. Posterior hip precautions to operative side x 3 months:  1) No hip flexion beyond 90 degrees  2) No adduction beyond midline  3) No internal rotation beyond neutral   Weight bearing status: WBAT  Antibiotics: Cefazolin x 24 hours - complete   Diet: Begin with clear fluids and progress diet as tolerated. Bowel regimen. Anti-emetics PRN.    DVT prophylaxis: Resume PTA AC POD1  Elevation: Elevate heels off of bed on pillows   Wound Care: Tegaderm and alginate x 2 weeks   Drains: none  Pain management: Orals PRN, IV for breakthrough only  X-rays: AP Pelvis and Lateral operative hip in PACU - complete   Physical Therapy: Mobilization, ROM, ADL's, Posterior hip precautions.    Occupational Therapy: ADL's  Labs: Trend Hgb on POD #1, 2  Consults: PT, OT. Hospitalist, appreciate assistance in caring for this patient throughout the perioperative period            Future Appointments   Date Time Provider Department Center   9/21/2022  3:00 PM Ofelia Victor Pt,  PT [facility]   9/22/2022  9:00 AM Ofelia Victor Pt, PT [facility]   9/22/2022 10:15 AM Zaida Givens, OT [facility]   9/22/2022  2:15 PM Ofelia Victor Pt, PT [facility]   9/27/2022  1:30 PM Sukumar Oglesby MD [facility]   10/5/2022  3:00 PM Ofelia Hendricks PA-C [facility]   11/10/2022  9:45 AM Ok Jolly MD [facility]   11/17/2022  1:30 PM [id number] [facility]   11/18/2022 12:30 PM Ellen Toney APRN CNP RUUM[facility] PSA CLIN   12/19/2022 11:00 AM Cesar Marie PA-C [facility] CR   12/27/2022 12:15 AM SINHA DCR2 [facility] PSA CLIN   1/2/2023 11:00 AM CR COVID LAB CRLABR CR   1/18/2023  3:00 PM Ofelia Hendricks PA-C [facility]   2/2/2023  1:15 PM Juancarlos Stevens MD [facility] PSA CLIN   2/16/2023 10:45 AM Ok Jolly MD [facility]      Disposition: Pending progress with therapies, pain control on orals, and medical stability, anticipate discharge to Home vs TCU on POD #1-2.     Chrisitan Gibbs MD  Orthopaedic Surgery PGY-4

## 2022-09-23 NOTE — PROGRESS NOTES
09/22/22 1300   Quick Adds   Type of Visit Initial Occupational Therapy Evaluation   Living Environment   People in Home significant other;child(latonia), adult   Current Living Arrangements house   Home Accessibility stairs to enter home   Number of Stairs, Main Entrance 2   Stair Railings, Main Entrance none   Number of Stairs, Within Home, Primary eight   Stair Railings, Within Home, Primary railing on left side (ascending)   Transportation Anticipated family or friend will provide   Living Environment Comments Pt bought raised toilet seat, shower chair, reacher, walker, cane, 1 crutch in preparation for surgery, has not used equipment in the past. Pt has walk in shower with and no grab bars.   Self-Care   Usual Activity Tolerance good   Current Activity Tolerance moderate   Regular Exercise Yes   Activity/Exercise Type other (see comments)  (golf)   Exercise Amount/Frequency 2 times/wk   Equipment Currently Used at Home none   Fall history within last six months no   Activity/Exercise/Self-Care Comment Pt IND prior to surgery in ADLs, mobility, and driving.   Instrumental Activities of Daily Living (IADL)   Previous Responsibilities meal prep   General Information   Onset of Illness/Injury or Date of Surgery 09/21/22   Referring Physician Ofelia Hendricks PA-C   Patient/Family Therapy Goal Statement (OT) not discussed   Additional Occupational Profile Info/Pertinent History of Current Problem s/p L ABRT   Existing Precautions/Restrictions no hip IR;no hip ADD past midline;90 degree hip flexion;no pivoting or twisting   Left Upper Extremity (Weight-bearing Status) full weight-bearing (FWB)   Right Upper Extremity (Weight-bearing Status) full weight-bearing (FWB)   Left Lower Extremity (Weight-bearing Status) weight-bearing as tolerated (WBAT)   Right Lower Extremity (Weight-bearing Status) full weight-bearing (FWB)   Cognitive Status Examination   Cognitive Status Comments WNL   Visual Perception   Visual  Impairment/Limitations WFL   Sensory   Sensory Quick Adds No deficits were identified   Pain Assessment   Patient Currently in Pain Yes, see Vital Sign flowsheet  (Sittin/10, Standin-6/10)   Range of Motion Comprehensive   Comment, General Range of Motion did not formally address, within normal ROM   Strength Comprehensive (MMT)   Comment, General Manual Muscle Testing (MMT) Assessment did not formally address, within functional limits   Bed Mobility   Bed Mobility supine-sit   Supine-Sit [place] (Bed Mobility) moderate assist (50% patient effort)   Transfers   Transfers toilet transfer   Toilet Transfer   Type (Toilet Transfer) sit-stand;stand-sit   [place] Level (Toilet Transfer) minimum assist (75% patient effort)   Assistive Device (Toilet Transfer) walker, front-wheeled   Bathing Assessment/Intervention   [place] Level (Bathing) moderate assist (50% patient effort)   Lower Body Dressing Assessment/Training   [place] Level (Lower Body Dressing) maximum assist (25% patient effort)   Toileting   [place] Level (Toileting) minimum assist (75% patient effort)   Clinical Impression   Criteria for Skilled Therapeutic Interventions Met (OT) Yes, treatment indicated   OT Diagnosis Decrease in IND and safety in transfers, functional mobility, and when completing ADLs   OT Problem List-Impairments impacting ADL problems related to;activity tolerance impaired;pain;post-surgical precautions   Assessment of Occupational Performance 3-5 Performance Deficits   Identified Performance Deficits bed mobility, toileting, bathing, lower body dressing   Planned Therapy Interventions (OT) ADL retraining;bed mobility training;home program guidelines   Clinical Decision Making Complexity (OT) low complexity   Anticipated Equipment Needs Upon Discharge (OT) hip kit;walker, rolling;raised toilet seat   Risk & Benefits of therapy have been explained evaluation/treatment results reviewed;participants  included;patient   Clinical Impression Comments Pt currently limited by reduced activity tolerance and post surgical hip precautions, which is impacting pts IND and safety in completing ADLs, transfers, and functional mobility. Pt would benefit from skilled OT interventions to learn precautions and use of AE to complete ADLs to increase safety and IND.   OT Discharge Planning   OT Discharge Recommendation (DC Rec) home with assist   OT Rationale for DC Rec Recommend d/c to home with assistance. Pt lives with wife and son who can assist with ADLs/IADLs as necessary for increased safety. Pt also has AE at home and willing to learn how to use AE to increase IND and safety in ADLs.   OT Brief overview of current status CGA with FWW for sit<>stand transfers and walking   Total Evaluation Time (Minutes)   Total Evaluation Time (Minutes) 8   OT Goals   Therapy Frequency (OT) Daily   OT Predicted Duration/Target Date for Goal Attainment 09/23/22   OT Goals Lower Body Dressing;Lower Body Bathing;Bed Mobility;Toilet Transfer/Toileting   OT: Lower Body Dressing Modified independent;using adaptive equipment;within precautions   OT: Lower Body Bathing Modified independent;using adaptive equipment;with precautions   OT: Bed Mobility Modified independent;supine to/from sitting;within precautions   OT: Toilet Transfer/Toileting Modified independent;using adaptive equipment;within precautions

## 2022-09-23 NOTE — PLAN OF CARE
[facility]      OUTPATIENT OCCUPATIONAL THERAPY  EVALUATION  PLAN OF TREATMENT FOR OUTPATIENT REHABILITATION  (COMPLETE FOR INITIAL CLAIMS ONLY)  Patient's Last Name, First Name, M.I.  YOB: 1955  Iraida Hernandez                             Provider's Name  [facility] Medical Record No.  [id number]                               Onset Date:  09/21/22   Start of Care Date:   09/22/22     Type:     ___PT   _X_OT   ___SLP Medical Diagnosis:   s/p L BART                        OT Diagnosis:  Decrease in IND and safety in transfers, functional mobility, and when completing ADLs   Visits from SOC:  1   _________________________________________________________________________________  Plan of Treatment/Functional Goals    Planned Interventions: ADL retraining, bed mobility training, home program guidelines   Goals: See Occupational Therapy Goals on Care Plan in [facility] electronic health record.    Therapy Frequency: Daily  Predicted Duration of Therapy Intervention: 09/23/22  _________________________________________________________________________________    I CERTIFY THE NEED FOR THESE SERVICES FURNISHED UNDER        THIS PLAN OF TREATMENT AND WHILE UNDER MY CARE     (Physician co-signature of this document indicates review and certification of the therapy plan).               ,      Referring Physician: Ofelia Hendricks PA-C            Initial Assessment        See Occupational Therapy evaluation dated   in Epic electronic health record.

## 2022-09-23 NOTE — PLAN OF CARE
Physical Therapy Discharge Summary    Reason for therapy discharge:    Discharged to home with outpatient therapy.    Progress towards therapy goal(s). See goals on Care Plan in [facility] electronic health record for goal details.  Goals met    Therapy recommendation(s):    Continued therapy is recommended.  Rationale/Recommendations:  Pt is currently below baseline and would benefit from OP PT to further improve functional mobility.

## 2022-09-23 NOTE — PLAN OF CARE
No new issues overnoc.  /75.Tolerating well on room air.  LR at 75ml/hr via PIV line R hand.  PIV line L hand,saline locked.  Tele remains NSR,no chest pain.  L hip drsg,CDI.  Pain managed with tylenol,ice pack and oxycodone.  Pillows in between legs as pt continue to refuse abducto foam wedge.  Voiding spontaneously using urinal.  Is passing gas.  No numbness/tingling sensation.  Thinks he's going home today.  Awaiting ortho and IM standpoint.

## 2022-09-27 ENCOUNTER — TELEPHONE (OUTPATIENT)
Dept: ONCOLOGY | Facility: CLINIC | Age: 67
End: 2022-09-27

## 2022-09-27 ENCOUNTER — VIRTUAL VISIT (OUTPATIENT)
Dept: UROLOGY | Facility: CLINIC | Age: 67
End: 2022-09-27
Payer: MEDICARE

## 2022-09-27 DIAGNOSIS — C61 PROSTATE CANCER (H): Primary | ICD-10-CM

## 2022-09-27 PROCEDURE — 99213 OFFICE O/P EST LOW 20 MIN: CPT | Mod: 24 | Performed by: UROLOGY

## 2022-09-27 NOTE — LETTER
9/27/2022       RE: Iraida Hernandez  [address] [address] [address]     Dear Colleague,    Thank you for referring your patient, Iraida Hernandez, to the [facility] UROLOGY CLINIC [place] [facility]. Please see a copy of my visit note below.    Iraida Hernandez is a 67 year old male who is being evaluated via a billable video visit.        How would you like to obtain your AVS? MyChart  If the video visit is dropped, the invitation should be resent by: Text to cell phone: [phone]  Will anyone else be joining your video visit? No}    Video Start Time: 2:35 PM    CHIEF COMPLAINT   It was my pleasure to see Iraida Hernandez who is a 67 year old male for follow-up of Prostate Cancer.      HPI  Iraida Hernandez is a very pleasant 66 year old male who presents with a history of Prostate Cancer.    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  He had clinical stage T1c disease at that time with a PSA of 9.7.  His Oncotype DX test suggested a very low probability of grade 4 disease or progression.     Oncotype GPS is 5 - very low risk.    Here for PSA recheck; remains rather high, but stable over past 2 years.    PSA  8/31/2022 - 14.9  1/20/2022 - 13.7  2/2/2021 - 12.90  3/31/2020 - 10.70  2/5/2020 - 14.60  2/15/2019 - 9.79  12/2/2013 - 6.19     TRUS 6/25/2021  FINAL DIAGNOSIS:   A.  Prostate, left [place] x2, biopsy   - Focal high-grade prostatic intraepithelial neoplasia. Negative for   invasive malignancy.     B.  Prostate, left mid x2, biopsy   - Benign prostatic tissue. Negative for malignancy.     C.  Prostate, left [place] x2, biopsy   - Focal high-grade prostatic intraepithelial neoplasia. Negative for   invasive malignancy.     D.  Prostate, right base x2, biopsy   - Benign prostatic tissue. Negative for malignancy     E.  Prostate, right mid x2, biopsy   - Atypical glands suspicious for malignancy (Please see  comment)     F.  Prostate, right [place] x2, biopsy   - Prostatic adenocarcinoma, acinar type, Krista's grade 3+ 3 = score of   6, grade group is 1, number of cores involved is 2 of 2, total surface area involved is 10-15%, perineural   invasion is not identified, high-grade prostatic intraepithelial neoplasia is present      TRUS 3/2019  F: Prostate, left apex, needle core biopsy: Adenocarcinoma, Krista score   3 + 3 = 6 (of 10) (Grade Group 1),   involving 1 of 1 needle core(s) and 1 mm of 10 mm of biopsy tissue   (approximately 10% of biopsy tissue).   Perineural invasion not identified.     K: Prostate, right mid, needle core biopsy: Adenocarcinoma, [place] score   3 + 3 = 6 (of 10) (Grade Group 1),   involving 1 of 1 needle core(s) and 1 mm of 10 mm of biopsy tissue   (approximately 10% of biopsy tissue).   Perineural invasion not identified.      MRI Prostate 3/9/2021  IMPRESSION:  1. Based on the most suspicious abnormality, this exam is  characterized as PIRADS 2 - Clinically significant cancer is unlikely  to be present.    2. No suspicious adenopathy or evidence of pelvic metastases.  3. Bilateral hip arthritis with effusion.         Allergies:   Penicillins and Tetracyclines         Review of Systems:  From intake questionnaire     Skin: negative  Eyes: negative  Ears/Nose/Throat: negative  Respiratory: No shortness of breath, dyspnea on exertion, cough, or hemoptysis  Cardiovascular: No chest pain or palpitations  Gastrointestinal: negative; no nausea/vomiting, constipation or diarrhea  Genitourinary: as per HPI  Musculoskeletal: negative  Neurologic: negative  Psychiatric: negative  Hematologic/Lymphatic/Immunologic: negative  Endocrine: negative         Physical Exam:     Vitals:  No vitals were obtained today due to virtual visit.    Physical Exam   GENERAL: Healthy, alert and no distress  EYES: Eyes grossly normal to inspection.  No discharge or erythema, or obvious scleral/conjunctival  abnormalities.  RESP: No audible wheeze, cough, or visible cyanosis.  No visible retractions or increased work of breathing.    SKIN: Visible skin clear. No significant rash, abnormal pigmentation or lesions.  NEURO: Cranial nerves grossly intact.  Mentation and speech appropriate for age.  PSYCH: Mentation appears normal, affect normal/bright, judgement and insight intact, normal speech and appearance well-groomed.      Outside and Past Medical records:    Review of the result(s) of each unique test - PSA         Assessment and Plan:     66 year old male with Krista 3+3 disease diagnosed 2019. GPS score 5. On active surveillance. Recent PSA 14.9 and stable. MRI demonstrates, again, that he has PIRADS 2 lesion on MRI 3/2021. Confirmatory re-biopsy with [place] 3+3=6 disease 6/2021. Planning to continue surveillance. Will plan another PSA in 6 months.      Plan:  Follow-up 8 months with PSA    Orders  Orders Placed This Encounter   Procedures     PSA tumor marker     Video-Visit Details    Type of service:  Video Visit    Video End Time:2:44 PM    Originating Location (pt. Location): Home    Distant Location (provider location):  [facility] UROLOGY AND [facility] FOR PROSTATE AND UROLOGIC CANCERS    Platform used for Video Visit: [place]    10 minutes spent on the date of the encounter including direct interaction with the patient, performing chart review, history and exam, documentation and further activities as noted above.    Sukumar Oglesby MD  Urology  [facility]

## 2022-09-27 NOTE — PROGRESS NOTES
Iraida Hernandez is a 67 year old male who is being evaluated via a billable video visit.        How would you like to obtain your AVS? MyChart  If the video visit is dropped, the invitation should be resent by: Text to cell phone: [phone]  Will anyone else be joining your video visit? No}    Video Start Time: 2:35 PM    CHIEF COMPLAINT   It was my pleasure to see Iraida Hernandez who is a 67 year old male for follow-up of Prostate Cancer.      HPI  Iraida Hernandez is a very pleasant 66 year old male who presents with a history of Prostate Cancer.    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  He had clinical stage T1c disease at that time with a PSA of 9.7.  His Oncotype DX test suggested a very low probability of grade 4 disease or progression.     Oncotype GPS is 5 - very low risk.    Here for PSA recheck; remains rather high, but stable over past 2 years.    PSA  8/31/2022 - 14.9  1/20/2022 - 13.7  2/2/2021 - 12.90  3/31/2020 - 10.70  2/5/2020 - 14.60  2/15/2019 - 9.79  12/2/2013 - 6.19     TRUS 6/25/2021  FINAL DIAGNOSIS:   A.  Prostate, left base x2, biopsy   - Focal high-grade prostatic intraepithelial neoplasia. Negative for   invasive malignancy.     B.  Prostate, left mid x2, biopsy   - Benign prostatic tissue. Negative for malignancy.     C.  Prostate, left [place] x2, biopsy   - Focal high-grade prostatic intraepithelial neoplasia. Negative for   invasive malignancy.     D.  Prostate, right base x2, biopsy   - Benign prostatic tissue. Negative for malignancy     E.  Prostate, right mid x2, biopsy   - Atypical glands suspicious for malignancy (Please see comment)     F.  Prostate, right [place] x2, biopsy   - Prostatic adenocarcinoma, acinar type, [place]'s grade 3+ 3 = score of   6, grade group is 1, number of cores involved is 2 of 2, total surface area involved is 10-15%, perineural   invasion is not identified, high-grade prostatic intraepithelial neoplasia is present       TRUS 3/2019  F: Prostate, left apex, needle core biopsy: Adenocarcinoma, Krista score   3 + 3 = 6 (of 10) (Grade Group 1),   involving 1 of 1 needle core(s) and 1 mm of 10 mm of biopsy tissue   (approximately 10% of biopsy tissue).   Perineural invasion not identified.     K: Prostate, right mid, needle core biopsy: Adenocarcinoma, Krista score   3 + 3 = 6 (of 10) (Grade Group 1),   involving 1 of 1 needle core(s) and 1 mm of 10 mm of biopsy tissue   (approximately 10% of biopsy tissue).   Perineural invasion not identified.      MRI Prostate 3/9/2021  IMPRESSION:  1. Based on the most suspicious abnormality, this exam is  characterized as PIRADS 2 - Clinically significant cancer is unlikely  to be present.    2. No suspicious adenopathy or evidence of pelvic metastases.  3. Bilateral hip arthritis with effusion.         Allergies:   Penicillins and Tetracyclines         Review of Systems:  From intake questionnaire     Skin: negative  Eyes: negative  Ears/Nose/Throat: negative  Respiratory: No shortness of breath, dyspnea on exertion, cough, or hemoptysis  Cardiovascular: No chest pain or palpitations  Gastrointestinal: negative; no nausea/vomiting, constipation or diarrhea  Genitourinary: as per HPI  Musculoskeletal: negative  Neurologic: negative  Psychiatric: negative  Hematologic/Lymphatic/Immunologic: negative  Endocrine: negative         Physical Exam:     Vitals:  No vitals were obtained today due to virtual visit.    Physical Exam   GENERAL: Healthy, alert and no distress  EYES: Eyes grossly normal to inspection.  No discharge or erythema, or obvious scleral/conjunctival abnormalities.  RESP: No audible wheeze, cough, or visible cyanosis.  No visible retractions or increased work of breathing.    SKIN: Visible skin clear. No significant rash, abnormal pigmentation or lesions.  NEURO: Cranial nerves grossly intact.  Mentation and speech appropriate for age.  PSYCH: Mentation appears normal, affect  normal/bright, judgement and insight intact, normal speech and appearance well-groomed.      Outside and Past Medical records:    Review of the result(s) of each unique test - PSA         Assessment and Plan:     66 year old male with Krisat 3+3 disease diagnosed 2019. GPS score 5. On active surveillance. Recent PSA 14.9 and stable. MRI demonstrates, again, that he has PIRADS 2 lesion on MRI 3/2021. Confirmatory re-biopsy with Krista 3+3=6 disease 6/2021. Planning to continue surveillance. Will plan another PSA in 6 months.      Plan:  Follow-up 8 months with PSA    Orders  Orders Placed This Encounter   Procedures     PSA tumor marker     Video-Visit Details    Type of service:  Video Visit    Video End Time:2:44 PM    Originating Location (pt. Location): Home    Distant Location (provider location):  [facility] UROLOGY AND [facility] FOR PROSTATE AND UROLOGIC CANCERS    Platform used for Video Visit: [place]    10 minutes spent on the date of the encounter including direct interaction with the patient, performing chart review, history and exam, documentation and further activities as noted above.    Sukumar Oglesby MD  Urology  [facility]

## 2022-10-05 ENCOUNTER — OFFICE VISIT (OUTPATIENT)
Dept: ORTHOPEDICS | Facility: CLINIC | Age: 67
End: 2022-10-05
Payer: MEDICARE

## 2022-10-05 DIAGNOSIS — M16.12 PRIMARY LOCALIZED OSTEOARTHRITIS OF LEFT HIP: Primary | ICD-10-CM

## 2022-10-05 PROCEDURE — 99024 POSTOP FOLLOW-UP VISIT: CPT

## 2022-10-05 NOTE — NURSING NOTE
Reason For Visit:   Chief Complaint   Patient presents with     Surgical Followup     2 week POP // ARTHROPLASTY, HIP, TOTAL (LEFT) // DOS 9/21/2022 with Dr. oJlly       There were no vitals taken for this visit.    Pain Assessment  Patient Currently in Pain: Yes  0-10 Pain Scale: 2  Primary Pain Location: Hip (Left)  Pain Descriptors: Other (comment) (pressure)    Heena Singh, ATC

## 2022-10-05 NOTE — LETTER
10/5/2022         RE: Iraida Hernandez  [address] [facility] [address]        Dear Colleague,    Thank you for referring your patient, Iraida Hernandez, to the [facility] [place]. Please see a copy of my visit note below.    Chief Complaint:   1. Follow up, DOS 9/21/22 with Dr. Jolly     Procedures:  1. Left total hip arthroplasty     History:  Iraida Hernandez is a pleasant 67 year old patient s/p above procedure, here for follow up. He has done well since surgery. Taking tylenol and 2.5 mg oxycodone daily, weaning off as pain subsides. Scheduling first PT visit this week. Notes his left leg feels much longer than the right. No concerns today.      Exam:     General: Awake, Alert, and oriented. Articulates and communicates with a normal affect     Left Lower Extremity:    Incisions well healed without evidence of infection, no erythema, drainage, or wound dehiscence     Range of motion and stability exam not performed    Neurovascularly intact    Gait: Antalgic with use of cane     Imaging:  No new imaging.     Medications:     Current Outpatient Medications:      acetaminophen (TYLENOL) 325 MG tablet, Take 2 tablets (650 mg) by mouth every 4 hours as needed for other, Disp: 60 tablet, Rfl: 0     aspirin EC 81 MG EC tablet, Take 1 tablet (81 mg) by mouth daily, Disp: , Rfl:      atorvastatin (LIPITOR) 40 MG tablet, Take 1 tablet (40 mg) by mouth daily, Disp: 90 tablet, Rfl: 0     carvedilol (COREG) 12.5 MG tablet, Take 1 tablet (12.5 mg) by mouth 2 times daily (with meals), Disp: 180 tablet, Rfl: 3     clopidogrel (PLAVIX) 75 MG tablet, Take 1 tablet (75 mg) by mouth daily, Disp: 90 tablet, Rfl: 1     lisinopril (ZESTRIL) 5 MG tablet, Take 1 tablet (5 mg) by mouth daily, Disp: 90 tablet, Rfl: 0     nitroGLYcerin (NITROSTAT) 0.4 MG sublingual tablet, Place 1 tablet (0.4 mg) under the tongue every 5 minutes as needed for chest pain, Disp: 25 tablet, Rfl: 3     oxyCODONE  (ROXICODONE) 5 MG tablet, Take 1 tablet (5 mg) by mouth every 4 hours as needed, Disp: 33 tablet, Rfl: 0     spironolactone (ALDACTONE) 25 MG tablet, Take 1 tablet (25 mg) by mouth daily, Disp: 90 tablet, Rfl: 0     polyethylene glycol (MIRALAX) 17 g packet, Take 17 g by mouth daily (Patient not taking: Reported on 10/5/2022), Disp: 10 packet, Rfl: 0     senna-docusate (SENOKOT-S/PERICOLACE) 8.6-50 MG tablet, Take 1 tablet by mouth 2 times daily (Patient not taking: Reported on 10/5/2022), Disp: 30 tablet, Rfl: 0    Current Facility-Administered Medications:      methylPREDNISolone (DEPO-MEDROL) injection 40 mg, 40 mg, , , Yeo, Albert, MD, 40 mg at 06/24/22 1345     methylPREDNISolone (DEPO-MEDROL) injection 40 mg, 40 mg, , , Yeo, Albert, MD, 40 mg at 06/24/22 1345    Assesment:  Progressing very well 2 weeks s/p L BART. Wound cares performed today, I did not appreciate signs of infection. We discussed the plan below, all questions were answered to the best of my ability.    Plan:   -Posterior hip precautions x 3 months   -Schedule visit with outpatient PT focusing on gait and stability  -Follow up with Dr. Jolly at 6 weeks post op, will need new XRs    Ofelia Hendricks PA-C 10/5/2022 4:40 PM  Orthopedic Surgery

## 2022-10-05 NOTE — PROGRESS NOTES
Chief Complaint:   1. Follow up, DOS 9/21/22 with Dr. Jolly     Procedures:  1. Left total hip arthroplasty     History:  Iraida Hernandez is a pleasant 67 year old patient s/p above procedure, here for follow up. He has done well since surgery. Taking tylenol and 2.5 mg oxycodone daily, weaning off as pain subsides. Scheduling first PT visit this week. Notes his left leg feels much longer than the right. No concerns today.      Exam:     General: Awake, Alert, and oriented. Articulates and communicates with a normal affect     Left Lower Extremity:    Incisions well healed without evidence of infection, no erythema, drainage, or wound dehiscence     Range of motion and stability exam not performed    Neurovascularly intact    Gait: Antalgic with use of cane     Imaging:  No new imaging.     Medications:     Current Outpatient Medications:      acetaminophen (TYLENOL) 325 MG tablet, Take 2 tablets (650 mg) by mouth every 4 hours as needed for other, Disp: 60 tablet, Rfl: 0     aspirin EC 81 MG EC tablet, Take 1 tablet (81 mg) by mouth daily, Disp: , Rfl:      atorvastatin (LIPITOR) 40 MG tablet, Take 1 tablet (40 mg) by mouth daily, Disp: 90 tablet, Rfl: 0     carvedilol (COREG) 12.5 MG tablet, Take 1 tablet (12.5 mg) by mouth 2 times daily (with meals), Disp: 180 tablet, Rfl: 3     clopidogrel (PLAVIX) 75 MG tablet, Take 1 tablet (75 mg) by mouth daily, Disp: 90 tablet, Rfl: 1     lisinopril (ZESTRIL) 5 MG tablet, Take 1 tablet (5 mg) by mouth daily, Disp: 90 tablet, Rfl: 0     nitroGLYcerin (NITROSTAT) 0.4 MG sublingual tablet, Place 1 tablet (0.4 mg) under the tongue every 5 minutes as needed for chest pain, Disp: 25 tablet, Rfl: 3     oxyCODONE (ROXICODONE) 5 MG tablet, Take 1 tablet (5 mg) by mouth every 4 hours as needed, Disp: 33 tablet, Rfl: 0     spironolactone (ALDACTONE) 25 MG tablet, Take 1 tablet (25 mg) by mouth daily, Disp: 90 tablet, Rfl: 0     polyethylene glycol (MIRALAX) 17 g packet, Take 17 g by  mouth daily (Patient not taking: Reported on 10/5/2022), Disp: 10 packet, Rfl: 0     senna-docusate (SENOKOT-S/PERICOLACE) 8.6-50 MG tablet, Take 1 tablet by mouth 2 times daily (Patient not taking: Reported on 10/5/2022), Disp: 30 tablet, Rfl: 0    Current Facility-Administered Medications:      methylPREDNISolone (DEPO-MEDROL) injection 40 mg, 40 mg, , , Yeo, Albert, MD, 40 mg at 06/24/22 1345     methylPREDNISolone (DEPO-MEDROL) injection 40 mg, 40 mg, , , Yeo, Albert, MD, 40 mg at 06/24/22 1345    Assesment:  Progressing very well 2 weeks s/p L BART. Wound cares performed today, I did not appreciate signs of infection. We discussed the plan below, all questions were answered to the best of my ability.    Plan:   -Posterior hip precautions x 3 months   -Schedule visit with outpatient PT focusing on gait and stability  -Follow up with Dr. Jolly at 6 weeks post op, will need new XRs    Ofelia Hendricks PA-C 10/5/2022 4:40 PM  Orthopedic Surgery

## 2022-10-09 ENCOUNTER — HEALTH MAINTENANCE LETTER (OUTPATIENT)
Age: 67
End: 2022-10-09

## 2022-10-11 ENCOUNTER — THERAPY VISIT (OUTPATIENT)
Dept: PHYSICAL THERAPY | Facility: CLINIC | Age: 67
End: 2022-10-11
Attending: CLINICAL NURSE SPECIALIST
Payer: MEDICARE

## 2022-10-11 DIAGNOSIS — M16.12 PRIMARY OSTEOARTHRITIS OF LEFT HIP: ICD-10-CM

## 2022-10-11 DIAGNOSIS — Z47.1 AFTERCARE FOLLOWING LEFT HIP JOINT REPLACEMENT SURGERY: ICD-10-CM

## 2022-10-11 DIAGNOSIS — Z96.642 AFTERCARE FOLLOWING LEFT HIP JOINT REPLACEMENT SURGERY: ICD-10-CM

## 2022-10-11 PROCEDURE — 97110 THERAPEUTIC EXERCISES: CPT | Mod: GP | Performed by: PHYSICAL THERAPIST

## 2022-10-11 PROCEDURE — 97161 PT EVAL LOW COMPLEX 20 MIN: CPT | Mod: GP | Performed by: PHYSICAL THERAPIST

## 2022-10-11 ASSESSMENT — ACTIVITIES OF DAILY LIVING (ADL)
ROLLING_OVER_IN_BED: MODERATE DIFFICULTY
HOS_ADL_ITEM_SCORE_TOTAL: 26
HOW_WOULD_YOU_RATE_YOUR_CURRENT_LEVEL_OF_FUNCTION_DURING_YOUR_USUAL_ACTIVITIES_OF_DAILY_LIVING_FROM_0_TO_100_WITH_100_BEING_YOUR_LEVEL_OF_FUNCTION_PRIOR_TO_YOUR_HIP_PROBLEM_AND_0_BEING_THE_INABILITY_TO_PERFORM_ANY_OF_YOUR_USUAL_DAILY_ACTIVITIES?: 30
GOING_UP_1_FLIGHT_OF_STAIRS: MODERATE DIFFICULTY
LIGHT_TO_MODERATE_WORK: SLIGHT DIFFICULTY
WALKING_INITIALLY: SLIGHT DIFFICULTY
WALKING_APPROXIMATELY_10_MINUTES: NO DIFFICULTY AT ALL
SITTING_FOR_15_MINUTES: NO DIFFICULTY AT ALL
STANDING_FOR_15_MINUTES: MODERATE DIFFICULTY
HOS_ADL_HIGHEST_POTENTIAL_SCORE: 36
HOS_ADL_COUNT: 9
STEPPING_UP_AND_DOWN_CURBS: SLIGHT DIFFICULTY
PUTTING_ON_SOCKS_AND_SHOES: MODERATE DIFFICULTY
GETTING_INTO_AND_OUT_OF_AN_AVERAGE_CAR: SLIGHT DIFFICULTY
GOING_DOWN_1_FLIGHT_OF_STAIRS: NO DIFFICULTY AT ALL

## 2022-10-11 NOTE — PROGRESS NOTES
[facility]    OUTPATIENT Physical Therapy ORTHOPEDIC EVALUATION  PLAN OF TREATMENT FOR OUTPATIENT REHABILITATION  (COMPLETE FOR INITIAL CLAIMS ONLY)  Patient's Last Name, First Name, M.I.  YOB: 1955  Iraida Hernandez       Provider s Name:  [facility]   Medical Record No.  [id number]   Start of Care Date:  10/11/22   Onset Date:  09/21/22    Treatment Diagnosis:  S/P LT HA Medical Diagnosis:     Primary osteoarthritis of left hip  Aftercare following left hip joint replacement surgery       Goals:     10/11/22 0500   Body Part   Goals listed below are for S/P L BART   Goal #1   Goal #1 ambulation   Previous Functional Level No restrictions   Current Functional Level Minutes patient can walk;with cane   Performance Level 12-13 with  3/10 pain   STG Target Performance Minutes patient will be able to walk;without assistive device   Performance Level 20 with 2/10 pain   Rationale for safe household ambulation;for safe outdoor household ambulation;for safe community ambulation;to maintain proper body mechanics/posture while ambulating to avoid additional compensatory injury due to improper gait mechanics;to promote a healthy and active lifestyle   Due Date 11/01/22    LTG Target Performance Minutes patient will be able to  walk   Performance Level 45+ pain free normal pattern   Rationale for safe household ambulation;for safe outdoor household ambulation;for safe community ambulation;to maintain proper body mechanics/posture while ambulating to avoid additional compensatory injury due to improper gait mechanics;to promote a healthy and active lifestyle   Due Date 01/03/23       Therapy Frequency:  1x/week  Predicted Duration of Therapy Intervention:  12 weeks    Ok Johnson PT                 I CERTIFY THE NEED FOR THESE SERVICES FURNISHED UNDER        THIS PLAN OF  TREATMENT AND WHILE UNDER MY CARE     (Physician attestation of this document indicates review and certification of the therapy plan).                     Certification Date From:  10/11/22   Certification Date To:  01/08/23    Referring Provider:  Lolly Peacock    Initial Assessment        See Epic Evaluation SOC Date: 10/11/22

## 2022-10-11 NOTE — PROGRESS NOTES
Physical Therapy Initial Evaluation  Subjective:  The history is provided by the patient. No  was used.   Patient Health History  Iraida Hernandez being seen for PT for left hip replacement.     Problem began: 9/21/2022.   Problem occurred: Surgery on date above   Pain is reported as 3/10 on pain scale.  General health as reported by patient is good.  Pertinent medical history includes: cancer, heart problems, implanted device and smoking.     Medical allergies: none.   Surgeries include:  Orthopedic surgery.    Current medications:  Cardiac medication, high blood pressure medication and pain medication.    Current occupation is Retired.   Primary job tasks include:  Computer work and driving.                  Therapist Generated HPI Evaluation  Problem details: Pt c/o L hip pain and weakness S/P L BART 9/21/2022.  MD order date 9/22/2022.  In hospital 3 days and home with HEP.  Pt is to have R BART 1/2023.  Currently using cane, no assistive device prior. .         Type of problem:  Left hip.    This is a new condition.  Condition occurred with:  Degenerative joint disease.  Where condition occurred: for unknown reasons.  Patient reports pain:  Anterior, lateral and medial.  Pain is described as aching and shooting and is intermittent.  Pain radiates to:  Thigh. Pain is the same all the time.  Since onset symptoms are gradually improving.  Associated symptoms:  Loss of strength. Symptoms are exacerbated by ascending stairs, bending/squatting, descending stairs, walking, weight bearing, standing, transfers and lying on extremity  and relieved by rest and analgesics.      Barriers include:  None as reported by patient.                        Objective:    Gait:    Gait Type:  Antalgic   Assistive Devices:  Cane and none  Deviations:  Hip:  Decr dynamic control LGeneral Deviations:  Stride length decr                                                 Hip Evaluation  Hip PROM:  : ROM WFL per S/P  Dx.                          Hip Strength:    Flexion:   Left: 4/5   +/-  Pain:                      Extension:  Left: 4-/5  +/-  Pain:  Abduction:  Left: 4-/5    +/-   Pain:  Adduction:  Left: 4+ and 4/5   -   Pain:      Knee Flexion:  Left: 4+ and 5-/5   +/-  Pain:  Knee Extension:  Left: /5 +/-  Pain:            Functional Testing:        Core Strength:      Single leg bridge:   Left:/20 reps Right:/20 reps  % of Uninvolved:  10 dbl legged wtih fair control      Proprioception:    Stork balance test:   Left:    4-5 sec  Right:  7-8 sec  % of Uninvolved:                General     ROS    Assessment/Plan:    Patient is a 67 year old male with left side hip complaints.    Patient has the following significant findings with corresponding treatment plan.                Diagnosis 1:  S/P L BART  Pain -  hot/cold therapy and home program  Decreased ROM/flexibility - manual therapy and therapeutic exercise  Decreased strength - therapeutic exercise and therapeutic activities  Decreased proprioception - neuro re-education and therapeutic activities  Impaired gait - gait training  Impaired muscle performance - neuro re-education  Decreased function - therapeutic activities    Therapy Evaluation Codes:   Cumulative Therapy Evaluation is: Low complexity.    Previous and current functional limitations:  (See Goal Flow Sheet for this information)    Short term and Long term goals: (See Goal Flow Sheet for this information)     Communication ability:  Patient appears to be able to clearly communicate and understand verbal and written communication and follow directions correctly.  Treatment Explanation - The following has been discussed with the patient:   RX ordered/plan of care  Anticipated outcomes  Possible risks and side effects  This patient would benefit from PT intervention to resume normal activities.   Rehab potential is excellent.    Frequency:  1 X week, once daily  Duration:  for 12 weeks  Discharge Plan:  Achieve  all LTG.  Independent in home treatment program.  Reach maximal therapeutic benefit.    Please refer to the daily flowsheet for treatment today, total treatment time and time spent performing 1:1 timed codes.

## 2022-10-18 ENCOUNTER — THERAPY VISIT (OUTPATIENT)
Dept: PHYSICAL THERAPY | Facility: CLINIC | Age: 67
End: 2022-10-18
Payer: MEDICARE

## 2022-10-18 DIAGNOSIS — Z47.1 AFTERCARE FOLLOWING LEFT HIP JOINT REPLACEMENT SURGERY: Primary | ICD-10-CM

## 2022-10-18 DIAGNOSIS — Z96.642 AFTERCARE FOLLOWING LEFT HIP JOINT REPLACEMENT SURGERY: Primary | ICD-10-CM

## 2022-10-18 PROCEDURE — 97110 THERAPEUTIC EXERCISES: CPT | Mod: GP | Performed by: PHYSICAL THERAPIST

## 2022-10-18 PROCEDURE — 97112 NEUROMUSCULAR REEDUCATION: CPT | Mod: GP | Performed by: PHYSICAL THERAPIST

## 2022-10-18 PROCEDURE — 97530 THERAPEUTIC ACTIVITIES: CPT | Mod: GP | Performed by: PHYSICAL THERAPIST

## 2022-10-20 DIAGNOSIS — I21.9 ACUTE MYOCARDIAL INFARCTION (H): ICD-10-CM

## 2022-10-20 DIAGNOSIS — I42.9 CARDIOMYOPATHY, UNSPECIFIED TYPE (H): ICD-10-CM

## 2022-10-20 RX ORDER — CLOPIDOGREL BISULFATE 75 MG/1
75 TABLET ORAL DAILY
Qty: 90 TABLET | Refills: 0 | Status: SHIPPED | OUTPATIENT
Start: 2022-10-20 | End: 2023-01-17

## 2022-10-21 DIAGNOSIS — Z96.649 STATUS POST HIP REPLACEMENT: Primary | ICD-10-CM

## 2022-10-26 ENCOUNTER — THERAPY VISIT (OUTPATIENT)
Dept: PHYSICAL THERAPY | Facility: CLINIC | Age: 67
End: 2022-10-26
Payer: MEDICARE

## 2022-10-26 DIAGNOSIS — Z47.1 AFTERCARE FOLLOWING LEFT HIP JOINT REPLACEMENT SURGERY: Primary | ICD-10-CM

## 2022-10-26 DIAGNOSIS — Z96.642 AFTERCARE FOLLOWING LEFT HIP JOINT REPLACEMENT SURGERY: Primary | ICD-10-CM

## 2022-10-26 PROCEDURE — 97530 THERAPEUTIC ACTIVITIES: CPT | Mod: GP | Performed by: PHYSICAL THERAPIST

## 2022-10-26 PROCEDURE — 97112 NEUROMUSCULAR REEDUCATION: CPT | Mod: GP | Performed by: PHYSICAL THERAPIST

## 2022-10-26 PROCEDURE — 97110 THERAPEUTIC EXERCISES: CPT | Mod: GP | Performed by: PHYSICAL THERAPIST

## 2022-11-03 ENCOUNTER — THERAPY VISIT (OUTPATIENT)
Dept: PHYSICAL THERAPY | Facility: CLINIC | Age: 67
End: 2022-11-03
Payer: MEDICARE

## 2022-11-03 DIAGNOSIS — Z47.1 AFTERCARE FOLLOWING LEFT HIP JOINT REPLACEMENT SURGERY: Primary | ICD-10-CM

## 2022-11-03 DIAGNOSIS — Z96.642 AFTERCARE FOLLOWING LEFT HIP JOINT REPLACEMENT SURGERY: Primary | ICD-10-CM

## 2022-11-03 PROCEDURE — 97112 NEUROMUSCULAR REEDUCATION: CPT | Mod: GP | Performed by: PHYSICAL THERAPIST

## 2022-11-03 PROCEDURE — 97110 THERAPEUTIC EXERCISES: CPT | Mod: GP | Performed by: PHYSICAL THERAPIST

## 2022-11-03 PROCEDURE — 97530 THERAPEUTIC ACTIVITIES: CPT | Mod: GP | Performed by: PHYSICAL THERAPIST

## 2022-11-10 ENCOUNTER — ANCILLARY PROCEDURE (OUTPATIENT)
Dept: GENERAL RADIOLOGY | Facility: CLINIC | Age: 67
End: 2022-11-10
Attending: ORTHOPAEDIC SURGERY
Payer: MEDICARE

## 2022-11-10 ENCOUNTER — OFFICE VISIT (OUTPATIENT)
Dept: ORTHOPEDICS | Facility: CLINIC | Age: 67
End: 2022-11-10
Payer: MEDICARE

## 2022-11-10 DIAGNOSIS — Z96.649 STATUS POST HIP REPLACEMENT: ICD-10-CM

## 2022-11-10 DIAGNOSIS — Z47.89 ORTHOPEDIC AFTERCARE: Primary | ICD-10-CM

## 2022-11-10 PROCEDURE — 73502 X-RAY EXAM HIP UNI 2-3 VIEWS: CPT | Mod: LT | Performed by: RADIOLOGY

## 2022-11-10 PROCEDURE — 99024 POSTOP FOLLOW-UP VISIT: CPT | Performed by: ORTHOPAEDIC SURGERY

## 2022-11-10 ASSESSMENT — HOOS JR
HOOS JR TOTAL INTERVAL SCORE: 70.43
BENDING TO THE FLOOR TO PICK UP OBJECT: MODERATE
GOING UP OR DOWN STAIRS: MILD
WALKING ON UNEVEN SURFACE: MILD
LYING IN BED (TURNING OVER, MAINTAINING HIP POSITION): MILD
RISING FROM SITTING: MILD

## 2022-11-10 NOTE — NURSING NOTE
Reason For Visit:   Chief Complaint   Patient presents with     RECHECK     6 week POP // ARTHROPLASTY, HIP, TOTAL (LEFT) // DOS 9/21/2022        There were no vitals taken for this visit.    Pain Assessment  Patient Currently in Pain: Yes  Patient's Stated Pain Goal: 2 (pain ranges)  HOOS score : 70.43    Katharine Cobian

## 2022-11-10 NOTE — PROGRESS NOTES
Chief Complaint: RECHECK (6 week POP // ARTHROPLASTY, HIP, TOTAL (LEFT) // DOS 9/21/2022 )       HPI: Iraida Hernandez returns today in follow-up for his left hip. This is the first of a planned staged bilateral hip replacement program. He reports that he is doing very well. He reports that the right side is now more painful than his left, post-operative hip and that he is looking forward to right total hip scheduled for January. Happy with how he is doing so far.     Medications and allergies are documented in the EMR and have been reviewed.    Current Outpatient Medications:      acetaminophen (TYLENOL) 325 MG tablet, Take 2 tablets (650 mg) by mouth every 4 hours as needed for other, Disp: 60 tablet, Rfl: 0     aspirin EC 81 MG EC tablet, Take 1 tablet (81 mg) by mouth daily, Disp: , Rfl:      atorvastatin (LIPITOR) 40 MG tablet, Take 1 tablet (40 mg) by mouth daily, Disp: 90 tablet, Rfl: 0     carvedilol (COREG) 12.5 MG tablet, Take 1 tablet (12.5 mg) by mouth 2 times daily (with meals), Disp: 180 tablet, Rfl: 3     clopidogrel (PLAVIX) 75 MG tablet, Take 1 tablet (75 mg) by mouth daily, Disp: 90 tablet, Rfl: 0     lisinopril (ZESTRIL) 5 MG tablet, Take 1 tablet (5 mg) by mouth daily, Disp: 90 tablet, Rfl: 0     nitroGLYcerin (NITROSTAT) 0.4 MG sublingual tablet, Place 1 tablet (0.4 mg) under the tongue every 5 minutes as needed for chest pain, Disp: 25 tablet, Rfl: 3     oxyCODONE (ROXICODONE) 5 MG tablet, Take 1 tablet (5 mg) by mouth every 4 hours as needed, Disp: 33 tablet, Rfl: 0     polyethylene glycol (MIRALAX) 17 g packet, Take 17 g by mouth daily (Patient not taking: Reported on 10/5/2022), Disp: 10 packet, Rfl: 0     senna-docusate (SENOKOT-S/PERICOLACE) 8.6-50 MG tablet, Take 1 tablet by mouth 2 times daily (Patient not taking: Reported on 10/5/2022), Disp: 30 tablet, Rfl: 0     spironolactone (ALDACTONE) 25 MG tablet, Take 1 tablet (25 mg) by mouth daily, Disp: 90 tablet, Rfl: 0    Current  Facility-Administered Medications:      methylPREDNISolone (DEPO-MEDROL) injection 40 mg, 40 mg, , , Yeo, Albert, MD, 40 mg at 06/24/22 1345     methylPREDNISolone (DEPO-MEDROL) injection 40 mg, 40 mg, , , Yeo, Albert, MD, 40 mg at 06/24/22 1345  Allergies: Penicillins and Tetracyclines    Physical Exam:  On physical examination the patient appears the stated age, is in no acute distress, affect is appropriate, and breathing is non-labored.  There were no vitals taken for this visit.  There is no height or weight on file to calculate BMI.  Gait: antalgic with less time on the right   Incision: healed an dbenign  ROM: left hip ROM is benign  Distally, the circulatory, motor, and sensation exam is intact with 5/5 EHL, gastroc-soleus, and tibialis anterior. Dorsalis pedis and posterior tibialis pulses are palpable.   X-rays:    I reviewed the x-rays dated today.  Previous films reviewed.    Findings:  Normal progression for a left total hip arthroplasty without evidence of loosening or subsidence.    Assessment: doing well   Plan: cont with rehab, f.u in 6 weeks, virtual is appropriate, sooner if issues.     Albert Yeo

## 2022-11-10 NOTE — LETTER
11/10/2022         RE: Iraida Hernandez  [address] [facility] [address]        Dear Colleague,    Thank you for referring your patient, Iraida Hernandez, to the [facility] [place]. Please see a copy of my visit note below.    Chief Complaint: RECHECK (6 week POP // ARTHROPLASTY, HIP, TOTAL (LEFT) // DOS 9/21/2022 )       HPI: Iraida Hernandez returns today in follow-up for his left hip. This is the first of a planned staged bilateral hip replacement program. He reports that he is doing very well. He reports that the right side is now more painful than his left, post-operative hip and that he is looking forward to right total hip scheduled for January. Happy with how he is doing so far.     Medications and allergies are documented in the EMR and have been reviewed.    Current Outpatient Medications:      acetaminophen (TYLENOL) 325 MG tablet, Take 2 tablets (650 mg) by mouth every 4 hours as needed for other, Disp: 60 tablet, Rfl: 0     aspirin EC 81 MG EC tablet, Take 1 tablet (81 mg) by mouth daily, Disp: , Rfl:      atorvastatin (LIPITOR) 40 MG tablet, Take 1 tablet (40 mg) by mouth daily, Disp: 90 tablet, Rfl: 0     carvedilol (COREG) 12.5 MG tablet, Take 1 tablet (12.5 mg) by mouth 2 times daily (with meals), Disp: 180 tablet, Rfl: 3     clopidogrel (PLAVIX) 75 MG tablet, Take 1 tablet (75 mg) by mouth daily, Disp: 90 tablet, Rfl: 0     lisinopril (ZESTRIL) 5 MG tablet, Take 1 tablet (5 mg) by mouth daily, Disp: 90 tablet, Rfl: 0     nitroGLYcerin (NITROSTAT) 0.4 MG sublingual tablet, Place 1 tablet (0.4 mg) under the tongue every 5 minutes as needed for chest pain, Disp: 25 tablet, Rfl: 3     oxyCODONE (ROXICODONE) 5 MG tablet, Take 1 tablet (5 mg) by mouth every 4 hours as needed, Disp: 33 tablet, Rfl: 0     polyethylene glycol (MIRALAX) 17 g packet, Take 17 g by mouth daily (Patient not taking: Reported on 10/5/2022), Disp: 10 packet, Rfl: 0     senna-docusate  (SENOKOT-S/PERICOLACE) 8.6-50 MG tablet, Take 1 tablet by mouth 2 times daily (Patient not taking: Reported on 10/5/2022), Disp: 30 tablet, Rfl: 0     spironolactone (ALDACTONE) 25 MG tablet, Take 1 tablet (25 mg) by mouth daily, Disp: 90 tablet, Rfl: 0    Current Facility-Administered Medications:      methylPREDNISolone (DEPO-MEDROL) injection 40 mg, 40 mg, , , Yeo, Albert, MD, 40 mg at 06/24/22 1345     methylPREDNISolone (DEPO-MEDROL) injection 40 mg, 40 mg, , , Yeo, Albert, MD, 40 mg at 06/24/22 1345  Allergies: Penicillins and Tetracyclines    Physical Exam:  On physical examination the patient appears the stated age, is in no acute distress, affect is appropriate, and breathing is non-labored.  There were no vitals taken for this visit.  There is no height or weight on file to calculate BMI.  Gait: antalgic with less time on the right   Incision: healed an dbenign  ROM: left hip ROM is benign  Distally, the circulatory, motor, and sensation exam is intact with 5/5 EHL, gastroc-soleus, and tibialis anterior. Dorsalis pedis and posterior tibialis pulses are palpable.   X-rays:    I reviewed the x-rays dated today.  Previous films reviewed.    Findings:  Normal progression for a left total hip arthroplasty without evidence of loosening or subsidence.    Assessment: doing well   Plan: cont with rehab, f.u in 6 weeks, virtual is appropriate, sooner if issues.     Albert Yeo

## 2022-11-11 ENCOUNTER — THERAPY VISIT (OUTPATIENT)
Dept: PHYSICAL THERAPY | Facility: CLINIC | Age: 67
End: 2022-11-11
Payer: MEDICARE

## 2022-11-11 DIAGNOSIS — Z96.642 AFTERCARE FOLLOWING LEFT HIP JOINT REPLACEMENT SURGERY: Primary | ICD-10-CM

## 2022-11-11 DIAGNOSIS — Z47.1 AFTERCARE FOLLOWING LEFT HIP JOINT REPLACEMENT SURGERY: Primary | ICD-10-CM

## 2022-11-11 PROCEDURE — 97112 NEUROMUSCULAR REEDUCATION: CPT | Mod: GP | Performed by: PHYSICAL THERAPIST

## 2022-11-11 PROCEDURE — 97530 THERAPEUTIC ACTIVITIES: CPT | Mod: GP | Performed by: PHYSICAL THERAPIST

## 2022-11-11 PROCEDURE — 97110 THERAPEUTIC EXERCISES: CPT | Mod: GP | Performed by: PHYSICAL THERAPIST

## 2022-11-17 ENCOUNTER — HOSPITAL ENCOUNTER (OUTPATIENT)
Dept: CARDIOLOGY | Facility: CLINIC | Age: 67
Discharge: HOME | End: 2022-11-17
Attending: INTERNAL MEDICINE | Admitting: INTERNAL MEDICINE
Payer: MEDICARE

## 2022-11-17 DIAGNOSIS — I25.5 ISCHEMIC CARDIOMYOPATHY: ICD-10-CM

## 2022-11-17 DIAGNOSIS — Z95.810 ICD (IMPLANTABLE CARDIOVERTER-DEFIBRILLATOR) IN PLACE: ICD-10-CM

## 2022-11-17 DIAGNOSIS — I25.10 CORONARY ARTERY DISEASE INVOLVING NATIVE CORONARY ARTERY OF NATIVE HEART WITHOUT ANGINA PECTORIS: ICD-10-CM

## 2022-11-17 LAB — LVEF ECHO: NORMAL

## 2022-11-17 PROCEDURE — 93306 TTE W/DOPPLER COMPLETE: CPT | Mod: 26 | Performed by: INTERNAL MEDICINE

## 2022-11-17 PROCEDURE — 999N000208 ECHOCARDIOGRAM COMPLETE

## 2022-11-17 PROCEDURE — 255N000002 HC RX 255 OP 636: Performed by: INTERNAL MEDICINE

## 2022-11-17 RX ADMIN — HUMAN ALBUMIN MICROSPHERES AND PERFLUTREN 3 ML: 10; .22 INJECTION, SOLUTION INTRAVENOUS at 14:21

## 2022-11-18 ENCOUNTER — OFFICE VISIT (OUTPATIENT)
Dept: CARDIOLOGY | Facility: CLINIC | Age: 67
End: 2022-11-18
Payer: MEDICARE

## 2022-11-18 VITALS
WEIGHT: 151 LBS | HEART RATE: 73 BPM | SYSTOLIC BLOOD PRESSURE: 118 MMHG | BODY MASS INDEX: 20.45 KG/M2 | DIASTOLIC BLOOD PRESSURE: 72 MMHG | OXYGEN SATURATION: 99 % | HEIGHT: 72 IN

## 2022-11-18 DIAGNOSIS — E78.5 HYPERLIPIDEMIA WITH TARGET LDL LESS THAN 70: ICD-10-CM

## 2022-11-18 DIAGNOSIS — I25.5 ISCHEMIC CARDIOMYOPATHY: Primary | ICD-10-CM

## 2022-11-18 DIAGNOSIS — I25.10 CORONARY ARTERY DISEASE INVOLVING NATIVE CORONARY ARTERY OF NATIVE HEART WITHOUT ANGINA PECTORIS: ICD-10-CM

## 2022-11-18 DIAGNOSIS — Z95.810 ICD (IMPLANTABLE CARDIOVERTER-DEFIBRILLATOR) IN PLACE: ICD-10-CM

## 2022-11-18 PROCEDURE — 99214 OFFICE O/P EST MOD 30 MIN: CPT | Performed by: NURSE PRACTITIONER

## 2022-11-18 RX ORDER — SACUBITRIL AND VALSARTAN 24; 26 MG/1; MG/1
1 TABLET, FILM COATED ORAL 2 TIMES DAILY
Qty: 60 TABLET | Refills: 3 | Status: SHIPPED | OUTPATIENT
Start: 2022-11-21 | End: 2022-12-09 | Stop reason: DRUGHIGH

## 2022-11-18 NOTE — LETTER
11/18/2022    Cesar Marie PA-C  [address] [facility] [address]    RE: Iraida Hernandez       Dear Colleague,     I had the pleasure of seeing Iraida Hernandez in the [facility].  Cardiology Clinic Progress Note  Iraida Hrenandez MRN# [id number]   YOB: 1955 Age: 67 year old     Reason For Visit:  Annual Follow up   Primary Cardiologist:   Dr. Stevens           History of Presenting Illness:      Iraida Hernandez is a pleasant 67 year old patient who carries a past medical history significant for ST elevation MI in 2014 status post emergent coronary angiogram, thrombectomy and stenting to the LAD and RCA, v-V. fib cardiac arrest status post ICD placement, ischemic cardiomyopathy, hyperlipidemia, prostate cancer, and recent left hip replacement.    He returns to the office today for an annual follow-up.  Overall, he is feeling well on a cardiac standpoint, denies chest pain, shortness of breath, palpitations, PND, orthopnea, presyncope, or lower extremity edema.  He is approximately 7 weeks out from a left total hip replacement with good results.  He is scheduled for a right hip replacement in mid January.  He is hopeful he will be back on the golf course in early spring.    Annual surveillance echocardiogram showed a reduced ejection fraction estimated at 30 to 35%, down from previous 35 to 40%, large anterior septal, apical, and distal inferior akinesis, normal RV size and function, no valvular disease.    Last device interrogation showed 0% v-pacing. 2 episodes of NSVT lasting 4-6 beats.     Blood pressure is well controlled at 118/72, managed on carvedilol, spironolactone and lisinopril  Last BMP was unremarkable  Lipid panel was excellent with a total cholesterol of 136, HDL 52, LDL 60, triglycerides 118.   BMI 20.48    He is currently enrolled in physical therapy. He is a avid golfer.   Follows a heart heatlhy diet  Remains compliant with all medications.                    Assessment and Plan:     1. Ischemic cardiopathy -  Recent echocardiogram showed a reduced ejection fraction estimated at 30 to 35%, down from previous 35 to 40%, large anterior septal, apical, and distal inferior akinesis, normal RV size and function, no valvular disease.  Euvolemic upon exam.  Weight stable at 151 pounds.  Due to a new reduction in the ejection fraction I will stop lisinopril and after a 36-hour washout start low-dose Entresto 24-26 mg twice daily.  Follow-up BMP in 2 weeks.  Plan to further uptitrate as patient tolerates and labs allow.  Continue carvedilol and spironolactone.  Once optimal medical therapy is achieved, follow-up limited echocardiogram in 3 months.     2.  Coronary artery disease -status post drug-eluting stent to the LAD and RCA.  No symptoms of ischemia.  Continue chronic clopidogrel and aspirin.    3.  VF arrest status post ICD placement -  Last device interrogation showed 0% v-pacing. 2 episodes of NSVT lasting 4-6 beats.  Continue on current medical therapy    4.  Hyperlipidemia -LDL at goal, continue statin           Thank you for allowing me to participate in this delightful patient's care.        Ellen Toney, APRN CNP         Review of Systems:     Review of Systems:  Skin:  Positive for itching   Eyes:  Positive for glasses  ENT:  Negative    Respiratory:  Negative    Cardiovascular:    fatigue;Positive for  Gastroenterology: Positive for reflux;heartburn  Genitourinary:  not assessed    Musculoskeletal:  Positive for joint stiffness;arthritis;joint pain  Neurologic:  Negative    Psychiatric:  Negative    Heme/Lymph/Imm:  Positive for allergies  Endocrine:  Negative                Physical Exam:     GEN:  In general, this is a thin male in no acute distress.  HEENT:  Pupils equal, round. Sclerae nonicteric. Clear oropharynx. Mucous membranes moist.  NECK: Supple, no masses appreciated. Trachea midline. No JVD   C/V:  Regular rate and rhythm, No murmur  RESP:  Respirations are unlabored. No use of accessory muscles. Clear to auscultation bilaterally without wheezing, rales, or rhonchi.  GI: Abdomen soft, nontender, nondistended.   EXTREM: No LE edema. No cyanosis or clubbing.  NEURO: Alert and oriented, cooperative. No obvious focal deficits.   PSYCH: Normal affect.  SKIN: Warm and dry. No rashes or petechiae appreciated.          Past Medical History:     Past Medical History:   Diagnosis Date     Antiplatelet or antithrombotic long-term use      Arthritis Age related     Calculus of kidney      Cancer (H)     basel cell on nose     Cardiac arrest (H)     V-fib, 2/16/2014     Cardiomyopathy (H)      Coronary artery disease      Hyperlipidaemia      Hyperlipidemia with target LDL less than 70 10/31/2010     Diagnosis updated by automated process. Provider to review and confirm.     ICD (implantable cardiac defibrillator) in place     12-1-2014 EF 29%     Myocardial infarction (H) 02/2014     Anterior infarct     Pacemaker      Prostate cancer (H)      Tobacco dependence               Past Surgical History:     Past Surgical History:   Procedure Laterality Date     ABDOMEN SURGERY  15 years ago    Hearnia     ARTHROPLASTY HIP Left 9/21/2022    Procedure: Left total hip arthroplasty;  Surgeon: Ok Jolly MD;  Location: [facility] OR     BIOPSY  February 2019    Prostate     CARDIAC SURGERY       COLONOSCOPY  2005     HEART CATH, ANGIOPLASTY  02/2014    Emergent cath,thrombectomy-pt had cardiac arrest-severe 3 vessel CAD-MACY proximal LAD, LAD diagonal kissing balloon, and stent to proximal RCA     HERNIA REPAIR       NO HISTORY OF SURGERY       PROSTATE SURGERY  03/29/2019              Allergies:   Penicillins and Tetracyclines       Data:   All laboratory data reviewed:    LAST CHOLESTEROL:  Lab Results   Component Value Date    CHOL 136 10/25/2021    CHOL 142 05/24/2019     Lab Results   Component Value Date    HDL 52 10/25/2021    HDL 48 05/24/2019     Lab Results    Component Value Date    LDL 60 10/25/2021    LDL 74 05/24/2019     Lab Results   Component Value Date    TRIG 118 10/25/2021    TRIG 99 05/24/2019     Lab Results   Component Value Date    CHOLHDLRATIO 3.1 08/15/2014       LAST BMP:  Lab Results   Component Value Date     09/21/2022     09/21/2022     02/15/2019      Lab Results   Component Value Date    POTASSIUM 4.3 09/21/2022    POTASSIUM 4.4 09/21/2022    POTASSIUM 4.2 02/15/2019     Lab Results   Component Value Date    CHLORIDE 108 09/21/2022    CHLORIDE 103 02/15/2019     Lab Results   Component Value Date    DIEGO 8.3 09/21/2022    DIEGO 8.4 02/15/2019     Lab Results   Component Value Date    CO2 24 09/21/2022    CO2 27 02/15/2019     Lab Results   Component Value Date    BUN 25 09/21/2022    BUN 14 02/15/2019     Lab Results   Component Value Date    CR 1.01 09/21/2022    CR 0.91 02/15/2019     Lab Results   Component Value Date     09/22/2022     09/21/2022    GLC 99 02/15/2019       LAST CBC:  Lab Results   Component Value Date    WBC 9.0 08/31/2022    WBC 8.4 12/01/2014     Lab Results   Component Value Date    RBC 4.21 08/31/2022    RBC 4.63 12/01/2014     Lab Results   Component Value Date    HGB 11.6 09/23/2022    HGB 15.2 12/01/2014     Lab Results   Component Value Date    HCT 42.1 08/31/2022    HCT 45.7 12/01/2014     Lab Results   Component Value Date     08/31/2022    MCV 99 12/01/2014     Lab Results   Component Value Date    MCH 34.9 08/31/2022    MCH 32.8 12/01/2014     Lab Results   Component Value Date    MCHC 34.9 08/31/2022    MCHC 33.3 12/01/2014     Lab Results   Component Value Date    RDW 13.2 08/31/2022    RDW 12.5 12/01/2014     Lab Results   Component Value Date     08/31/2022     12/01/2014       Thank you for allowing me to participate in the care of your patient.      Sincerely,     CARA Rodriguez [facility]  Care  cc:   Juancarlos Stevens MD  [address]  [place] [id number]

## 2022-11-18 NOTE — PATIENT INSTRUCTIONS
Thanks for participating in a office visit with the [facility] today.    Reviewed results of echo showing worsening heart function 30-35%  To help improve function will stop lisinopril and wash out for 2 days.   On Monday 11/21, start low dose Entresto twice daily.   Follow up blood test (BMP) in 2 weeks   Monitor for any lightheadedness, dizziness or low blood pressure.   If stable after 2 weeks will plan to increase dose.   Continue on all other medications   Follow up limited echo in 3 months       Please call my nurse at  [phone] with any questions or concerns.    Scheduling phone number: [phone]  Reminder: Please bring in all current medications, over the counter supplements and vitamin bottles to your next appointment.

## 2022-11-18 NOTE — PROGRESS NOTES
Cardiology Clinic Progress Note  Iraida Hernandez MRN# [id number]   YOB: 1955 Age: 67 year old     Reason For Visit:  Annual Follow up   Primary Cardiologist:   Dr. Stevens           History of Presenting Illness:      Iraida Hernandez is a pleasant 67 year old patient who carries a past medical history significant for ST elevation MI in 2014 status post emergent coronary angiogram, thrombectomy and stenting to the LAD and RCA, v-V. fib cardiac arrest status post ICD placement, ischemic cardiomyopathy, hyperlipidemia, prostate cancer, and recent left hip replacement.    He returns to the office today for an annual follow-up.  Overall, he is feeling well on a cardiac standpoint, denies chest pain, shortness of breath, palpitations, PND, orthopnea, presyncope, or lower extremity edema.  He is approximately 7 weeks out from a left total hip replacement with good results.  He is scheduled for a right hip replacement in mid January.  He is hopeful he will be back on the golf course in early spring.    Annual surveillance echocardiogram showed a reduced ejection fraction estimated at 30 to 35%, down from previous 35 to 40%, large anterior septal, apical, and distal inferior akinesis, normal RV size and function, no valvular disease.    Last device interrogation showed 0% v-pacing. 2 episodes of NSVT lasting 4-6 beats.     Blood pressure is well controlled at 118/72, managed on carvedilol, spironolactone and lisinopril  Last BMP was unremarkable  Lipid panel was excellent with a total cholesterol of 136, HDL 52, LDL 60, triglycerides 118.   BMI 20.48    He is currently enrolled in physical therapy. He is a avid golfer.   Follows a heart heatlhy diet  Remains compliant with all medications.                   Assessment and Plan:     1. Ischemic cardiopathy -  Recent echocardiogram showed a reduced ejection fraction estimated at 30 to 35%, down from previous 35 to 40%, large anterior septal, apical, and distal  inferior akinesis, normal RV size and function, no valvular disease.  Euvolemic upon exam.  Weight stable at 151 pounds.  Due to a new reduction in the ejection fraction I will stop lisinopril and after a 36-hour washout start low-dose Entresto 24-26 mg twice daily.  Follow-up BMP in 2 weeks.  Plan to further uptitrate as patient tolerates and labs allow.  Continue carvedilol and spironolactone.  Once optimal medical therapy is achieved, follow-up limited echocardiogram in 3 months.     2.  Coronary artery disease -status post drug-eluting stent to the LAD and RCA.  No symptoms of ischemia.  Continue chronic clopidogrel and aspirin.    3.  VF arrest status post ICD placement -  Last device interrogation showed 0% v-pacing. 2 episodes of NSVT lasting 4-6 beats.  Continue on current medical therapy    4.  Hyperlipidemia -LDL at goal, continue statin           Thank you for allowing me to participate in this delightful patient's care.        CARA Rodriguez CNP         Review of Systems:     Review of Systems:  Skin:  Positive for itching   Eyes:  Positive for glasses  ENT:  Negative    Respiratory:  Negative    Cardiovascular:    fatigue;Positive for  Gastroenterology: Positive for reflux;heartburn  Genitourinary:  not assessed    Musculoskeletal:  Positive for joint stiffness;arthritis;joint pain  Neurologic:  Negative    Psychiatric:  Negative    Heme/Lymph/Imm:  Positive for allergies  Endocrine:  Negative                Physical Exam:     GEN:  In general, this is a thin male in no acute distress.  HEENT:  Pupils equal, round. Sclerae nonicteric. Clear oropharynx. Mucous membranes moist.  NECK: Supple, no masses appreciated. Trachea midline. No JVD   C/V:  Regular rate and rhythm, No murmur  RESP: Respirations are unlabored. No use of accessory muscles. Clear to auscultation bilaterally without wheezing, rales, or rhonchi.  GI: Abdomen soft, nontender, nondistended.   EXTREM: No LE edema. No cyanosis or  clubbing.  NEURO: Alert and oriented, cooperative. No obvious focal deficits.   PSYCH: Normal affect.  SKIN: Warm and dry. No rashes or petechiae appreciated.          Past Medical History:     Past Medical History:   Diagnosis Date     Antiplatelet or antithrombotic long-term use      Arthritis Age related     Calculus of kidney      Cancer (H)     basel cell on nose     Cardiac arrest (H)     V-fib, 2/16/2014     Cardiomyopathy (H)      Coronary artery disease      Hyperlipidaemia      Hyperlipidemia with target LDL less than 70 10/31/2010     Diagnosis updated by automated process. Provider to review and confirm.     ICD (implantable cardiac defibrillator) in place     12-1-2014 EF 29%     Myocardial infarction (H) 02/2014     Anterior infarct     Pacemaker      Prostate cancer (H)      Tobacco dependence               Past Surgical History:     Past Surgical History:   Procedure Laterality Date     ABDOMEN SURGERY  15 years ago    Hearnia     ARTHROPLASTY HIP Left 9/21/2022    Procedure: Left total hip arthroplasty;  Surgeon: Ok Jolly MD;  Location: [facility] OR     BIOPSY  February 2019    Prostate     CARDIAC SURGERY       COLONOSCOPY  2005     HEART CATH, ANGIOPLASTY  02/2014    Emergent cath,thrombectomy-pt had cardiac arrest-severe 3 vessel CAD-MACY proximal LAD, LAD diagonal kissing balloon, and stent to proximal RCA     HERNIA REPAIR       NO HISTORY OF SURGERY       PROSTATE SURGERY  03/29/2019              Allergies:   Penicillins and Tetracyclines       Data:   All laboratory data reviewed:    LAST CHOLESTEROL:  Lab Results   Component Value Date    CHOL 136 10/25/2021    CHOL 142 05/24/2019     Lab Results   Component Value Date    HDL 52 10/25/2021    HDL 48 05/24/2019     Lab Results   Component Value Date    LDL 60 10/25/2021    LDL 74 05/24/2019     Lab Results   Component Value Date    TRIG 118 10/25/2021    TRIG 99 05/24/2019     Lab Results   Component Value Date    CHOLHDLRATIO 3.1  08/15/2014       LAST BMP:  Lab Results   Component Value Date     09/21/2022     09/21/2022     02/15/2019      Lab Results   Component Value Date    POTASSIUM 4.3 09/21/2022    POTASSIUM 4.4 09/21/2022    POTASSIUM 4.2 02/15/2019     Lab Results   Component Value Date    CHLORIDE 108 09/21/2022    CHLORIDE 103 02/15/2019     Lab Results   Component Value Date    DIEGO 8.3 09/21/2022    DIEGO 8.4 02/15/2019     Lab Results   Component Value Date    CO2 24 09/21/2022    CO2 27 02/15/2019     Lab Results   Component Value Date    BUN 25 09/21/2022    BUN 14 02/15/2019     Lab Results   Component Value Date    CR 1.01 09/21/2022    CR 0.91 02/15/2019     Lab Results   Component Value Date     09/22/2022     09/21/2022    GLC 99 02/15/2019       LAST CBC:  Lab Results   Component Value Date    WBC 9.0 08/31/2022    WBC 8.4 12/01/2014     Lab Results   Component Value Date    RBC 4.21 08/31/2022    RBC 4.63 12/01/2014     Lab Results   Component Value Date    HGB 11.6 09/23/2022    HGB 15.2 12/01/2014     Lab Results   Component Value Date    HCT 42.1 08/31/2022    HCT 45.7 12/01/2014     Lab Results   Component Value Date     08/31/2022    MCV 99 12/01/2014     Lab Results   Component Value Date    MCH 34.9 08/31/2022    MCH 32.8 12/01/2014     Lab Results   Component Value Date    MCHC 34.9 08/31/2022    MCHC 33.3 12/01/2014     Lab Results   Component Value Date    RDW 13.2 08/31/2022    RDW 12.5 12/01/2014     Lab Results   Component Value Date     08/31/2022     12/01/2014

## 2022-11-26 ENCOUNTER — HEALTH MAINTENANCE LETTER (OUTPATIENT)
Age: 67
End: 2022-11-26

## 2022-11-29 ENCOUNTER — THERAPY VISIT (OUTPATIENT)
Dept: PHYSICAL THERAPY | Facility: CLINIC | Age: 67
End: 2022-11-29
Payer: MEDICARE

## 2022-11-29 DIAGNOSIS — Z96.642 AFTERCARE FOLLOWING LEFT HIP JOINT REPLACEMENT SURGERY: Primary | ICD-10-CM

## 2022-11-29 DIAGNOSIS — Z47.1 AFTERCARE FOLLOWING LEFT HIP JOINT REPLACEMENT SURGERY: Primary | ICD-10-CM

## 2022-11-29 PROCEDURE — 97110 THERAPEUTIC EXERCISES: CPT | Mod: GP | Performed by: PHYSICAL THERAPIST

## 2022-11-29 PROCEDURE — 97112 NEUROMUSCULAR REEDUCATION: CPT | Mod: GP | Performed by: PHYSICAL THERAPIST

## 2022-11-29 PROCEDURE — 97530 THERAPEUTIC ACTIVITIES: CPT | Mod: GP | Performed by: PHYSICAL THERAPIST

## 2022-12-01 DIAGNOSIS — I21.9 ACUTE MYOCARDIAL INFARCTION (H): ICD-10-CM

## 2022-12-01 RX ORDER — SPIRONOLACTONE 25 MG/1
25 TABLET ORAL DAILY
Qty: 90 TABLET | Refills: 3 | Status: SHIPPED | OUTPATIENT
Start: 2022-12-01 | End: 2023-12-19

## 2022-12-05 ENCOUNTER — LAB (OUTPATIENT)
Dept: LAB | Facility: CLINIC | Age: 67
End: 2022-12-05
Payer: MEDICARE

## 2022-12-05 DIAGNOSIS — I25.5 ISCHEMIC CARDIOMYOPATHY: ICD-10-CM

## 2022-12-05 LAB
ANION GAP SERPL CALCULATED.3IONS-SCNC: 9 MMOL/L (ref 7–15)
BUN SERPL-MCNC: 15.3 MG/DL (ref 8–23)
CALCIUM SERPL-MCNC: 9.2 MG/DL (ref 8.8–10.2)
CHLORIDE SERPL-SCNC: 103 MMOL/L (ref 98–107)
CREAT SERPL-MCNC: 0.93 MG/DL (ref 0.67–1.17)
DEPRECATED HCO3 PLAS-SCNC: 25 MMOL/L (ref 22–29)
GFR SERPL CREATININE-BSD FRML MDRD: 89 ML/MIN/1.73M2
GLUCOSE SERPL-MCNC: 101 MG/DL (ref 70–99)
POTASSIUM SERPL-SCNC: 4.7 MMOL/L (ref 3.4–5.3)
SODIUM SERPL-SCNC: 137 MMOL/L (ref 136–145)

## 2022-12-05 PROCEDURE — 36415 COLL VENOUS BLD VENIPUNCTURE: CPT

## 2022-12-05 PROCEDURE — 80048 BASIC METABOLIC PNL TOTAL CA: CPT

## 2022-12-09 ENCOUNTER — TELEPHONE (OUTPATIENT)
Dept: CARDIOLOGY | Facility: CLINIC | Age: 67
End: 2022-12-09

## 2022-12-09 DIAGNOSIS — I25.10 CORONARY ARTERY DISEASE INVOLVING NATIVE CORONARY ARTERY OF NATIVE HEART WITHOUT ANGINA PECTORIS: ICD-10-CM

## 2022-12-09 DIAGNOSIS — I25.5 ISCHEMIC CARDIOMYOPATHY: ICD-10-CM

## 2022-12-09 DIAGNOSIS — I42.9 CARDIOMYOPATHY, UNSPECIFIED TYPE (H): ICD-10-CM

## 2022-12-09 DIAGNOSIS — I21.9 ACUTE MYOCARDIAL INFARCTION (H): Primary | ICD-10-CM

## 2022-12-12 ENCOUNTER — THERAPY VISIT (OUTPATIENT)
Dept: PHYSICAL THERAPY | Facility: CLINIC | Age: 67
End: 2022-12-12
Payer: MEDICARE

## 2022-12-12 DIAGNOSIS — Z47.1 AFTERCARE FOLLOWING LEFT HIP JOINT REPLACEMENT SURGERY: Primary | ICD-10-CM

## 2022-12-12 DIAGNOSIS — Z96.642 AFTERCARE FOLLOWING LEFT HIP JOINT REPLACEMENT SURGERY: Primary | ICD-10-CM

## 2022-12-12 PROCEDURE — 97530 THERAPEUTIC ACTIVITIES: CPT | Mod: GP | Performed by: PHYSICAL THERAPIST

## 2022-12-12 PROCEDURE — 97112 NEUROMUSCULAR REEDUCATION: CPT | Mod: GP | Performed by: PHYSICAL THERAPIST

## 2022-12-12 PROCEDURE — 97110 THERAPEUTIC EXERCISES: CPT | Mod: GP | Performed by: PHYSICAL THERAPIST

## 2022-12-15 ASSESSMENT — HOOS JR
HOOS JR TOTAL INTERVAL SCORE: 70.43
LYING IN BED (TURNING OVER, MAINTAINING HIP POSITION): MILD
WALKING ON UNEVEN SURFACE: MILD
RISING FROM SITTING: MILD
BENDING TO THE FLOOR TO PICK UP OBJECT: MODERATE
GOING UP OR DOWN STAIRS: MILD

## 2022-12-19 ENCOUNTER — TELEPHONE (OUTPATIENT)
Dept: FAMILY MEDICINE | Facility: CLINIC | Age: 67
End: 2022-12-19

## 2022-12-19 ENCOUNTER — OFFICE VISIT (OUTPATIENT)
Dept: FAMILY MEDICINE | Facility: CLINIC | Age: 67
End: 2022-12-19
Payer: MEDICARE

## 2022-12-19 VITALS
OXYGEN SATURATION: 99 % | BODY MASS INDEX: 20.67 KG/M2 | SYSTOLIC BLOOD PRESSURE: 102 MMHG | HEART RATE: 80 BPM | HEIGHT: 72 IN | RESPIRATION RATE: 12 BRPM | TEMPERATURE: 97.8 F | WEIGHT: 152.6 LBS | DIASTOLIC BLOOD PRESSURE: 68 MMHG

## 2022-12-19 DIAGNOSIS — Z13.220 SCREENING FOR HYPERLIPIDEMIA: ICD-10-CM

## 2022-12-19 DIAGNOSIS — I25.119 CORONARY ARTERY DISEASE WITH ANGINA PECTORIS, UNSPECIFIED VESSEL OR LESION TYPE, UNSPECIFIED WHETHER NATIVE OR TRANSPLANTED HEART (H): ICD-10-CM

## 2022-12-19 DIAGNOSIS — I25.5 ISCHEMIC CARDIOMYOPATHY: ICD-10-CM

## 2022-12-19 DIAGNOSIS — I25.10 CORONARY ARTERY DISEASE INVOLVING NATIVE CORONARY ARTERY OF NATIVE HEART WITHOUT ANGINA PECTORIS: ICD-10-CM

## 2022-12-19 DIAGNOSIS — Z12.11 SCREEN FOR COLON CANCER: ICD-10-CM

## 2022-12-19 DIAGNOSIS — M16.11 ARTHRITIS OF RIGHT HIP: Primary | ICD-10-CM

## 2022-12-19 DIAGNOSIS — I42.9 CARDIOMYOPATHY, UNSPECIFIED TYPE (H): ICD-10-CM

## 2022-12-19 DIAGNOSIS — I25.119 CORONARY ARTERY DISEASE WITH ANGINA PECTORIS, UNSPECIFIED VESSEL OR LESION TYPE, UNSPECIFIED WHETHER NATIVE OR TRANSPLANTED HEART (H): Primary | ICD-10-CM

## 2022-12-19 DIAGNOSIS — Z01.818 PREOP GENERAL PHYSICAL EXAM: ICD-10-CM

## 2022-12-19 LAB
ANION GAP SERPL CALCULATED.3IONS-SCNC: 14 MMOL/L (ref 7–15)
BUN SERPL-MCNC: 16.5 MG/DL (ref 8–23)
CALCIUM SERPL-MCNC: 9.3 MG/DL (ref 8.8–10.2)
CHLORIDE SERPL-SCNC: 105 MMOL/L (ref 98–107)
CHOLEST SERPL-MCNC: 141 MG/DL
CREAT SERPL-MCNC: 0.93 MG/DL (ref 0.67–1.17)
DEPRECATED HCO3 PLAS-SCNC: 24 MMOL/L (ref 22–29)
ERYTHROCYTE [DISTWIDTH] IN BLOOD BY AUTOMATED COUNT: 13.2 % (ref 10–15)
GFR SERPL CREATININE-BSD FRML MDRD: 89 ML/MIN/1.73M2
GLUCOSE SERPL-MCNC: 97 MG/DL (ref 70–99)
HCT VFR BLD AUTO: 42.3 % (ref 40–53)
HDLC SERPL-MCNC: 45 MG/DL
HGB BLD-MCNC: 14.3 G/DL (ref 13.3–17.7)
LDLC SERPL CALC-MCNC: 75 MG/DL
MCH RBC QN AUTO: 34 PG (ref 26.5–33)
MCHC RBC AUTO-ENTMCNC: 33.8 G/DL (ref 31.5–36.5)
MCV RBC AUTO: 101 FL (ref 78–100)
NONHDLC SERPL-MCNC: 96 MG/DL
PLATELET # BLD AUTO: 267 10E3/UL (ref 150–450)
POTASSIUM SERPL-SCNC: 4.8 MMOL/L (ref 3.4–5.3)
RBC # BLD AUTO: 4.21 10E6/UL (ref 4.4–5.9)
SODIUM SERPL-SCNC: 143 MMOL/L (ref 136–145)
TRIGL SERPL-MCNC: 104 MG/DL
WBC # BLD AUTO: 9.1 10E3/UL (ref 4–11)

## 2022-12-19 PROCEDURE — 80048 BASIC METABOLIC PNL TOTAL CA: CPT | Performed by: PHYSICIAN ASSISTANT

## 2022-12-19 PROCEDURE — 99214 OFFICE O/P EST MOD 30 MIN: CPT | Performed by: PHYSICIAN ASSISTANT

## 2022-12-19 PROCEDURE — 36415 COLL VENOUS BLD VENIPUNCTURE: CPT | Performed by: PHYSICIAN ASSISTANT

## 2022-12-19 PROCEDURE — 80061 LIPID PANEL: CPT | Performed by: PHYSICIAN ASSISTANT

## 2022-12-19 PROCEDURE — 85027 COMPLETE CBC AUTOMATED: CPT | Performed by: PHYSICIAN ASSISTANT

## 2022-12-19 PROCEDURE — 93000 ELECTROCARDIOGRAM COMPLETE: CPT | Performed by: PHYSICIAN ASSISTANT

## 2022-12-19 NOTE — Clinical Note
Please contact patient's cardiology team. Given recent worsening EF reduction, I will defer cardiac clearance to cardiology. They have recent seen patient. Unsure if they will need to see him prior to his scheduled surgery or not.   -wendie ferrara, pac

## 2022-12-19 NOTE — TELEPHONE ENCOUNTER
Called Ellen Toney at [facility], [place] at [phone], discussed, routed to her and will respond to ZB directly      Message  Received: Today  Cesar Marie PA-C  P Cr Triage  Please contact patient's cardiology team. Given recent worsening EF reduction, I will defer cardiac clearance to cardiology. They have recent seen patient. Unsure if they will need to see him prior to his scheduled surgery or not.     -wendie marie, pac     Gabbie Roberson RN, BSN  [facility]

## 2022-12-19 NOTE — PROGRESS NOTES
[facility]  [id number] [place] [id number]  Phone: [phone]  Primary Provider: Tavares Campbell  Pre-op Performing Provider: TAVARES CAMPBELL      PREOPERATIVE EVALUATION:  Today's date: 12/19/2022    Iraida Hernandez is a 67 year old male who presents for a preoperative evaluation.    Surgical Information:  Surgery/Procedure: ARTHROPLASTY, RIGHT HIP, TOTAL  Surgery Location: [facility]   Surgeon: Ok Jolly MD  Surgery Date: 1/4/2023  Time of Surgery: TBD  Where patient plans to recover: At home with family  Fax number for surgical facility: Note does not need to be faxed, will be available electronically in Epic.    Type of Anesthesia Anticipated: General    Assessment & Plan     The proposed surgical procedure is considered INTERMEDIATE risk.    Preop general physical exam  Stable exam.   - EKG 12-lead complete w/read - Clinics  - CBC with platelets; Future  - CBC with platelets    Arthritis of right hip      Coronary artery disease with angina pectoris, unspecified vessel or lesion type, unspecified whether native or transplanted heart (H)  DASI 58.2. no chest pains or shortness of breath. ekg unchanged from august. However, has recently noted worsening EF and followed through cardiology. entresto recently increased. Will defer cardiac clearance to cardiology.   - Lipid panel reflex to direct LDL Non-fasting; Future  - Lipid panel reflex to direct LDL Non-fasting    Ischemic cardiomyopathy  As above   - Basic metabolic panel    Screening for hyperlipidemia    - Lipid panel reflex to direct LDL Non-fasting; Future  - Lipid panel reflex to direct LDL Non-fasting    Screen for colon cancer    - HEATHER([employer] SCI[employer]ES); Future    Acute myocardial infarction (H)  As above   - Basic metabolic panel    Coronary artery disease involving native coronary artery of native heart without angina pectoris  As above   - Basic metabolic  "panel    Cardiomyopathy, unspecified type (H)  As above   - Basic metabolic panel       Implanted Device:   - Type of device: ICD Patient advised to bring device information on day of surgery.      Risks and Recommendations:  The patient has the following additional risks and recommendations for perioperative complications:  Cardiovascular:   - cardiology clearance needed prior to surgery. Addendum 12/31/22. Cardiology clearance obtained. See below for copy and pasted cardiology clearance:     \"Patient recently underwent a nuclear stress test as part of pre-operative cardiac clearance for orthopedic surgery. Stress test shows a large area of a severe degree of infarction in the anterior, apical, anteroseptal and inferoseptal segment(s) of the left ventricle. EF 37%. Stress test is unchanged from prior study in 2016. No new areas of ischemia noted. Patient is OK to proceed with orthopedic surgery.\"     Medication Instructions:  Patient is to take all scheduled medications on the day of surgery EXCEPT for modifications listed below:   - aspirin: Discontinue aspirin 7-10 days prior to procedure to reduce bleeding risk. It should be resumed postoperatively.    - clopidrogel (Plavix), prasugrel (Effient), ticagrelor (Brilinta): No contraindication to stopping Plavix, HOLD 5-7 days before surgery.    - ACE/ARB: May be continued on the day of surgery.    - Beta Blockers: Continue taking on the day of surgery.   - Statins: Continue taking on the day of surgery.     RECOMMENDATION:  Patient referred to cardiology for evaluation before surgery. Cardiac clearance was given. Patient is approved to proceed with the above surgery.         Subjective     HPI related to upcoming procedure: history of right hip arthritis. The above procedure was deemed the next best step in maangement.     Preop Questions 12/12/2022   1. Have you ever had a heart attack or stroke? YES -    2. Have you ever had surgery on your heart or blood vessels, " such as a stent placement, a coronary artery bypass, or surgery on an artery in your head, neck, heart, or legs? YES -    3. Do you have chest pain with activity? No   4. Do you have a history of  heart failure? YES - 30-35% EF in nov 2022   5. Do you currently have a cold, bronchitis or symptoms of other infection? No   6. Do you have a cough, shortness of breath, or wheezing? No   7. Do you or anyone in your family have previous history of blood clots? No   8. Do you or does anyone in your family have a serious bleeding problem such as prolonged bleeding following surgeries or cuts? No   9. Have you ever had problems with anemia or been told to take iron pills? No   10. Have you had any abnormal blood loss such as black, tarry or bloody stools? No   11. Have you ever had a blood transfusion? No   12. Are you willing to have a blood transfusion if it is medically needed before, during, or after your surgery? Yes   13. Have you or any of your relatives ever had problems with anesthesia? No   14. Do you have sleep apnea, excessive snoring or daytime drowsiness? No   15. Do you have any artifical heart valves or other implanted medical devices like a pacemaker, defibrillator, or continuous glucose monitor? YES - ICD   15a. What type of device do you have? [employer] ICD   15b. Name of the clinic that manages your device:  [facility]   16. Do you have artificial joints? YES - left hip   17. Are you allergic to latex? No       Health Care Directive:  Patient has a Health Care Directive on file      Preoperative Review of :   reviewed - no record of controlled substances prescribed.      Status of Chronic Conditions:  See problem list for active medical problems.  Problems all longstanding and stable, except as noted/documented.  See ROS for pertinent symptoms related to these conditions.    CAD - Patient has a longstanding history of moderate-severe CAD. Patient denies recent chest pain or NTG use,  denies exercise induced dyspnea or PND. Last Stress test 2/22/16, EKG 12/19/22.     CHF - Patient has a longstanding history of moderate-severe CHF. Exacerbating conditions include ischemic heart disease and hypertension. Currently the patient's condition is worsening. Current treatment regimen includes spirolactone and plavix with entresto. The patient denies chest pain, edema, orthopnea, SOB or recent weight gain. Last Echocardiogram 11/17/22, EKG 12/19/22.     HYPERLIPIDEMIA - Patient has a long history of significant Hyperlipidemia requiring medication for treatment with recent good control. Patient reports no problems or side effects with the medication.     HYPERTENSION - Patient has longstanding history of HTN , currently denies any symptoms referable to elevated blood pressure. Specifically denies chest pain, palpitations, dyspnea, orthopnea, PND or peripheral edema. Blood pressure readings have been in normal range. Current medication regimen is as listed below. Patient denies any side effects of medication.       Review of Systems: right hip pain.   CONSTITUTIONAL: NEGATIVE for fever, chills, change in weight  INTEGUMENTARY/SKIN: NEGATIVE for worrisome rashes, moles or lesions  EYES: NEGATIVE for vision changes or irritation  ENT/MOUTH: NEGATIVE for ear, mouth and throat problems  RESP: NEGATIVE for significant cough or SOB  CV: NEGATIVE for chest pain, palpitations or peripheral edema  GI: NEGATIVE for nausea, abdominal pain, heartburn, or change in bowel habits  : NEGATIVE for frequency, dysuria, or hematuria  MUSCULOSKELETAL: NEGATIVE for significant arthralgias or myalgia  NEURO: NEGATIVE for weakness, dizziness or paresthesias  ENDOCRINE: NEGATIVE for temperature intolerance, skin/hair changes  HEME: NEGATIVE for bleeding problems  PSYCHIATRIC: NEGATIVE for changes in mood or affect    Patient Active Problem List    Diagnosis Date Noted     Aftercare following left hip joint replacement surgery  10/11/2022     Priority: Medium     Ventricular tachycardia 07/16/2021     Priority: Medium     Prostate cancer (H) 02/05/2020     Priority: Medium     Hyperlipidemia      Priority: Medium     Diagnosis updated by automated process. Provider to review and confirm.       Coronary artery disease      Priority: Medium     Myocardial infarction (H)      Priority: Medium     Cardiomyopathy (H)      Priority: Medium     Cardiac arrest (H)      Priority: Medium     V-fib       Health Care Home 03/04/2014     Priority: Medium       Status:  Unable to contact   Care Coordinator:  Alexandra Liao [phone]  See Letters for [facility] Emergency Care Plan             Acute myocardial infarction (H) 02/16/2014     Priority: Medium     Nevus 01/07/2011     Priority: Medium     IMO update changed this record. Please review for accuracy  Do you wish to do the replacement in the background? yes         Tobacco abuse 01/07/2011     Priority: Medium     Hyperlipidemia with target LDL less than 70 10/31/2010     Priority: Medium     Diagnosis updated by automated process. Provider to review and confirm.        Past Medical History:   Diagnosis Date     Antiplatelet or antithrombotic long-term use      Arthritis Age related     Calculus of kidney      Cancer (H)     basel cell on nose     Cardiac arrest (H)     V-fib, 2/16/2014     Cardiomyopathy (H)      Coronary artery disease      Hyperlipidaemia      Hyperlipidemia with target LDL less than 70 10/31/2010     Diagnosis updated by automated process. Provider to review and confirm.     ICD (implantable cardiac defibrillator) in place     12-1-2014 EF 29%     Myocardial infarction (H) 02/2014     Anterior infarct     Pacemaker      Prostate cancer (H)      Tobacco dependence      Past Surgical History:   Procedure Laterality Date     ABDOMEN SURGERY  15 years ago    Hearnia     ARTHROPLASTY HIP Left 9/21/2022    Procedure: Left total hip arthroplasty;  Surgeon: Ok Jolly MD;   Location: [facility] OR     BIOPSY  February 2019    Prostate     CARDIAC SURGERY       COLONOSCOPY  2005     HEART CATH, ANGIOPLASTY  02/2014    Emergent cath,thrombectomy-pt had cardiac arrest-severe 3 vessel CAD-MACY proximal LAD, LAD diagonal kissing balloon, and stent to proximal RCA     HERNIA REPAIR       NO HISTORY OF SURGERY       PROSTATE SURGERY  03/29/2019     Current Outpatient Medications   Medication Sig Dispense Refill     aspirin EC 81 MG EC tablet Take 1 tablet (81 mg) by mouth daily       atorvastatin (LIPITOR) 40 MG tablet Take 1 tablet (40 mg) by mouth daily 90 tablet 0     carvedilol (COREG) 12.5 MG tablet Take 1 tablet (12.5 mg) by mouth 2 times daily (with meals) 180 tablet 3     clopidogrel (PLAVIX) 75 MG tablet Take 1 tablet (75 mg) by mouth daily 90 tablet 0     nitroGLYcerin (NITROSTAT) 0.4 MG sublingual tablet Place 1 tablet (0.4 mg) under the tongue every 5 minutes as needed for chest pain 25 tablet 3     sacubitril-valsartan (ENTRESTO) 49-51 MG per tablet Take 1 tablet by mouth 2 times daily 60 tablet 0     spironolactone (ALDACTONE) 25 MG tablet Take 1 tablet (25 mg) by mouth daily 90 tablet 3     acetaminophen (TYLENOL) 325 MG tablet Take 2 tablets (650 mg) by mouth every 4 hours as needed for other (Patient not taking: Reported on 12/19/2022) 60 tablet 0       Allergies   Allergen Reactions     Penicillins      Tetracyclines         Social History     Tobacco Use     Smoking status: Light Smoker     Packs/day: 0.70     Years: 48.00     Pack years: 33.60     Types: Cigarettes     Start date: 1/1/1975     Smokeless tobacco: Never     Tobacco comments:     1/3 pack a day - cutting back (only just doing 1/day as of 9/18/22)   Substance Use Topics     Alcohol use: Yes     Comment: 1-3 drinks per week     Family History   Problem Relation Age of Onset     Cerebrovascular Disease Father      Heart Disease Father         MI     Unknown/Adopted Father      Cancer Mother      Other Cancer Mother       "Unknown/Adopted Mother         Laine Hernandez     Unknown/Adopted Maternal Grandmother      Unknown/Adopted Maternal Grandfather      Unknown/Adopted Paternal Grandmother      Unknown/Adopted Paternal Grandfather      Diabetes Son      Family History Negative Son      Cancer Maternal Uncle      History   Drug Use No         Objective     /68 (BP Location: Right arm, Patient Position: Sitting, Cuff Size: Adult Regular)   Pulse 80   Temp 97.8  F (36.6  C) (Oral)   Resp 12   Ht 1.816 m (5' 11.5\")   Wt 69.2 kg (152 lb 9.6 oz)   SpO2 99%   BMI 20.99 kg/m      Physical Exam    GENERAL APPEARANCE: healthy, alert and no distress     EYES: EOMI,  PERRL     HENT: ear canals and TM's normal and nose and mouth without ulcers or lesions     NECK: no adenopathy, no asymmetry, masses, or scars and thyroid normal to palpation     RESP: lungs clear to auscultation - no rales, rhonchi or wheezes     CV: regular rates and rhythm     ABDOMEN:  soft, nontender, no HSM or masses and bowel sounds normal     MS: extremities normal- no gross deformities noted, no evidence of inflammation in joints, FROM in all extremities.     SKIN: no suspicious lesions or rashes     NEURO: Normal strength and tone, sensory exam grossly normal, mentation intact and speech normal     PSYCH: mentation appears normal. and affect normal/bright     LYMPHATICS: No cervical adenopathy    Recent Labs   Lab Test 12/05/22  1334 09/23/22  0535 09/22/22  0758 09/21/22  1828 09/21/22  0841 08/31/22  1414 10/25/21  0954 10/25/21  0954   HGB  --  11.6* 11.8*  --    < > 14.7   < >  --    PLT  --   --   --   --   --  232  --   --      --   --  138   < > 137  --  136   POTASSIUM 4.7  --   --  4.3   < > 4.1  --  4.7   CR 0.93  --   --  1.01  --  0.80  --  0.97   A1C  --   --   --   --   --   --   --  5.5    < > = values in this interval not displayed.        Diagnostics:  Labs pending at this time.  Results will be reviewed when available.  No results " found for this or any previous visit (from the past 24 hour(s)).   EKG: appears normal, NSR, anteroseptal infarct old    Revised Cardiac Risk Index (RCRI):  The patient has the following serious cardiovascular risks for perioperative complications:   - Coronary Artery Disease (MI, positive stress test, angina, Qs on EKG) = 1 point   - Congestive Heart Failure (pulmonary edema, PND, s3 lenard, CXR with pulmonary congestion, basilar rales) = 1 point     RCRI Interpretation: 2 points: Class III (moderate risk - 6.6% complication rate)     Estimated Functional Capacity: Duke Activity Status Index (DASI) score: 58.2            Signed Electronically by: Cesar Marie PA-C  Copy of this evaluation report is provided to requesting physician.

## 2022-12-19 NOTE — PATIENT INSTRUCTIONS
For informational purposes only. Not to replace the advice of your health care provider. Copyright   2003,  [employer] [facility]. All rights reserved. Clinically reviewed by Maria Ines Khan MD. [employer] [id number] - REV .  Preparing for Your Surgery  Getting started  A nurse will call you to review your health history and instructions. They will give you an arrival time based on your scheduled surgery time. Please be ready to share:    Your doctor's clinic name and phone number    Your medical, surgical, and anesthesia history    A list of allergies and sensitivities    A list of medicines, including herbal treatments and over-the-counter drugs    Whether the patient has a legal guardian (ask how to send us the papers in advance)  Please tell us if you're pregnant--or if there's any chance you might be pregnant. Some surgeries may injure a fetus (unborn baby), so they require a pregnancy test. Surgeries that are safe for a fetus don't always need a test, and you can choose whether to have one.   If you have a child who's having surgery, please ask for a copy of Preparing for Your Child's Surgery.    Preparing for surgery    Within 10 to 30 days of surgery: Have a pre-op exam (sometimes called an H&P, or History and Physical). This can be done at a clinic or pre-operative center.  ? If you're having a , you may not need this exam. Talk to your care team.    At your pre-op exam, talk to your care team about all medicines you take. If you need to stop any medicines before surgery, ask when to start taking them again.  ? We do this for your safety. Many medicines can make you bleed too much during surgery. Some change how well surgery (anesthesia) drugs work.    Call your insurance company to let them know you're having surgery. (If you don't have insurance, call [phone].)    Call your clinic if there's any change in your health. This includes signs of a cold or flu (sore throat, runny nose,  cough, rash, fever). It also includes a scrape or scratch near the surgery site.    If you have questions on the day of surgery, call your hospital or surgery center.  Eating and drinking guidelines  For your safety: Unless your surgeon tells you otherwise, follow the guidelines below.    Eat and drink as usual until 8 hours before you arrive for surgery. After that, no food or milk.    Drink clear liquids until 2 hours before you arrive. These are liquids you can see through, like water, Gatorade, and Propel Water. They also include plain black coffee and tea (no cream or milk), candy, and breath mints. You can spit out gum when you arrive.    If you drink alcohol: Stop drinking it the night before surgery.    If your care team tells you to take medicine on the morning of surgery, it's okay to take it with a sip of water.  Preventing infection    Shower or bathe the night before and morning of your surgery. Follow the instructions your clinic gave you. (If no instructions, use regular soap.)    Don't shave or clip hair near your surgery site. We'll remove the hair if needed.    Don't smoke or vape the morning of surgery. You may chew nicotine gum up to 2 hours before surgery. A nicotine patch is okay.  ? Note: Some surgeries require you to completely quit smoking and nicotine. Check with your surgeon.    Your care team will make every effort to keep you safe from infection. We will:  ? Clean our hands often with soap and water (or an alcohol-based hand rub).  ? Clean the skin at your surgery site with a special soap that kills germs.  ? Give you a special gown to keep you warm. (Cold raises the risk of infection.)  ? Wear special hair covers, masks, gowns and gloves during surgery.  ? Give antibiotic medicine, if prescribed. Not all surgeries need antibiotics.  What to bring on the day of surgery    Photo ID and insurance card    Copy of your health care directive, if you have one    Glasses and hearing aids (bring  cases)  ? You can't wear contacts during surgery    Inhaler and eye drops, if you use them (tell us about these when you arrive)    CPAP machine or breathing device, if you use them    A few personal items, if spending the night    If you have . . .  ? A pacemaker, ICD (cardiac defibrillator) or other implant: Bring the ID card.  ? An implanted stimulator: Bring the remote control.  ? A legal guardian: Bring a copy of the certified (court-stamped) guardianship papers.  Please remove any jewelry, including body piercings. Leave jewelry and other valuables at home.  If you're going home the day of surgery    You must have a responsible adult drive you home. They should stay with you overnight as well.    If you don't have someone to stay with you, and you aren't safe to go home alone, we may keep you overnight. Insurance often won't pay for this.  After surgery  If it's hard to control your pain or you need more pain medicine, please call your surgeon's office.  Questions?   If you have any questions for your care team, list them here: _________________________________________________________________________________________________________________________________________________________________________ ____________________________________ ____________________________________ ____________________________________

## 2022-12-20 NOTE — TELEPHONE ENCOUNTER
Bharath, see cardiology notes, appears they are handling this, do you still want to order?  Gabbie Roberson RN, BSN  [facility]

## 2022-12-20 NOTE — TELEPHONE ENCOUNTER
Hello!   I have sent a message over to our scheduling to help coordinate this ASAP. Thanks! Nasreen SCHAEFER, see cardiology [facility]    Gabbie Roberson RN, BSN  [facility]

## 2022-12-20 NOTE — TELEPHONE ENCOUNTER
OLIVE, see cardiology note    Patient was last seen on 11/18/2022 as part of an annual follow-up. Annual surveillance echocardiogram showed a reduced ejection fraction estimated at 30 to 35%, down from previous 35 to 40%, large anterior septal, apical, and distal inferior akinesis, normal RV size and function, no valvular disease.  To optimize medical therapy, lisinopril was stopped and he was initiated on Entresto.  He is now planning for a right hip replacement on 1/4/2023.  At office visit he denied any ischemic symptoms.  However, last ischemic work-up was in 2016.  Reviewed with Dr. Stevens who recommends a nuclear stress test for ischemic evaluation as part of a preop cardiac clearance.  Hopefully we can expedite this to avoid any rescheduling of his surgery.       Thanks,   Ellen Roberson, RN, BSN  [facility]

## 2022-12-20 NOTE — TELEPHONE ENCOUNTER
I have ordered stress testing. Can we see if this can be done in plenty of time prior to his scheduled surgery per cardiology recommendations?    -wendie ferrara, pac

## 2022-12-20 NOTE — TELEPHONE ENCOUNTER
"Note per Ellen Toney CNP below.  recommending a nuclear stress test. Order was placed by Cesar Marie PA-C. Will route message to scheduling to call pt and get this scheduled asap.         Ellen Toney, CARA CNP  You      \"Patient was last seen on 11/18/2022 as part of an annual follow-up. Annual surveillance echocardiogram showed a reduced ejection fraction estimated at 30 to 35%, down from previous 35 to 40%, large anterior septal, apical, and distal inferior akinesis, normal RV size and function, no valvular disease.  To optimize medical therapy, lisinopril was stopped and he was initiated on Entresto.  He is now planning for a right hip replacement on 1/4/2023.  At office visit he denied any ischemic symptoms.  However, last ischemic work-up was in 2016.  Reviewed with Dr. Stevens who recommends a nuclear stress test for ischemic evaluation as part of a preop cardiac clearance.  Hopefully we can expedite this to avoid any rescheduling of his surgery.       Thanks,   Ellen \"              SAGE Lemos    "

## 2022-12-20 NOTE — TELEPHONE ENCOUNTER
Bharath Marie PA-C has already placed the order. The  already called patient and left VM. No need to change anything at this time. We will watch for results and review with provider. I did try to call pt to keep him in the loop with our recommendations prior to surgery. No answer. Please make sure pt is aware of the plan.     Nasreen CAZARES

## 2022-12-21 NOTE — TELEPHONE ENCOUNTER
Called pt, aware, tried getting a hold of them now but had to leave a message, pt was instructed they will call him today to schedule  Gabbie Roberson RN, BSN  [facility]

## 2022-12-22 ENCOUNTER — VIRTUAL VISIT (OUTPATIENT)
Dept: ORTHOPEDICS | Facility: CLINIC | Age: 67
End: 2022-12-22
Payer: MEDICARE

## 2022-12-22 ENCOUNTER — THERAPY VISIT (OUTPATIENT)
Dept: PHYSICAL THERAPY | Facility: CLINIC | Age: 67
End: 2022-12-22
Payer: MEDICARE

## 2022-12-22 DIAGNOSIS — Z47.89 ORTHOPEDIC AFTERCARE: Primary | ICD-10-CM

## 2022-12-22 DIAGNOSIS — Z96.642 AFTERCARE FOLLOWING LEFT HIP JOINT REPLACEMENT SURGERY: Primary | ICD-10-CM

## 2022-12-22 DIAGNOSIS — Z47.1 AFTERCARE FOLLOWING LEFT HIP JOINT REPLACEMENT SURGERY: Primary | ICD-10-CM

## 2022-12-22 PROCEDURE — 97110 THERAPEUTIC EXERCISES: CPT | Mod: GP | Performed by: PHYSICAL THERAPIST

## 2022-12-22 PROCEDURE — 97112 NEUROMUSCULAR REEDUCATION: CPT | Mod: GP | Performed by: PHYSICAL THERAPIST

## 2022-12-22 PROCEDURE — 99024 POSTOP FOLLOW-UP VISIT: CPT | Performed by: ORTHOPAEDIC SURGERY

## 2022-12-22 PROCEDURE — 97530 THERAPEUTIC ACTIVITIES: CPT | Mod: GP | Performed by: PHYSICAL THERAPIST

## 2022-12-22 RX ORDER — CEFAZOLIN SODIUM/WATER 2 G/20 ML
2 SYRINGE (ML) INTRAVENOUS
Status: CANCELLED | OUTPATIENT
Start: 2023-01-04

## 2022-12-22 RX ORDER — TRANEXAMIC ACID 650 MG/1
1950 TABLET ORAL ONCE
Status: CANCELLED | OUTPATIENT
Start: 2023-01-04

## 2022-12-22 RX ORDER — ACETAMINOPHEN 325 MG/1
975 TABLET ORAL ONCE
Status: CANCELLED | OUTPATIENT
Start: 2023-01-04

## 2022-12-22 RX ORDER — CELECOXIB 200 MG/1
400 CAPSULE ORAL ONCE
Status: CANCELLED | OUTPATIENT
Start: 2023-01-04

## 2022-12-22 RX ORDER — CEFAZOLIN SODIUM/WATER 2 G/20 ML
2 SYRINGE (ML) INTRAVENOUS SEE ADMIN INSTRUCTIONS
Status: CANCELLED | OUTPATIENT
Start: 2023-01-04

## 2022-12-22 NOTE — PROGRESS NOTES
"Subjective:  HPI  Physical Exam                    Objective:  System    Physical Exam    General     ROS    Assessment/Plan:    DISCHARGE REPORT    Progress reporting period is from IE to 12/22/2022.       SUBJECTIVE  Subjective changes noted by patient:  .  Subjective: L BART doing well, much more limited by R hip pain.  To have R BART 1/4/2022    Current pain level is  Current Pain level:  (0-1/10 L hip).     Previous pain level was   Initial Pain level: 3/10.   Changes in function:  Yes (See Goal flowsheet attached for changes in current functional level)  Adverse reaction to treatment or activity: None    OBJECTIVE  Changes noted in objective findings:    Objective: Good control 6\" step L. SLS 15-20sec     ASSESSMENT/PLAN  Updated problem list and treatment plan: Diagnosis 1:  S/P L BART  Pain -  home program  Decreased ROM/flexibility - home program  Decreased strength - home program  Decreased proprioception - home program  Impaired gait - home program  Impaired muscle performance - home program  Decreased function - home program  STG/LTGs have been met or progress has been made towards goals:  Yes (See Goal flow sheet completed today.)  Assessment of Progress: The patient's condition is improving.  Self Management Plans:  Patient is independent in a home treatment program.  Patient is independent in self management of symptoms.  I have re-evaluated this patient and find that the nature, scope, duration and intensity of the therapy is appropriate for the medical condition of the patient.  Iraida continues to require the following intervention to meet STG and LTG's:  PT intervention is no longer required to meet STG/LTG.    Recommendations:  This patient is ready to be discharged from therapy and continue their home treatment program.    Please refer to the daily flowsheet for treatment today, total treatment time and time spent performing 1:1 timed codes.          "

## 2022-12-22 NOTE — LETTER
"    12/22/2022         RE: Iraida Hernandez  [id number] [facility] [id number]        Dear Colleague,    Thank you for referring your patient, Iraida Hernandez, to the [facility] [place]. Please see a copy of my visit note below.    Chief Complaint: RECHECK (12 week Left BART DOS 9/21/22)       HPI: Iraida Hernandez returns today in follow-up for his hips. He is 12 weeks out from left total hip. He reports that this is going very well. Finished physical therapy \"it's like perfect but the right one has gotten worse.\" he reports that he had his pre-op. Additional heart testing weas ordered before his 1/4/22 date. This is scheduled for 12/26.    Medications and allergies are documented in the EMR and have been reviewed.    Current Outpatient Medications:      acetaminophen (TYLENOL) 325 MG tablet, Take 2 tablets (650 mg) by mouth every 4 hours as needed for other (Patient not taking: Reported on 12/19/2022), Disp: 60 tablet, Rfl: 0     aspirin EC 81 MG EC tablet, Take 1 tablet (81 mg) by mouth daily, Disp: , Rfl:      atorvastatin (LIPITOR) 40 MG tablet, Take 1 tablet (40 mg) by mouth daily, Disp: 90 tablet, Rfl: 0     carvedilol (COREG) 12.5 MG tablet, Take 1 tablet (12.5 mg) by mouth 2 times daily (with meals), Disp: 180 tablet, Rfl: 3     clopidogrel (PLAVIX) 75 MG tablet, Take 1 tablet (75 mg) by mouth daily, Disp: 90 tablet, Rfl: 0     nitroGLYcerin (NITROSTAT) 0.4 MG sublingual tablet, Place 1 tablet (0.4 mg) under the tongue every 5 minutes as needed for chest pain, Disp: 25 tablet, Rfl: 3     sacubitril-valsartan (ENTRESTO) 49-51 MG per tablet, Take 1 tablet by mouth 2 times daily, Disp: 60 tablet, Rfl: 0     spironolactone (ALDACTONE) 25 MG tablet, Take 1 tablet (25 mg) by mouth daily, Disp: 90 tablet, Rfl: 3    Current Facility-Administered Medications:      methylPREDNISolone (DEPO-MEDROL) injection 40 mg, 40 mg, , , Yeo, Albert, MD, 40 mg at 06/24/22 1345     " methylPREDNISolone (DEPO-MEDROL) injection 40 mg, 40 mg, , , Yeo, Albert, MD, 40 mg at 06/24/22 2196  Allergies: Penicillins and Tetracyclines    Assessment and Plan: left hip doing very well. Right hip painful and scheduled for 1/4 pending final cards clearance.     Referred Self    Ok Jolly MD

## 2022-12-22 NOTE — NURSING NOTE
Reason For Visit:   Chief Complaint   Patient presents with     RECHECK     12 week Left BART DOS 9/21/22       There were no vitals taken for this visit.         Ping Allan, ATC

## 2022-12-22 NOTE — PROGRESS NOTES
"Chief Complaint: RECHECK (12 week Left BART DOS 9/21/22)       HPI: Iraida Hernandez returns today in follow-up for his hips. He is 12 weeks out from left total hip. He reports that this is going very well. Finished physical therapy \"it's like perfect but the right one has gotten worse.\" he reports that he had his pre-op. Additional heart testing weas ordered before his 1/4/22 date. This is scheduled for 12/26.    Medications and allergies are documented in the EMR and have been reviewed.    Current Outpatient Medications:      acetaminophen (TYLENOL) 325 MG tablet, Take 2 tablets (650 mg) by mouth every 4 hours as needed for other (Patient not taking: Reported on 12/19/2022), Disp: 60 tablet, Rfl: 0     aspirin EC 81 MG EC tablet, Take 1 tablet (81 mg) by mouth daily, Disp: , Rfl:      atorvastatin (LIPITOR) 40 MG tablet, Take 1 tablet (40 mg) by mouth daily, Disp: 90 tablet, Rfl: 0     carvedilol (COREG) 12.5 MG tablet, Take 1 tablet (12.5 mg) by mouth 2 times daily (with meals), Disp: 180 tablet, Rfl: 3     clopidogrel (PLAVIX) 75 MG tablet, Take 1 tablet (75 mg) by mouth daily, Disp: 90 tablet, Rfl: 0     nitroGLYcerin (NITROSTAT) 0.4 MG sublingual tablet, Place 1 tablet (0.4 mg) under the tongue every 5 minutes as needed for chest pain, Disp: 25 tablet, Rfl: 3     sacubitril-valsartan (ENTRESTO) 49-51 MG per tablet, Take 1 tablet by mouth 2 times daily, Disp: 60 tablet, Rfl: 0     spironolactone (ALDACTONE) 25 MG tablet, Take 1 tablet (25 mg) by mouth daily, Disp: 90 tablet, Rfl: 3    Current Facility-Administered Medications:      methylPREDNISolone (DEPO-MEDROL) injection 40 mg, 40 mg, , , Yeo, Albert, MD, 40 mg at 06/24/22 1345     methylPREDNISolone (DEPO-MEDROL) injection 40 mg, 40 mg, , , Yeo, Albert, MD, 40 mg at 06/24/22 1345  Allergies: Penicillins and Tetracyclines    Assessment and Plan: left hip doing very well. Right hip painful and scheduled for 1/4 pending final cards clearance.     Referred " Self

## 2022-12-26 ENCOUNTER — HOSPITAL ENCOUNTER (OUTPATIENT)
Dept: NUCLEAR MEDICINE | Facility: CLINIC | Age: 67
Setting detail: NUCLEAR MEDICINE
Discharge: HOME | End: 2022-12-26
Attending: PHYSICIAN ASSISTANT
Payer: MEDICARE

## 2022-12-26 ENCOUNTER — HOSPITAL ENCOUNTER (OUTPATIENT)
Dept: CARDIOLOGY | Facility: CLINIC | Age: 67
Discharge: HOME | End: 2022-12-26
Attending: PHYSICIAN ASSISTANT
Payer: MEDICARE

## 2022-12-26 DIAGNOSIS — I21.02 ACUTE ST ELEVATION MYOCARDIAL INFARCTION (STEMI) INVOLVING LEFT ANTERIOR DESCENDING (LAD) CORONARY ARTERY (H): ICD-10-CM

## 2022-12-26 DIAGNOSIS — I21.9 ACUTE MYOCARDIAL INFARCTION (H): ICD-10-CM

## 2022-12-26 DIAGNOSIS — I25.119 CORONARY ARTERY DISEASE WITH ANGINA PECTORIS, UNSPECIFIED VESSEL OR LESION TYPE, UNSPECIFIED WHETHER NATIVE OR TRANSPLANTED HEART (H): ICD-10-CM

## 2022-12-26 LAB
CV STRESS MAX HR HE: 87
RATE PRESSURE PRODUCT: 9657
STRESS ECHO BASELINE DIASTOLIC HE: 59
STRESS ECHO BASELINE HR: 68 BPM
STRESS ECHO BASELINE SYSTOLIC BP: 109
STRESS ECHO CALCULATED PERCENT HR: 58 %
STRESS ECHO LAST STRESS DIASTOLIC BP: 65
STRESS ECHO LAST STRESS SYSTOLIC BP: 111
STRESS ECHO TARGET HR: 151

## 2022-12-26 PROCEDURE — 78452 HT MUSCLE IMAGE SPECT MULT: CPT | Mod: 26 | Performed by: INTERNAL MEDICINE

## 2022-12-26 PROCEDURE — G1010 CDSM STANSON: HCPCS

## 2022-12-26 PROCEDURE — 250N000011 HC RX IP 250 OP 636

## 2022-12-26 PROCEDURE — 93018 CV STRESS TEST I&R ONLY: CPT | Performed by: INTERNAL MEDICINE

## 2022-12-26 PROCEDURE — 93016 CV STRESS TEST SUPVJ ONLY: CPT | Performed by: INTERNAL MEDICINE

## 2022-12-26 PROCEDURE — A9502 TC99M TETROFOSMIN: HCPCS | Performed by: PHYSICIAN ASSISTANT

## 2022-12-26 PROCEDURE — 343N000001 HC RX 343: Performed by: PHYSICIAN ASSISTANT

## 2022-12-26 PROCEDURE — 93017 CV STRESS TEST TRACING ONLY: CPT

## 2022-12-26 PROCEDURE — G1010 CDSM STANSON: HCPCS | Performed by: INTERNAL MEDICINE

## 2022-12-26 RX ORDER — REGADENOSON 0.08 MG/ML
INJECTION, SOLUTION INTRAVENOUS
Status: COMPLETED
Start: 2022-12-26 | End: 2022-12-26

## 2022-12-26 RX ORDER — CARVEDILOL 12.5 MG/1
12.5 TABLET ORAL 2 TIMES DAILY WITH MEALS
Qty: 180 TABLET | Refills: 3 | Status: SHIPPED | OUTPATIENT
Start: 2022-12-26 | End: 2024-01-10

## 2022-12-26 RX ORDER — ATORVASTATIN CALCIUM 40 MG/1
40 TABLET, FILM COATED ORAL DAILY
Qty: 90 TABLET | Refills: 3 | Status: SHIPPED | OUTPATIENT
Start: 2022-12-26 | End: 2023-12-19

## 2022-12-26 RX ADMIN — TETROFOSMIN 10.7 MCI.: 1.38 INJECTION, POWDER, LYOPHILIZED, FOR SOLUTION INTRAVENOUS at 12:56

## 2022-12-26 RX ADMIN — TETROFOSMIN 33 MCI.: 1.38 INJECTION, POWDER, LYOPHILIZED, FOR SOLUTION INTRAVENOUS at 14:25

## 2022-12-26 RX ADMIN — REGADENOSON 0.4 MG: 0.08 INJECTION, SOLUTION INTRAVENOUS at 14:23

## 2022-12-27 ENCOUNTER — ANCILLARY PROCEDURE (OUTPATIENT)
Dept: CARDIOLOGY | Facility: CLINIC | Age: 67
End: 2022-12-27
Attending: INTERNAL MEDICINE
Payer: MEDICARE

## 2022-12-27 DIAGNOSIS — I25.5 ISCHEMIC CARDIOMYOPATHY: ICD-10-CM

## 2022-12-27 DIAGNOSIS — Z95.810 ICD (IMPLANTABLE CARDIOVERTER-DEFIBRILLATOR) IN PLACE: ICD-10-CM

## 2022-12-27 LAB
MDC_IDC_EPISODE_DTM: NORMAL
MDC_IDC_EPISODE_DURATION: 7 S
MDC_IDC_EPISODE_DURATION: 8 S
MDC_IDC_EPISODE_DURATION: 9 S
MDC_IDC_EPISODE_DURATION: 9 S
MDC_IDC_EPISODE_ID: NORMAL
MDC_IDC_EPISODE_TYPE: NORMAL
MDC_IDC_LEAD_IMPLANT_DT: NORMAL
MDC_IDC_LEAD_LOCATION: NORMAL
MDC_IDC_LEAD_LOCATION_DETAIL_1: NORMAL
MDC_IDC_LEAD_MFG: NORMAL
MDC_IDC_LEAD_MODEL: NORMAL
MDC_IDC_LEAD_POLARITY_TYPE: NORMAL
MDC_IDC_LEAD_SERIAL: NORMAL
MDC_IDC_MSMT_BATTERY_DTM: NORMAL
MDC_IDC_MSMT_BATTERY_REMAINING_LONGEVITY: 78 MO
MDC_IDC_MSMT_BATTERY_REMAINING_PERCENTAGE: 80 %
MDC_IDC_MSMT_BATTERY_STATUS: NORMAL
MDC_IDC_MSMT_CAP_CHARGE_DTM: NORMAL
MDC_IDC_MSMT_CAP_CHARGE_TIME: 10.3 S
MDC_IDC_MSMT_CAP_CHARGE_TYPE: NORMAL
MDC_IDC_MSMT_LEADCHNL_RV_IMPEDANCE_VALUE: 675 OHM
MDC_IDC_MSMT_LEADCHNL_RV_PACING_THRESHOLD_AMPLITUDE: 0.6 V
MDC_IDC_MSMT_LEADCHNL_RV_PACING_THRESHOLD_PULSEWIDTH: 0.5 MS
MDC_IDC_PG_IMPLANT_DTM: NORMAL
MDC_IDC_PG_MFG: NORMAL
MDC_IDC_PG_MODEL: NORMAL
MDC_IDC_PG_SERIAL: NORMAL
MDC_IDC_PG_TYPE: NORMAL
MDC_IDC_SESS_CLINIC_NAME: NORMAL
MDC_IDC_SESS_DTM: NORMAL
MDC_IDC_SESS_TYPE: NORMAL
MDC_IDC_SET_BRADY_LOWRATE: 40 {BEATS}/MIN
MDC_IDC_SET_BRADY_MODE: NORMAL
MDC_IDC_SET_LEADCHNL_RV_PACING_AMPLITUDE: 2 V
MDC_IDC_SET_LEADCHNL_RV_PACING_POLARITY: NORMAL
MDC_IDC_SET_LEADCHNL_RV_PACING_PULSEWIDTH: 0.5 MS
MDC_IDC_SET_LEADCHNL_RV_SENSING_ADAPTATION_MODE: NORMAL
MDC_IDC_SET_LEADCHNL_RV_SENSING_POLARITY: NORMAL
MDC_IDC_SET_LEADCHNL_RV_SENSING_SENSITIVITY: 0.6 MV
MDC_IDC_SET_ZONE_DETECTION_INTERVAL: 250 MS
MDC_IDC_SET_ZONE_DETECTION_INTERVAL: 300 MS
MDC_IDC_SET_ZONE_DETECTION_INTERVAL: 353 MS
MDC_IDC_SET_ZONE_TYPE: NORMAL
MDC_IDC_SET_ZONE_VENDOR_TYPE: NORMAL
MDC_IDC_STAT_BRADY_DTM_END: NORMAL
MDC_IDC_STAT_BRADY_DTM_START: NORMAL
MDC_IDC_STAT_BRADY_RV_PERCENT_PACED: 0 %
MDC_IDC_STAT_EPISODE_RECENT_COUNT: 0
MDC_IDC_STAT_EPISODE_RECENT_COUNT: 22
MDC_IDC_STAT_EPISODE_RECENT_COUNT_DTM_END: NORMAL
MDC_IDC_STAT_EPISODE_RECENT_COUNT_DTM_START: NORMAL
MDC_IDC_STAT_EPISODE_TYPE: NORMAL
MDC_IDC_STAT_EPISODE_VENDOR_TYPE: NORMAL
MDC_IDC_STAT_TACHYTHERAPY_ATP_DELIVERED_RECENT: 0
MDC_IDC_STAT_TACHYTHERAPY_ATP_DELIVERED_TOTAL: 0
MDC_IDC_STAT_TACHYTHERAPY_RECENT_DTM_END: NORMAL
MDC_IDC_STAT_TACHYTHERAPY_RECENT_DTM_START: NORMAL
MDC_IDC_STAT_TACHYTHERAPY_SHOCKS_ABORTED_RECENT: 0
MDC_IDC_STAT_TACHYTHERAPY_SHOCKS_ABORTED_TOTAL: 0
MDC_IDC_STAT_TACHYTHERAPY_SHOCKS_DELIVERED_RECENT: 0
MDC_IDC_STAT_TACHYTHERAPY_SHOCKS_DELIVERED_TOTAL: 0
MDC_IDC_STAT_TACHYTHERAPY_TOTAL_DTM_END: NORMAL
MDC_IDC_STAT_TACHYTHERAPY_TOTAL_DTM_START: NORMAL

## 2022-12-27 PROCEDURE — 93295 DEV INTERROG REMOTE 1/2/MLT: CPT | Performed by: INTERNAL MEDICINE

## 2022-12-27 PROCEDURE — 93296 REM INTERROG EVL PM/IDS: CPT | Performed by: INTERNAL MEDICINE

## 2022-12-28 ENCOUNTER — TELEPHONE (OUTPATIENT)
Dept: FAMILY MEDICINE | Facility: CLINIC | Age: 67
End: 2022-12-28

## 2022-12-28 ENCOUNTER — DOCUMENTATION ONLY (OUTPATIENT)
Dept: CARDIOLOGY | Facility: CLINIC | Age: 67
End: 2022-12-28

## 2022-12-28 NOTE — TELEPHONE ENCOUNTER
Called pt, has not heard from cardiology, discussed, pt advised to call cardiology in am and discuss with them as they are probably closed now and this RN is not in clinic tomorrow, pt agrees, routed to triage to monitor    TRIAGE RN, CAN YOU FOLLOW UP ON THIS TO CONFIRM F/U PLAN IN PLACE? See ZB note, pt needs cardiac clearance for surgery next week    The nuclear stress test is abnormal.     There is a large area of a severe degree of infarction in the anterior, apical, anteroseptal and inferoseptal segment(s) of the left ventricle.     Left ventricular function is moderately reduced, EF  37%.     A prior study was conducted on 2/22/2016.  This study has no significant change when compared with the prior study.    (copied Ellen DUMONT from cardiology on note)      Gabbie Roberson RN, BSN  [facility]

## 2022-12-28 NOTE — TELEPHONE ENCOUNTER
----- Message from Cesar Marie PA-C sent at 12/28/2022  9:06 AM CST -----  Reviewed. Results. I have been out of the office for a few days and will remain out of the office for the remainder of the week. My ability to have access to epic will be sparse. Unclear if this has been addressed in my absence. Gabbie Villatoro is familiar with this patient's current situation. His stress test was abnormal. Has cardiology been in touch with patient? This needs to be addressed prior  to his surgery.     -wendie marie, pac

## 2022-12-29 NOTE — PROGRESS NOTES
Ortho Navigator Note      Pre-op Date 12/29/22     Medical Clearance  Cleared     Labs WNR   Per Cardiology: Stress test is unchanged from prior study in 2016.   COVID Test Date No longer indicated      Skin  Intact      Activity: Ambulates independently      Equipment Need Patient has all adaptive equipment at home from previous TJR surgery 3 months ago. Defer to PT/OT for recs.       Meds to Hold Held all supplements 14 days prior to surgery  Held Plavix 5 days prior to surgery   Cardiology recommends continuing ASA. Dr. Jolly is ok with this.      NPO Instructions  Defer to PAN RN     Pre-op Joint Education Complete? Yes    Discharge Plan Patient has plan to discharge home on morning of POD 1. Clarice will arrive at hospital at 1000. She will then transport patient home.    /Transportation Clarice (spouse)  is physically able to care for patient.      Barriers to Discharge No barriers to discharge.      Additional Info/   Special Needs : Patient had no unanswered questions or concerns.          12/29/22 0911   Discharge Planning   Patient/Family Anticipates Transition to home with family   Concerns to be Addressed no discharge needs identified   Living Arrangements   People in Home spouse   Is your private residence a single family home or apartment? Single family home   Number of Stairs, Within Home, Primary greater than 10 stairs   Stair Railings, Within Home, Primary railings safe and in good condition   Once home, are you able to live on one level? No   Bathroom Shower/Tub Walk-in shower   Equipment Currently Used at Home none   Support System   Support Systems Spouse/Significant Other   Do you have someone available to stay with you one or two nights once you are home? Yes   Medical Clearance   Date of Physical 12/29/22   Clinic Name [facility]   It is recommended that you call and check with any specialty providers before surgery to see if you need surgical clearance.  Do you see any  specialty providers outside of your primary care provider? No   Blood   Known bleeding disorder or coagulopathy? Yes  (On Plavix)   Does the patient have any Uatsdin/cultural preferences related to blood products? No   Education   Has the patient scheduled or completed pre-op total joint education, either in class or online, in the last 12 months? Yes   Patient attended total joint pre-op class/received pre-op teaching  online   Relationship/Environment   Name(s) of People in Home Clarice

## 2022-12-29 NOTE — PROGRESS NOTES
Patient recently underwent a nuclear stress test as part of pre operative cardiac clearance for orthopedic surgery. Stress test shows a large area of a severe degree of infarction in the anterior, apical, anteroseptal and inferoseptal segment(s) of the left ventricle. EF 37 %. Stress test is unchanged from prior study in 2016. No new areas of ischemia noted. Patient is ok to proceed with orthopedic surgery.     Surgical risk: Low   Low-Reported risk of cardiac death or non-fatal MI <1%  Intermediate-Reported risk of cardiac death or non-fatal MI 1-5%  High-Reported risk of cardiac death or non-fatal MI >5%    Patient specific risk: Moderate 6.6%     At this time Dietmar David is felt to be optimized from a cardiac standpoint prior to surgery and no further cardiac evaluation/testing is needed. We recommend continuous use of statin therapy through the periop period. We also recommend continuing BB use as tolerated through the periop period. From a CV standpoint ideally a low dose aspirin would be continued, but final discretion is up to the surgeon.       CARA Rodriguez CNP

## 2022-12-29 NOTE — TELEPHONE ENCOUNTER
Patient has called Cardiology and L/M for call back.  Routing to to [facility] to addend preop when he returns.  SAGE Turcios Krystal KD     9:42 AM  Note  [facility] Call Center     Phone Message     May a detailed message be left on voicemail: yes      Reason for Call: Other: Pt would like a call back as he needs a cardiac clearance as well as discuss his hip surgery and a few other questions he has      Action Taken: Message routed to:  [facility] Cardio     Travel Screening: Not Applicable

## 2022-12-29 NOTE — TELEPHONE ENCOUNTER
12/28/2022  [facility] Ellen Brooke APRN CNP  Cardiovascular Disease     Progress Notes  Ellen Toney APRN CNP (Nurse Practitioner)     Cardiovascular Disease  Unsigned     Patient recently underwent a nuclear stress test as part of pre operative cardiac clearance for orthopedic surgery. Stress test shows a large area of a severe degree of infarction in the anterior, apical, anteroseptal and inferoseptal segment(s) of the left ventricle. EF 37 %. Stress test is unchanged from prior study in 2016. No new areas of ischemia noted. Patient is ok to proceed with orthopedic surgery.      Surgical risk: Low   Low-Reported risk of cardiac death or non-fatal MI <1%  Intermediate-Reported risk of cardiac death or non-fatal MI 1-5%  High-Reported risk of cardiac death or non-fatal MI >5%     Patient specific risk: Moderate 6.6%      At this time Dietchrissy Hernandez is felt to be optimized from a cardiac standpoint prior to surgery and no further cardiac evaluation/testing is needed. We recommend continuous use of statin therapy through the periop period. We also recommend continuing BB use as tolerated through the periop period. From a CV standpoint ideally a low dose aspirin would be continued, but final discretion is up to the surgeon.         CARA Rodriguez CNP

## 2023-01-01 NOTE — TELEPHONE ENCOUNTER
Attempted to call patient. Left voicemail.     However during his visit today he was told the standard recommendation of holding his spironolactone as well as lisinopril the morning of his surgery. While this is recommended for most, for individual with reduced heart function (himself), these actually are best to CONTINUE as prescribed.     I have sent him a message with an updated medication's to hold list through [employer], but in summary:    Medications to HOLD:  10 Days Before: Stop your baby aspirin  7 Days Before: Do not take Ibuprofen/Aleve/NSAIDs  5 Days Before: Stop taking your Plavix  24 Hours Before: No alcohol use    Restart baby aspirin, Plavix on the day after surgery (unless directed otherwise by surgeon).    -wendie ferrara, pac  
Pt was notified of provider message.     Patient was given an opportunity to ask questions, verbalized understanding of plan, and is agreeable.     Constanza Lam RN   
show

## 2023-01-02 NOTE — OR NURSING
"The following epic inbasket message was sent with high importance:   AA: Cardiac. EF 37%  Received: Today  Jennie Lam, Sonny Hawkins MD; P Pas Anesthesiology  Cc: Bartolome Luque RN; Letty Cummings RN  Hi Dr. Hester,     Pt is scheduled for ARTHROPLASTY, RIGHT HIP, TOTAL on 1/4/22 at the [facility].     FYI: He has a history of Cardiomyopathy and VF arrest status post ICD placement.   Note in chart on 12/28, by Ellen Toney APRN CNP, says \" Patient recently underwent a nuclear stress test as part of pre operative cardiac clearance for orthopedic surgery. Stress test shows a large area of a severe degree of infarction in the anterior, apical, anteroseptal and inferoseptal segment(s) of the left ventricle. EF 37 %. Stress test is unchanged from prior study in 2016. No new areas of ischemia noted. Patient is ok to proceed with orthopedic surgery. \"     He will remain on ASA per cardiology recommendations and approved by surgeon. He will continue Entresto on DOS per PCP. Should he take or hold Spirolactone on DOS? Is this case appropriate for the [facility]?     Kind regards.     Jennie Lam RN, BSN   Pre-Anesthesia Screening   [phone]     "

## 2023-01-02 NOTE — OR NURSING
"The previous message was revised as it had not been seen by the recipients yet. This revised message was sent with high importance:   AA: Cardiac. EF 37%  Received: Today  Jennie Lam, Sonny Hawkins MD; P Pas Anesthesiology  Cc: Bartolome Luque, SAGE; Letty Cummings RN  Hi Dr. Hester,     Pt is scheduled for ARTHROPLASTY, RIGHT HIP, TOTAL on 1/4/22 at the [facility].     FYI: He has a history of Cardiomyopathy and VF arrest status post ICD placement.   Note in chart on 12/28, by Ellen Toney APRN CNP, says \" Patient recently underwent a nuclear stress test as part of pre operative cardiac clearance for orthopedic surgery. Stress test shows a large area of a severe degree of infarction in the anterior, apical, anteroseptal and inferoseptal segment(s) of the left ventricle. EF 37 %. Stress test is unchanged from prior study in 2016. No new areas of ischemia noted. Patient is ok to proceed with orthopedic surgery. \"     Pt's ICD remote device check on 12/27/22 showed Ventricular Arrhythmia: 17 HVR episodes logged. 4 EGMs for review show NSVT lasting 4-11 beats with rates in the 170-180s.     Pt will remain on ASA per cardiology recommendations and approved by surgeon. He will continue Entresto on DOS per PCP. Should he take or hold Spirolactone on DOS? Is this case appropriate for the [facility]?     Kind regards.     Jennie Lam RN, BSN   Pre-Anesthesia Screening   [phone]          "

## 2023-01-02 NOTE — OR NURSING
"RE: AA: Cardiac. EF 37%  Received: Today  Sonny Hester MD Johnson, Judy, RN; P Pas Anesthesiology  Cc: Bartolome Luque, SAGE; Letty Cummings RN  He should take his spirolactone on DOS and he is appropriate for the [facility].           Previous Messages     ----- Message -----   From: Jennie Lam RN   Sent: 1/2/2023   2:50 PM CST   To: Sonny Hester MD, Bartolome Luque RN, *   Subject: AA: Cardiac. EF 37%                               Hi Dr. Hester,     Pt is scheduled for ARTHROPLASTY, RIGHT HIP, TOTAL on 1/4/22 at the [facility].     FYI: He has a history of Cardiomyopathy and VF arrest status post ICD placement.   Note in chart on 12/28, by Ellen Toney APRN CNP, says \" Patient recently underwent a nuclear stress test as part of pre operative cardiac clearance for orthopedic surgery. Stress test shows a large area of a severe degree of infarction in the anterior, apical, anteroseptal and inferoseptal segment(s) of the left ventricle. EF 37 %. Stress test is unchanged from prior study in 2016. No new areas of ischemia noted. Patient is ok to proceed with orthopedic surgery. \"     Pt's ICD remote device check on 12/27/22 showed Ventricular Arrhythmia: 17 HVR episodes logged. 4 EGMs for review show NSVT lasting 4-11 beats with rates in the 170-180s.     Pt will remain on ASA per cardiology recommendations and approved by surgeon. He will continue Entresto on DOS per PCP. Should he take or hold Spirolactone on DOS? Is this case appropriate for the [facility]?     Kind regards.     Jennie Lam, RN, BSN   Pre-Anesthesia Screening   [phone]      "

## 2023-01-04 ENCOUNTER — APPOINTMENT (OUTPATIENT)
Dept: GENERAL RADIOLOGY | Facility: CLINIC | Age: 68
End: 2023-01-04
Attending: ORTHOPAEDIC SURGERY
Payer: MEDICARE

## 2023-01-04 ENCOUNTER — ANESTHESIA EVENT (OUTPATIENT)
Dept: SURGERY | Facility: CLINIC | Age: 68
End: 2023-01-04
Payer: MEDICARE

## 2023-01-04 ENCOUNTER — ANESTHESIA (OUTPATIENT)
Dept: SURGERY | Facility: CLINIC | Age: 68
End: 2023-01-04
Payer: MEDICARE

## 2023-01-04 ENCOUNTER — HOSPITAL ENCOUNTER (OUTPATIENT)
Facility: CLINIC | Age: 68
Discharge: HOME HEALTH | End: 2023-01-05
Attending: ORTHOPAEDIC SURGERY | Admitting: ORTHOPAEDIC SURGERY
Payer: MEDICARE

## 2023-01-04 DIAGNOSIS — M16.11 PRIMARY OSTEOARTHRITIS OF RIGHT HIP: Primary | ICD-10-CM

## 2023-01-04 LAB
ANION GAP SERPL CALCULATED.3IONS-SCNC: 6 MMOL/L (ref 3–14)
BUN SERPL-MCNC: 17 MG/DL (ref 7–30)
CALCIUM SERPL-MCNC: 8 MG/DL (ref 8.5–10.1)
CHLORIDE BLD-SCNC: 105 MMOL/L (ref 94–109)
CO2 SERPL-SCNC: 26 MMOL/L (ref 20–32)
CREAT SERPL-MCNC: 0.91 MG/DL (ref 0.66–1.25)
GFR SERPL CREATININE-BSD FRML MDRD: >90 ML/MIN/1.73M2
GLUCOSE BLD-MCNC: 174 MG/DL (ref 70–99)
GLUCOSE BLDC GLUCOMTR-MCNC: 122 MG/DL (ref 70–99)
HGB BLD-MCNC: 12.5 G/DL (ref 13.3–17.7)
HOLD SPECIMEN: NORMAL
POTASSIUM BLD-SCNC: 4.2 MMOL/L (ref 3.4–5.3)
SODIUM SERPL-SCNC: 137 MMOL/L (ref 133–144)

## 2023-01-04 PROCEDURE — 999N000065 XR PELVIS AND HIP PORTABLE RIGHT 1 VIEW

## 2023-01-04 PROCEDURE — 250N000009 HC RX 250

## 2023-01-04 PROCEDURE — 250N000011 HC RX IP 250 OP 636: Performed by: ANESTHESIOLOGY

## 2023-01-04 PROCEDURE — 258N000003 HC RX IP 258 OP 636: Performed by: STUDENT IN AN ORGANIZED HEALTH CARE EDUCATION/TRAINING PROGRAM

## 2023-01-04 PROCEDURE — 258N000003 HC RX IP 258 OP 636: Performed by: NURSE ANESTHETIST, CERTIFIED REGISTERED

## 2023-01-04 PROCEDURE — 250N000013 HC RX MED GY IP 250 OP 250 PS 637: Performed by: PHYSICIAN ASSISTANT

## 2023-01-04 PROCEDURE — 80048 BASIC METABOLIC PNL TOTAL CA: CPT | Performed by: PHYSICIAN ASSISTANT

## 2023-01-04 PROCEDURE — 99214 OFFICE O/P EST MOD 30 MIN: CPT | Performed by: PHYSICIAN ASSISTANT

## 2023-01-04 PROCEDURE — 272N000001 HC OR GENERAL SUPPLY STERILE: Performed by: ORTHOPAEDIC SURGERY

## 2023-01-04 PROCEDURE — 370N000017 HC ANESTHESIA TECHNICAL FEE, PER MIN: Performed by: ORTHOPAEDIC SURGERY

## 2023-01-04 PROCEDURE — 250N000011 HC RX IP 250 OP 636

## 2023-01-04 PROCEDURE — 360N000077 HC SURGERY LEVEL 4, PER MIN: Performed by: ORTHOPAEDIC SURGERY

## 2023-01-04 PROCEDURE — 258N000001 HC RX 258: Performed by: ORTHOPAEDIC SURGERY

## 2023-01-04 PROCEDURE — 250N000011 HC RX IP 250 OP 636: Performed by: STUDENT IN AN ORGANIZED HEALTH CARE EDUCATION/TRAINING PROGRAM

## 2023-01-04 PROCEDURE — 250N000011 HC RX IP 250 OP 636: Performed by: NURSE ANESTHETIST, CERTIFIED REGISTERED

## 2023-01-04 PROCEDURE — 27130 TOTAL HIP ARTHROPLASTY: CPT | Mod: RT | Performed by: ORTHOPAEDIC SURGERY

## 2023-01-04 PROCEDURE — 250N000025 HC SEVOFLURANE, PER MIN: Performed by: ORTHOPAEDIC SURGERY

## 2023-01-04 PROCEDURE — 250N000011 HC RX IP 250 OP 636: Performed by: ORTHOPAEDIC SURGERY

## 2023-01-04 PROCEDURE — 710N000010 HC RECOVERY PHASE 1, LEVEL 2, PER MIN: Performed by: ORTHOPAEDIC SURGERY

## 2023-01-04 PROCEDURE — 250N000009 HC RX 250: Performed by: NURSE ANESTHETIST, CERTIFIED REGISTERED

## 2023-01-04 PROCEDURE — C1713 ANCHOR/SCREW BN/BN,TIS/BN: HCPCS | Performed by: ORTHOPAEDIC SURGERY

## 2023-01-04 PROCEDURE — 36415 COLL VENOUS BLD VENIPUNCTURE: CPT | Performed by: PHYSICIAN ASSISTANT

## 2023-01-04 PROCEDURE — 999N000141 HC STATISTIC PRE-PROCEDURE NURSING ASSESSMENT: Performed by: ORTHOPAEDIC SURGERY

## 2023-01-04 PROCEDURE — 85018 HEMOGLOBIN: CPT | Performed by: PHYSICIAN ASSISTANT

## 2023-01-04 PROCEDURE — C1776 JOINT DEVICE (IMPLANTABLE): HCPCS | Performed by: ORTHOPAEDIC SURGERY

## 2023-01-04 PROCEDURE — 250N000013 HC RX MED GY IP 250 OP 250 PS 637: Performed by: STUDENT IN AN ORGANIZED HEALTH CARE EDUCATION/TRAINING PROGRAM

## 2023-01-04 PROCEDURE — 258N000003 HC RX IP 258 OP 636: Performed by: ORTHOPAEDIC SURGERY

## 2023-01-04 PROCEDURE — 250N000009 HC RX 250: Performed by: ORTHOPAEDIC SURGERY

## 2023-01-04 DEVICE — COBALT CHROME 12/14 TAPER FEMORAL                                    HEAD 36MM +8: Type: IMPLANTABLE DEVICE | Site: HIP | Status: FUNCTIONAL

## 2023-01-04 DEVICE — R3 3 HOLE ACETABULAR SHELL 54MM
Type: IMPLANTABLE DEVICE | Site: HIP | Status: FUNCTIONAL
Brand: R3 ACETABULAR

## 2023-01-04 DEVICE — ANTHOLOGY AFIT HIGH OFFSET POROUS                                    PLUS HA SIZE 7
Type: IMPLANTABLE DEVICE | Site: HIP | Status: FUNCTIONAL
Brand: ANTHOLOGY AFIT

## 2023-01-04 DEVICE — R3 0 DEGREE XLPE ACETABULAR LINER                                    36MM ID X OD 54MM
Type: IMPLANTABLE DEVICE | Site: HIP | Status: FUNCTIONAL
Brand: R3

## 2023-01-04 DEVICE — REFLECTION SPHERICAL HEAD SCREW 25MM
Type: IMPLANTABLE DEVICE | Site: HIP | Status: FUNCTIONAL
Brand: REFLECTION

## 2023-01-04 DEVICE — REFLECTION SPHERICAL HEAD SCREW 40MM
Type: IMPLANTABLE DEVICE | Site: HIP | Status: FUNCTIONAL
Brand: REFLECTION

## 2023-01-04 RX ORDER — CEFAZOLIN SODIUM/WATER 2 G/20 ML
2 SYRINGE (ML) INTRAVENOUS SEE ADMIN INSTRUCTIONS
Status: DISCONTINUED | OUTPATIENT
Start: 2023-01-04 | End: 2023-01-04 | Stop reason: HOSPADM

## 2023-01-04 RX ORDER — OXYCODONE HYDROCHLORIDE 10 MG/1
10 TABLET ORAL EVERY 4 HOURS PRN
Status: DISCONTINUED | OUTPATIENT
Start: 2023-01-04 | End: 2023-01-05 | Stop reason: HOSPADM

## 2023-01-04 RX ORDER — SODIUM CHLORIDE, SODIUM LACTATE, POTASSIUM CHLORIDE, CALCIUM CHLORIDE 600; 310; 30; 20 MG/100ML; MG/100ML; MG/100ML; MG/100ML
INJECTION, SOLUTION INTRAVENOUS CONTINUOUS PRN
Status: DISCONTINUED | OUTPATIENT
Start: 2023-01-04 | End: 2023-01-04

## 2023-01-04 RX ORDER — ACETAMINOPHEN 325 MG/1
975 TABLET ORAL ONCE
Status: COMPLETED | OUTPATIENT
Start: 2023-01-04 | End: 2023-01-04

## 2023-01-04 RX ORDER — HYDROMORPHONE HYDROCHLORIDE 1 MG/ML
0.4 INJECTION, SOLUTION INTRAMUSCULAR; INTRAVENOUS; SUBCUTANEOUS
Status: DISCONTINUED | OUTPATIENT
Start: 2023-01-04 | End: 2023-01-05 | Stop reason: HOSPADM

## 2023-01-04 RX ORDER — NALOXONE HYDROCHLORIDE 0.4 MG/ML
0.2 INJECTION, SOLUTION INTRAMUSCULAR; INTRAVENOUS; SUBCUTANEOUS
Status: DISCONTINUED | OUTPATIENT
Start: 2023-01-04 | End: 2023-01-05 | Stop reason: HOSPADM

## 2023-01-04 RX ORDER — SODIUM CHLORIDE, SODIUM LACTATE, POTASSIUM CHLORIDE, CALCIUM CHLORIDE 600; 310; 30; 20 MG/100ML; MG/100ML; MG/100ML; MG/100ML
INJECTION, SOLUTION INTRAVENOUS CONTINUOUS
Status: DISCONTINUED | OUTPATIENT
Start: 2023-01-04 | End: 2023-01-04 | Stop reason: HOSPADM

## 2023-01-04 RX ORDER — SPIRONOLACTONE 25 MG/1
25 TABLET ORAL DAILY
Status: DISCONTINUED | OUTPATIENT
Start: 2023-01-05 | End: 2023-01-05 | Stop reason: HOSPADM

## 2023-01-04 RX ORDER — ATORVASTATIN CALCIUM 40 MG/1
40 TABLET, FILM COATED ORAL AT BEDTIME
Status: DISCONTINUED | OUTPATIENT
Start: 2023-01-04 | End: 2023-01-05 | Stop reason: HOSPADM

## 2023-01-04 RX ORDER — LIDOCAINE HYDROCHLORIDE 20 MG/ML
INJECTION, SOLUTION INFILTRATION; PERINEURAL PRN
Status: DISCONTINUED | OUTPATIENT
Start: 2023-01-04 | End: 2023-01-04

## 2023-01-04 RX ORDER — CEFAZOLIN SODIUM/WATER 2 G/20 ML
2 SYRINGE (ML) INTRAVENOUS
Status: COMPLETED | OUTPATIENT
Start: 2023-01-04 | End: 2023-01-04

## 2023-01-04 RX ORDER — ONDANSETRON 2 MG/ML
4 INJECTION INTRAMUSCULAR; INTRAVENOUS EVERY 30 MIN PRN
Status: DISCONTINUED | OUTPATIENT
Start: 2023-01-04 | End: 2023-01-04 | Stop reason: HOSPADM

## 2023-01-04 RX ORDER — CEFAZOLIN SODIUM 1 G/3ML
1 INJECTION, POWDER, FOR SOLUTION INTRAMUSCULAR; INTRAVENOUS EVERY 8 HOURS
Status: COMPLETED | OUTPATIENT
Start: 2023-01-04 | End: 2023-01-05

## 2023-01-04 RX ORDER — ONDANSETRON 4 MG/1
4 TABLET, ORALLY DISINTEGRATING ORAL EVERY 6 HOURS PRN
Status: DISCONTINUED | OUTPATIENT
Start: 2023-01-04 | End: 2023-01-05 | Stop reason: HOSPADM

## 2023-01-04 RX ORDER — HYDROMORPHONE HYDROCHLORIDE 1 MG/ML
0.2 INJECTION, SOLUTION INTRAMUSCULAR; INTRAVENOUS; SUBCUTANEOUS
Status: DISCONTINUED | OUTPATIENT
Start: 2023-01-04 | End: 2023-01-05 | Stop reason: HOSPADM

## 2023-01-04 RX ORDER — BISACODYL 10 MG
10 SUPPOSITORY, RECTAL RECTAL DAILY PRN
Status: DISCONTINUED | OUTPATIENT
Start: 2023-01-04 | End: 2023-01-05 | Stop reason: HOSPADM

## 2023-01-04 RX ORDER — ACETAMINOPHEN 325 MG/1
650 TABLET ORAL EVERY 4 HOURS PRN
Status: DISCONTINUED | OUTPATIENT
Start: 2023-01-07 | End: 2023-01-05 | Stop reason: HOSPADM

## 2023-01-04 RX ORDER — POLYETHYLENE GLYCOL 3350 17 G/17G
17 POWDER, FOR SOLUTION ORAL DAILY
Status: DISCONTINUED | OUTPATIENT
Start: 2023-01-05 | End: 2023-01-05 | Stop reason: HOSPADM

## 2023-01-04 RX ORDER — DEXAMETHASONE SODIUM PHOSPHATE 4 MG/ML
INJECTION, SOLUTION INTRA-ARTICULAR; INTRALESIONAL; INTRAMUSCULAR; INTRAVENOUS; SOFT TISSUE PRN
Status: DISCONTINUED | OUTPATIENT
Start: 2023-01-04 | End: 2023-01-04

## 2023-01-04 RX ORDER — ACETAMINOPHEN 325 MG/1
975 TABLET ORAL EVERY 8 HOURS
Status: DISCONTINUED | OUTPATIENT
Start: 2023-01-04 | End: 2023-01-05 | Stop reason: HOSPADM

## 2023-01-04 RX ORDER — HYDROMORPHONE HYDROCHLORIDE 1 MG/ML
0.2 INJECTION, SOLUTION INTRAMUSCULAR; INTRAVENOUS; SUBCUTANEOUS EVERY 5 MIN PRN
Status: DISCONTINUED | OUTPATIENT
Start: 2023-01-04 | End: 2023-01-04 | Stop reason: HOSPADM

## 2023-01-04 RX ORDER — MAGNESIUM HYDROXIDE 1200 MG/15ML
LIQUID ORAL PRN
Status: DISCONTINUED | OUTPATIENT
Start: 2023-01-04 | End: 2023-01-04 | Stop reason: HOSPADM

## 2023-01-04 RX ORDER — NALOXONE HYDROCHLORIDE 0.4 MG/ML
0.4 INJECTION, SOLUTION INTRAMUSCULAR; INTRAVENOUS; SUBCUTANEOUS
Status: DISCONTINUED | OUTPATIENT
Start: 2023-01-04 | End: 2023-01-05 | Stop reason: HOSPADM

## 2023-01-04 RX ORDER — SODIUM CHLORIDE, SODIUM LACTATE, POTASSIUM CHLORIDE, CALCIUM CHLORIDE 600; 310; 30; 20 MG/100ML; MG/100ML; MG/100ML; MG/100ML
INJECTION, SOLUTION INTRAVENOUS CONTINUOUS
Status: DISCONTINUED | OUTPATIENT
Start: 2023-01-04 | End: 2023-01-05 | Stop reason: HOSPADM

## 2023-01-04 RX ORDER — OXYCODONE HYDROCHLORIDE 5 MG/1
5 TABLET ORAL EVERY 4 HOURS PRN
Status: DISCONTINUED | OUTPATIENT
Start: 2023-01-04 | End: 2023-01-05 | Stop reason: HOSPADM

## 2023-01-04 RX ORDER — FENTANYL CITRATE 50 UG/ML
25 INJECTION, SOLUTION INTRAMUSCULAR; INTRAVENOUS EVERY 5 MIN PRN
Status: DISCONTINUED | OUTPATIENT
Start: 2023-01-04 | End: 2023-01-04 | Stop reason: HOSPADM

## 2023-01-04 RX ORDER — TRANEXAMIC ACID 650 MG/1
1950 TABLET ORAL ONCE
Status: COMPLETED | OUTPATIENT
Start: 2023-01-04 | End: 2023-01-04

## 2023-01-04 RX ORDER — ONDANSETRON 2 MG/ML
4 INJECTION INTRAMUSCULAR; INTRAVENOUS EVERY 6 HOURS PRN
Status: DISCONTINUED | OUTPATIENT
Start: 2023-01-04 | End: 2023-01-05 | Stop reason: HOSPADM

## 2023-01-04 RX ORDER — CLOPIDOGREL BISULFATE 75 MG/1
75 TABLET ORAL DAILY
Status: DISCONTINUED | OUTPATIENT
Start: 2023-01-04 | End: 2023-01-05 | Stop reason: HOSPADM

## 2023-01-04 RX ORDER — LIDOCAINE 40 MG/G
CREAM TOPICAL
Status: DISCONTINUED | OUTPATIENT
Start: 2023-01-04 | End: 2023-01-05 | Stop reason: HOSPADM

## 2023-01-04 RX ORDER — AMOXICILLIN 250 MG
1 CAPSULE ORAL 2 TIMES DAILY
Status: DISCONTINUED | OUTPATIENT
Start: 2023-01-04 | End: 2023-01-05 | Stop reason: HOSPADM

## 2023-01-04 RX ORDER — ONDANSETRON 4 MG/1
4 TABLET, ORALLY DISINTEGRATING ORAL EVERY 30 MIN PRN
Status: DISCONTINUED | OUTPATIENT
Start: 2023-01-04 | End: 2023-01-04 | Stop reason: HOSPADM

## 2023-01-04 RX ORDER — ASPIRIN 81 MG/1
162 TABLET ORAL DAILY
Status: DISCONTINUED | OUTPATIENT
Start: 2023-01-05 | End: 2023-01-05 | Stop reason: HOSPADM

## 2023-01-04 RX ORDER — PROPOFOL 10 MG/ML
INJECTION, EMULSION INTRAVENOUS PRN
Status: DISCONTINUED | OUTPATIENT
Start: 2023-01-04 | End: 2023-01-04

## 2023-01-04 RX ORDER — FENTANYL CITRATE 50 UG/ML
50 INJECTION, SOLUTION INTRAMUSCULAR; INTRAVENOUS EVERY 5 MIN PRN
Status: DISCONTINUED | OUTPATIENT
Start: 2023-01-04 | End: 2023-01-04 | Stop reason: HOSPADM

## 2023-01-04 RX ORDER — CARVEDILOL 6.25 MG/1
12.5 TABLET ORAL 2 TIMES DAILY WITH MEALS
Status: DISCONTINUED | OUTPATIENT
Start: 2023-01-04 | End: 2023-01-05 | Stop reason: HOSPADM

## 2023-01-04 RX ORDER — PHENYLEPHRINE HCL IN 0.9% NACL 50MG/250ML
.5-1.25 PLASTIC BAG, INJECTION (ML) INTRAVENOUS CONTINUOUS
Status: DISCONTINUED | OUTPATIENT
Start: 2023-01-04 | End: 2023-01-04 | Stop reason: CLARIF

## 2023-01-04 RX ORDER — METHOCARBAMOL 500 MG/1
500 TABLET, FILM COATED ORAL EVERY 6 HOURS PRN
Status: DISCONTINUED | OUTPATIENT
Start: 2023-01-04 | End: 2023-01-05 | Stop reason: HOSPADM

## 2023-01-04 RX ORDER — NITROGLYCERIN 0.4 MG/1
0.4 TABLET SUBLINGUAL EVERY 5 MIN PRN
Status: DISCONTINUED | OUTPATIENT
Start: 2023-01-04 | End: 2023-01-04

## 2023-01-04 RX ORDER — ONDANSETRON 2 MG/ML
INJECTION INTRAMUSCULAR; INTRAVENOUS PRN
Status: DISCONTINUED | OUTPATIENT
Start: 2023-01-04 | End: 2023-01-04

## 2023-01-04 RX ORDER — HYDROMORPHONE HYDROCHLORIDE 1 MG/ML
0.4 INJECTION, SOLUTION INTRAMUSCULAR; INTRAVENOUS; SUBCUTANEOUS EVERY 5 MIN PRN
Status: DISCONTINUED | OUTPATIENT
Start: 2023-01-04 | End: 2023-01-04 | Stop reason: HOSPADM

## 2023-01-04 RX ORDER — PROCHLORPERAZINE MALEATE 5 MG
5 TABLET ORAL EVERY 6 HOURS PRN
Status: DISCONTINUED | OUTPATIENT
Start: 2023-01-04 | End: 2023-01-05 | Stop reason: HOSPADM

## 2023-01-04 RX ADMIN — PHENYLEPHRINE HYDROCHLORIDE 0.4 MCG/KG/MIN: 10 INJECTION INTRAVENOUS at 14:09

## 2023-01-04 RX ADMIN — HYDROMORPHONE HYDROCHLORIDE 0.25 MG: 1 INJECTION, SOLUTION INTRAMUSCULAR; INTRAVENOUS; SUBCUTANEOUS at 14:46

## 2023-01-04 RX ADMIN — ACETAMINOPHEN 975 MG: 325 TABLET, FILM COATED ORAL at 13:27

## 2023-01-04 RX ADMIN — SODIUM CHLORIDE, POTASSIUM CHLORIDE, SODIUM LACTATE AND CALCIUM CHLORIDE: 600; 310; 30; 20 INJECTION, SOLUTION INTRAVENOUS at 13:51

## 2023-01-04 RX ADMIN — Medication 10 MG: at 14:31

## 2023-01-04 RX ADMIN — LIDOCAINE HYDROCHLORIDE 100 MG: 20 INJECTION, SOLUTION INFILTRATION; PERINEURAL at 14:03

## 2023-01-04 RX ADMIN — HYDROMORPHONE HYDROCHLORIDE 0.25 MG: 1 INJECTION, SOLUTION INTRAMUSCULAR; INTRAVENOUS; SUBCUTANEOUS at 15:57

## 2023-01-04 RX ADMIN — NOREPINEPHRINE BITARTRATE 6.4 MCG: 1 INJECTION, SOLUTION, CONCENTRATE INTRAVENOUS at 14:32

## 2023-01-04 RX ADMIN — ACETAMINOPHEN 975 MG: 325 TABLET, FILM COATED ORAL at 21:41

## 2023-01-04 RX ADMIN — CEFAZOLIN 1 G: 1 INJECTION, POWDER, FOR SOLUTION INTRAMUSCULAR; INTRAVENOUS at 21:51

## 2023-01-04 RX ADMIN — SODIUM CHLORIDE, POTASSIUM CHLORIDE, SODIUM LACTATE AND CALCIUM CHLORIDE: 600; 310; 30; 20 INJECTION, SOLUTION INTRAVENOUS at 19:04

## 2023-01-04 RX ADMIN — NOREPINEPHRINE BITARTRATE 3.2 MCG: 1 INJECTION, SOLUTION, CONCENTRATE INTRAVENOUS at 15:24

## 2023-01-04 RX ADMIN — ONDANSETRON 4 MG: 2 INJECTION INTRAMUSCULAR; INTRAVENOUS at 15:19

## 2023-01-04 RX ADMIN — PHENYLEPHRINE HYDROCHLORIDE 50 MCG: 10 INJECTION INTRAVENOUS at 15:46

## 2023-01-04 RX ADMIN — PHENYLEPHRINE HYDROCHLORIDE 200 MCG: 10 INJECTION INTRAVENOUS at 14:07

## 2023-01-04 RX ADMIN — TRANEXAMIC ACID 1950 MG: 650 TABLET ORAL at 13:28

## 2023-01-04 RX ADMIN — PROPOFOL 200 MG: 10 INJECTION, EMULSION INTRAVENOUS at 14:03

## 2023-01-04 RX ADMIN — ATORVASTATIN CALCIUM 40 MG: 40 TABLET, FILM COATED ORAL at 21:41

## 2023-01-04 RX ADMIN — Medication 10 MG: at 15:34

## 2023-01-04 RX ADMIN — Medication 2 G: at 13:51

## 2023-01-04 RX ADMIN — FENTANYL CITRATE 100 MCG: 50 INJECTION, SOLUTION INTRAMUSCULAR; INTRAVENOUS at 14:03

## 2023-01-04 RX ADMIN — PHENYLEPHRINE HYDROCHLORIDE 50 MCG: 10 INJECTION INTRAVENOUS at 15:50

## 2023-01-04 RX ADMIN — NOREPINEPHRINE BITARTRATE 1.6 MCG: 1 INJECTION, SOLUTION, CONCENTRATE INTRAVENOUS at 15:42

## 2023-01-04 RX ADMIN — DEXAMETHASONE SODIUM PHOSPHATE 4 MG: 4 INJECTION, SOLUTION INTRA-ARTICULAR; INTRALESIONAL; INTRAMUSCULAR; INTRAVENOUS; SOFT TISSUE at 15:14

## 2023-01-04 RX ADMIN — PHENYLEPHRINE HYDROCHLORIDE 50 MCG: 10 INJECTION INTRAVENOUS at 15:42

## 2023-01-04 RX ADMIN — NOREPINEPHRINE BITARTRATE 1.6 MCG: 1 INJECTION, SOLUTION, CONCENTRATE INTRAVENOUS at 15:46

## 2023-01-04 RX ADMIN — NOREPINEPHRINE BITARTRATE 6.4 MCG: 1 INJECTION, SOLUTION, CONCENTRATE INTRAVENOUS at 14:20

## 2023-01-04 RX ADMIN — SODIUM CHLORIDE, POTASSIUM CHLORIDE, SODIUM LACTATE AND CALCIUM CHLORIDE: 600; 310; 30; 20 INJECTION, SOLUTION INTRAVENOUS at 16:17

## 2023-01-04 RX ADMIN — MIDAZOLAM 2 MG: 1 INJECTION INTRAMUSCULAR; INTRAVENOUS at 13:55

## 2023-01-04 RX ADMIN — SENNOSIDES AND DOCUSATE SODIUM 1 TABLET: 50; 8.6 TABLET ORAL at 21:50

## 2023-01-04 RX ADMIN — SUGAMMADEX 200 MG: 100 INJECTION, SOLUTION INTRAVENOUS at 16:11

## 2023-01-04 RX ADMIN — Medication 40 MG: at 14:03

## 2023-01-04 ASSESSMENT — ACTIVITIES OF DAILY LIVING (ADL)
ADLS_ACUITY_SCORE: 24
ADLS_ACUITY_SCORE: 22
ADLS_ACUITY_SCORE: 24
ADLS_ACUITY_SCORE: 22
ADLS_ACUITY_SCORE: 20
ADLS_ACUITY_SCORE: 24
ADLS_ACUITY_SCORE: 24

## 2023-01-04 NOTE — ANESTHESIA CARE TRANSFER NOTE
Patient: Iraida Hernandez    Procedure: Procedure(s):  ARTHROPLASTY, RIGHT HIP, TOTAL       Diagnosis: Primary osteoarthritis of right hip [M16.11]  Diagnosis Additional Information: No value filed.    Anesthesia Type:   General     Note:    Oropharynx: oropharynx clear of all foreign objects and spontaneously breathing  Level of Consciousness: awake and drowsy  Oxygen Supplementation: face mask  Level of Supplemental Oxygen (L/min / FiO2): 8  Independent Airway: airway patency satisfactory and stable  Dentition: dentition unchanged  Vital Signs Stable: post-procedure vital signs reviewed and stable  Report to RN Given: handoff report given  Patient transferred to: PACU    Handoff Report: Identifed the Patient, Identified the Reponsible Provider, Reviewed the pertinent medical history, Discussed the surgical course, Reviewed Intra-OP anesthesia mangement and issues during anesthesia, Set expectations for post-procedure period and Allowed opportunity for questions and acknowledgement of understanding      Vitals:  Vitals Value Taken Time   BP 98/62 01/04/23 1625   Temp 36.6 C    Pulse 70 01/04/23 1629   Resp 8 01/04/23 1629   SpO2 99 % 01/04/23 1629   Vitals shown include unvalidated device data.    Electronically Signed By: CARA Burgess CRNA  January 4, 2023  4:30 PM

## 2023-01-04 NOTE — ANESTHESIA PREPROCEDURE EVALUATION
Anesthesia Pre-Procedure Evaluation    Patient: Iraida Hernandez   MRN: [id number] : 1955        Procedure : Procedure(s):  ARTHROPLASTY, RIGHT HIP, TOTAL            68yo for hip replacement considered moderate risk with h/o significant CADz, s/p arrest event, emergent cath lab stent placement  and placement of ICD () .   Denies any current symptomatology. BMI is WNL. Activity is currently limited due to hip pain. Also hx of prostrate cancer. Echo details below function at low normal level with EF of about 30%.  Mets of exercise limited per pain according to pt. Social alcohol use. No signif resp history but is a tobacco smoker daily 1/3 ppd. No angina/can lay flat in bed/denies significant MARY KAY issues.  Past Medical History:   Diagnosis Date     Antiplatelet or antithrombotic long-term use      Arthritis Age related     Calculus of kidney      Cancer (H)     basel cell on nose     Cardiac arrest (H)     V-fib, 2014     Cardiomyopathy (H)      Coronary artery disease      Hyperlipidaemia      Hyperlipidemia with target LDL less than 70 10/31/2010     Diagnosis updated by automated process. Provider to review and confirm.     ICD (implantable cardiac defibrillator) in place     2014 EF 29%     Myocardial infarction (H) 2014     Anterior infarct     Pacemaker      Prostate cancer (H)      Stented coronary artery      Tobacco dependence       Past Surgical History:   Procedure Laterality Date     ABDOMEN SURGERY  15 years ago    Hearnia     ARTHROPLASTY HIP Left 2022    Procedure: Left total hip arthroplasty;  Surgeon: Ok Jolly MD;  Location: [facility]     BIOPSY  2019    Prostate     CARDIAC SURGERY       COLONOSCOPY       HEART CATH, ANGIOPLASTY  2014    Emergent cath,thrombectomy-pt had cardiac arrest-severe 3 vessel CAD-MACY proximal LAD, LAD diagonal kissing balloon, and stent to proximal RCA     HERNIA REPAIR       NO HISTORY OF SURGERY       PROSTATE SURGERY   03/29/2019      Allergies   Allergen Reactions     Penicillins      Violent shaking of entire body. Reaction was 50 years ago      Tetracyclines Hives      Social History     Tobacco Use     Smoking status: Light Smoker     Packs/day: 0.70     Years: 48.00     Pack years: 33.60     Types: Cigarettes     Start date: 1/1/1975     Smokeless tobacco: Never     Tobacco comments:     3-4 cigs per day 1/2/22   Substance Use Topics     Alcohol use: Yes     Comment: 1-3 drinks per week      Wt Readings from Last 1 Encounters:   01/04/23 69.2 kg (152 lb 8.9 oz)        Anesthesia Evaluation   Pt has had prior anesthetic.         ROS/MED HX  ENT/Pulmonary:    (-) MARY KAY risk factors   Neurologic:  - neg neurologic ROS     Cardiovascular:     (+) --CAD -past MI -stent-2014. Taking blood thinners pacemaker, Reason placed: v fib arrest. - Patientt is not dependent on pacemaker. ICD     METS/Exercise Tolerance:     Hematologic:       Musculoskeletal:       GI/Hepatic:  - neg GI/hepatic ROS     Renal/Genitourinary:       Endo:  - neg endo ROS     Psychiatric/Substance Use:  - neg psychiatric ROS     Infectious Disease:       Malignancy:       Other:            Physical Exam    Airway        Mallampati: II   TM distance: > 3 FB   Neck ROM: full   Mouth opening: > 3 cm    Respiratory Devices and Support         Dental  no notable dental history         Cardiovascular   cardiovascular exam normal          Pulmonary   pulmonary exam normal                OUTSIDE LABS:  CBC:   Lab Results   Component Value Date    WBC 9.1 12/19/2022    WBC 9.0 08/31/2022    HGB 14.3 12/19/2022    HGB 11.6 (L) 09/23/2022    HCT 42.3 12/19/2022    HCT 42.1 08/31/2022     12/19/2022     08/31/2022     BMP:   Lab Results   Component Value Date     12/19/2022     12/05/2022    POTASSIUM 4.8 12/19/2022    POTASSIUM 4.7 12/05/2022    CHLORIDE 105 12/19/2022    CHLORIDE 103 12/05/2022    CO2 24 12/19/2022    CO2 25 12/05/2022    BUN 16.5  12/19/2022    BUN 15.3 12/05/2022    CR 0.93 12/19/2022    CR 0.93 12/05/2022     (H) 01/04/2023    GLC 97 12/19/2022     COAGS:   Lab Results   Component Value Date    INR 0.90 12/01/2014     POC: No results found for: BGM, HCG, HCGS  HEPATIC:   Lab Results   Component Value Date    ALBUMIN 3.4 10/25/2021    PROTTOTAL 7.3 10/25/2021    ALT 36 10/25/2021    AST 19 10/25/2021    ALKPHOS 84 10/25/2021    BILITOTAL 0.7 10/25/2021     OTHER:   Lab Results   Component Value Date    A1C 5.5 10/25/2021    DIEGO 9.3 12/19/2022    MAG 2.2 09/21/2022    TSH 1.62 12/10/2007       Anesthesia Plan    ASA Status:  3   NPO Status:  NPO Appropriate    Anesthesia Type: General.     - Airway: ETT   Induction: Intravenous.   Maintenance: Balanced.        Consents    Anesthesia Plan(s) and associated risks, benefits, and realistic alternatives discussed. Questions answered and patient/representative(s) expressed understanding.     - Discussed: Risks, Benefits and Alternatives for BOTH SEDATION and the PROCEDURE were discussed     - Discussed with:  Patient      - Extended Intubation/Ventilatory Support Discussed: No.      - Patient is DNR/DNI Status: No    Use of blood products discussed: No .     Postoperative Care    Pain management: IV analgesics, Oral pain medications.   PONV prophylaxis: Ondansetron (or other 5HT-3), Dexamethasone or Solumedrol     Comments:    Other Comments: GETA with full monitoring ;                 Leonardo Winston MD

## 2023-01-04 NOTE — ANESTHESIA POSTPROCEDURE EVALUATION
Patient: Iraida Hernandez    Procedure: Procedure(s):  ARTHROPLASTY, RIGHT HIP, TOTAL       Anesthesia Type:  General    Note:  Disposition: Admission   Postop Pain Control: Uneventful            Sign Out: Well controlled pain   PONV: No   Neuro/Psych: Uneventful            Sign Out: Acceptable/Baseline neuro status   Airway/Respiratory: Uneventful            Sign Out: Acceptable/Baseline resp. status   CV/Hemodynamics: Uneventful            Sign Out: Acceptable CV status; No obvious hypovolemia; No obvious fluid overload   Other NRE: NONE   DID A NON-ROUTINE EVENT OCCUR? No           Last vitals:  Vitals Value Taken Time   BP 91/59 01/04/23 1746   Temp 36.7  C (98  F) 01/04/23 1700   Pulse 68 01/04/23 1753   Resp 8 01/04/23 1753   SpO2 99 % 01/04/23 1753   Vitals shown include unvalidated device data.    Electronically Signed By: Thalia Ford MD  January 4, 2023  5:55 PM

## 2023-01-04 NOTE — BRIEF OP NOTE
[facility]    Brief Operative Note    Pre-operative diagnosis: Primary osteoarthritis of right hip [M16.11]  Post-operative diagnosis Same as pre-operative diagnosis    Procedure: Procedure(s):  ARTHROPLASTY, RIGHT HIP, TOTAL  Surgeon: Surgeon(s) and Role:     * Ok Jolly MD - Primary     * Bryan Acosta MD - Resident - Assisting     * Hedy Sinclair MD - Fellow - Assisting  Anesthesia: General   Estimated Blood Loss: 250 mL    Drains: None  Specimens: * No specimens in log *  Findings:   See full operative report.  Complications: None.  Implants:   Implant Name Type Inv. Item Serial No.  Lot No. LRB No. Used Action   IMP SHELL SNR ACET R3 3H 54MM [id number] - [id number] Total Joint Component/Insert IMP SHELL SNR ACET R3 3H 54MM [id number]  [employer] [id number] Right 1 Implanted   IMP LINER SNR ACET R3 XLPE 0DEG [id number] - [id number] Total Joint Component/Insert IMP LINER SNR ACET R3 XLPE 0DEG [id number]  [employer] [id number] Right 1 Implanted   IMP SCR ACET SNN SPHERICAL HEAD 6.5X40MM [id number] - [id number] Metallic Hardware/[place] IMP SCR ACET SNN SPHERICAL HEAD 6.5X40MM [id number]  [employer]R [id number] Right 1 Implanted   IMP SCR ACET SNN SPHERICAL HEAD 6.5X25MM [id number] - [id number] Metallic Hardware/[place] IMP SCR ACET SNN SPHERICAL HEAD 6.5X25MM [id number]  [employer] [id number] Right 1 Implanted   IMP STEM FEM ANTHOLOGY HA POR + 7 HI OFST HIP STRL [id number] - [id number] Total Joint Component/Insert IMP STEM FEM ANTHOLOGY HA POR + 7 HI OFST HIP STRL [id number]  [employer] [id number] Right 1 Implanted   IMP HEAD FEMORAL SNN BIPOLAR [place] 36MM +8 [id number] - [id number] Total Joint Component/Insert IMP HEAD FEMORAL SNN BIPOLAR [place] 36MM +8 [id number]  [employer] [id number] Right 1 Implanted     Iraida Hernandez is a 66yo M with [facility] CAD, h/o prostate CA, HLD, R hip OA now s/p R BART on  1/4/2023 with Dr. Jolly    Activity: Up with assist until independent  Weight bearing status: WBAT RLE with posterior hip precautions x3 months.  Antibiotics: Ancef x24 hours perioperatively.  Diet: Begin with clear fluids and progress diet as tolerated.  DVT prophylaxis: ASA 162mg every day x4 weeks to begin POD1 and mechanical while in the hospital.  Bracing/Splinting: Abduction pillow while in bed  Wound Care: Exofin glue, steri-strips, silver alginate, tegaderm  Pain management: transition from IV to orals as tolerated.   X-rays: AP pelvis, cross table lateral right hip in PACU.  Physical Therapy:  ROM, ADL's.  Occupational Therapy: ADL's.  Labs: Trend Hgb.  Consults: PT, OT. Medicine, appreciate assistance in caring for this patient.  Follow-up: Clinic with Ofelia Hendricks PA-C on 1/18/2023     Disposition: Pending progress with therapies, pain control on orals, and medical stability, anticipate discharge to home on POD #1-2.     Bryan Acosta MD  Orthopaedic Surgery PGY-4  [phone]     Please page me at [phone] with any questions/concerns. If there is no response, if it is a weekend, or if it is during evening hours then please page the orthopaedic surgery resident on call.

## 2023-01-04 NOTE — OP NOTE
OPERATIVE REPORT    DATE OF SERVICE: 1/4/23    SURGEON: Ok Jolly MD.    ASSISTANT(S):  Bryan Acosta MD and Hedy Sinclair MD    PREOPERATIVE DIAGNOSIS:  Osteoarthritis    POSTOPERATIVE DIAGNOSIS:  Osteoarthritis    OPERATION PERFORMED:  Right  total hip arthroplasty    IMPLANTS:    Implant Name Type Inv. Item Serial No.  Lot No. LRB No. Used Action   IMP SHELL SNR ACET R3 3H 54MM [id number] - [id number] Total Joint Component/Insert IMP SHELL SNR ACET R3 3H 54MM [id number]  [employer] [id number] Right 1 Implanted   IMP LINER SNR ACET R3 XLPE 0DEG 20W74MM [id number] - [id number] Total Joint Component/Insert IMP LINER SNR ACET R3 XLPE 0DEG [id number]  [employer] [id number] Right 1 Implanted   IMP SCR ACET SNN SPHERICAL HEAD 6.5X40MM [id number] - [id number] Metallic Hardware/[place] IMP SCR ACET SNN SPHERICAL HEAD 6.5X40MM [id number]  [employer] [id number] Right 1 Implanted   IMP SCR ACET SNN SPHERICAL HEAD 6.5X25MM [id number] - [id number] Metallic Hardware/[place] IMP SCR ACET SNN SPHERICAL HEAD 6.5X25MM [id number]  [employer] [id number] Right 1 Implanted   IMP STEM FEM ANTHOLOGY HA POR + 7 HI OFST HIP STRL [id number] - [id number] Total Joint Component/Insert IMP STEM FEM ANTHOLOGY HA POR + 7 HI OFST HIP STRL [id number]  [employer] [id number] Right 1 Implanted   IMP HEAD FEMORAL SNN BIPOLAR [place] 36MM +8 [id number] - [id number] Total Joint Component/Insert IMP HEAD FEMORAL SNN BIPOLAR [place] 36MM +8 [id number]  [employer] [id number] Right 1 Implanted       ANESTHETIC: general     OPERATIVE FINDINGS:  End stage arthrosis of the hip    BLOOD LOSS: about 250 ml     COMPLICATIONS:  None apparent    OPERATIVE INDICATIONS:  The patient has a long history of debilitating pain secondary to ostearthritis of the hip.  Despite comprehensive non-operative management these symptoms continued to interfere with activities of daily living.  After discussion of further treatment  options including the risks and benefits that patient elected to proceed with a total hip.    DESCRIPTION OF THE PROCEDURE:  The patient was identified in the preoperative holding area.  The consent form including the risks and benefits were reviewed with the patient.  The operative limb was identified and marked.  The patient was brought back to the operating room and placed supine on the operating table.  An anesthetic was induced by the anesthesia team.   The patient was placed in the lateral decubitus position and prepped and draped in the normal standard fashion for a hip replacement.  A time-out was called.  Antibiotics were given.  We utilized an approximately 15 cm curvilinear incision, centered on the vastus ridge, and performed a standard posterior approach to the hip.  The tensor fascia was split.  A small portion of gluteus trinity was split in line with its fibers.  The sciatic nerve was palpated.  The east-west retractor was placed.  The posterior border of gluteus medius was exposed and retracted.  The tendon of piriformis and that of the obturators was released from their attachments.  A trapdoor posterior capsulotomy was performed.  The hip was dislocated.  The lesser trochanter was exposed.  A ruler was used to measure and electrocautery was used to alberto our neck cut as preoperatively templated.  The head was measured with a caliper and found to be 51 mm.  This measurement was used to choose our first reamer.  The neck cut was re-measured. The femur was elevated.  A Hohmann was placed over the anterior rim of the acetabulum and the femur was subluxed anterior.  A split was made in the inferior capsule.  The transverse acetabular ligament was left intact and used a guide for the anterversion of the acetabular component.  Circumferential retractors were placed.  We began reaming and went up by two until sufficient contact was made with the acetabular rim.  We then went up by one millimeter for a one  "millimeter press-fit.  We were within one size of our preoperative plan.   A trail was placed.  It had an excellent press fit.  We then placed out final component in 40 degrees of inclination and approximately 20 degrees of anteversion, parallel to the transverse ligament.  The press fit was excellent.  Screws were placed for additional initial fixation.  A flat liner was then placed. It locked into place.  Attention was turned to the femur.  Retractors were placed to elevated the proximal femur and to protect the tendon of gluteus medius.  Remaining lateral neck was removed and the piriformis fossa was cleared of soft-tissue.  A box osteotome and canal finder were used to prepare for broaching.  A sharp broach was used to lateralize slightly.  We then broached up sequentially to a size 7.  It was rotationally stable and sat up 1-2 millimeters from the neck-cut.  Preoperatively the patient had templated to a high offset stem.  The high offset stem appropriately tensioned the abductors.  We trialed the following femoral heads: +4 and +8.  The +8 appropriately tensioned the abductors and clinically equalized the leg-lengths.  The stability exam was excellent.  The hip was stable and there was no impingement posteriorly with hyper-extension and maximal external rotation.  With full extension, the knee could be fixed to bring the foot nearly to the buttock.  With the hip in ninety degrees of flexion and neutral rotation there was greater than 60 degrees of internal rotation before subluxation.  There appropriate movement with a \"nick\" test.  Happy with our stability exam, the final implant was placed in approximately twenty degrees of anteversion.  It sat within 1 mm of the broach.  We then trailed with a +8 head.  The stability exam was identical.  We then placed the final head on a clean, dry neck and impacted it into place. The hip was reduced after directly visualizing the entire acetabulum.  The wound was then " irrigated.  The posterior capsule and short external rotators were sutured to the greater trochanter with non-absorbable suture through bone tunnels.The fascia was closed with interrupted Vicryl, the dermis with interrupted Vicryl, and skin with running monocryl, Dermabond and steri-strips.  At the end of the procedure the sponge and needle counts were correct times two.  The patient tolerated the procedure well and returned to the PAR extubated and stable.    POSTOPERATIVE PLAN:  1. Weight bearing as tolerated  2. Standard posterior hip precautions  3. DVT prophylaxis   4. 24 hours of prophylactic antibiotics  5. Follow-up:  Wound clinic in 2 weeks and with Rik in clinic in 6 weeks for x-rays and a rehabilitation check.

## 2023-01-04 NOTE — ANESTHESIA PROCEDURE NOTES
Airway       Patient location during procedure: OR       Procedure Start/Stop Times: 1/4/2023 2:05 PM  Staff -        CRNA: Ping Ann APRN CRNA       Performed By: CRNA  Consent for Airway        Urgency: elective  Indications and Patient Condition       Indications for airway management: mary-procedural       Induction type:intravenous       Mask difficulty assessment: 1 - vent by mask    Final Airway Details       Final airway type: endotracheal airway       Successful airway: ETT - single  Endotracheal Airway Details        ETT size (mm): 7.5       Cuffed: yes       Successful intubation technique: video laryngoscopy       VL Blade Size: Glidescope 4       Grade View of Cords: 1       Adjucts: stylet       Position: Right       Measured from: gums/teeth       Secured at (cm): 22       Bite block used: None    Post intubation assessment        Placement verified by: capnometry, equal breath sounds and chest rise        Number of attempts at approach: 1       Number of other approaches attempted: 0       Secured with: pink tape       Ease of procedure: easy       Dentition: Intact    Medication(s) Administered   Medication Administration Time: 1/4/2023 2:05 PM

## 2023-01-04 NOTE — CONSULTS
[facility]  Consult Note - Hospitalist Service  Date of Admission:  1/4/2023  Consult Requested by: orthopedics  Reason for Consult: medical co-management    Assessment & Plan   Iraida Hernandez is a 67 year old male with PMH CAD, hx cardiac arrest s/p emergent cath (2014), cardiomyopathy, ICD in place (2014), prostate Ca on active surveillance (2019), HLD, Right hip OA admitted on 1/4/2023 status post right total hip arthroplasty.     status post right total hip arthroplasty (1/4/23)  Primary OA of the hip  Surgery with Dr. Jolly. EBL 250cc.  GA.  - cares per primary surgery team:   -Activity: Up with assist until independent   -Weight bearing status: WBAT RLE with posterior hip precautions x3 months.   -Antibiotics: Ancef x24 hours perioperatively.   -Diet: Begin with clear fluids and progress diet as tolerated.   -DVT prophylaxis: ASA 162mg every day x4 weeks to begin POD1 and mechanical while in the hospital.   -Bracing/Splinting: Abduction pillow while in bed   -Wound Care: Exofin glue, steri-strips, silver alginate, tegaderm   - Pain management: transition from IV to orals as tolerated.     - APAP 875mg q8h and then 650mg q4h prn    - robaxin 500mg q6h prn    - oxycodone 5-10mg q4h prn    - dilaudid 0.2-0.4mg IV q2h prn   -X-rays: AP pelvis, cross table lateral right hip in PACU.   -PT/OT   - Labs: Trend Hgb.   -Follow-up: Clinic with Ofelia Hendricks PA-C on 1/18/2023   - Disposition: Pending progress with therapies, pain control on orals, and medical stability, anticipate discharge to home on POD #1-2    CAD on DAPT  Hx of cardiac arrest and emergent cath + ICD placement (2014)  Current tobacco use  Hx HFrEF  Follows with Ellen MARROQUIN of cardiology and seen 12/28/22. Per cards: Stress test shows a large area of a severe degree of infarction in the anterior, apical, anteroseptal and inferoseptal segment(s) of the left ventricle. EF 37 %. Stress test is  "unchanged from prior study in 2016.Post-op bp soft and otherwise AVSS  - DAPT: ASA as per above and hold PTA plavix today and likely resume tomorrow pending ortho recs  - continue PTA coreg 12.5mg BID, hold for sbp <100 or HR<55  - continue PTA NTG prn  - continue PTA spirololactone 25mg daily, hold for sbp <105  - hold PTA entreso 49-51mg BID, resume when bp and renal function tolerates    Prostate Ca s/p prostate biopsies, GG 6 (2019) - follows with Dr. Oglesby of Urology for active surveillance    HLD- continue PTA statin     The patient's care was discussed with the Attending Physician, Dr. Lowry, Bedside Nurse and Patient.    Clinically Significant Risk Factors Present on Admission                # Drug Induced Platelet Defect: home medication list includes an antiplatelet medication    # Chronic systolic heart failure: echo within the past year with EF <40%              Caro Rhoades PA-C  Hospitalist Service  Securely message with [employer] (more info)  Text page via [employer] Paging/Directory   ______________________________________________________________________    Chief Complaint   POD# 0 s/p right total hip arthroplasty    History is obtained from the patient    History of Present Illness   Iraida Hernandez is a 67 year old male with PMH CAD, hx cardiac arrest s/p emergent cath (2014), cardiomyopathy, ICD in place (2014), prostate Ca on active surveillance (2019), HLD, Right hip OA admitted on 1/4/2023 status post right total hip arthroplasty.     Patient had left hip arthroplasty 3 months ago and reports \"I'm feeling much better than 3 months ago\" and notes he is feeling much better than he did in the setting of his prior surgery. His pain is currently 0-1/10 in the right hip and was previously 9/10. He denies any numbness of the limbs/groin/saddle region.  He notes prior difficulty abducting the lower extremities and currently has a foam wedge between his thighs that he is tolerating well.     He has never " needed to take NTG and denies any cardiopulmonary complaints.     He notes his last hospitalization post-op was extended a day d/t low blood pressure that required a great deal of IV fluids.  He is tolerating po fluids currently.     Patient notes his wife is in charge of his medications and is not entirely certain himself on which medications he takes daily at 6pm, but notes they were reviewed and consistent on admission.     Denies N/V, F/C, chest pain, sob, palpitations, abdominal pain, rashes, lumps, lesions, numbess, changes in sensation, new weakness or other complaints.       Past Medical History    Past Medical History:   Diagnosis Date     Antiplatelet or antithrombotic long-term use      Arthritis Age related     Calculus of kidney      Cancer (H)     basel cell on nose     Cardiac arrest (H)     V-fib, 2/16/2014     Cardiomyopathy (H)      Coronary artery disease      Hyperlipidaemia      Hyperlipidemia with target LDL less than 70 10/31/2010     Diagnosis updated by automated process. Provider to review and confirm.     ICD (implantable cardiac defibrillator) in place     12-1-2014 EF 29%     Myocardial infarction (H) 02/2014     Anterior infarct     Pacemaker      Prostate cancer (H)      Stented coronary artery      Tobacco dependence        Past Surgical History   Past Surgical History:   Procedure Laterality Date     ABDOMEN SURGERY  15 years ago    Hearnia     ARTHROPLASTY HIP Left 9/21/2022    Procedure: Left total hip arthroplasty;  Surgeon: Ok Jolly MD;  Location: [facility] OR     BIOPSY  February 2019    Prostate     CARDIAC SURGERY       COLONOSCOPY  2005     HEART CATH, ANGIOPLASTY  02/2014    Emergent cath,thrombectomy-pt had cardiac arrest-severe 3 vessel CAD-MACY proximal LAD, LAD diagonal kissing balloon, and stent to proximal RCA     HERNIA REPAIR       NO HISTORY OF SURGERY       PROSTATE SURGERY  03/29/2019       Medications   I have reviewed this patient's current medications        Social History   I have reviewed this patient's social history and updated it with pertinent information if needed.  Social History     Tobacco Use     Smoking status: Light Smoker     Packs/day: 0.70     Years: 48.00     Pack years: 33.60     Types: Cigarettes     Start date: 1/1/1975     Smokeless tobacco: Never     Tobacco comments:     3-4 cigs per day 1/2/22   Vaping Use     Vaping Use: Never used   Substance Use Topics     Alcohol use: Yes     Comment: 1-3 drinks per week     Drug use: No        Physical Exam   Vital Signs: Temp: 98  F (36.7  C) Temp src: Oral BP: 92/87 Pulse: 69   Resp: 21 SpO2: 99 %   Oxygen Delivery: 5 LPM  Weight: 152 lbs 8.93 oz  GENERAL: Alert and oriented. NAD. Appears comfortable supine in bed with both legs bent. Cooperative.   HEENT: NC, AT, pupils equal and round, sclera anicteric  CV: RRR, no m/r/g  PULM: CTAB, non-labored breathing  GI: NABS, soft, NT, ND  Extremities: no edema, + PT and radial pulses  MSK: moves all extremities, no gross deformities  SKIN: CDI, noncyanotic, anicteric  Psych: Mood and affect appropriate      Medical Decision Making       45 MINUTES SPENT BY ME on the date of service doing chart review, history, exam, documentation & further activities per the note.  NOTE(S)/MEDICAL RECORDS REVIEWED over the past 24 hours: 10      Data

## 2023-01-04 NOTE — OR NURSING
PACU to Inpatient Nursing Handoff    Patient Iraida Hernandez is a 67 year old male who speaks English.   Procedure Procedure(s):  ARTHROPLASTY, RIGHT HIP, TOTAL   Surgeon(s) Primary: Ok Jolly MD  Resident - Assisting: Bryan Acosta MD  Fellow - Assisting: Hedy Sinclair MD     Allergies   Allergen Reactions     Penicillins      Violent shaking of entire body. Reaction was 50 years ago      Tetracyclines Hives       Isolation  [unfilled]     Past Medical History   has a past medical history of Antiplatelet or antithrombotic long-term use, Arthritis (Age related), Calculus of kidney, Cancer (H), Cardiac arrest (H), Cardiomyopathy (H), Coronary artery disease, Hyperlipidaemia, Hyperlipidemia with target LDL less than 70 (10/31/2010), ICD (implantable cardiac defibrillator) in place, Myocardial infarction (H) (02/2014), Pacemaker, Prostate cancer (H), Stented coronary artery, and Tobacco dependence.    Anesthesia General   Dermatome Level     Preop Meds acetaminophen (Tylenol) - time given: 1327  1950mg  - time given: 1327   Nerve block Not applicable   Intraop Meds dexamethasone (Decadron)  fentanyl (Sublimaze): 100 mcg total  hydromorphone (Dilaudid): 0.5 mg total  ondansetron (Zofran): last given at 1519   Local Meds Yes - Local Cocktail (morphine, ropivacaine, epinephrine, Toradol)   Antibiotics cefazolin (Ancef) - last given at 1351     Pain Patient Currently in Pain: denies   PACU meds  Not applicable   PCA / epidural No   Capnography     Telemetry ECG Rhythm: Sinus rhythm   Inpatient Telemetry Monitor Ordered? No        Labs Glucose Lab Results   Component Value Date     01/04/2023     09/21/2022    GLC 99 02/15/2019       Hgb Lab Results   Component Value Date    HGB 14.3 12/19/2022    HGB 15.2 12/01/2014       INR Lab Results   Component Value Date    INR 0.90 12/01/2014      PACU Imaging Completed     Wound/Incision Incision/Surgical Site 09/21/22 Left;Lateral Hip (Active)    Incision Assessment UTV 09/23/22 0823   Closure Liquid bandage;Adhesive strip(s) 09/23/22 0823   Incision Drainage Amount None 09/23/22 0823   Dressing Intervention Clean, dry, intact 09/23/22 0823   Number of days: 105       Incision/Surgical Site 01/04/23 Right Hip (Active)   Incision Assessment UTV 01/04/23 1625   Closure JACOBY 01/04/23 1625   Incision Drainage Amount None 01/04/23 1625   Dressing Intervention Clean, dry, intact 01/04/23 1625   Number of days: 0      CMS     intact, see flowsheets   Equipment abductor pillow   Other LDA       IV Access Peripheral IV 01/04/23 Anterior;Left Hand (Active)   Site Assessment [facility] 01/04/23 1625   Line Status Saline locked 01/04/23 1625   Dressing Intervention New dressing  01/04/23 1248   Phlebitis Scale 0-->no symptoms 01/04/23 1625   Infiltration Scale 0 01/04/23 1625   Infiltration Site Treatment Method  None 01/04/23 1625   Number of days: 0       Peripheral IV 01/04/23 Right Lower forearm (Active)   Site Assessment [facility] 01/04/23 1625   Line Status Infusing 01/04/23 1625   Phlebitis Scale 0-->no symptoms 01/04/23 1625   Infiltration Scale 0 01/04/23 1625   Infiltration Site Treatment Method  None 01/04/23 1625   Number of days: 0      Blood Products Not applicable  mL   Intake/Output Date 01/04/23 0700 - 01/05/23 0659   Shift 1272-3480 9375-7007 0630-0602 24 Hour Total   INTAKE   I.V.  1000  1000   Shift Total(mL/kg)  1000(14.45)  1000(14.45)   OUTPUT   Blood  250  250   Shift Total(mL/kg)  250(3.61)  250(3.61)   Weight (kg) 69.2 69.2 69.2 69.2      Drains / Ochoa     Time of void PreOp Void Prior to Procedure: 1215 (01/04/23 1208)    PostOp      Diapered? No   Bladder Scan  scanned for 61mL at 1713   PO   240mL water     Vitals    B/P: 110/60  T: 97.9  F (36.6  C)    Temp src: Oral  P:  Pulse: 70 (01/04/23 1630)          R: 12  O2:  SpO2: 99 %         Oxygen Delivery: 5 LPM (01/04/23 2834)         Family/support present none present, wife went home   Patient  belongings  two white belongings bags   Patient transported on cart   DC meds/scripts (obs/outpt) Not applicable   Inpatient Pain Meds Released? Yes       Special needs/considerations None   Tasks needing completion None       Charles Rothman  ASCOM [id number]

## 2023-01-05 ENCOUNTER — APPOINTMENT (OUTPATIENT)
Dept: PHYSICAL THERAPY | Facility: CLINIC | Age: 68
End: 2023-01-05
Attending: ORTHOPAEDIC SURGERY
Payer: MEDICARE

## 2023-01-05 VITALS
OXYGEN SATURATION: 96 % | BODY MASS INDEX: 20.66 KG/M2 | HEIGHT: 72 IN | RESPIRATION RATE: 16 BRPM | SYSTOLIC BLOOD PRESSURE: 91 MMHG | DIASTOLIC BLOOD PRESSURE: 55 MMHG | TEMPERATURE: 98.8 F | HEART RATE: 81 BPM | WEIGHT: 152.56 LBS

## 2023-01-05 LAB
ANION GAP SERPL CALCULATED.3IONS-SCNC: 7 MMOL/L (ref 3–14)
BUN SERPL-MCNC: 18 MG/DL (ref 7–30)
CALCIUM SERPL-MCNC: 7.8 MG/DL (ref 8.5–10.1)
CHLORIDE BLD-SCNC: 106 MMOL/L (ref 94–109)
CO2 SERPL-SCNC: 25 MMOL/L (ref 20–32)
CREAT SERPL-MCNC: 1.01 MG/DL (ref 0.66–1.25)
ERYTHROCYTE [DISTWIDTH] IN BLOOD BY AUTOMATED COUNT: 12.4 % (ref 10–15)
GFR SERPL CREATININE-BSD FRML MDRD: 81 ML/MIN/1.73M2
GLUCOSE BLD-MCNC: 121 MG/DL (ref 70–99)
GLUCOSE BLDC GLUCOMTR-MCNC: 199 MG/DL (ref 70–99)
HCT VFR BLD AUTO: 31.8 % (ref 40–53)
HGB BLD-MCNC: 10.6 G/DL (ref 13.3–17.7)
MAGNESIUM SERPL-MCNC: 2 MG/DL (ref 1.6–2.3)
MCH RBC QN AUTO: 33.4 PG (ref 26.5–33)
MCHC RBC AUTO-ENTMCNC: 33.3 G/DL (ref 31.5–36.5)
MCV RBC AUTO: 100 FL (ref 78–100)
PLATELET # BLD AUTO: 214 10E3/UL (ref 150–450)
POTASSIUM BLD-SCNC: 4.2 MMOL/L (ref 3.4–5.3)
RBC # BLD AUTO: 3.17 10E6/UL (ref 4.4–5.9)
SODIUM SERPL-SCNC: 138 MMOL/L (ref 133–144)
WBC # BLD AUTO: 14.3 10E3/UL (ref 4–11)

## 2023-01-05 PROCEDURE — 97161 PT EVAL LOW COMPLEX 20 MIN: CPT | Mod: GP

## 2023-01-05 PROCEDURE — 36415 COLL VENOUS BLD VENIPUNCTURE: CPT | Performed by: PHYSICIAN ASSISTANT

## 2023-01-05 PROCEDURE — 82962 GLUCOSE BLOOD TEST: CPT

## 2023-01-05 PROCEDURE — 84520 ASSAY OF UREA NITROGEN: CPT | Performed by: PHYSICIAN ASSISTANT

## 2023-01-05 PROCEDURE — 97116 GAIT TRAINING THERAPY: CPT | Mod: GP

## 2023-01-05 PROCEDURE — 250N000011 HC RX IP 250 OP 636: Performed by: STUDENT IN AN ORGANIZED HEALTH CARE EDUCATION/TRAINING PROGRAM

## 2023-01-05 PROCEDURE — 250N000013 HC RX MED GY IP 250 OP 250 PS 637: Performed by: PHYSICIAN ASSISTANT

## 2023-01-05 PROCEDURE — 99213 OFFICE O/P EST LOW 20 MIN: CPT | Performed by: INTERNAL MEDICINE

## 2023-01-05 PROCEDURE — 250N000013 HC RX MED GY IP 250 OP 250 PS 637: Performed by: STUDENT IN AN ORGANIZED HEALTH CARE EDUCATION/TRAINING PROGRAM

## 2023-01-05 PROCEDURE — 83735 ASSAY OF MAGNESIUM: CPT | Performed by: PHYSICIAN ASSISTANT

## 2023-01-05 PROCEDURE — 258N000003 HC RX IP 258 OP 636: Performed by: STUDENT IN AN ORGANIZED HEALTH CARE EDUCATION/TRAINING PROGRAM

## 2023-01-05 PROCEDURE — 85027 COMPLETE CBC AUTOMATED: CPT | Performed by: PHYSICIAN ASSISTANT

## 2023-01-05 PROCEDURE — 999N000111 HC STATISTIC OT IP EVAL DEFER

## 2023-01-05 PROCEDURE — 97530 THERAPEUTIC ACTIVITIES: CPT | Mod: GP

## 2023-01-05 RX ORDER — POLYETHYLENE GLYCOL 3350 17 G/17G
17 POWDER, FOR SOLUTION ORAL DAILY
Qty: 10 PACKET | Refills: 0 | Status: SHIPPED | OUTPATIENT
Start: 2023-01-05 | End: 2023-02-02

## 2023-01-05 RX ORDER — ACETAMINOPHEN 325 MG/1
650 TABLET ORAL EVERY 4 HOURS PRN
Qty: 60 TABLET | Refills: 0 | Status: SHIPPED | OUTPATIENT
Start: 2023-01-07 | End: 2023-08-07

## 2023-01-05 RX ORDER — AMOXICILLIN 250 MG
1 CAPSULE ORAL 2 TIMES DAILY
Qty: 30 TABLET | Refills: 0 | Status: SHIPPED | OUTPATIENT
Start: 2023-01-05 | End: 2023-02-02

## 2023-01-05 RX ORDER — OXYCODONE HYDROCHLORIDE 5 MG/1
5 TABLET ORAL EVERY 4 HOURS PRN
Qty: 26 TABLET | Refills: 0 | Status: SHIPPED | OUTPATIENT
Start: 2023-01-05 | End: 2023-02-02

## 2023-01-05 RX ADMIN — OXYCODONE HYDROCHLORIDE 5 MG: 5 TABLET ORAL at 06:53

## 2023-01-05 RX ADMIN — CEFAZOLIN 1 G: 1 INJECTION, POWDER, FOR SOLUTION INTRAMUSCULAR; INTRAVENOUS at 05:51

## 2023-01-05 RX ADMIN — ASPIRIN 162 MG: 81 TABLET ORAL at 08:45

## 2023-01-05 RX ADMIN — OXYCODONE HYDROCHLORIDE 5 MG: 5 TABLET ORAL at 11:20

## 2023-01-05 RX ADMIN — POLYETHYLENE GLYCOL 3350 17 G: 17 POWDER, FOR SOLUTION ORAL at 08:45

## 2023-01-05 RX ADMIN — SPIRONOLACTONE 25 MG: 25 TABLET, FILM COATED ORAL at 08:46

## 2023-01-05 RX ADMIN — SENNOSIDES AND DOCUSATE SODIUM 1 TABLET: 50; 8.6 TABLET ORAL at 08:46

## 2023-01-05 RX ADMIN — SODIUM CHLORIDE, POTASSIUM CHLORIDE, SODIUM LACTATE AND CALCIUM CHLORIDE 500 ML: 600; 310; 30; 20 INJECTION, SOLUTION INTRAVENOUS at 01:08

## 2023-01-05 RX ADMIN — ACETAMINOPHEN 975 MG: 325 TABLET, FILM COATED ORAL at 05:51

## 2023-01-05 RX ADMIN — METHOCARBAMOL 500 MG: 500 TABLET ORAL at 08:45

## 2023-01-05 RX ADMIN — METHOCARBAMOL 500 MG: 500 TABLET ORAL at 01:13

## 2023-01-05 ASSESSMENT — ACTIVITIES OF DAILY LIVING (ADL)
ADLS_ACUITY_SCORE: 24

## 2023-01-05 NOTE — DISCHARGE INSTRUCTIONS
Patient was found with asymptomatic hypotension this morning after surgery. He had a similar episode months ago after surgery. He is ambulating without any symptoms.   I recommended to the patient to get a blood pressure cuff at his local pharmacy and continue monitoring his BP at home.   Hold PTA Coreg, Entresto and Aldactone if systolic BP < 100 mmHg and contact his PCP.

## 2023-01-05 NOTE — PLAN OF CARE
"VS: BP (!) 84/51   Pulse 71   Temp (!) 96  F (35.6  C)   Resp 15   Ht 1.816 m (5' 11.5\")   Wt 69.2 kg (152 lb 8.9 oz)   SpO2 95%   BMI 20.98 kg/m      O2: O2 SATS >90% denies chest pain,  or SBO   Output: Pt  no voided  yet pvr 123 pt has urinal at the bed side    Last BM:  BS present all x4 quadrants    Activity: Pt not oob this shift    Up for meals? Yes   Skin: L hip incision    Pain: Denies or minimal pain, declines pain medication when offered   CMS: Intact Denies numbness and tingling   Dressing: CDI    Diet: Regular diet    LDA: R PIV infusing  L PIV SL    Equipment: IV Pole , personal belongings abduction pillow     Plan: TBD   Additional Info: BP low at end of shift coming MD paged by upcoming staff.                             "

## 2023-01-05 NOTE — PHARMACY-ADMISSION MEDICATION HISTORY
Admission Medication History Completed by Pharmacy    See [facility] Admission Navigator for allergy information, preferred outpatient pharmacy, prior to admission medications and immunization status.     Medication History Sources:     Patient    Pharmacy dispense fill data     Chart review    Changes made to PTA medication list (reason):  None      Prior to Admission medications    Medication Sig Last Dose Taking? Auth Provider Long Term End Date   aspirin EC 81 MG EC tablet Take 1 tablet (81 mg) by mouth daily 1/4/2023 at 0700 Yes Veronika Camacho APRN CNP     atorvastatin (LIPITOR) 40 MG tablet Take 1 tablet (40 mg) by mouth daily  Patient taking differently: Take 40 mg by mouth At Bedtime 1/3/2023 at 2000 Yes Ellen Toney APRN CNP Yes    carvedilol (COREG) 12.5 MG tablet Take 1 tablet (12.5 mg) by mouth 2 times daily (with meals) 1/4/2023 at 0700 Yes Ellen Toney APRN CNP Yes    clopidogrel (PLAVIX) 75 MG tablet Take 1 tablet (75 mg) by mouth daily Past Month Yes Juancarlos Stevens MD Yes    sacubitril-valsartan (ENTRESTO) 49-51 MG per tablet Take 1 tablet by mouth 2 times daily 1/4/2023 at 0700 Yes Ellen Toney APRN CNP Yes    spironolactone (ALDACTONE) 25 MG tablet Take 1 tablet (25 mg) by mouth daily 1/4/2023 at 0700 Yes Ellen Toney APRN CNP Yes    nitroGLYcerin (NITROSTAT) 0.4 MG sublingual tablet Place 1 tablet (0.4 mg) under the tongue every 5 minutes as needed for chest pain   Juancarlos Stevens MD Yes        Date completed: 01/04/23    Medication history completed by: JOSE GUADALUPE LEE [facility]

## 2023-01-05 NOTE — PLAN OF CARE
526 (E,D) Right BART.   Medicine paged re: low BP 84/51. Awaiting order.      Update:   500ml LR bolus ordered

## 2023-01-05 NOTE — PLAN OF CARE
Patient BP remains low but asymptomatic. MD notified. Refused pain medication when offered. Patient stated he is good with the muscle relaxant that was given earlier. Patient just finished eating peanut butter and jelly sandwich and is now comfortable and resting.

## 2023-01-05 NOTE — PLAN OF CARE
"  VS: BP 95/56 (BP Location: Right arm)   Pulse 69   Temp (!) 96  F (35.6  C) (Oral)   Resp 15   Ht 1.816 m (5' 11.5\")   Wt 69.2 kg (152 lb 8.9 oz)   SpO2 97%   BMI 20.98 kg/m    -low BP overnight, MD aware. See previous note.    O2: Stable at room air.    Output: Voids spontaneously without difficulty using urinal.    Last BM: 01/04/23. Bowel sound active x4.    Activity: Not OOB yet. Low BP overnight.    Requested to have a rest from the abduction pillow.   2 pcs ordinary pillows used instead.    Skin: R hip surgical incision   Sacral protective mepilex in place.    Pain: Minimal pain overnight 2/10  R hip pain, managed by PRN robaxin. Refused narcotic for pain overnight, but agreed to have 5mg of oxycodone at 0700am.    CMS: A&O x4. Denies tingling. Mild numbness on L heel. Pillows use to offload heels fr bed.    Dressing: CDI    Diet: Regular. Patient ate peanut butter and jelly toast.    LDA: PIV on R hand infusing with 50ml/hr LR.   PIV on L hand, saline locked.    Equipment: IV pole/pump, capno, abduction pillow, personal belongings.    Plan: TBD.   Additional Info:                             "

## 2023-01-05 NOTE — PROGRESS NOTES
01/05/23 0900   Appointment Info   Signing Clinician's Name / Credentials (PT) Nancy Harris DPT   Living Environment   People in Home significant other;child(latonia), adult   Current Living Arrangements house   Home Accessibility stairs to enter home   Number of Stairs, Main Entrance 2   Stair Railings, Main Entrance none   Number of Stairs, Within Home, Primary greater than 10 stairs   Stair Railings, Within Home, Primary railings safe and in good condition   Transportation Anticipated family or friend will provide   Self-Care   Usual Activity Tolerance excellent   Current Activity Tolerance good   Regular Exercise Yes   Activity/Exercise Type other (see comments)  (golf and OP PT after R BART)   Activity/Exercise/Self-Care Comment IND and active at baseline, had L hip replaced a in august and was very succesful. Pt has all equipment needs from this still and knows ADLs and precautions with posterior approach   General Information   Onset of Illness/Injury or Date of Surgery 01/04/23   Referring Physician Dr. Jolly   Patient/Family Therapy Goals Statement (PT) get back to golf this summer   Pertinent History of Current Problem (include personal factors and/or comorbidities that impact the POC) L BART   Existing Precautions/Restrictions 90 degree hip flexion;no pivoting or twisting;no hip IR;fall;no hip ADD past midline   Weight-Bearing Status - RLE weight-bearing as tolerated   General Observations pleasant, motivated   Cognition   Affect/Mental Status (Cognition) WNL   Pain Assessment   Patient Currently in Pain Yes, see Vital Sign flowsheet  (1/10 at rest)   Posture    Posture Forward head position;Kyphosis   Range of Motion (ROM)   ROM Comment R hip limited by pain for AROM, achieve ~60 deg hip flexion in bed tolerated, all other extremeties WFL   Strength (Manual Muscle Testing)   Strength Comments NT formally on R hip 2/2 new surgery, functional weakness present consistent with surgry   Bed Mobility   Comment,  (Bed Mobility) slow from flat bed, extra time needed and minor vc but competes without assist, supervision only   Transfers   Comment, (Transfers) Genoveva STS to FWW   Gait/Stairs (Locomotion)   Distance in Feet (Gait) 200   Comment, (Gait/Stairs) pt ambulates throughot room with FWW and supervision, very mild initial R LE buckle but improves with ongoing steps, steady with walker   Balance   Balance no deficits were identified   Sensory Examination   Sensory Perception patient reports no sensory changes   Clinical Impression   Criteria for Skilled Therapeutic Intervention Yes, treatment indicated   PT Diagnosis (PT) impaired functional mobility   Influenced by the following impairments pain, posterior hip precautions, R LE weakness   Functional limitations due to impairments gait, stairs, bed mobility, ADLs   Clinical Presentation (PT Evaluation Complexity) Stable/Uncomplicated   Clinical Presentation Rationale PMH, cliniciain impression, uncomplicated BART   Clinical Decision Making (Complexity) low complexity   Planned Therapy Interventions (PT) bed mobility training;gait training;home exercise program;patient/family education   Risk & Benefits of therapy have been explained evaluation/treatment results reviewed;care plan/treatment goals reviewed;risks/benefits reviewed;current/potential barriers reviewed;participants voiced agreement with care plan;participants included;patient   Clinical Impression Comments mobilizing very well, good BART recovery understanding   PT Total Evaluation Time   PT Eval, Low Complexity Minutes (52651) 8   Plan of Care Review   Plan of Care Reviewed With parent   Therapy Certification   Start of care date 01/04/23   Certification date from 01/04/23   Certification date to 01/04/23   Medical Diagnosis R BART   Physical Therapy Goals   PT Frequency One time eval and treatment only   PT Predicted Duration/Target Date for Goal Attainment 01/04/23   PT Goals Bed Mobility;Transfers;Gait;Stairs;PT  Goal 1   PT: Bed Mobility Independent;Supine to/from sit;Within precautions;Goal Met   PT: Transfers Modified independent;Sit to/from stand;Assistive device;Within precautions;Goal Met   PT: Gait Modified independent;Rolling walker;Within precautions;Greater than 200 feet;Goal Met   PT: Stairs Supervision/stand-by assist;10 stairs;Rail on left;Goal Met   PT: Goal 1 pt will verbalize 3/3 BART posterior precautions  (completed)   Interventions   Interventions Quick Adds Gait Training;Therapeutic Activity   Therapeutic Activity   Therapeutic Activities: dynamic activities to improve functional performance Minutes (78296) 15   Symptoms Noted During/After Treatment Increased pain   Treatment Detail/Skilled Intervention PT: this is pt's second BART, so familiar with precautions and recovery. Needed x1 initial reminder for all 3/3 but good carryover functionally. Extra time for bed mobility, SBA with min vc to reduce IR getting out, but functionally strong and capable. Pt re-educated in equipment needs and activity progression, he verbalized understanding for all and good teach back to PT. Inquired about return to OP PT, which this writer did encourage. Pt has all equipment needs already at home, feels comfortable to DC today, wirte in agreement. Declined review of HEP, has all handouts already and able to verbalize proper starting and advanced exercises.   Gait Training   Gait Training Minutes (20492) 10   Symptoms Noted During/After Treatment (Gait Training) increased pain   Treatment Detail/Skilled Intervention PT: pt amublates with FWW and SBA>Genoveva throughout sessions, initially reduced hip flexion and mild weakness/buckling but improves with vc for proximal activation and increased swing flexion. Stairs training 3x4 B rail > single L rail per home set up SBA, good adherence to technique. Mild instability going down first set needing B UE support but improved to single UE support on single rail after training.   PT  Discharge Planning   PT Plan discharge   PT Discharge Recommendation (DC Rec) home with assist;home with outpatient physical therapy   PT Rationale for DC Rec pt mobilizing well, up Genoveva with FWW, excellent understnaidng for BART recovery as this is his second. Adheres to precautions well, good family support at home. OP PT to progress high level activity as pt very active at baseline   PT Brief overview of current status Genoveva with FWW   Total Session Time   Timed Code Treatment Minutes 25   Total Session Time (sum of timed and untimed services) 33

## 2023-01-05 NOTE — PROGRESS NOTES
"Orthopedic Surgery Progress Note: 01/05/2023    Subjective:   No acute events overnight. Pain well-controlled. Tolerating PO without N/V. Voiding. Denies CP/SOB. No fevers/chills. Denies new numbness or tingling.  No Other concerns or complaints.    Objective:   BP (!) 84/49 (BP Location: Right arm)   Pulse 71   Temp 97.3  F (36.3  C) (Oral)   Resp 16   Ht 1.816 m (5' 11.5\")   Wt 69.2 kg (152 lb 8.9 oz)   SpO2 97%   BMI 20.98 kg/m    No intake/output data recorded.  General: NAD. Resting comfortably in bed.  Respiratory: Breathing comfortably on 5L NC  Musculoskeletal:         RLE  Dressings clean and dry   SILT in sural, saphenous, superficial peroneal nerve, deep peroneal nerve, and tibial nerve distributions  Fires FHL/TA and EHL/GSC without issue  . Foot is WWP      Laboratory Data:  Lab Results   Component Value Date    WBC 9.1 12/19/2022    HGB 12.5 (L) 01/04/2023     12/19/2022    INR 0.90 12/01/2014       Images:  PACU imaging reviewed. Implants well aligned. No periprosthetic fracture appreciated.     Assessment & Plan:   Iraida Hernandez is a 68yo M with PMH CAD, h/o prostate CA, HLD, R hip OA now s/p R BART on 1/4/2023 with Dr. Jolly    Changes for today:  PT, pain control  The patient can discharge from an orthopaedic perspective when cleared by PT and medicine team.        Activity: Up with assist until independent  Weight bearing status: WBAT RLE with posterior hip precautions x3 months.  Antibiotics: Ancef x24 hours perioperatively.  Diet: Begin with clear fluids and progress diet as tolerated.  DVT prophylaxis: ASA 162mg every day x4 weeks to begin POD1 and mechanical while in the hospital.  Bracing/Splinting: Abduction pillow while in bed  Wound Care: Exofin glue, steri-strips, silver alginate, tegaderm  Pain management: transition from IV to orals as tolerated.   X-rays: completed in PACU.  Physical Therapy:  ROM, ADL's.  Occupational Therapy: ADL's.  Labs: Trend Hgb.  Consults: PT, " OT. Medicine, appreciate assistance in caring for this patient.  Follow-up: Clinic with Ofelia Hendricks PA-C on 1/18/2023     Disposition: Pending progress with therapies, pain control on orals, and medical stability, anticipate discharge to home on POD #1-2.    Orthopedic surgery staff for this patient is Dr. oJlly.    ------------------------------------------------------------------------------------------    Ari Carrasco MD  Orthopedic Surgery PGY4  [phone]    Please page me directly with any questions/concerns during regular weekday hours before 5 pm. If there is no response, if it is a weekend, or if it is during evening hours then please page the orthopedic surgery resident on call.    FOLLOWUP:    Future Appointments   Date Time Provider Department Center   1/5/2023  9:00 AM Tammy Winn, OT [facility] [place]   1/5/2023 10:00 AM Nancy Harris, PT [facility] [place]   1/5/2023  1:30 PM Nancy Harris, PT [facility] [place]   1/18/2023  3:00 PM Ofelia Hendricks PA-C [facility]   2/2/2023  1:15 PM Juancarlos Stevens MD [facility] CLIN   2/2/2023  2:00 PM SINHA DCR2 [facility] PSA CLIN   2/16/2023 10:40 AM Ok Jolly MD [facility]   5/1/2023  1:15 PM CR LAB CRLABR CR   5/9/2023  1:45 PM Sukumar Oglesby MD [facility]

## 2023-01-05 NOTE — PLAN OF CARE
Physical Therapy Discharge Summary    Reason for therapy discharge:    All goals and outcomes met, no further needs identified.    Progress towards therapy goal(s). See goals on Care Plan in [facility] electronic health record for goal details.  Goals met    Therapy recommendation(s):    Continued therapy is recommended.  Rationale/Recommendations:  OP PT when appropriate for return to high level activity post R BART.

## 2023-01-05 NOTE — PLAN OF CARE
OT 5th Floor Ortho - DEFER    Per chart review and discussion w/ PT, this is pts second hip replacement w/ pt having all equipment from this previous stay. Today, pt mobilized mod I w/ FWW and has his wife to A at home. OT to sign off. Please consult again if new needs arise.

## 2023-01-05 NOTE — PLAN OF CARE
VS: VSS, except low BP but pt not symptomatic and MD aware okay if pt discharge home, pt denied CP, SOB or lightheaded.    O2: Room air sat. > 90%.   Output: Voiding adequate amount without difficulty.    Last BM: 01/04/23   Activity: Up walked on the [place] with PT using walker and SBA.   Skin: Intact except surgical incision.    Pain: Comfortably manageable with PRN medication.    Neuro: CMS and neuro intact.    Dressing: CDI.    Diet: Regular tolerating okay.    LDA: PIV SL.    Equipment: Walker, IV pole and personal belongings.    Plan: Discharge home today.    Additional Info:             DISCHARGE SUMMARY    Pt discharging to: Home  Transportation: Family.  AVS given and discussed: yes  Stoplight Tool given and discussed: yes.  Medications given: yes  Belongings returned: yes.  Comments: pt understand discharge plan.

## 2023-01-05 NOTE — PROGRESS NOTES
[facility]    Medicine Progress Note - Hospitalist Service, GOLD TEAM 17    Date of Admission:  1/4/2023    Assessment & Plan     67 year old male with PMH CAD, hx cardiac arrest s/p emergent cath (2014), cardiomyopathy, ICD in place (2014), prostate Ca on active surveillance (2019), HLD, Right hip OA admitted on 1/4/2023 status post right total hip arthroplasty.   Patient had some asymptomatic hypotension this morning, he denies any dizziness, lightheadedness or near-syncope. He passed PT.      status post right total hip arthroplasty (1/4/23)  Primary OA of the hip  Ortho is primary.   - cares per primary surgery team:              -Activity: Up with assist until independent              -Weight bearing status: WBAT RLE with posterior hip precautions x3 months.              -Antibiotics: Ancef x24 hours perioperatively.              -Diet: Begin with clear fluids and progress diet as tolerated.              -DVT prophylaxis: ASA 162mg every day x4 weeks to begin POD1 and mechanical while in the hospital.              -Bracing/Splinting: Abduction pillow while in bed              -Wound Care: Exofin glue, steri-strips, silver alginate, tegaderm              - Pain management: transition from IV to orals as tolerated.                           - APAP 875mg q8h and then 650mg q4h prn                          - robaxin 500mg q6h prn                          - oxycodone 5-10mg q4h prn                          - dilaudid 0.2-0.4mg IV q2h prn              -X-rays: AP pelvis, cross table lateral right hip in PACU.              -PT/OT              - Labs: Trend Hgb.              -Follow-up: Clinic with Ofelia Hendricks PA-C on 1/18/2023              - Disposition: Per Ortho      CAD on DAPT  Hx of cardiac arrest and emergent cath + ICD placement (2014)  Current tobacco use  Hx HFrEF  Follows with Ellen MARROQUIN of cardiology and seen 12/28/22. Per cards: Stress test shows  a large area of a severe degree of infarction in the anterior, apical, anteroseptal and inferoseptal segment(s) of the left ventricle. EF 37 %. Stress test is unchanged from prior study in 2016.Post-op bp soft and otherwise AVSS  - DAPT: ASA as per above and hold PTA plavix today and likely resume tomorrow pending ortho recs  - continue PTA coreg 12.5mg BID, hold for sbp <100 or HR<55  - continue PTA NTG prn  - continue PTA spirololactone 25mg daily, hold for sbp <105  - Restart entresto with holding parameters. Renal function stable.      Prostate Ca s/p prostate biopsies, GG 6 (2019) - follows with Dr. Oglesby of Urology for active surveillance     HLD- continue PTA statin        Diet: Advance Diet as Tolerated: Regular Diet Adult  Discharge Instruction - Regular Diet Adult    DVT Prophylaxis: Defer to primary service  Ochoa Catheter: Not present  Lines: None     Cardiac Monitoring: None  Code Status: Full Code      Clinically Significant Risk Factors Present on Admission                # Drug Induced Platelet Defect: home medication list includes an antiplatelet medication    # Chronic systolic heart failure: echo within the past year with EF <40%   # Circulatory Shock: currently requiring pressors for blood pressure support             Disposition Plan      Expected Discharge Date: 01/05/2023,  9:00 AM                Michele Barajas MD  Hospitalist Service, [address]  Securely message with [employer] (more info)  Text page via [facility] Paging/Directory   See signed in provider for up to date coverage information  ______________________________________________________________________    Interval History     No acute events overnight.   BP low earlier but this is improving now.   Denies chest pain, palpitations, shortness of breath, nausea, vomit, fever or chills.     Physical Exam   Vital Signs: Temp: 98.8  F (37.1  C) Temp src: Oral BP: 91/55 (sitting)  Pulse: 81   Resp: 16 SpO2: 96 % O2 Device: None (Room air) Oxygen Delivery: 5 LPM  Weight: 152 lbs 8.93 oz    GENERAL: Alert and oriented. NAD. Room air.   HEENT: NC, AT, pupils equal and round, sclera anicteric  CV: RRR, no m/r/g  PULM: CTAB, non-labored breathing  GI: NABS, soft, NT, ND  Extremities: no edema, + PT and radial pulses  MSK: moves all extremities, no gross deformities  SKIN: CDI, noncyanotic, anicteric  Psych: Mood and affect appropriate    Medical Decision Making       45 MINUTES SPENT BY ME on the date of service doing chart review, history, exam, documentation & further activities per the note.      Data     I have personally reviewed the following data over the past 24 hrs:    14.3 (H)  \   10.6 (L)   / 214     138 106 18 /  121 (H)   4.2 25 1.01 \

## 2023-01-06 NOTE — DISCHARGE SUMMARY
ORTHOPAEDIC SURGERY DISCHARGE SUMMARY     Date of Admission: 1/4/2023  Date of Discharge: 1/5/2023 12:25 PM  Disposition: Home  Staff Physician: Ok Jolly MD  Primary Care Provider: Cesar Marie      ADMISSION DIAGNOSIS:  Primary osteoarthritis of right hip [M16.11]    DISCHARGE DIAGNOSIS:  Primary osteoarthritis of right hip [M16.11]    PROCEDURES: Procedure(s):  Right total hip arthroplasty on 1/4/2023    BRIEF HISTORY:  The patient has a long history of debilitating pain secondary to ostearthritis of the hip.  Despite comprehensive non-operative management these symptoms continued to interfere with activities of daily living.  After discussion of further treatment options including the risks and benefits that patient elected to proceed with a total hip.    HOSPITAL COURSE:    The patient was admitted following the above listed procedures for pain control and rehabilitation. Iraida Hernandez did well post-operatively. Medicine was consulted post operatively to aid in management of medical co-morbidities. The patient received routine nursing cares and at the time of discharge was medically stable. Vital signs were stable throughout admission. The patient is tolerating a regular diet and is voiding spontaneously. All PT/OT goals have been met for safe mobility. Pain is now controlled on oral medications which will be available on discharge. Stool softeners have been used while taking pain medications to help prevent constipation. Iraida Hernandez is deemed medically safe to discharge on POD1.     Antibiotics:  Ancef given periop and 24 hours postop.   DVT prophylaxis:  Aspirin 162mg every day initiated after surgery and will be continued for 4 weeks.   PT Progress:  Has met PT/OT goals for safe mobility.    Pain Meds:  Weaned off all IV pain meds by discharge.  Inpatient Events: No significant events or complications.     PHYSICAL EXAM:    General: NAD. Resting comfortably in bed.  Respiratory: Breathing  comfortably on 5L NC  Musculoskeletal:            RLE  Dressings clean and dry   SILT in sural, saphenous, superficial peroneal nerve, deep peroneal nerve, and tibial nerve distributions  Fires FHL/TA and EHL/GSC without issue  . Foot is WWP    FOLLOWUP:    Clinic with Ofelia Hendricks PA-C on 1/18/2023    Future Appointments   Date Time Provider Department Center   1/18/2023  3:00 PM Ofelia Hendricks PA-C [facility]   2/2/2023  1:15 PM Juancarlos Stevens MD [facility]   2/2/2023  2:00 PM [facility]   2/16/2023 10:40 AM Ok Jolly MD [facility]   5/1/2023  1:15 PM CR LAB [facility] CR   5/9/2023  1:45 PM Sukumar Oglesby MD [facility]       Orthopaedic Surgery appointments are at the [facility] ([address] [place] [address]). Call [phone] to schedule a follow-up appointment at this location with your provider.     PLANNED DISCHARGE ORDERS:     DVT Prophylaxis: ASA 162mg every day  x4 weeks     Activity: WBAT RLE with posterior hip precautions x3 months.     Wound Care: see Below      Discharge Medication List as of 1/5/2023 11:42 AM      START taking these medications    Details   acetaminophen (TYLENOL) 325 MG tablet Take 2 tablets (650 mg) by mouth every 4 hours as needed for other, Disp-60 tablet, R-0, E-Prescribe      oxyCODONE (ROXICODONE) 5 MG tablet Take 1 tablet (5 mg) by mouth every 4 hours as needed for moderate pain (4-6), Disp-26 tablet, R-0, E-Prescribe      polyethylene glycol (MIRALAX) 17 g packet Take 17 g by mouth daily, Disp-10 packet, R-0, E-Prescribe      senna-docusate (SENOKOT-S/PERICOLACE) 8.6-50 MG tablet Take 1 tablet by mouth 2 times daily, Disp-30 tablet, R-0, E-Prescribe      tiZANidine (ZANAFLEX) 4 MG tablet Take 1 tablet (4 mg) by mouth 3 times daily as needed for muscle spasms, Disp-40 tablet, R-0, E-Prescribe         CONTINUE these medications which have CHANGED    Details   aspirin (ASA)  "81 MG EC tablet Take 2 tablets (162 mg) by mouth daily for 30 days, Disp-60 tablet, R-0, E-Prescribe         CONTINUE these medications which have NOT CHANGED    Details   atorvastatin (LIPITOR) 40 MG tablet Take 1 tablet (40 mg) by mouth daily, Disp-90 tablet, R-3, E-Prescribe      carvedilol (COREG) 12.5 MG tablet Take 1 tablet (12.5 mg) by mouth 2 times daily (with meals), Disp-180 tablet, R-3, E-Prescribe      clopidogrel (PLAVIX) 75 MG tablet Take 1 tablet (75 mg) by mouth daily, Disp-90 tablet, R-0, E-Prescribe      nitroGLYcerin (NITROSTAT) 0.4 MG sublingual tablet Place 1 tablet (0.4 mg) under the tongue every 5 minutes as needed for chest pain, Disp-25 tablet, R-3, E-Prescribe      sacubitril-valsartan (ENTRESTO) 49-51 MG per tablet Take 1 tablet by mouth 2 times daily, Disp-60 tablet, R-0, E-PrescribeThis is an increase from his previous dose.      spironolactone (ALDACTONE) 25 MG tablet Take 1 tablet (25 mg) by mouth daily, Disp-90 tablet, R-3, E-Prescribe               Discharge Procedure Orders   Physical Therapy Referral   Standing Status: Future   Referral Priority: Routine Referral Type: Rehab Therapy Physical Therapy   Number of Visits Requested: 1     Reason for your hospital stay   Order Comments: Right total hip arthroplasty     When to call - Contact Surgeon Team   Order Comments: You may experience symptoms that require follow-up before your scheduled appointment. Refer to the \"[employer]\" for instructions on when to contact your Surgeon Team if you are concerned about pain control, blood clots, constipation, or if you are unable to urinate.     When to call - Reach out to Urgent Care   Order Comments: If you are not able to reach your Surgeon Team and you need immediate care, go to the Orthopedic Walk-in Clinic or Urgent Care at your Surgeon's office.  Do NOT go to the Emergency Room unless you have shortness of breath, chest pain, or other signs of a medical emergency.     When to call - " Reasons to Call 911   Order Comments: Call 911 immediately if you experience sudden-onset chest pain, arm weakness/numbness, slurred speech, or shortness of breath     Discharge Instruction - Breathing exercises   Order Comments: Perform breathing exercises using your Incentive Spirometer 10 times per hour while awake for 2 weeks.     Symptoms - Fever Management   Order Comments: A low grade fever can be expected after surgery.  Use acetaminophen (TYLENOL) as needed for fever management.  Contact your Surgeon Team if you have a fever greater than 101.5 F, chills, and/or night sweats.     Symptoms - Constipation management   Order Comments: Constipation (hard, dry bowel movements) is expected after surgery due to the combination of being less active, the anesthetic, and the opioid pain medication.  You can do the following to help reduce constipation:  ~  FLUIDS:  Drink clear liquids (water or Gatorade), or juice (apple/prune).  ~  DIET:  Eat a fiber rich diet.    ~  ACTIVITY:  Get up and move around several times a day.  Increase your activity as you are able.  MEDICATIONS:  Reduce the risk of constipation by starting medications before you are constipated.  You can take Miralax   (1 packet as directed) and/or a stool softener (Senokot 1-2 tablets 1-2 times a day).  If you already have constipation and these medications are not working, you can get magnesium citrate and use as directed.  If you continue to have constipation you can try an over the counter suppository or enema.  Call your Surgeon Team if it has been greater than 3 days since your last bowel movement.     Symptoms - Reduced Urine Output   Order Comments: Changes in the amount of fluids you drank before and after surgery may result in problems urinating.  It is important to stay well-hydrated after surgery and drink plenty of water. If it has been greater than 8 hours since you have urinated despite drinking plenty of water, call your Surgeon Team.      Activity - Exercises to prevent blood clots   Order Comments: Unless otherwise directed by your Surgeon team, perform the following exercises at least three times per day for the first four weeks after surgery to prevent blood clots in your legs: 1) Point and flex your feet (Ankle Pumps), 2) Move your ankle around in big circles, 3) Wiggle your toes, 4) Walk, even for short distances, several times a day, will help decrease the risk of blood clots.     Order Specific Question Answer Comments   Is discharge order? Yes      Comfort and Pain Management - Pain after Surgery   Order Comments: Pain after surgery is normal and expected.  You will have some amount of pain for several weeks after surgery.  Your pain will improve with time.  There are several things you can do to help reduce your pain including: rest, ice, elevation, and using pain medications as needed. Contact your Surgeon Team if you have pain that persists or worsens after surgery despite rest, ice, elevation, and taking your medication(s) as prescribed. Contact your Surgeon Team if you have new numbness, tingling, or weakness in your operative extremity.     Comfort and Pain Management - Swelling after Surgery   Order Comments: Swelling and/or bruising of the surgical extremity is common and may persist for several months after surgery. In addition to frequent icing and elevation, gentle compressive support with an ACE wrap or tubigrip may help with swelling. Apply compression regularly, removing at least twice daily to perform skin checks. Contact your Surgeon Team if your swelling increases and is NOT associated with an increase in your activity level, or if your swelling increases and is associated with redness and pain.     Comfort and Pain Management - Cold therapy   Order Comments: Ice can be used to control swelling and discomfort after surgery. Place a thin towel over your operative site and apply the ice pack overtop. Leave ice pack in place  for 20 minutes, then remove for 20 minutes. Repeat this 20 minutes on/20 minutes off routine as often as tolerated.     Medication Instructions - Acetaminophen (TYLENOL) Instructions   Order Comments: You were discharged with acetaminophen (TYLENOL) for pain management after surgery. Acetaminophen most effectively manages pain symptoms when it is taken on a schedule without missing doses (every four, six, or eight hours). Your Provider will prescribe a safe daily dose between 3000 - 4000 mg.  Do NOT exceed this daily dose. Most patients use acetaminophen for pain control for the first four weeks after surgery.  You can wean from this medication as your pain decreases.     Medication Instructions - NSAID Instructions   Order Comments: You were discharged with an anti-inflammatory medication for pain management to use in combination with acetaminophen (TYLENOL) and the narcotic pain medication.  Take this medication exactly as directed.  You should only take one anti-inflammatory at a time.  Some common anti-inflammatories include: ibuprofen (ADVIL, MOTRIN), naproxen (ALEVE, NAPROSYN), celecoxib (CELEBREX), meloxicam (MOBIC), ketorolac (TORADOL).  Take this medication with food and water.     Medication Instructions - Opioids - Tapering Instructions   Order Comments: In the first three days following surgery, your symptoms may warrant use of the narcotic pain medication every four to six hours as prescribed. This is normal. As your pain symptoms improve, focus your efforts on decreasing (tapering) use of narcotic medications. The most successful tapering strategy is to first, decrease the number of tablets you take every 4-6 hours to the minimum prescribed. Then, increase the amount of time between doses.  For example:  First, taper to   or 1 tablet every 4-6 hours.  Then, taper to   or 1 tablet every 6-8 hours.  Then, taper to   or 1 tablet every 8-10 hours.  Then, taper to   or 1 tablet every 10-12 hours.  Then,  "taper to   or 1 tablet at bedtime.  The bedtime dose can help with comfort during sleep and is typically the last dose to be discontinued after surgery.     Follow Up Care   Order Comments: Follow-up with your Surgeon Team on 1/18/2023     Activity - Total Hip Arthroplasty   Order Comments: Refer to the [facility] \"Your Guide to Total Joint Replacement\" for recommendations on activities and Exercises.     Order Specific Question Answer Comments   Is discharge order? Yes      Return to Driving   Order Comments: Return to driving - Driving is NOT permitted until directed by your provider. Under no circumstance are you permitted to drive while using narcotic pain medications.     Order Specific Question Answer Comments   Is discharge order? Yes      No flexion past 90 degrees   Order Comments: No bending at waist past 90 degrees.     Order Specific Question Answer Comments   Is discharge order? Yes      No internal rotation   Order Comments: No pivoting on your operative leg.     Order Specific Question Answer Comments   Is discharge order? Yes      No adduction past midline   Order Comments: No crossing your operative leg.     Order Specific Question Answer Comments   Is discharge order? Yes      Weight bearing as tolerated   Order Comments: Weight bearing as tolerated on your operative extremity.     Order Specific Question Answer Comments   Is discharge order? Yes      Dressing / Wound Care - Wound   Order Comments: You have a clean dressing on your surgical wound. Dressing change instructions as follows: steri-strips and tegaderm to remain in place until fall off on own. Contact your Surgeon Team if you have increased redness, warmth around the surgical wound, and/or drainage from the surgical wound.     Dressing / Wound care - Shower with wound/dressing covered   Order Comments: You must COVER your dressing or incision with saran wrap (or any other non-permeable covering) to allow the incision to remain dry " "while showering.  You may shower 2-3 days after surgery as long as the surgical wound stays dry. Continue to cover your dressing or incision for showering until your first office visit.  You are strictly prohibited from soaking   or submerging the surgical wound underwater.     Dressing / Wound Care - NO Tub Bathing   Order Comments: Tub bathing, swimming, or any other activities that will cause your incision to be submerged in water should be avoided until allowed by your Surgeon.     Medication instructions -  Anticoagulation - aspirin   Order Comments: Take the aspirin as prescribed for a total of four weeks after surgery.  This is given to help minimize your risk of blood clot.     Comfort and Pain Management - LOWER Extremity Elevation   Order Comments: Swelling is expected for several months after surgery. This type of swelling is usually associated with gravity and activity, and can be improved with elevation.   The best way to do this is to get your \"toes above your nose\" by laying down and placing several pillows lengthwise under your calf and heel. When elevating your leg keep your knee completely straight. Perform this elevation as often as possible especially for the first two weeks after surgery.     Medication Instructions - Opioid Instructions (Greater than or equal to 65 years)   Order Comments: You were discharged with an opioid medication (hydromorphone, oxycodone, hydrocodone, or tramadol). This medication should only be taken for breakthrough pain that is not controlled with acetaminophen (TYLENOL). If you rate your pain less than 3 you do not need this medication.  Pain rating 0-3:  You do not need this medication  Pain rating 4-6:  Take 1/2 tablet every 4-6 hours as needed  Pain rating 7-10:  Take 1 tablet every 4-6 hours as needed  Do not exceed 4 tablets per day     Crutches DME   Order Comments: DME Documentation: Describe the reason for need to support medical necessity: Impaired gait " status post hip surgery. I, the undersigned, certify that the above prescribed supplies are medically necessary for this patient and is both reasonable and necessary in reference to accepted standards of medical practice in the treatment of this patient's condition and is not prescribed as a convenience.     Order Specific Question Answer Comments   DME Provider: [employer]    Crutch Type: Standard    Crutches Add On: NA    Length of Need: Lifetime      Cane DME   Order Comments: DME Documentation: Describe the reason for need to support medical necessity: Impaired gait status post hip surgery. I, the undersigned, certify that the above prescribed supplies are medically necessary for this patient and is both reasonable and necessary in reference to accepted standards of medical practice in the treatment of this patient's condition and is not prescribed as a convenience.     Order Specific Question Answer Comments   DME Provider: [employer]    Cane Type: Single Tip    Reminder: Patient can typically get 1 every 5 years      Walker DME   Order Comments: : DME Documentation: Describe the reason for need to support medical necessity: Impaired gait status post hip surgery. I, the undersigned, certify that the above prescribed supplies are medically necessary for this patient and is both reasonable and necessary in reference to accepted standards of medical practice in the treatment of this patient's condition and is not prescribed as a convenience.     Order Specific Question Answer Comments   DME Provider: [employer]    Walker Type: Standard (2 Wheel)    Accessories: N/A      Discharge Instruction - Regular Diet Adult   Order Comments: Return to your pre-surgery diet unless instructed otherwise     Order Specific Question Answer Comments   Is discharge order? Yes      Assign Questionnaire Series to Patient       Bryan Acosta MD  Orthopaedic Surgery PGY-4  [phone]

## 2023-01-12 ENCOUNTER — MYC REFILL (OUTPATIENT)
Dept: FAMILY MEDICINE | Facility: CLINIC | Age: 68
End: 2023-01-12

## 2023-01-12 DIAGNOSIS — M16.11 PRIMARY OSTEOARTHRITIS OF RIGHT HIP: ICD-10-CM

## 2023-01-12 RX ORDER — OXYCODONE HYDROCHLORIDE 5 MG/1
5 TABLET ORAL EVERY 4 HOURS PRN
Qty: 40 TABLET | Refills: 0 | Status: SHIPPED | OUTPATIENT
Start: 2023-01-12 | End: 2023-01-20

## 2023-01-12 RX ORDER — OXYCODONE HYDROCHLORIDE 5 MG/1
5 TABLET ORAL EVERY 4 HOURS PRN
Qty: 26 TABLET | Refills: 0 | Status: CANCELLED | OUTPATIENT
Start: 2023-01-12

## 2023-01-13 ENCOUNTER — MYC MEDICAL ADVICE (OUTPATIENT)
Dept: CARDIOLOGY | Facility: CLINIC | Age: 68
End: 2023-01-13

## 2023-01-13 DIAGNOSIS — I25.5 ISCHEMIC CARDIOMYOPATHY: ICD-10-CM

## 2023-01-13 DIAGNOSIS — I42.9 CARDIOMYOPATHY, UNSPECIFIED TYPE (H): ICD-10-CM

## 2023-01-13 DIAGNOSIS — I25.10 CORONARY ARTERY DISEASE INVOLVING NATIVE CORONARY ARTERY OF NATIVE HEART WITHOUT ANGINA PECTORIS: ICD-10-CM

## 2023-01-13 DIAGNOSIS — I21.9 ACUTE MYOCARDIAL INFARCTION (H): ICD-10-CM

## 2023-01-13 RX ORDER — SACUBITRIL AND VALSARTAN 49; 51 MG/1; MG/1
1 TABLET, FILM COATED ORAL 2 TIMES DAILY
Qty: 180 TABLET | Refills: 1 | Status: SHIPPED | OUTPATIENT
Start: 2023-01-13 | End: 2023-07-14

## 2023-01-17 DIAGNOSIS — I21.9 ACUTE MYOCARDIAL INFARCTION (H): ICD-10-CM

## 2023-01-17 DIAGNOSIS — I42.9 CARDIOMYOPATHY, UNSPECIFIED TYPE (H): ICD-10-CM

## 2023-01-17 RX ORDER — CLOPIDOGREL BISULFATE 75 MG/1
75 TABLET ORAL DAILY
Qty: 90 TABLET | Refills: 3 | Status: SHIPPED | OUTPATIENT
Start: 2023-01-17 | End: 2024-02-14

## 2023-01-18 ENCOUNTER — OFFICE VISIT (OUTPATIENT)
Dept: ORTHOPEDICS | Facility: CLINIC | Age: 68
End: 2023-01-18
Payer: MEDICARE

## 2023-01-18 DIAGNOSIS — Z48.89 ENCOUNTER FOR POSTOPERATIVE CARE: Primary | ICD-10-CM

## 2023-01-18 PROCEDURE — 99024 POSTOP FOLLOW-UP VISIT: CPT

## 2023-01-18 NOTE — NURSING NOTE
Reason For Visit:   Chief Complaint   Patient presents with     RECHECK     2 Weeks post op from Right BART. DOS: 01/04/2023 with Dr. Jolly.       There were no vitals taken for this visit.    Pain Assessment  Patient Currently in Pain: Yes  0-10 Pain Scale: 3  Primary Pain Location: Hip (Right hip and entire right lower extremity)    Srinath Raymond, EMT

## 2023-01-18 NOTE — LETTER
1/18/2023         RE: Iraida Hernandez  [address] [facility] [id number]        Dear Colleague,    Thank you for referring your patient, Iraida Hernandez, to the [facility] [place]. Please see a copy of my visit note below.    Chief Complaint:   1. Follow up, DOS 1/4/23 with Dr. Jolly    Procedures:  1. Right total hip arthroplasty    History:  Iraida Hernandez is a pleasant 67 year old 2 weeks s/p above procedure, here for follow up. He has done well since surgery. Denies any hip/groin pain. Does endorse thigh and knee soreness.Taking 2.5mg Oxycodone q 4-6 hours for pain control. Has yet to schedule initial physical therapy evaluation, planning to schedule this week. He is very pleased with his legs feeling equal in length. Denies any redness or drainage about the incision. No concerns today.     Exam:     General: Awake, Alert, and oriented. Articulates and communicates with a normal affect     Right Lower Extremity:    Incisions well healed without evidence of infection, no erythema, drainage, or wound dehiscence     Range of motion and stability exam not performed    TA/Gsc/EHL/FHL with 5/5 strength    Distal pulses palpable, toes are warm and well perfused     Gait: Antalgic without use of assistive , less time on the right    Imaging:  No new imaging.     Medications:     Current Outpatient Medications:      acetaminophen (TYLENOL) 325 MG tablet, Take 2 tablets (650 mg) by mouth every 4 hours as needed for other, Disp: 60 tablet, Rfl: 0     aspirin (ASA) 81 MG EC tablet, Take 2 tablets (162 mg) by mouth daily for 30 days, Disp: 60 tablet, Rfl: 0     atorvastatin (LIPITOR) 40 MG tablet, Take 1 tablet (40 mg) by mouth daily (Patient taking differently: Take 40 mg by mouth At Bedtime), Disp: 90 tablet, Rfl: 3     carvedilol (COREG) 12.5 MG tablet, Take 1 tablet (12.5 mg) by mouth 2 times daily (with meals), Disp: 180 tablet, Rfl: 3     clopidogrel (PLAVIX) 75 MG tablet, Take  1 tablet (75 mg) by mouth daily, Disp: 90 tablet, Rfl: 3     nitroGLYcerin (NITROSTAT) 0.4 MG sublingual tablet, Place 1 tablet (0.4 mg) under the tongue every 5 minutes as needed for chest pain, Disp: 25 tablet, Rfl: 3     oxyCODONE (ROXICODONE) 5 MG tablet, Take 1 tablet (5 mg) by mouth every 4 hours as needed for pain, Disp: 40 tablet, Rfl: 0     oxyCODONE (ROXICODONE) 5 MG tablet, Take 1 tablet (5 mg) by mouth every 4 hours as needed for moderate pain (4-6), Disp: 26 tablet, Rfl: 0     polyethylene glycol (MIRALAX) 17 g packet, Take 17 g by mouth daily, Disp: 10 packet, Rfl: 0     sacubitril-valsartan (ENTRESTO) 49-51 MG per tablet, Take 1 tablet by mouth 2 times daily, Disp: 180 tablet, Rfl: 1     senna-docusate (SENOKOT-S/PERICOLACE) 8.6-50 MG tablet, Take 1 tablet by mouth 2 times daily, Disp: 30 tablet, Rfl: 0     spironolactone (ALDACTONE) 25 MG tablet, Take 1 tablet (25 mg) by mouth daily, Disp: 90 tablet, Rfl: 3     tiZANidine (ZANAFLEX) 4 MG tablet, Take 1 tablet (4 mg) by mouth 3 times daily as needed for muscle spasms, Disp: 40 tablet, Rfl: 0    Assessment:  Doing well 2 weeks s/p right total hip arthroplasty with Dr. Jolly. Basic wound cares were performed today, I did not appreciate signs of infection. We discussed the plan below, all questions were answered to the best of my ability.     Plan:   -Weight bearing: WBAT   -Posterior hip precautions  -Follow with physical therapy focusing on gait and stability  -DVT prophylaxis:  mg x 4 weeks  -Follow up: 6 weeks with Dr. Jolly, will obtain new XR    Ofelia Hendricks PA-C 1/18/2023 3:41 PM  Orthopedic Surgery

## 2023-01-18 NOTE — PROGRESS NOTES
Chief Complaint:   1. Follow up, DOS 1/4/23 with Dr. Jolly    Procedures:  1. Right total hip arthroplasty    History:  Iraida Hernandez is a pleasant 67 year old 2 weeks s/p above procedure, here for follow up. He has done well since surgery. Denies any hip/groin pain. Does endorse thigh and knee soreness.Taking 2.5mg Oxycodone q 4-6 hours for pain control. Has yet to schedule initial physical therapy evaluation, planning to schedule this week. He is very pleased with his legs feeling equal in length. Denies any redness or drainage about the incision. No concerns today.     Exam:     General: Awake, Alert, and oriented. Articulates and communicates with a normal affect     Right Lower Extremity:    Incisions well healed without evidence of infection, no erythema, drainage, or wound dehiscence     Range of motion and stability exam not performed    TA/Gsc/EHL/FHL with 5/5 strength    Distal pulses palpable, toes are warm and well perfused     Gait: Antalgic without use of assistive , less time on the right    Imaging:  No new imaging.     Medications:     Current Outpatient Medications:      acetaminophen (TYLENOL) 325 MG tablet, Take 2 tablets (650 mg) by mouth every 4 hours as needed for other, Disp: 60 tablet, Rfl: 0     aspirin (ASA) 81 MG EC tablet, Take 2 tablets (162 mg) by mouth daily for 30 days, Disp: 60 tablet, Rfl: 0     atorvastatin (LIPITOR) 40 MG tablet, Take 1 tablet (40 mg) by mouth daily (Patient taking differently: Take 40 mg by mouth At Bedtime), Disp: 90 tablet, Rfl: 3     carvedilol (COREG) 12.5 MG tablet, Take 1 tablet (12.5 mg) by mouth 2 times daily (with meals), Disp: 180 tablet, Rfl: 3     clopidogrel (PLAVIX) 75 MG tablet, Take 1 tablet (75 mg) by mouth daily, Disp: 90 tablet, Rfl: 3     nitroGLYcerin (NITROSTAT) 0.4 MG sublingual tablet, Place 1 tablet (0.4 mg) under the tongue every 5 minutes as needed for chest pain, Disp: 25 tablet, Rfl: 3     oxyCODONE (ROXICODONE) 5 MG tablet, Take 1  tablet (5 mg) by mouth every 4 hours as needed for pain, Disp: 40 tablet, Rfl: 0     oxyCODONE (ROXICODONE) 5 MG tablet, Take 1 tablet (5 mg) by mouth every 4 hours as needed for moderate pain (4-6), Disp: 26 tablet, Rfl: 0     polyethylene glycol (MIRALAX) 17 g packet, Take 17 g by mouth daily, Disp: 10 packet, Rfl: 0     sacubitril-valsartan (ENTRESTO) 49-51 MG per tablet, Take 1 tablet by mouth 2 times daily, Disp: 180 tablet, Rfl: 1     senna-docusate (SENOKOT-S/PERICOLACE) 8.6-50 MG tablet, Take 1 tablet by mouth 2 times daily, Disp: 30 tablet, Rfl: 0     spironolactone (ALDACTONE) 25 MG tablet, Take 1 tablet (25 mg) by mouth daily, Disp: 90 tablet, Rfl: 3     tiZANidine (ZANAFLEX) 4 MG tablet, Take 1 tablet (4 mg) by mouth 3 times daily as needed for muscle spasms, Disp: 40 tablet, Rfl: 0    Assessment:  Doing well 2 weeks s/p right total hip arthroplasty with Dr. Jolly. Basic wound cares were performed today, I did not appreciate signs of infection. We discussed the plan below, all questions were answered to the best of my ability.     Plan:   -Weight bearing: WBAT   -Posterior hip precautions  -Follow with physical therapy focusing on gait and stability  -DVT prophylaxis:  mg x 4 weeks  -Follow up: 6 weeks with Dr. Jolly, will obtain new XR    Ofelia Hendricks PA-C 1/18/2023 3:41 PM  Orthopedic Surgery

## 2023-01-20 ENCOUNTER — MYC REFILL (OUTPATIENT)
Dept: FAMILY MEDICINE | Facility: CLINIC | Age: 68
End: 2023-01-20
Payer: MEDICARE

## 2023-01-20 DIAGNOSIS — M16.11 PRIMARY OSTEOARTHRITIS OF RIGHT HIP: ICD-10-CM

## 2023-01-20 RX ORDER — OXYCODONE HYDROCHLORIDE 5 MG/1
5 TABLET ORAL EVERY 4 HOURS PRN
Qty: 30 TABLET | Refills: 0 | Status: SHIPPED | OUTPATIENT
Start: 2023-01-20 | End: 2023-02-02

## 2023-01-24 ENCOUNTER — THERAPY VISIT (OUTPATIENT)
Dept: PHYSICAL THERAPY | Facility: CLINIC | Age: 68
End: 2023-01-24
Payer: MEDICARE

## 2023-01-24 DIAGNOSIS — Z96.641 AFTERCARE FOLLOWING RIGHT HIP JOINT REPLACEMENT SURGERY: ICD-10-CM

## 2023-01-24 DIAGNOSIS — Z47.1 AFTERCARE FOLLOWING RIGHT HIP JOINT REPLACEMENT SURGERY: ICD-10-CM

## 2023-01-24 DIAGNOSIS — M16.11 PRIMARY OSTEOARTHRITIS OF RIGHT HIP: ICD-10-CM

## 2023-01-24 PROCEDURE — 97161 PT EVAL LOW COMPLEX 20 MIN: CPT | Mod: GP | Performed by: PHYSICAL THERAPIST

## 2023-01-24 PROCEDURE — 97116 GAIT TRAINING THERAPY: CPT | Mod: GP | Performed by: PHYSICAL THERAPIST

## 2023-01-24 PROCEDURE — 97530 THERAPEUTIC ACTIVITIES: CPT | Mod: GP | Performed by: PHYSICAL THERAPIST

## 2023-01-24 PROCEDURE — 97110 THERAPEUTIC EXERCISES: CPT | Mod: GP | Performed by: PHYSICAL THERAPIST

## 2023-01-24 ASSESSMENT — ACTIVITIES OF DAILY LIVING (ADL)
LIGHT_TO_MODERATE_WORK: SLIGHT DIFFICULTY
HOS_ADL_COUNT: 15
GOING_DOWN_1_FLIGHT_OF_STAIRS: NO DIFFICULTY AT ALL
GETTING_INTO_AND_OUT_OF_AN_AVERAGE_CAR: SLIGHT DIFFICULTY
DEEP_SQUATTING: MODERATE DIFFICULTY
WALKING_15_MINUTES_OR_GREATER: MODERATE DIFFICULTY
HOW_WOULD_YOU_RATE_YOUR_CURRENT_LEVEL_OF_FUNCTION_DURING_YOUR_USUAL_ACTIVITIES_OF_DAILY_LIVING_FROM_0_TO_100_WITH_100_BEING_YOUR_LEVEL_OF_FUNCTION_PRIOR_TO_YOUR_HIP_PROBLEM_AND_0_BEING_THE_INABILITY_TO_PERFORM_ANY_OF_YOUR_USUAL_DAILY_ACTIVITIES?: 65
WALKING_DOWN_STEEP_HILLS: NO DIFFICULTY AT ALL
STANDING_FOR_15_MINUTES: SLIGHT DIFFICULTY
WALKING_INITIALLY: SLIGHT DIFFICULTY
GOING_UP_1_FLIGHT_OF_STAIRS: SLIGHT DIFFICULTY
WALKING_UP_STEEP_HILLS: SLIGHT DIFFICULTY
HOS_ADL_SCORE(%): 73.33
WALKING_APPROXIMATELY_10_MINUTES: SLIGHT DIFFICULTY
TWISTING/PIVOTING_ON_INVOLVED_LEG: MODERATE DIFFICULTY
HOS_ADL_ITEM_SCORE_TOTAL: 44
SITTING_FOR_15_MINUTES: SLIGHT DIFFICULTY
HEAVY_WORK: SLIGHT DIFFICULTY
ROLLING_OVER_IN_BED: SLIGHT DIFFICULTY
HOS_ADL_HIGHEST_POTENTIAL_SCORE: 60
STEPPING_UP_AND_DOWN_CURBS: SLIGHT DIFFICULTY
PUTTING_ON_SOCKS_AND_SHOES: SLIGHT DIFFICULTY

## 2023-01-24 NOTE — PROGRESS NOTES
[facility]    OUTPATIENT Physical Therapy ORTHOPEDIC EVALUATION  PLAN OF TREATMENT FOR OUTPATIENT REHABILITATION  (COMPLETE FOR INITIAL CLAIMS ONLY)  Patient's Last Name, First Name, M.I.  YOB: 1955  Iraida Hernandez       Provider s Name:  [facility]   Medical Record No.  [id number]   Start of Care Date:  01/24/23   Onset Date:  01/04/23    Treatment Diagnosis:  S/P R BART Medical Diagnosis:     Primary osteoarthritis of right hip  Aftercare following right hip joint replacement surgery       Goals:     01/24/23 0500   Body Part   Goals listed below are for S/P R BART   Goal #1   Goal #1 ambulation   Previous Functional Level No restrictions   Current Functional Level Minutes patient can walk;with cane   Performance Level 10-12 with 3/10 pain decreased stride length   STG Target Performance Minutes patient will be able to walk;without assistive device;Ambulate without gait deviation   Performance Level 20 with 2/10 pain   Rationale for safe household ambulation;for safe outdoor household ambulation;for safe community ambulation;to maintain proper body mechanics/posture while ambulating to avoid additional compensatory injury due to improper gait mechanics;to promote a healthy and active lifestyle   Due Date 02/07/23    LTG Target Performance Minutes patient will be able to  walk;without assistive device;Ambulate without gait deviation   Performance Level 45+ pain free   Rationale for safe household ambulation;for safe outdoor household ambulation;for safe community ambulation;to maintain proper body mechanics/posture while ambulating to avoid additional compensatory injury due to improper gait mechanics;to promote a healthy and active lifestyle   Due Date 04/04/23         Therapy Frequency:  1x/week  Predicted Duration of Therapy Intervention:  10 weeks    Ok  Alex, PT                 I CERTIFY THE NEED FOR THESE SERVICES FURNISHED UNDER        THIS PLAN OF TREATMENT AND WHILE UNDER MY CARE     (Physician attestation of this document indicates review and certification of the therapy plan).                     Certification Date From:  01/24/23   Certification Date To:  04/04/23    Referring Provider:  Lolly Peacock    Initial Assessment        See Epic Evaluation SOC Date: 01/24/23

## 2023-01-24 NOTE — PROGRESS NOTES
Physical Therapy Initial Evaluation  Subjective:  The history is provided by the patient. No  was used.   Patient Health History  Iraida Hernandez being seen for PT for right hip replacement.     Problem began: 1/4/2023.   Problem occurred: Surgery at [facility]   Pain is reported as 3/10 on pain scale.  General health as reported by patient is good.  Pertinent medical history includes: heart problems, implanted device and pain at night/rest.     Medical allergies: none.   Surgeries include:  Orthopedic surgery and heart surgery.    Current medications:  Cardiac medication, high blood pressure medication, muscle relaxants and pain medication.    Current occupation is Retired.                     Therapist Generated HPI Evaluation  Problem details: Pt c/o R hip pain S/P R BART 1/4/2023.  Has been performing discharge HEP without issue.  Currently using quad cane, no assistive device prior to surgery.  Pt is S/P L BART 9/21/2022 with no issues.  MD order date 1/5/2023..         Type of problem:  Right hip.    This is a new condition.  Condition occurred with:  Degenerative joint disease.  Where condition occurred: for unknown reasons.  Patient reports pain:  Anterior and groin.  Pain is described as aching and is constant.  Pain radiates to:  Thigh. Pain is worse in the P.M. (restless leg syndrome).  Since onset symptoms are gradually improving.  Associated symptoms:  Loss of motion/stiffness and loss of strength. Symptoms are exacerbated by descending stairs, ascending stairs, bending/squatting, transfers, standing and walking  and relieved by rest, analgesics and ice.      Barriers include:  None as reported by patient.                        Objective:    Gait:    Gait Type:  Antalgic   Assistive Devices:  Cane  Deviations:  Hip:  Decr dynamic control RGeneral Deviations:  Stride length decr    Flexibility/Screens:       Lower Extremity:      Decreased right lower extremity flexibility:  Hip  Flexors                                                 Hip Evaluation  HIP AROM:  AROM:   Left Hip:        Right Hip:   Normal (WFL per dx)                    Hip Strength:    Flexion:   Right: 4-/5   +  Pain:                    Extension:  Right: 4-/5    +/-  Pain:    Abduction:  Right: 4- and 3+/5   +/-   Pain:  Adduction:  Right: 4+/5   -  Pain:                    Functional Testing:        Core Strength:      Single leg bridge:   Left:/20 reps Right:/20 reps  % of Uninvolved:  10 dbl leg fair control    Quad:      Bilateral leg squat:  Supported-L weight shift noted  Moderate loss of control     Proprioception:    MoneyLionk balance test:   Left:    20-25 sec  Right:  4-5sec  % of Uninvolved:                General     ROS    Assessment/Plan:    Patient is a 67 year old male with right side hip complaints.    Patient has the following significant findings with corresponding treatment plan.                Diagnosis 1:  S/P R BART  Pain -  hot/cold therapy and home program  Decreased ROM/flexibility - manual therapy and therapeutic exercise  Decreased strength - therapeutic exercise and therapeutic activities  Decreased proprioception - neuro re-education and therapeutic activities  Impaired gait - gait training and assistive devices  Impaired muscle performance - neuro re-education  Decreased function - therapeutic activities    Therapy Evaluation Codes:   Cumulative Therapy Evaluation is: Low complexity.    Previous and current functional limitations:  (See Goal Flow Sheet for this information)    Short term and Long term goals: (See Goal Flow Sheet for this information)     Communication ability:  Patient appears to be able to clearly communicate and understand verbal and written communication and follow directions correctly.  Treatment Explanation - The following has been discussed with the patient:   RX ordered/plan of care  Anticipated outcomes  Possible risks and side effects  This patient would benefit from PT  intervention to resume normal activities.   Rehab potential is good.    Frequency:  1 X week, once daily  Duration:  for 10 weeks  Discharge Plan:  Achieve all LTG.  Independent in home treatment program.  Reach maximal therapeutic benefit.    Please refer to the daily flowsheet for treatment today, total treatment time and time spent performing 1:1 timed codes.

## 2023-01-31 ENCOUNTER — HOSPITAL ENCOUNTER (OUTPATIENT)
Dept: CARDIOLOGY | Facility: CLINIC | Age: 68
Discharge: HOME | End: 2023-01-31
Attending: NURSE PRACTITIONER | Admitting: NURSE PRACTITIONER
Payer: MEDICARE

## 2023-01-31 DIAGNOSIS — I25.5 ISCHEMIC CARDIOMYOPATHY: ICD-10-CM

## 2023-01-31 LAB — LVEF ECHO: NORMAL

## 2023-01-31 PROCEDURE — 93308 TTE F-UP OR LMTD: CPT | Mod: 26 | Performed by: INTERNAL MEDICINE

## 2023-01-31 PROCEDURE — 93325 DOPPLER ECHO COLOR FLOW MAPG: CPT | Mod: 26 | Performed by: INTERNAL MEDICINE

## 2023-01-31 PROCEDURE — 93321 DOPPLER ECHO F-UP/LMTD STD: CPT

## 2023-01-31 PROCEDURE — 255N000002 HC RX 255 OP 636: Performed by: NURSE PRACTITIONER

## 2023-01-31 PROCEDURE — 93321 DOPPLER ECHO F-UP/LMTD STD: CPT | Mod: 26 | Performed by: INTERNAL MEDICINE

## 2023-01-31 PROCEDURE — 93325 DOPPLER ECHO COLOR FLOW MAPG: CPT

## 2023-01-31 RX ADMIN — HUMAN ALBUMIN MICROSPHERES AND PERFLUTREN 9 ML: 10; .22 INJECTION, SOLUTION INTRAVENOUS at 14:34

## 2023-02-01 ENCOUNTER — THERAPY VISIT (OUTPATIENT)
Dept: PHYSICAL THERAPY | Facility: CLINIC | Age: 68
End: 2023-02-01
Payer: MEDICARE

## 2023-02-01 DIAGNOSIS — Z47.1 AFTERCARE FOLLOWING RIGHT HIP JOINT REPLACEMENT SURGERY: Primary | ICD-10-CM

## 2023-02-01 DIAGNOSIS — Z96.641 AFTERCARE FOLLOWING RIGHT HIP JOINT REPLACEMENT SURGERY: Primary | ICD-10-CM

## 2023-02-01 PROCEDURE — 97110 THERAPEUTIC EXERCISES: CPT | Mod: GP | Performed by: PHYSICAL THERAPIST

## 2023-02-01 PROCEDURE — 97112 NEUROMUSCULAR REEDUCATION: CPT | Mod: GP | Performed by: PHYSICAL THERAPIST

## 2023-02-01 PROCEDURE — 97530 THERAPEUTIC ACTIVITIES: CPT | Mod: GP | Performed by: PHYSICAL THERAPIST

## 2023-02-02 ENCOUNTER — ANCILLARY PROCEDURE (OUTPATIENT)
Dept: CARDIOLOGY | Facility: CLINIC | Age: 68
End: 2023-02-02
Attending: INTERNAL MEDICINE
Payer: MEDICARE

## 2023-02-02 ENCOUNTER — OFFICE VISIT (OUTPATIENT)
Dept: CARDIOLOGY | Facility: CLINIC | Age: 68
End: 2023-02-02
Payer: MEDICARE

## 2023-02-02 VITALS
HEIGHT: 72 IN | SYSTOLIC BLOOD PRESSURE: 110 MMHG | WEIGHT: 148 LBS | DIASTOLIC BLOOD PRESSURE: 71 MMHG | BODY MASS INDEX: 20.05 KG/M2 | HEART RATE: 77 BPM

## 2023-02-02 DIAGNOSIS — I25.10 CORONARY ARTERY DISEASE INVOLVING NATIVE CORONARY ARTERY OF NATIVE HEART WITHOUT ANGINA PECTORIS: ICD-10-CM

## 2023-02-02 DIAGNOSIS — I25.5 ISCHEMIC CARDIOMYOPATHY: ICD-10-CM

## 2023-02-02 DIAGNOSIS — Z95.810 ICD (IMPLANTABLE CARDIOVERTER-DEFIBRILLATOR) IN PLACE: ICD-10-CM

## 2023-02-02 DIAGNOSIS — I42.9 CARDIOMYOPATHY, UNSPECIFIED TYPE (H): ICD-10-CM

## 2023-02-02 DIAGNOSIS — I25.5 ISCHEMIC CARDIOMYOPATHY: Primary | ICD-10-CM

## 2023-02-02 PROCEDURE — 93282 PRGRMG EVAL IMPLANTABLE DFB: CPT | Performed by: INTERNAL MEDICINE

## 2023-02-02 PROCEDURE — 99214 OFFICE O/P EST MOD 30 MIN: CPT | Mod: 25 | Performed by: INTERNAL MEDICINE

## 2023-02-02 NOTE — PROGRESS NOTES
HPI and Plan:     Saravanan is a delightful 66 y/o male with history of hyperlipidemia, CAD, current smoker, s/p bilateral hip replacement.   Nuclear stress test 12/22 showing LVEF 37% without ischemia.  Echocardiogram January of this year showing no change with LVEF 30-35% with normal valvular function.     LDL 75.  Seen by Ellen Toney whom he enjoys.  Taken off of lisinopril and placed on Entresto.with decrease in LVEDD to 4.4 cm.  Denies chest pain, sob, palpitations, edema.      He is delighted with his new hips to be gobble bilateral hip replacement done recently and he is ambulating and recovering well.  Excited to be able to actually drive to [place] this year and plans on playing golf in the summertime here.    Recommend follow-up with Ellen in 1 years time.  Continue on Entresto it did look like he got some benefit with LV size reduction.  No evidence of heart failure or angina pectus.      Today's clinic visit entailed:  Review of the result(s) of each unique test - echocardiogram  Prescription drug management  35 minutes spent on the date of the encounter doing chart review, history and exam, documentation and further activities per the note  Provider  Link to [facility] Help Grid     The level of medical decision making during this visit was of moderate complexity.      No orders of the defined types were placed in this encounter.    No orders of the defined types were placed in this encounter.    Medications Discontinued During This Encounter   Medication Reason     oxyCODONE (ROXICODONE) 5 MG tablet Stopped by Patient (No AVS)     polyethylene glycol (MIRALAX) 17 g packet Stopped by Patient (No AVS)     senna-docusate (SENOKOT-S/PERICOLACE) 8.6-50 MG tablet Stopped by Patient (No AVS)     tiZANidine (ZANAFLEX) 4 MG tablet Stopped by Patient (No AVS)     oxyCODONE (ROXICODONE) 5 MG tablet Stopped by Patient (No AVS)         No diagnosis found.    CURRENT MEDICATIONS:  Current Outpatient Medications   Medication  Sig Dispense Refill     acetaminophen (TYLENOL) 325 MG tablet Take 2 tablets (650 mg) by mouth every 4 hours as needed for other 60 tablet 0     aspirin (ASA) 81 MG EC tablet Take 2 tablets (162 mg) by mouth daily for 30 days 60 tablet 0     atorvastatin (LIPITOR) 40 MG tablet Take 1 tablet (40 mg) by mouth daily (Patient taking differently: Take 40 mg by mouth At Bedtime) 90 tablet 3     carvedilol (COREG) 12.5 MG tablet Take 1 tablet (12.5 mg) by mouth 2 times daily (with meals) 180 tablet 3     clopidogrel (PLAVIX) 75 MG tablet Take 1 tablet (75 mg) by mouth daily 90 tablet 3     nitroGLYcerin (NITROSTAT) 0.4 MG sublingual tablet Place 1 tablet (0.4 mg) under the tongue every 5 minutes as needed for chest pain 25 tablet 3     sacubitril-valsartan (ENTRESTO) 49-51 MG per tablet Take 1 tablet by mouth 2 times daily 180 tablet 1     spironolactone (ALDACTONE) 25 MG tablet Take 1 tablet (25 mg) by mouth daily 90 tablet 3       ALLERGIES     Allergies   Allergen Reactions     Penicillins      Violent shaking of entire body. Reaction was 50 years ago      Tetracyclines Hives       PAST MEDICAL HISTORY:  Past Medical History:   Diagnosis Date     Antiplatelet or antithrombotic long-term use      Arthritis Age related     Calculus of kidney      Cancer (H)     basel cell on nose     Cardiac arrest (H)     V-fib, 2/16/2014     Cardiomyopathy (H)      Coronary artery disease      Hyperlipidaemia      Hyperlipidemia with target LDL less than 70 10/31/2010     Diagnosis updated by automated process. Provider to review and confirm.     ICD (implantable cardiac defibrillator) in place     12-1-2014 EF 29%     Myocardial infarction (H) 02/2014     Anterior infarct     Pacemaker      Prostate cancer (H)      Stented coronary artery      Tobacco dependence        PAST SURGICAL HISTORY:  Past Surgical History:   Procedure Laterality Date     ABDOMEN SURGERY  15 years ago    Hearnia     ARTHROPLASTY HIP Left 9/21/2022    Procedure:  Left total hip arthroplasty;  Surgeon: Ok Jolly MD;  Location: [facility] OR     ARTHROPLASTY HIP Right 1/4/2023    Procedure: Right total hip arthroplasty;  Surgeon: Ok Jolly MD;  Location: [facility] OR     BIOPSY  February 2019    Prostate     CARDIAC SURGERY       COLONOSCOPY  2005     HEART CATH, ANGIOPLASTY  02/2014    Emergent cath,thrombectomy-pt had cardiac arrest-severe 3 vessel CAD-MACY proximal LAD, LAD diagonal kissing balloon, and stent to proximal RCA     HERNIA REPAIR       NO HISTORY OF SURGERY       PROSTATE SURGERY  03/29/2019       FAMILY HISTORY:  Family History   Problem Relation Age of Onset     Cerebrovascular Disease Father      Heart Disease Father         MI     Unknown/Adopted Father      Cancer Mother      Other Cancer Mother      Unknown/Adopted Mother         Laine David     Unknown/Adopted Maternal Grandmother      Unknown/Adopted Maternal Grandfather      Unknown/Adopted Paternal Grandmother      Unknown/Adopted Paternal Grandfather      Diabetes Son      Family History Negative Son      Cancer Maternal Uncle        SOCIAL HISTORY:  Social History     Socioeconomic History     Marital status:      Spouse name: Jessica     Number of children: 2     Years of education: None     Highest education level: None   Occupational History     Occupation:      Employer: shelter supply     Comment: [employer]   Tobacco Use     Smoking status: Light Smoker     Packs/day: 0.70     Years: 48.00     Pack years: 33.60     Types: Cigarettes     Start date: 1/1/1975     Smokeless tobacco: Never     Tobacco comments:     3-4 cigs per day 1/2/22   Vaping Use     Vaping Use: Never used   Substance and Sexual Activity     Alcohol use: Yes     Comment: 1-3 drinks per week     Drug use: No     Sexual activity: Yes     Partners: Female   Other Topics Concern      Service No     Blood Transfusions No     Caffeine Concern No     Comment: soda occasionally       "Occupational Exposure No     Hobby Hazards No     Sleep Concern No     Stress Concern No     Weight Concern No     Special Diet Yes     Comment: eats healthy      Back Care No     Exercise Yes     Comment: golf 2x/week, tredmill daily     Seat Belt Yes     Self-Exams Yes     Parent/sibling w/ CABG, MI or angioplasty before 65F 55M? No     Social Determinants of Health     Financial Resource Strain: Low Risk      Difficulty of Paying Living Expenses: Not hard at all   Food Insecurity: No Food Insecurity     Worried About Running Out of Food in the Last Year: Never true     Ran Out of Food in the Last Year: Never true   Transportation Needs: No Transportation Needs     Lack of Transportation (Medical): No     Lack of Transportation (Non-Medical): No   Physical Activity: Insufficiently Active     Days of Exercise per Week: 2 days     Minutes of Exercise per Session: 60 min   Stress: No Stress Concern Present     Feeling of Stress : Not at all   Social Connections: Moderately Integrated     Frequency of Communication with Friends and Family: More than three times a week     Frequency of Social Gatherings with Friends and Family: Twice a week     Attends Mosque Services: 1 to 4 times per year     Active Member of Clubs or Organizations: No     Marital Status:    Housing Stability: Low Risk      Unable to Pay for Housing in the Last Year: No     Number of Places Lived in the Last Year: 1     Unstable Housing in the Last Year: No       Review of Systems:  Skin:        Eyes:       ENT:       Respiratory:       Cardiovascular:       Gastroenterology:      Genitourinary:       Musculoskeletal:       Neurologic:       Psychiatric:       Heme/Lymph/Imm:       Endocrine:         Physical Exam:  Vitals: /71 (BP Location: Left arm, Cuff Size: Adult Large)   Pulse 77   Ht 1.816 m (5' 11.5\")   Wt 67.1 kg (148 lb)   BMI 20.35 kg/m      Constitutional:           Skin:             Head:           Eyes:       "     Lymph:      ENT:           Neck:           Respiratory:            Cardiac:                                                           GI:           Extremities and Muscular Skeletal:                 Neurological:           Psych:         Recent Lab Results:  LIPID RESULTS:  Lab Results   Component Value Date    CHOL 141 12/19/2022    CHOL 142 05/24/2019    HDL 45 12/19/2022    HDL 48 05/24/2019    LDL 75 12/19/2022    LDL 74 05/24/2019    TRIG 104 12/19/2022    TRIG 99 05/24/2019    CHOLHDLRATIO 3.1 08/15/2014       LIVER ENZYME RESULTS:  Lab Results   Component Value Date    AST 19 10/25/2021    AST 14 02/15/2019    ALT 36 10/25/2021    ALT 6 05/24/2019       CBC RESULTS:  Lab Results   Component Value Date    WBC 14.3 (H) 01/05/2023    WBC 8.4 12/01/2014    RBC 3.17 (L) 01/05/2023    RBC 4.63 12/01/2014    HGB 10.6 (L) 01/05/2023    HGB 15.2 12/01/2014    HCT 31.8 (L) 01/05/2023    HCT 45.7 12/01/2014     01/05/2023    MCV 99 12/01/2014    MCH 33.4 (H) 01/05/2023    MCH 32.8 12/01/2014    MCHC 33.3 01/05/2023    MCHC 33.3 12/01/2014    RDW 12.4 01/05/2023    RDW 12.5 12/01/2014     01/05/2023     12/01/2014       BMP RESULTS:  Lab Results   Component Value Date     01/05/2023     (H) 09/21/2022     02/15/2019    POTASSIUM 4.2 01/05/2023    POTASSIUM 4.4 09/21/2022    POTASSIUM 4.2 02/15/2019    CHLORIDE 106 01/05/2023    CHLORIDE 103 02/15/2019    CO2 25 01/05/2023    CO2 27 02/15/2019    ANIONGAP 7 01/05/2023    ANIONGAP 4 02/15/2019     (H) 01/05/2023     (H) 01/05/2023    GLC 99 02/15/2019    BUN 18 01/05/2023    BUN 14 02/15/2019    CR 1.01 01/05/2023    CR 0.91 02/15/2019    GFRESTIMATED 81 01/05/2023    GFRESTIMATED 75 01/28/2021    GFRESTBLACK >90 01/28/2021    DIEGO 7.8 (L) 01/05/2023    DIEGO 8.4 (L) 02/15/2019        A1C RESULTS:  Lab Results   Component Value Date    A1C 5.5 10/25/2021    A1C 4.8 12/10/2007       INR RESULTS:  Lab Results   Component  Value Date    INR 0.90 12/01/2014    INR 0.89 02/16/2014           CC  No referring provider defined for this encounter.

## 2023-02-02 NOTE — LETTER
2/2/2023    Cesar Marie PA-C  [address] [facility] [address]    RE: Iraida David       Dear Colleague,     I had the pleasure of seeing Iraida Hernandez in the [facility].  HPI and Plan:     Saravanan is a delightful 66 y/o male with history of hyperlipidemia, CAD, current smoker, s/p bilateral hip replacement.   Nuclear stress test 12/22 showing LVEF 37% without ischemia.  Echocardiogram January of this year showing no change with LVEF 30-35% with normal valvular function.     LDL 75.  Seen by Ellen Toney whom he enjoys.  Taken off of lisinopril and placed on Entresto.with decrease in LVEDD to 4.4 cm.  Denies chest pain, sob, palpitations, edema.      He is delighted with his new hips to be gobble bilateral hip replacement done recently and he is ambulating and recovering well.  Excited to be able to actually drive to [place] this year and plans on playing golf in the summertime here.    Recommend follow-up with lElen in 1 years time.  Continue on Entresto it did look like he got some benefit with LV size reduction.  No evidence of heart failure or angina pectus.      Today's clinic visit entailed:  Review of the result(s) of each unique test - echocardiogram  Prescription drug management  35 minutes spent on the date of the encounter doing chart review, history and exam, documentation and further activities per the note  Provider  Link to [facility] Help Grid     The level of medical decision making during this visit was of moderate complexity.      No orders of the defined types were placed in this encounter.    No orders of the defined types were placed in this encounter.    Medications Discontinued During This Encounter   Medication Reason     oxyCODONE (ROXICODONE) 5 MG tablet Stopped by Patient (No AVS)     polyethylene glycol (MIRALAX) 17 g packet Stopped by Patient (No AVS)     senna-docusate (SENOKOT-S/PERICOLACE) 8.6-50 MG tablet Stopped by Patient (No AVS)     tiZANidine  (ZANAFLEX) 4 MG tablet Stopped by Patient (No AVS)     oxyCODONE (ROXICODONE) 5 MG tablet Stopped by Patient (No AVS)         No diagnosis found.    CURRENT MEDICATIONS:  Current Outpatient Medications   Medication Sig Dispense Refill     acetaminophen (TYLENOL) 325 MG tablet Take 2 tablets (650 mg) by mouth every 4 hours as needed for other 60 tablet 0     aspirin (ASA) 81 MG EC tablet Take 2 tablets (162 mg) by mouth daily for 30 days 60 tablet 0     atorvastatin (LIPITOR) 40 MG tablet Take 1 tablet (40 mg) by mouth daily (Patient taking differently: Take 40 mg by mouth At Bedtime) 90 tablet 3     carvedilol (COREG) 12.5 MG tablet Take 1 tablet (12.5 mg) by mouth 2 times daily (with meals) 180 tablet 3     clopidogrel (PLAVIX) 75 MG tablet Take 1 tablet (75 mg) by mouth daily 90 tablet 3     nitroGLYcerin (NITROSTAT) 0.4 MG sublingual tablet Place 1 tablet (0.4 mg) under the tongue every 5 minutes as needed for chest pain 25 tablet 3     sacubitril-valsartan (ENTRESTO) 49-51 MG per tablet Take 1 tablet by mouth 2 times daily 180 tablet 1     spironolactone (ALDACTONE) 25 MG tablet Take 1 tablet (25 mg) by mouth daily 90 tablet 3       ALLERGIES     Allergies   Allergen Reactions     Penicillins      Violent shaking of entire body. Reaction was 50 years ago      Tetracyclines Hives       PAST MEDICAL HISTORY:  Past Medical History:   Diagnosis Date     Antiplatelet or antithrombotic long-term use      Arthritis Age related     Calculus of kidney      Cancer (H)     basel cell on nose     Cardiac arrest (H)     V-fib, 2/16/2014     Cardiomyopathy (H)      Coronary artery disease      Hyperlipidaemia      Hyperlipidemia with target LDL less than 70 10/31/2010     Diagnosis updated by automated process. Provider to review and confirm.     ICD (implantable cardiac defibrillator) in place     12-1-2014 EF 29%     Myocardial infarction (H) 02/2014     Anterior infarct     Pacemaker      Prostate cancer (H)      Stented  coronary artery      Tobacco dependence        PAST SURGICAL HISTORY:  Past Surgical History:   Procedure Laterality Date     ABDOMEN SURGERY  15 years ago    Hearnia     ARTHROPLASTY HIP Left 9/21/2022    Procedure: Left total hip arthroplasty;  Surgeon: Ok Jolly MD;  Location: [facility] OR     ARTHROPLASTY HIP Right 1/4/2023    Procedure: Right total hip arthroplasty;  Surgeon: Ok Jolly MD;  Location: [facility] OR     BIOPSY  February 2019    Prostate     CARDIAC SURGERY       COLONOSCOPY  2005     HEART CATH, ANGIOPLASTY  02/2014    Emergent cath,thrombectomy-pt had cardiac arrest-severe 3 vessel CAD-MACY proximal LAD, LAD diagonal kissing balloon, and stent to proximal RCA     HERNIA REPAIR       NO HISTORY OF SURGERY       PROSTATE SURGERY  03/29/2019       FAMILY HISTORY:  Family History   Problem Relation Age of Onset     Cerebrovascular Disease Father      Heart Disease Father         MI     Unknown/Adopted Father      Cancer Mother      Other Cancer Mother      Unknown/Adopted Mother         Laine David     Unknown/Adopted Maternal Grandmother      Unknown/Adopted Maternal Grandfather      Unknown/Adopted Paternal Grandmother      Unknown/Adopted Paternal Grandfather      Diabetes Son      Family History Negative Son      Cancer Maternal Uncle        SOCIAL HISTORY:  Social History     Socioeconomic History     Marital status:      Spouse name: Jessica     Number of children: 2     Years of education: None     Highest education level: None   Occupational History     Occupation:      Employer: shelter supply     Comment: [employer]   Tobacco Use     Smoking status: Light Smoker     Packs/day: 0.70     Years: 48.00     Pack years: 33.60     Types: Cigarettes     Start date: 1/1/1975     Smokeless tobacco: Never     Tobacco comments:     3-4 cigs per day 1/2/22   Vaping Use     Vaping Use: Never used   Substance and Sexual Activity     Alcohol use: Yes     Comment: 1-3  drinks per week     Drug use: No     Sexual activity: Yes     Partners: Female   Other Topics Concern      Service No     Blood Transfusions No     Caffeine Concern No     Comment: soda occasionally      Occupational Exposure No     Hobby Hazards No     Sleep Concern No     Stress Concern No     Weight Concern No     Special Diet Yes     Comment: eats healthy      Back Care No     Exercise Yes     Comment: golf 2x/week, tredmill daily     Seat Belt Yes     Self-Exams Yes     Parent/sibling w/ CABG, MI or angioplasty before 65F 55M? No     Social Determinants of Health     Financial Resource Strain: Low Risk      Difficulty of Paying Living Expenses: Not hard at all   Food Insecurity: No Food Insecurity     Worried About Running Out of Food in the Last Year: Never true     Ran Out of Food in the Last Year: Never true   Transportation Needs: No Transportation Needs     Lack of Transportation (Medical): No     Lack of Transportation (Non-Medical): No   Physical Activity: Insufficiently Active     Days of Exercise per Week: 2 days     Minutes of Exercise per Session: 60 min   Stress: No Stress Concern Present     Feeling of Stress : Not at all   Social Connections: Moderately Integrated     Frequency of Communication with Friends and Family: More than three times a week     Frequency of Social Gatherings with Friends and Family: Twice a week     Attends Oriental orthodox Services: 1 to 4 times per year     Active Member of Clubs or Organizations: No     Marital Status:    Housing Stability: Low Risk      Unable to Pay for Housing in the Last Year: No     Number of Places Lived in the Last Year: 1     Unstable Housing in the Last Year: No       Review of Systems:  Skin:        Eyes:       ENT:       Respiratory:       Cardiovascular:       Gastroenterology:      Genitourinary:       Musculoskeletal:       Neurologic:       Psychiatric:       Heme/Lymph/Imm:       Endocrine:         Physical Exam:  Vitals: BP  "110/71 (BP Location: Left arm, Cuff Size: Adult Large)   Pulse 77   Ht 1.816 m (5' 11.5\")   Wt 67.1 kg (148 lb)   BMI 20.35 kg/m      Constitutional:           Skin:             Head:           Eyes:           Lymph:      ENT:           Neck:           Respiratory:            Cardiac:                                                           GI:           Extremities and Muscular Skeletal:                 Neurological:           Psych:         Recent Lab Results:  LIPID RESULTS:  Lab Results   Component Value Date    CHOL 141 12/19/2022    CHOL 142 05/24/2019    HDL 45 12/19/2022    HDL 48 05/24/2019    LDL 75 12/19/2022    LDL 74 05/24/2019    TRIG 104 12/19/2022    TRIG 99 05/24/2019    CHOLHDLRATIO 3.1 08/15/2014       LIVER ENZYME RESULTS:  Lab Results   Component Value Date    AST 19 10/25/2021    AST 14 02/15/2019    ALT 36 10/25/2021    ALT 6 05/24/2019       CBC RESULTS:  Lab Results   Component Value Date    WBC 14.3 (H) 01/05/2023    WBC 8.4 12/01/2014    RBC 3.17 (L) 01/05/2023    RBC 4.63 12/01/2014    HGB 10.6 (L) 01/05/2023    HGB 15.2 12/01/2014    HCT 31.8 (L) 01/05/2023    HCT 45.7 12/01/2014     01/05/2023    MCV 99 12/01/2014    MCH 33.4 (H) 01/05/2023    MCH 32.8 12/01/2014    MCHC 33.3 01/05/2023    MCHC 33.3 12/01/2014    RDW 12.4 01/05/2023    RDW 12.5 12/01/2014     01/05/2023     12/01/2014       BMP RESULTS:  Lab Results   Component Value Date     01/05/2023     (H) 09/21/2022     02/15/2019    POTASSIUM 4.2 01/05/2023    POTASSIUM 4.4 09/21/2022    POTASSIUM 4.2 02/15/2019    CHLORIDE 106 01/05/2023    CHLORIDE 103 02/15/2019    CO2 25 01/05/2023    CO2 27 02/15/2019    ANIONGAP 7 01/05/2023    ANIONGAP 4 02/15/2019     (H) 01/05/2023     (H) 01/05/2023    GLC 99 02/15/2019    BUN 18 01/05/2023    BUN 14 02/15/2019    CR 1.01 01/05/2023    CR 0.91 02/15/2019    GFRESTIMATED 81 01/05/2023    GFRESTIMATED 75 01/28/2021    GFRESTBLACK " >90 01/28/2021    DIEGO 7.8 (L) 01/05/2023    DIEGO 8.4 (L) 02/15/2019        A1C RESULTS:  Lab Results   Component Value Date    A1C 5.5 10/25/2021    A1C 4.8 12/10/2007       INR RESULTS:  Lab Results   Component Value Date    INR 0.90 12/01/2014    INR 0.89 02/16/2014         CC  No referring provider defined for this encounter.    Thank you for allowing me to participate in the care of your patient.      Sincerely,     ELIANA KHAN MD     [facility]

## 2023-02-06 DIAGNOSIS — Z96.649 STATUS POST HIP REPLACEMENT: Primary | ICD-10-CM

## 2023-02-08 ENCOUNTER — THERAPY VISIT (OUTPATIENT)
Dept: PHYSICAL THERAPY | Facility: CLINIC | Age: 68
End: 2023-02-08
Payer: MEDICARE

## 2023-02-08 DIAGNOSIS — Z47.1 AFTERCARE FOLLOWING RIGHT HIP JOINT REPLACEMENT SURGERY: Primary | ICD-10-CM

## 2023-02-08 DIAGNOSIS — Z96.641 AFTERCARE FOLLOWING RIGHT HIP JOINT REPLACEMENT SURGERY: Primary | ICD-10-CM

## 2023-02-08 PROCEDURE — 97110 THERAPEUTIC EXERCISES: CPT | Mod: GP | Performed by: PHYSICAL THERAPIST

## 2023-02-08 PROCEDURE — 97112 NEUROMUSCULAR REEDUCATION: CPT | Mod: GP | Performed by: PHYSICAL THERAPIST

## 2023-02-08 PROCEDURE — 97530 THERAPEUTIC ACTIVITIES: CPT | Mod: GP | Performed by: PHYSICAL THERAPIST

## 2023-02-12 ENCOUNTER — HEALTH MAINTENANCE LETTER (OUTPATIENT)
Age: 68
End: 2023-02-12

## 2023-02-16 ENCOUNTER — ANCILLARY PROCEDURE (OUTPATIENT)
Dept: GENERAL RADIOLOGY | Facility: CLINIC | Age: 68
End: 2023-02-16
Attending: ORTHOPAEDIC SURGERY
Payer: MEDICARE

## 2023-02-16 ENCOUNTER — OFFICE VISIT (OUTPATIENT)
Dept: ORTHOPEDICS | Facility: CLINIC | Age: 68
End: 2023-02-16
Payer: MEDICARE

## 2023-02-16 DIAGNOSIS — Z47.89 ORTHOPEDIC AFTERCARE: Primary | ICD-10-CM

## 2023-02-16 DIAGNOSIS — Z96.649 STATUS POST HIP REPLACEMENT: ICD-10-CM

## 2023-02-16 PROCEDURE — 73502 X-RAY EXAM HIP UNI 2-3 VIEWS: CPT | Mod: RT | Performed by: RADIOLOGY

## 2023-02-16 PROCEDURE — 99024 POSTOP FOLLOW-UP VISIT: CPT | Performed by: ORTHOPAEDIC SURGERY

## 2023-02-16 NOTE — LETTER
2/16/2023         RE: Iraida Hernandez  [address] [facility] [address]        Dear Colleague,    Thank you for referring your patient, Iraida Hernandez, to the [facility] [place]. Please see a copy of my visit note below.    Chief Complaint: RECHECK (6 week POP // ARTHROPLASTY, HIP, TOTAL (Right) // DOS 1/04/23)       HPI: Iraida Hernandez returns today in follow-up for right total hip arthroplasty. He has done very well since surgery. States hip and thigh pain have resolved, does not take any pain medication. Walks ~ 5,000 steps/day.       Medications and allergies are documented in the EMR and have been reviewed.    Current Outpatient Medications:      acetaminophen (TYLENOL) 325 MG tablet, Take 2 tablets (650 mg) by mouth every 4 hours as needed for other, Disp: 60 tablet, Rfl: 0     atorvastatin (LIPITOR) 40 MG tablet, Take 1 tablet (40 mg) by mouth daily (Patient taking differently: Take 40 mg by mouth At Bedtime), Disp: 90 tablet, Rfl: 3     carvedilol (COREG) 12.5 MG tablet, Take 1 tablet (12.5 mg) by mouth 2 times daily (with meals), Disp: 180 tablet, Rfl: 3     clopidogrel (PLAVIX) 75 MG tablet, Take 1 tablet (75 mg) by mouth daily, Disp: 90 tablet, Rfl: 3     nitroGLYcerin (NITROSTAT) 0.4 MG sublingual tablet, Place 1 tablet (0.4 mg) under the tongue every 5 minutes as needed for chest pain, Disp: 25 tablet, Rfl: 3     sacubitril-valsartan (ENTRESTO) 49-51 MG per tablet, Take 1 tablet by mouth 2 times daily, Disp: 180 tablet, Rfl: 1     spironolactone (ALDACTONE) 25 MG tablet, Take 1 tablet (25 mg) by mouth daily, Disp: 90 tablet, Rfl: 3  Allergies: Penicillins and Tetracyclines    Physical Exam:  On physical examination the patient appears the stated age, is in no acute distress, affect is appropriate, and breathing is non-labored.  There were no vitals taken for this visit.  There is no height or weight on file to calculate BMI.  Gait: normal   Incision:  benign  ROM: fluid and painless  Sciatic function is normal and symmetric    X-rays:    I reviewed the x-rays dated today.  Previous films reviewed.    Findings:  Normal progression for a total hip arthroplasty without evidence of loosening or subsidence.    Assessment: doing very well.   Plan: cont HEP and RTC in 6 weeks, sooner if issues.     Referred Self      Ok Jolly MD

## 2023-02-16 NOTE — NURSING NOTE
Reason For Visit:   Chief Complaint   Patient presents with     RECHECK     6 week POP // ARTHROPLASTY, HIP, TOTAL (Right) // DOS 1/04/23       There were no vitals taken for this visit.         Ping Allan, ATC

## 2023-02-16 NOTE — PROGRESS NOTES
Chief Complaint: RECHECK (6 week POP // ARTHROPLASTY, HIP, TOTAL (Right) // DOS 1/04/23)       HPI: Iraida Hernandez returns today in follow-up for right total hip arthroplasty. He has done very well since surgery. States hip and thigh pain have resolved, does not take any pain medication. Walks ~ 5,000 steps/day.       Medications and allergies are documented in the EMR and have been reviewed.    Current Outpatient Medications:      acetaminophen (TYLENOL) 325 MG tablet, Take 2 tablets (650 mg) by mouth every 4 hours as needed for other, Disp: 60 tablet, Rfl: 0     atorvastatin (LIPITOR) 40 MG tablet, Take 1 tablet (40 mg) by mouth daily (Patient taking differently: Take 40 mg by mouth At Bedtime), Disp: 90 tablet, Rfl: 3     carvedilol (COREG) 12.5 MG tablet, Take 1 tablet (12.5 mg) by mouth 2 times daily (with meals), Disp: 180 tablet, Rfl: 3     clopidogrel (PLAVIX) 75 MG tablet, Take 1 tablet (75 mg) by mouth daily, Disp: 90 tablet, Rfl: 3     nitroGLYcerin (NITROSTAT) 0.4 MG sublingual tablet, Place 1 tablet (0.4 mg) under the tongue every 5 minutes as needed for chest pain, Disp: 25 tablet, Rfl: 3     sacubitril-valsartan (ENTRESTO) 49-51 MG per tablet, Take 1 tablet by mouth 2 times daily, Disp: 180 tablet, Rfl: 1     spironolactone (ALDACTONE) 25 MG tablet, Take 1 tablet (25 mg) by mouth daily, Disp: 90 tablet, Rfl: 3  Allergies: Penicillins and Tetracyclines    Physical Exam:  On physical examination the patient appears the stated age, is in no acute distress, affect is appropriate, and breathing is non-labored.  There were no vitals taken for this visit.  There is no height or weight on file to calculate BMI.  Gait: normal   Incision: benign  ROM: fluid and painless  Sciatic function is normal and symmetric    X-rays:    I reviewed the x-rays dated today.  Previous films reviewed.    Findings:  Normal progression for a total hip arthroplasty without evidence of loosening or subsidence.    Assessment: doing  very well.   Plan: cont HEP and RTC in 6 weeks, sooner if issues.     Referred Self

## 2023-02-17 ASSESSMENT — ENCOUNTER SYMPTOMS
DIZZINESS: 0
NERVOUS/ANXIOUS: 0
HEMATURIA: 0
DIARRHEA: 0
CHILLS: 0
ABDOMINAL PAIN: 0
ARTHRALGIAS: 0
NAUSEA: 0
SORE THROAT: 0
CONSTIPATION: 0
HEADACHES: 0
DYSURIA: 0
JOINT SWELLING: 0
HEMATOCHEZIA: 0
EYE PAIN: 0
FEVER: 0
HEARTBURN: 0
FREQUENCY: 0
PARESTHESIAS: 0
MYALGIAS: 0
SHORTNESS OF BREATH: 0
COUGH: 0
WEAKNESS: 0
PALPITATIONS: 0

## 2023-02-17 ASSESSMENT — ACTIVITIES OF DAILY LIVING (ADL): CURRENT_FUNCTION: NO ASSISTANCE NEEDED

## 2023-02-19 ENCOUNTER — LAB (OUTPATIENT)
Dept: FAMILY MEDICINE | Facility: CLINIC | Age: 68
End: 2023-02-19
Payer: MEDICARE

## 2023-02-19 DIAGNOSIS — Z12.11 SCREEN FOR COLON CANCER: ICD-10-CM

## 2023-02-22 ENCOUNTER — THERAPY VISIT (OUTPATIENT)
Dept: PHYSICAL THERAPY | Facility: CLINIC | Age: 68
End: 2023-02-22
Payer: MEDICARE

## 2023-02-22 DIAGNOSIS — Z96.641 AFTERCARE FOLLOWING RIGHT HIP JOINT REPLACEMENT SURGERY: Primary | ICD-10-CM

## 2023-02-22 DIAGNOSIS — Z47.1 AFTERCARE FOLLOWING RIGHT HIP JOINT REPLACEMENT SURGERY: Primary | ICD-10-CM

## 2023-02-22 LAB
MDC_IDC_LEAD_IMPLANT_DT: NORMAL
MDC_IDC_LEAD_LOCATION: NORMAL
MDC_IDC_LEAD_LOCATION_DETAIL_1: NORMAL
MDC_IDC_LEAD_MFG: NORMAL
MDC_IDC_LEAD_MODEL: NORMAL
MDC_IDC_LEAD_POLARITY_TYPE: NORMAL
MDC_IDC_LEAD_SERIAL: NORMAL
MDC_IDC_MSMT_BATTERY_DTM: NORMAL
MDC_IDC_MSMT_BATTERY_REMAINING_LONGEVITY: 72 MO
MDC_IDC_MSMT_BATTERY_REMAINING_PERCENTAGE: 80 %
MDC_IDC_MSMT_BATTERY_STATUS: NORMAL
MDC_IDC_MSMT_CAP_CHARGE_DTM: NORMAL
MDC_IDC_MSMT_CAP_CHARGE_TIME: 10.3 S
MDC_IDC_MSMT_CAP_CHARGE_TYPE: NORMAL
MDC_IDC_MSMT_LEADCHNL_RV_IMPEDANCE_VALUE: 734 OHM
MDC_IDC_MSMT_LEADCHNL_RV_PACING_THRESHOLD_AMPLITUDE: 0.5 V
MDC_IDC_MSMT_LEADCHNL_RV_PACING_THRESHOLD_PULSEWIDTH: 0.5 MS
MDC_IDC_PG_IMPLANT_DTM: NORMAL
MDC_IDC_PG_MFG: NORMAL
MDC_IDC_PG_MODEL: NORMAL
MDC_IDC_PG_SERIAL: NORMAL
MDC_IDC_PG_TYPE: NORMAL
MDC_IDC_SESS_CLINIC_NAME: NORMAL
MDC_IDC_SESS_DTM: NORMAL
MDC_IDC_SESS_TYPE: NORMAL
MDC_IDC_SET_BRADY_LOWRATE: 40 {BEATS}/MIN
MDC_IDC_SET_BRADY_MODE: NORMAL
MDC_IDC_SET_LEADCHNL_RV_PACING_AMPLITUDE: 2 V
MDC_IDC_SET_LEADCHNL_RV_PACING_POLARITY: NORMAL
MDC_IDC_SET_LEADCHNL_RV_PACING_PULSEWIDTH: 0.5 MS
MDC_IDC_SET_LEADCHNL_RV_SENSING_ADAPTATION_MODE: NORMAL
MDC_IDC_SET_LEADCHNL_RV_SENSING_POLARITY: NORMAL
MDC_IDC_SET_LEADCHNL_RV_SENSING_SENSITIVITY: 0.6 MV
MDC_IDC_SET_ZONE_DETECTION_INTERVAL: 250 MS
MDC_IDC_SET_ZONE_DETECTION_INTERVAL: 300 MS
MDC_IDC_SET_ZONE_DETECTION_INTERVAL: 353 MS
MDC_IDC_SET_ZONE_TYPE: NORMAL
MDC_IDC_SET_ZONE_VENDOR_TYPE: NORMAL
MDC_IDC_STAT_BRADY_DTM_END: NORMAL
MDC_IDC_STAT_BRADY_DTM_START: NORMAL
MDC_IDC_STAT_BRADY_RV_PERCENT_PACED: 0 %
MDC_IDC_STAT_EPISODE_RECENT_COUNT: 0
MDC_IDC_STAT_EPISODE_RECENT_COUNT: 41
MDC_IDC_STAT_EPISODE_RECENT_COUNT_DTM_END: NORMAL
MDC_IDC_STAT_EPISODE_RECENT_COUNT_DTM_START: NORMAL
MDC_IDC_STAT_EPISODE_TYPE: NORMAL
MDC_IDC_STAT_EPISODE_VENDOR_TYPE: NORMAL
MDC_IDC_STAT_TACHYTHERAPY_ATP_DELIVERED_RECENT: 0
MDC_IDC_STAT_TACHYTHERAPY_ATP_DELIVERED_TOTAL: 0
MDC_IDC_STAT_TACHYTHERAPY_RECENT_DTM_END: NORMAL
MDC_IDC_STAT_TACHYTHERAPY_RECENT_DTM_START: NORMAL
MDC_IDC_STAT_TACHYTHERAPY_SHOCKS_ABORTED_RECENT: 0
MDC_IDC_STAT_TACHYTHERAPY_SHOCKS_ABORTED_TOTAL: 0
MDC_IDC_STAT_TACHYTHERAPY_SHOCKS_DELIVERED_RECENT: 0
MDC_IDC_STAT_TACHYTHERAPY_SHOCKS_DELIVERED_TOTAL: 0
MDC_IDC_STAT_TACHYTHERAPY_TOTAL_DTM_END: NORMAL
MDC_IDC_STAT_TACHYTHERAPY_TOTAL_DTM_START: NORMAL

## 2023-02-22 PROCEDURE — 97110 THERAPEUTIC EXERCISES: CPT | Mod: GP | Performed by: PHYSICAL THERAPIST

## 2023-02-22 PROCEDURE — 97112 NEUROMUSCULAR REEDUCATION: CPT | Mod: GP | Performed by: PHYSICAL THERAPIST

## 2023-02-22 PROCEDURE — 97530 THERAPEUTIC ACTIVITIES: CPT | Mod: GP | Performed by: PHYSICAL THERAPIST

## 2023-02-24 ENCOUNTER — VIRTUAL VISIT (OUTPATIENT)
Dept: FAMILY MEDICINE | Facility: CLINIC | Age: 68
End: 2023-02-24
Attending: PHYSICIAN ASSISTANT
Payer: MEDICARE

## 2023-02-24 DIAGNOSIS — Z00.00 ENCOUNTER FOR MEDICARE ANNUAL WELLNESS EXAM: Primary | ICD-10-CM

## 2023-02-24 DIAGNOSIS — I47.20 VENTRICULAR TACHYCARDIA (H): ICD-10-CM

## 2023-02-24 DIAGNOSIS — I25.119 CORONARY ARTERY DISEASE WITH ANGINA PECTORIS, UNSPECIFIED VESSEL OR LESION TYPE, UNSPECIFIED WHETHER NATIVE OR TRANSPLANTED HEART (H): ICD-10-CM

## 2023-02-24 DIAGNOSIS — C61 PROSTATE CANCER (H): ICD-10-CM

## 2023-02-24 DIAGNOSIS — Z12.11 SCREEN FOR COLON CANCER: ICD-10-CM

## 2023-02-24 DIAGNOSIS — R21 RASH AND NONSPECIFIC SKIN ERUPTION: ICD-10-CM

## 2023-02-24 PROCEDURE — G0439 PPPS, SUBSEQ VISIT: HCPCS | Mod: 95 | Performed by: PHYSICIAN ASSISTANT

## 2023-02-24 ASSESSMENT — ENCOUNTER SYMPTOMS
EYE PAIN: 0
SHORTNESS OF BREATH: 0
HEMATURIA: 0
PARESTHESIAS: 0
DIARRHEA: 0
ARTHRALGIAS: 0
HEARTBURN: 0
CHILLS: 0
FREQUENCY: 0
HEADACHES: 0
NAUSEA: 0
DIZZINESS: 0
SORE THROAT: 0
MYALGIAS: 0
PALPITATIONS: 0
NERVOUS/ANXIOUS: 0
ABDOMINAL PAIN: 0
JOINT SWELLING: 0
COUGH: 0
DYSURIA: 0
WEAKNESS: 0
HEMATOCHEZIA: 0
CONSTIPATION: 0
FEVER: 0

## 2023-02-24 ASSESSMENT — ACTIVITIES OF DAILY LIVING (ADL): CURRENT_FUNCTION: NO ASSISTANCE NEEDED

## 2023-02-24 NOTE — PROGRESS NOTES
"SUBJECTIVE:   Saravanan is a 67 year old who presents for Preventive Visit.  {Split Bill scripting  The purpose of this visit is to discuss your medical history and prevent health problems before you are sick. You may be responsible for a co-pay, coinsurance, or deductible if your visit today includes services such as checking on a sore throat, having an x-ray or lab test, or treating and evaluating a new or existing condition :038129}  Patient has been advised of split billing requirements and indicates understanding: Yes  Are you in the first 12 months of your Medicare coverage?  No    Healthy Habits:     In general, how would you rate your overall health?  Good    Frequency of exercise:  4-5 days/week    Duration of exercise:  30-45 minutes    Do you usually eat at least 4 servings of fruit and vegetables a day, include whole grains    & fiber and avoid regularly eating high fat or \"junk\" foods?  Yes    Taking medications regularly:  Yes    Medication side effects:  None    Ability to successfully perform activities of daily living:  No assistance needed    Home Safety:  No safety concerns identified    Hearing Impairment:  No hearing concerns    In the past 6 months, have you been bothered by leaking of urine?  No    In general, how would you rate your overall mental or emotional health?  Excellent      PHQ-2 Total Score: 0    Additional concerns today:  No      History of past Cardiac arrest with V tach and CAD followed by cardiology. Last seen in feb. Stable. Routine follow ups with them in the future planned.     History of prostate cancer. Followed by urology. Last seen sept 2022. Stable. Plans for repeat psa in late may/early June.     Have you ever done Advance Care Planning? (For example, a Health Directive, POLST, or a discussion with a medical provider or your loved ones about your wishes): Yes, advance care planning is on file.       Fall risk  Fallen 2 or more times in the past year?: No  Any fall with " injury in the past year?: No    Cognitive Screening Unable to complete due to virtual visit; need for additional assessment in future face-to-face visit    {Do you have sleep apnea, excessive snoring or daytime drowsiness? (Optional):428897}    Reviewed and updated as needed this visit by clinical staff   Tobacco  Allergies  Meds              Reviewed and updated as needed this visit by Provider                 Social History     Tobacco Use     Smoking status: Light Smoker     Packs/day: 0.70     Years: 48.00     Pack years: 33.60     Types: Cigarettes     Start date: 1/1/1975     Smokeless tobacco: Never     Tobacco comments:     3-4 cigs per day 1/2/22   Substance Use Topics     Alcohol use: Yes     Comment: 1-3 drinks per week         Alcohol Use 2/17/2023   Prescreen: >3 drinks/day or >7 drinks/week? No   Prescreen: >3 drinks/day or >7 drinks/week? -       Current providers sharing in care for this patient include:   Patient Care Team:  Cesar Marie PA-C as PCP - General (Family Medicine)  Juancarlos Stevens MD as MD (Cardiology)  Jose Roberto Echevarria MD as MD (Urology)  Sukumar Oglesby MD as Assigned Cancer Care Provider  Yeo, Albert, MD as Assigned Musculoskeletal Provider  Cesar Marie PA-C as Assigned PCP  Ellen Toney APRN CNP as Assigned Heart and Vascular Provider    The following health maintenance items are reviewed in Epic and correct as of today:  Health Maintenance   Topic Date Due     DIABETIC FOOT EXAM  Never done     LUNG CANCER SCREENING  Never done     Pneumococcal Vaccine: 65+ Years (2 - PCV) 02/17/2015     DTAP/TDAP/TD IMMUNIZATION (2 - Td or Tdap) 04/06/2015     A1C  01/25/2022     NICOTINE/TOBACCO CESSATION COUNSELING Q 1 YR  10/18/2022     MEDICARE ANNUAL WELLNESS VISIT  10/18/2022     MICROALBUMIN  10/25/2022     COLORECTAL CANCER SCREENING  10/29/2022     EYE EXAM  01/25/2023     BMP  07/05/2023     LIPID  12/19/2023     ANNUAL REVIEW OF  "HM ORDERS  2023     FALL RISK ASSESSMENT  2024     ADVANCE CARE PLANNING  10/28/2026     HEPATITIS C SCREENING  Completed     PHQ-2 (once per calendar year)  Completed     INFLUENZA VACCINE  Completed     ZOSTER IMMUNIZATION  Completed     AORTIC ANEURYSM SCREENING (SYSTEM ASSIGNED)  Completed     COVID-19 Vaccine  Completed     IPV IMMUNIZATION  Aged Out     MENINGITIS IMMUNIZATION  Aged Out     {Chronicprobdata (optional):227916}  {Decision Support (Optional):045516}        Review of Systems   Constitutional: Negative for chills and fever.   HENT: Negative for congestion, ear pain, hearing loss and sore throat.    Eyes: Negative for pain and visual disturbance.   Respiratory: Negative for cough and shortness of breath.    Cardiovascular: Negative for chest pain, palpitations and peripheral edema.   Gastrointestinal: Negative for abdominal pain, constipation, diarrhea, heartburn, hematochezia and nausea.   Genitourinary: Negative for dysuria, frequency, genital sores, hematuria, impotence, penile discharge and urgency.   Musculoskeletal: Negative for arthralgias, joint swelling and myalgias.   Skin: Positive for rash.   Neurological: Negative for dizziness, weakness, headaches and paresthesias.   Psychiatric/Behavioral: Negative for mood changes. The patient is not nervous/anxious.      {ROS COMP (Optional):689025}    OBJECTIVE:   There were no vitals taken for this visit. Estimated body mass index is 20.35 kg/m  as calculated from the following:    Height as of 23: 1.816 m (5' 11.5\").    Weight as of 23: 67.1 kg (148 lb).  Physical Exam  {Exam (Optional) :814805}    {Diagnostic Test Results (Optional):889983::\"Diagnostic Test Results:\",\"Labs reviewed in Epic\"}    ASSESSMENT / PLAN:   {Diag Picklist:653877}    {Patient advised of split billing (Optional):762467}      COUNSELING:  {Medicare Counselin}        He reports that he has been smoking cigarettes. He started smoking about 48 years " ago. He has a 33.60 pack-year smoking history. He has never used smokeless tobacco.  Nicotine/Tobacco Cessation Plan:   {Nicotine/Tobacco Cessation Plan:549503}      Appropriate preventive services were discussed with this patient, including applicable screening as appropriate for cardiovascular disease, diabetes, osteopenia/osteoporosis, and glaucoma.  As appropriate for age/gender, discussed screening for colorectal cancer, prostate cancer, breast cancer, and cervical cancer. Checklist reviewing preventive services available has been given to the patient.    Reviewed patients plan of care and provided an AVS. The {CarePlan:583306} for Iraida meets the Care Plan requirement. This Care Plan has been established and reviewed with the {PATIENT, FAMILY MEMBER, CAREGIVER:739493}.    {Counseling Resources  [place] Preventive Services Task Force  Cholesterol Screening  Health diet/nutrition  Pooled Cohorts Equation Calculator  USDA's MyPlate  ASA Prophylaxis  Lung CA Screening  Osteoporosis prevention/bone health :407400}  {Prostate Cancer Screening  Consider for men 55-69 per guidance from USPSTF :627992}    Cesar Marie PA-C  [facility]    Identified Health Risks:

## 2023-02-24 NOTE — PATIENT INSTRUCTIONS
Patient Education   Personalized Prevention Plan  You are due for the preventive services outlined below.  Your care team is available to assist you in scheduling these services.  If you have already completed any of these items, please share that information with your care team to update in your medical record.  Health Maintenance Due   Topic Date Due     Diabetic Foot Exam  Never done     LUNG CANCER SCREENING  Never done     Pneumococcal Vaccine (2 - PCV) 02/17/2015     Diptheria Tetanus Pertussis (DTAP/TDAP/TD) Vaccine (2 - Td or Tdap) 04/06/2015     A1C Lab  01/25/2022     Annual Wellness Visit  10/18/2022     Kidney Microalbumin Urine Test  10/25/2022     Colorectal Cancer Screening  10/29/2022     Eye Exam  01/25/2023

## 2023-02-24 NOTE — PROGRESS NOTES
"Saravanan is a 67 year old who is being evaluated via a billable video visit.      How would you like to obtain your AVS? MyChart  If the video visit is dropped, the invitation should be resent by: Text to cell phone: [phone]  Will anyone else be joining your video visit? No      Assessment & Plan     Encounter for Medicare annual wellness exam  Stable wellness. No concerns.     Rash and nonspecific skin eruption  Resolved with otc steroid cream. Monitoring now. Given localization, doubt related to peanuts, but possible. Given time of years this has occurred, likely an atopic dermatitis type of reaction. Recommending monitoring for now and avoid peanuts. If occurs with avoiding nuts and occurs again, consider derm/allergist referral. However, managed well prn with otc steroid cream.     Screen for colon cancer  Has cologaurd at home.     Ventricular tachycardia  No recurrence. Follows cardiology. Stable.     Prostate cancer (H)  Followed by urology. Follow up appt scheduled.     Coronary artery disease with angina pectoris, unspecified vessel or lesion type, unspecified whether native or transplanted heart (H)  As above.         No follow-ups on file.    Cesar Marie PA-C  [facility]    Manjinder Coe is a 67 year old accompanied by his self, presenting for the following health issues:  Medicare Visit and Results (Discuss ECG results)      HPI     Annual Wellness Visit    Patient has been advised of split billing requirements and indicates understanding: Yes   Healthy Habits:     In general, how would you rate your overall health?  Good    Frequency of exercise:  4-5 days/week    Duration of exercise:  30-45 minutes    Do you usually eat at least 4 servings of fruit and vegetables a day, include whole grains    & fiber and avoid regularly eating high fat or \"junk\" foods?  Yes    Taking medications regularly:  Yes    Medication side effects:  None    Ability to successfully " perform activities of daily living:  No assistance needed    Home Safety:  No safety concerns identified    Hearing Impairment:  No hearing concerns    In the past 6 months, have you been bothered by leaking of urine?  No    In general, how would you rate your overall mental or emotional health?  Excellent      PHQ-2 Total Score: 0    Additional concerns today:  No        History of past Cardiac arrest with V tach and CAD followed by cardiology. Last seen in feb. Stable. Routine follow ups with them in the future planned.      History of prostate cancer. Followed by urology. Last seen sept 2022. Stable. Plans for repeat psa in late may/early June.      Have you ever done Advance Care Planning? (For example, a Health Directive, POLST, or a discussion with a medical provider or your loved ones about your wishes): Yes, advance care planning is on file.        Fall risk  Fallen 2 or more times in the past year?: No  Any fall with injury in the past year?: No     Cognitive Screening Unable to complete due to virtual visit; need for additional assessment in future face-to-face visit       Reviewed and updated as needed this visit by clinical staff   Tobacco  Allergies  Meds               Reviewed and updated as needed this visit by Provider                 Social History            Tobacco Use     Smoking status: Light Smoker       Packs/day: 0.70       Years: 48.00       Pack years: 33.60       Types: Cigarettes       Start date: 1/1/1975     Smokeless tobacco: Never     Tobacco comments:       3-4 cigs per day 1/2/22   Substance Use Topics     Alcohol use: Yes       Comment: 1-3 drinks per week            Alcohol Use 2/17/2023   Prescreen: >3 drinks/day or >7 drinks/week? No   Prescreen: >3 drinks/day or >7 drinks/week? -         Current providers sharing in care for this patient include:   Patient Care Team:  Cesar Marie PA-C as PCP - General (Family Medicine)  Juancarlos Stevens MD as MD  (Cardiology)  Jose Roberto Echevarria MD as MD (Urology)  Sukumar Oglesby MD as Assigned Cancer Care Provider  Yeo, Albert, MD as Assigned Musculoskeletal Provider  Cesar Marie PA-C as Assigned PCP  Ellen Toney APRN CNP as Assigned Heart and Vascular Provider     The following health maintenance items are reviewed in Epic and correct as of today:       Health Maintenance   Topic Date Due     DIABETIC FOOT EXAM  Never done     LUNG CANCER SCREENING  Never done     Pneumococcal Vaccine: 65+ Years (2 - PCV) 02/17/2015     DTAP/TDAP/TD IMMUNIZATION (2 - Td or Tdap) 04/06/2015     A1C  01/25/2022     NICOTINE/TOBACCO CESSATION COUNSELING Q 1 YR  10/18/2022     MEDICARE ANNUAL WELLNESS VISIT  10/18/2022     MICROALBUMIN  10/25/2022     COLORECTAL CANCER SCREENING  10/29/2022     EYE EXAM  01/25/2023     BMP  07/05/2023     LIPID  12/19/2023     ANNUAL REVIEW OF HM ORDERS  12/19/2023     FALL RISK ASSESSMENT  02/24/2024     ADVANCE CARE PLANNING  10/28/2026     HEPATITIS C SCREENING  Completed     PHQ-2 (once per calendar year)  Completed     INFLUENZA VACCINE  Completed     ZOSTER IMMUNIZATION  Completed     AORTIC ANEURYSM SCREENING (SYSTEM ASSIGNED)  Completed     COVID-19 Vaccine  Completed     IPV IMMUNIZATION  Aged Out     MENINGITIS IMMUNIZATION  Aged Out      Of note, did have rash, but 1 week ago started otc anti itch cream and has almost resolved. Present on back. Diffuse on back. Multiple small itchy bumps. Improved now. Has had multiple times in the past which has resolved with steroid creams. He was wondering if this may be due to peanuts. Denies any troubles breathing, swelling of face or throat. Also rash only on back.       Review of Systems   Constitutional, HEENT, cardiovascular, pulmonary, GI, , musculoskeletal, neuro, skin, endocrine and psych systems are negative, except as otherwise noted.      Objective           Vitals:  No vitals were obtained today due to virtual  "visit.    Physical Exam   GENERAL: Healthy, alert and no distress  EYES: Eyes grossly normal to inspection.  No discharge or erythema, or obvious scleral/conjunctival abnormalities.  RESP: No audible wheeze, cough, or visible cyanosis.  No visible retractions or increased work of breathing.    SKIN: Visible skin clear. No significant rash, abnormal pigmentation or lesions.  NEURO: Cranial nerves grossly intact.  Mentation and speech appropriate for age.  PSYCH: Mentation appears normal, affect normal/bright, judgement and insight intact, normal speech and appearance well-groomed.          Video-Visit Details    Type of service:  Video Visit     Originating Location (pt. Location): Home  Distant Location (provider location):  Off-site  Platform used for Video Visit: [employer] connection 12 minutes in. Finished on phone call last 15 minutes. Total of 27 minutes.       Answers for HPI/ROS submitted by the patient on 2/17/2023  In general, how would you rate your overall physical health?: good  Frequency of exercise:: 4-5 days/week  Do you usually eat at least 4 servings of fruit and vegetables a day, include whole grains & fiber, and avoid regularly eating high fat or \"junk\" foods? : Yes  Taking medications regularly:: Yes  Medication side effects:: None  Activities of Daily Living: no assistance needed  Home safety: no safety concerns identified  Hearing Impairment:: no hearing concerns  In the past 6 months, have you been bothered by leaking of urine?: No  abdominal pain: No  Blood in stool: No  Blood in urine: No  chest pain: No  chills: No  congestion: No  constipation: No  cough: No  diarrhea: No  dizziness: No  ear pain: No  eye pain: No  nervous/anxious: No  fever: No  frequency: No  genital sores: No  headaches: No  hearing loss: No  heartburn: No  arthralgias: No  joint swelling: No  peripheral edema: No  mood changes: No  myalgias: No  nausea: No  dysuria: No  palpitations: No  Skin sensation changes: " No  sore throat: No  urgency: No  rash: Yes  shortness of breath: No  visual disturbance: No  weakness: No  impotence: No  penile discharge: No  In general, how would you rate your overall mental or emotional health?: excellent  Additional concerns today:: No  Duration of exercise:: 30-45 minutes

## 2023-03-02 ENCOUNTER — THERAPY VISIT (OUTPATIENT)
Dept: PHYSICAL THERAPY | Facility: CLINIC | Age: 68
End: 2023-03-02
Payer: MEDICARE

## 2023-03-02 DIAGNOSIS — Z96.641 AFTERCARE FOLLOWING RIGHT HIP JOINT REPLACEMENT SURGERY: Primary | ICD-10-CM

## 2023-03-02 DIAGNOSIS — Z47.1 AFTERCARE FOLLOWING RIGHT HIP JOINT REPLACEMENT SURGERY: Primary | ICD-10-CM

## 2023-03-02 PROCEDURE — 97112 NEUROMUSCULAR REEDUCATION: CPT | Mod: GP | Performed by: PHYSICAL THERAPIST

## 2023-03-02 PROCEDURE — 97530 THERAPEUTIC ACTIVITIES: CPT | Mod: GP | Performed by: PHYSICAL THERAPIST

## 2023-03-02 PROCEDURE — 97110 THERAPEUTIC EXERCISES: CPT | Mod: GP | Performed by: PHYSICAL THERAPIST

## 2023-03-04 LAB — NONINV COLON CA DNA+OCC BLD SCRN STL QL: NEGATIVE

## 2023-03-13 ENCOUNTER — THERAPY VISIT (OUTPATIENT)
Dept: PHYSICAL THERAPY | Facility: CLINIC | Age: 68
End: 2023-03-13
Payer: MEDICARE

## 2023-03-13 DIAGNOSIS — Z96.641 AFTERCARE FOLLOWING RIGHT HIP JOINT REPLACEMENT SURGERY: Primary | ICD-10-CM

## 2023-03-13 DIAGNOSIS — Z47.1 AFTERCARE FOLLOWING RIGHT HIP JOINT REPLACEMENT SURGERY: Primary | ICD-10-CM

## 2023-03-13 PROCEDURE — 97112 NEUROMUSCULAR REEDUCATION: CPT | Mod: GP | Performed by: PHYSICAL THERAPIST

## 2023-03-13 PROCEDURE — 97110 THERAPEUTIC EXERCISES: CPT | Mod: GP | Performed by: PHYSICAL THERAPIST

## 2023-03-13 PROCEDURE — 97530 THERAPEUTIC ACTIVITIES: CPT | Mod: GP | Performed by: PHYSICAL THERAPIST

## 2023-03-24 ENCOUNTER — THERAPY VISIT (OUTPATIENT)
Dept: PHYSICAL THERAPY | Facility: CLINIC | Age: 68
End: 2023-03-24
Payer: MEDICARE

## 2023-03-24 DIAGNOSIS — Z96.641 AFTERCARE FOLLOWING RIGHT HIP JOINT REPLACEMENT SURGERY: Primary | ICD-10-CM

## 2023-03-24 DIAGNOSIS — Z47.1 AFTERCARE FOLLOWING RIGHT HIP JOINT REPLACEMENT SURGERY: Primary | ICD-10-CM

## 2023-03-24 PROCEDURE — 97530 THERAPEUTIC ACTIVITIES: CPT | Mod: GP | Performed by: PHYSICAL THERAPIST

## 2023-03-24 PROCEDURE — 97110 THERAPEUTIC EXERCISES: CPT | Mod: GP | Performed by: PHYSICAL THERAPIST

## 2023-03-24 PROCEDURE — 97112 NEUROMUSCULAR REEDUCATION: CPT | Mod: GP | Performed by: PHYSICAL THERAPIST

## 2023-03-24 ASSESSMENT — ACTIVITIES OF DAILY LIVING (ADL)
HOS_ADL_HIGHEST_POTENTIAL_SCORE: 68
STANDING_FOR_15_MINUTES: NO DIFFICULTY AT ALL
ROLLING_OVER_IN_BED: SLIGHT DIFFICULTY
DEEP_SQUATTING: SLIGHT DIFFICULTY
GETTING_INTO_AND_OUT_OF_A_BATHTUB: SLIGHT DIFFICULTY
LIGHT_TO_MODERATE_WORK: NO DIFFICULTY AT ALL
WALKING_15_MINUTES_OR_GREATER: NO DIFFICULTY AT ALL
SITTING_FOR_15_MINUTES: NO DIFFICULTY AT ALL
GOING_UP_1_FLIGHT_OF_STAIRS: NO DIFFICULTY AT ALL
GOING_DOWN_1_FLIGHT_OF_STAIRS: NO DIFFICULTY AT ALL
RECREATIONAL_ACTIVITIES: SLIGHT DIFFICULTY
WALKING_INITIALLY: NO DIFFICULTY AT ALL
GETTING_INTO_AND_OUT_OF_AN_AVERAGE_CAR: NO DIFFICULTY AT ALL
WALKING_UP_STEEP_HILLS: NO DIFFICULTY AT ALL
HOS_ADL_SCORE(%): 92.65
HOS_ADL_ITEM_SCORE_TOTAL: 63
WALKING_APPROXIMATELY_10_MINUTES: NO DIFFICULTY AT ALL
TWISTING/PIVOTING_ON_INVOLVED_LEG: NO DIFFICULTY AT ALL
STEPPING_UP_AND_DOWN_CURBS: NO DIFFICULTY AT ALL
WALKING_DOWN_STEEP_HILLS: NO DIFFICULTY AT ALL
HEAVY_WORK: SLIGHT DIFFICULTY
HOS_ADL_COUNT: 17
PUTTING_ON_SOCKS_AND_SHOES: NO DIFFICULTY AT ALL
HOW_WOULD_YOU_RATE_YOUR_CURRENT_LEVEL_OF_FUNCTION_DURING_YOUR_USUAL_ACTIVITIES_OF_DAILY_LIVING_FROM_0_TO_100_WITH_100_BEING_YOUR_LEVEL_OF_FUNCTION_PRIOR_TO_YOUR_HIP_PROBLEM_AND_0_BEING_THE_INABILITY_TO_PERFORM_ANY_OF_YOUR_USUAL_DAILY_ACTIVITIES?: 95

## 2023-03-24 NOTE — PROGRESS NOTES
"Subjective:  HPI  Physical Exam                    Objective:  System    Physical Exam    General     ROS    Assessment/Plan:    DISCHARGE REPORT    Progress reporting period is from IE to 3/24/2023.       SUBJECTIVE  Subjective changes noted by patient:  .  Subjective: Doing good, able to perform stairs normal pattern.  Was also able to do some golf simulation with no hip pain    Current pain level is  Current Pain level: 0/10.     Previous pain level was   Initial Pain level: 3/10.   Changes in function:  Yes (See Goal flowsheet attached for changes in current functional level)  Adverse reaction to treatment or activity: None    OBJECTIVE  Changes noted in objective findings:     Objective: Good control 6\" fair 8\".  ROM WFL.  SLS 10 sec mod mm activity.  Good control wtih supported squat     ASSESSMENT/PLAN  Updated problem list and treatment plan: Diagnosis 1:  S/P R BART  Decreased strength - home program  Decreased proprioception - home program  Impaired muscle performance - home program  Decreased function - home program  STG/LTGs have been met or progress has been made towards goals:  Yes (See Goal flow sheet completed today.)  Assessment of Progress: The patient's condition is improving.  Self Management Plans:  Patient is independent in a home treatment program.  Patient is independent in self management of symptoms.  I have re-evaluated this patient and find that the nature, scope, duration and intensity of the therapy is appropriate for the medical condition of the patient.  Dietmar continues to require the following intervention to meet STG and LTG's:  PT    Recommendations:  This patient is ready to be discharged from therapy and continue their home treatment program.    Please refer to the daily flowsheet for treatment today, total treatment time and time spent performing 1:1 timed codes.          "

## 2023-04-19 ASSESSMENT — HOOS JR
BENDING TO THE FLOOR TO PICK UP OBJECT: MILD
HOOS JR TOTAL INTERVAL SCORE: 92.34

## 2023-04-20 ENCOUNTER — OFFICE VISIT (OUTPATIENT)
Dept: ORTHOPEDICS | Facility: CLINIC | Age: 68
End: 2023-04-20
Payer: MEDICARE

## 2023-04-20 ENCOUNTER — TELEPHONE (OUTPATIENT)
Dept: FAMILY MEDICINE | Facility: CLINIC | Age: 68
End: 2023-04-20

## 2023-04-20 DIAGNOSIS — E11.9 TYPE 2 DIABETES MELLITUS TREATED WITHOUT INSULIN (H): Primary | ICD-10-CM

## 2023-04-20 DIAGNOSIS — Z48.89 ENCOUNTER FOR POSTOPERATIVE CARE: Primary | ICD-10-CM

## 2023-04-20 PROCEDURE — 99213 OFFICE O/P EST LOW 20 MIN: CPT

## 2023-04-20 NOTE — TELEPHONE ENCOUNTER
Signed but FYI, as long as the annual HM order is up to date, if any  labs are due, lab does not need an order for these to draw/obtain at a lab appt.     -wendie ferrara, pac

## 2023-04-20 NOTE — LETTER
4/20/2023         RE: Iraida Hernandez  [address] [facility] [address] [facility] [id number]        Dear Colleague,    Thank you for referring your patient, Iraida Hernandez, to the [facility] [place]. Please see a copy of my visit note below.    Chief Complaint: RECHECK (1/4/23 Rt BART 12 weeks out)         HPI: Iraida Hernandez returns today in follow-up 12 weeks s/p R BART. He is very happy with how he is doing. Just returned from vacation where he was able to walk ~8,000 steps daily on the beach. No concerns.     Pain medication: None   Assist device: None   Physical therapy: Completed course, continues HEP  Returned to work: Retired     Current Status:  HOOS Jr:92.34  Global Mental Health Score - (P) 20  Global Physical Health Score - (P) 18  PROMIS TOTAL - SUBSCORES - (P) 38  Medications and allergies are documented in the EMR and have been reviewed.      Current Outpatient Medications:     acetaminophen (TYLENOL) 325 MG tablet, Take 2 tablets (650 mg) by mouth every 4 hours as needed for other, Disp: 60 tablet, Rfl: 0    atorvastatin (LIPITOR) 40 MG tablet, Take 1 tablet (40 mg) by mouth daily (Patient taking differently: Take 40 mg by mouth At Bedtime), Disp: 90 tablet, Rfl: 3    carvedilol (COREG) 12.5 MG tablet, Take 1 tablet (12.5 mg) by mouth 2 times daily (with meals), Disp: 180 tablet, Rfl: 3    clopidogrel (PLAVIX) 75 MG tablet, Take 1 tablet (75 mg) by mouth daily, Disp: 90 tablet, Rfl: 3    nitroGLYcerin (NITROSTAT) 0.4 MG sublingual tablet, Place 1 tablet (0.4 mg) under the tongue every 5 minutes as needed for chest pain, Disp: 25 tablet, Rfl: 3    sacubitril-valsartan (ENTRESTO) 49-51 MG per tablet, Take 1 tablet by mouth 2 times daily, Disp: 180 tablet, Rfl: 1    spironolactone (ALDACTONE) 25 MG tablet, Take 1 tablet (25 mg) by mouth daily, Disp: 90 tablet, Rfl: 3    Allergies: Penicillins and Tetracyclines        Physical Exam:  On physical examination the patient appears the  stated age, is in no acute distress, affect is appropriate, and breathing is non-labored.    There were no vitals taken for this visit.  There is no height or weight on file to calculate BMI.    Rises from chair: Without difficulty   Gait: Symmetric   ROM:  Smooth and painless       Distally, the circulatory, motor, and sensation exam is intact with 5/5 EHL, gastroc-soleus, and tibialis anterior.  Sensation to light touch is intact.  Dorsalis pedis and posterior tibialis pulses are palpable.  There are no sores on the feet, no bruising, and no lymphedema.    Assessment: Progressing very well 12 weeks s.p right total hip arthroplasty    Plan: Follow up for right hip in 1 year, sooner if needed   No ref. provider found    Ok Jolly MD

## 2023-04-20 NOTE — TELEPHONE ENCOUNTER
Patient coming in for PSA lab on 5/1/2023. Can we please place orders for A1C and urine micro albumin so they can be completed as well? Will also contact re DM follow up. Shell Mcgarry CMA

## 2023-04-20 NOTE — PROGRESS NOTES
Chief Complaint: RECHECK (1/4/23 Rt BART 12 weeks out)         HPI: Iraida Hernandez returns today in follow-up 12 weeks s/p R BART. He is very happy with how he is doing. Just returned from vacation where he was able to walk ~8,000 steps daily on the beach. No concerns.     Pain medication: None   Assist device: None   Physical therapy: Completed course, continues HEP  Returned to work: Retired     Current Status:  HOOS Jr:92.34  Global Mental Health Score - (P) 20  Global Physical Health Score - (P) 18  PROMIS TOTAL - SUBSCORES - (P) 38  Medications and allergies are documented in the EMR and have been reviewed.      Current Outpatient Medications:      acetaminophen (TYLENOL) 325 MG tablet, Take 2 tablets (650 mg) by mouth every 4 hours as needed for other, Disp: 60 tablet, Rfl: 0     atorvastatin (LIPITOR) 40 MG tablet, Take 1 tablet (40 mg) by mouth daily (Patient taking differently: Take 40 mg by mouth At Bedtime), Disp: 90 tablet, Rfl: 3     carvedilol (COREG) 12.5 MG tablet, Take 1 tablet (12.5 mg) by mouth 2 times daily (with meals), Disp: 180 tablet, Rfl: 3     clopidogrel (PLAVIX) 75 MG tablet, Take 1 tablet (75 mg) by mouth daily, Disp: 90 tablet, Rfl: 3     nitroGLYcerin (NITROSTAT) 0.4 MG sublingual tablet, Place 1 tablet (0.4 mg) under the tongue every 5 minutes as needed for chest pain, Disp: 25 tablet, Rfl: 3     sacubitril-valsartan (ENTRESTO) 49-51 MG per tablet, Take 1 tablet by mouth 2 times daily, Disp: 180 tablet, Rfl: 1     spironolactone (ALDACTONE) 25 MG tablet, Take 1 tablet (25 mg) by mouth daily, Disp: 90 tablet, Rfl: 3    Allergies: Penicillins and Tetracyclines        Physical Exam:  On physical examination the patient appears the stated age, is in no acute distress, affect is appropriate, and breathing is non-labored.    There were no vitals taken for this visit.  There is no height or weight on file to calculate BMI.    Rises from chair: Without difficulty   Gait: Symmetric   ROM:   Smooth and painless       Distally, the circulatory, motor, and sensation exam is intact with 5/5 EHL, gastroc-soleus, and tibialis anterior.  Sensation to light touch is intact.  Dorsalis pedis and posterior tibialis pulses are palpable.  There are no sores on the feet, no bruising, and no lymphedema.    Assessment: Progressing very well 12 weeks s.p right total hip arthroplasty    Plan: Follow up for right hip in 1 year, sooner if needed   No ref. provider found

## 2023-04-20 NOTE — NURSING NOTE
Reason For Visit:   Chief Complaint   Patient presents with     RECHECK     1/4/23 Rt BART 12 weeks out       There were no vitals taken for this visit.         Ping Allan, ATC

## 2023-04-25 ENCOUNTER — PRE VISIT (OUTPATIENT)
Dept: UROLOGY | Facility: CLINIC | Age: 68
End: 2023-04-25
Payer: MEDICARE

## 2023-04-25 NOTE — TELEPHONE ENCOUNTER
Reason for visit: Follow up     Dx/Hx/Sx: Prostate Cancer    Records/imaging/labs/orders: PSA scheduled 5/1/23    At Rooming: Video Visit

## 2023-05-01 ENCOUNTER — LAB (OUTPATIENT)
Dept: LAB | Facility: CLINIC | Age: 68
End: 2023-05-01
Payer: MEDICARE

## 2023-05-01 DIAGNOSIS — C61 PROSTATE CANCER (H): ICD-10-CM

## 2023-05-01 DIAGNOSIS — R73.09 ELEVATED GLUCOSE: ICD-10-CM

## 2023-05-01 LAB
CREAT UR-MCNC: 160 MG/DL
HBA1C MFR BLD: 5.5 % (ref 0–5.6)
MICROALBUMIN UR-MCNC: 66.7 MG/L
MICROALBUMIN/CREAT UR: 41.69 MG/G CR (ref 0–17)
PSA SERPL DL<=0.01 NG/ML-MCNC: 16 NG/ML (ref 0–6.5)

## 2023-05-01 PROCEDURE — 36415 COLL VENOUS BLD VENIPUNCTURE: CPT

## 2023-05-01 PROCEDURE — 82043 UR ALBUMIN QUANTITATIVE: CPT

## 2023-05-01 PROCEDURE — 84153 ASSAY OF PSA TOTAL: CPT

## 2023-05-01 PROCEDURE — 82570 ASSAY OF URINE CREATININE: CPT

## 2023-05-01 PROCEDURE — 83036 HEMOGLOBIN GLYCOSYLATED A1C: CPT

## 2023-05-03 ENCOUNTER — TRANSFERRED RECORDS (OUTPATIENT)
Dept: HEALTH INFORMATION MANAGEMENT | Facility: CLINIC | Age: 68
End: 2023-05-03
Payer: MEDICARE

## 2023-05-04 ENCOUNTER — TELEPHONE (OUTPATIENT)
Dept: FAMILY MEDICINE | Facility: CLINIC | Age: 68
End: 2023-05-04
Payer: MEDICARE

## 2023-05-04 NOTE — TELEPHONE ENCOUNTER
Patient Quality Outreach    Patient is due for the following:   Diabetes -  Eye Exam    Next Steps:   No follow up needed at this time.    Type of outreach:    Chart review performed, no outreach needed. and pt had a diabetic eye exam at [facility] on 5/3/23      Questions for provider review:    None           Cassia Olson, CMA

## 2023-05-09 ENCOUNTER — VIRTUAL VISIT (OUTPATIENT)
Dept: UROLOGY | Facility: CLINIC | Age: 68
End: 2023-05-09
Payer: MEDICARE

## 2023-05-09 DIAGNOSIS — C61 PROSTATE CANCER (H): Primary | ICD-10-CM

## 2023-05-09 PROCEDURE — 99442 PR PHYSICIAN TELEPHONE EVALUATION 11-20 MIN: CPT | Mod: 95 | Performed by: UROLOGY

## 2023-05-09 NOTE — NURSING NOTE
Is the patient currently in the state of [place]? YES    Visit mode:VIDEO    If the visit is dropped, the patient can be reconnected by: VIDEO VISIT: Text to cell phone: [phone]    Will anyone else be joining the visit? NO      How would you like to obtain your AVS? MyChart    Are changes needed to the allergy or medication list? YES: Pt states he takes 81 mg of aspirin 1X day  PLEASE MAKE SURE THIS IS ADDED PT SAYS HE HAS BEEN TAKING THIS FOR 9 YEARS- AND HE TAKES THIS FOR HIS HEART     Reason for visit: Video Visit (8 mo follow )    Kiara Ngo on 5/9/2023 at 1:36 PM

## 2023-05-09 NOTE — Clinical Note
5/9/2023       RE: Iraida Hernandez  [address] [facility] [id number]     Dear Colleague,    Thank you for referring your patient, Iraida Hernandez, to the [facility] [place] at [facility]. Please see a copy of my visit note below.    Iraida Hernandez is a 68 year old male who is being evaluated via a billable video visit.      How would you like to obtain your AVS? MyChart  If the video visit is dropped, the invitation should be resent by: Text to cell phone: [phone]  Will anyone else be joining your video visit? No    Video Start Time: 2:14 PM    CHIEF COMPLAINT   It was my pleasure to see Iraida Hernandez who is a 68 year old male for follow-up of ***.      HPI  Iraida Hernandez is a very pleasant 68 year old male who presents with a history of Prostate Cancer.    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  He had clinical stage T1c disease at that time with a PSA of 9.7.  His Oncotype DX test suggested a very low probability of grade 4 disease or progression.      Oncotype GPS is 5 - very low risk.     Here for PSA recheck; remains rather high, but stable over past 2 years.    PSA  5/1/2023 - 16.00  8/31/2022 - 14.9  1/20/2022 - 13.7  2/2/2021 - 12.90  3/31/2020 - 10.70  2/5/2020 - 14.60  2/15/2019 - 9.79  12/2/2013 - 6.19     TRUS 6/25/2021  FINAL DIAGNOSIS:   A.  Prostate, left base x2, biopsy   - Focal high-grade prostatic intraepithelial neoplasia. Negative for   invasive malignancy.     B.  Prostate, left mid x2, biopsy   - Benign prostatic tissue. Negative for malignancy.     C.  Prostate, left [place] x2, biopsy   - Focal high-grade prostatic intraepithelial neoplasia. Negative for   invasive malignancy.     D.  Prostate, right base x2, biopsy   - Benign prostatic tissue. Negative for malignancy     E.  Prostate, right mid x2, biopsy   - Atypical glands suspicious for malignancy (Please see  comment)     F.  Prostate, right [place] x2, biopsy   - Prostatic adenocarcinoma, acinar type, Krista's grade 3+ 3 = score of   6, grade group is 1, number of cores involved is 2 of 2, total surface area involved is 10-15%, perineural   invasion is not identified, high-grade prostatic intraepithelial neoplasia is present      TRUS 3/2019  F: Prostate, left apex, needle core biopsy: Adenocarcinoma, Krista score   3 + 3 = 6 (of 10) (Grade Group 1),   involving 1 of 1 needle core(s) and 1 mm of 10 mm of biopsy tissue   (approximately 10% of biopsy tissue).   Perineural invasion not identified.     K: Prostate, right mid, needle core biopsy: Adenocarcinoma, [place] score   3 + 3 = 6 (of 10) (Grade Group 1),   involving 1 of 1 needle core(s) and 1 mm of 10 mm of biopsy tissue   (approximately 10% of biopsy tissue).   Perineural invasion not identified.      MRI Prostate 3/9/2021  IMPRESSION:  1. Based on the most suspicious abnormality, this exam is  characterized as PIRADS 2 - Clinically significant cancer is unlikely  to be present.    2. No suspicious adenopathy or evidence of pelvic metastases.  3. Bilateral hip arthritis with effusion.         Allergies:   Penicillins and Tetracyclines & related         Review of Systems:  From intake questionnaire     Skin: negative  Eyes: negative  Ears/Nose/Throat: negative  Respiratory: No shortness of breath, dyspnea on exertion, cough, or hemoptysis  Cardiovascular: No chest pain or palpitations  Gastrointestinal: negative; no nausea/vomiting, constipation or diarrhea  Genitourinary: as per HPI  Musculoskeletal: negative  Neurologic: negative  Psychiatric: negative  Hematologic/Lymphatic/Immunologic: negative  Endocrine: negative         Physical Exam:     Vitals:  No vitals were obtained today due to virtual visit.    Physical Exam   GENERAL: Healthy, alert and no distress  EYES: Eyes grossly normal to inspection.  No discharge or erythema, or obvious scleral/conjunctival  abnormalities.  RESP: No audible wheeze, cough, or visible cyanosis.  No visible retractions or increased work of breathing.    SKIN: Visible skin clear. No significant rash, abnormal pigmentation or lesions.  NEURO: Cranial nerves grossly intact.  Mentation and speech appropriate for age.  PSYCH: Mentation appears normal, affect normal/bright, judgement and insight intact, normal speech and appearance well-groomed.      Outside and Past Medical records:    Assessment requiring an independent historian(s) - {:[id number]}  Review of prior external note(s) from - {note reviewed source:[id number]}  Review of the result(s) of each unique test - ***         Assessment and Plan:     66 year old male with [place] 3+3 disease diagnosed 2019. GPS score 5. On active surveillance. Recent PSA 14.9 and stable. MRI demonstrates, again, that he has PIRADS 2 lesion on MRI 3/2021. Confirmatory re-biopsy with [place] 3+3=6 disease 6/2021. Planning to continue surveillance. Will plan another PSA in 6 months.      Plan:  MRI prostate - will call with results    Orders  No orders of the defined types were placed in this encounter.      Phone      Iraida Hernandez is a 68 year old male who is being evaluated via a billable telephone visit.      Patient has given verbal consent for Telephone visit?  Yes    What phone number would you like to be contacted at? [phone]    How would you like to obtain your AVS? [place]    Chief Complaint   It was my pleasure to speak with Iraida Hernandez who is a 68 year old male for follow-up of Prostate Cancer.      HPI  Iraida Hernandez is a very pleasant 68 year old male who presents with a history of Prostate Cancer. 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  He had clinical stage T1c disease at that time with a PSA of 9.7.  His Oncotype DX test suggested a very low probability of grade 4 disease or progression.      Oncotype GPS is 5 - very low risk.     Here for  PSA recheck; remains rather high and up a bit to 16.0, but stable over past 2 years.    PSA  5/1/2023 - 16.00  8/31/2022 - 14.9  1/20/2022 - 13.7  2/2/2021 - 12.90  3/31/2020 - 10.70  2/5/2020 - 14.60  2/15/2019 - 9.79  12/2/2013 - 6.19     TRUS 6/25/2021  FINAL DIAGNOSIS:   A.  Prostate, left base x2, biopsy   - Focal high-grade prostatic intraepithelial neoplasia. Negative for   invasive malignancy.     B.  Prostate, left mid x2, biopsy   - Benign prostatic tissue. Negative for malignancy.     C.  Prostate, left [place] x2, biopsy   - Focal high-grade prostatic intraepithelial neoplasia. Negative for   invasive malignancy.     D.  Prostate, right base x2, biopsy   - Benign prostatic tissue. Negative for malignancy     E.  Prostate, right mid x2, biopsy   - Atypical glands suspicious for malignancy (Please see comment)     F.  Prostate, right [place] x2, biopsy   - Prostatic adenocarcinoma, acinar type, Krista's grade 3+ 3 = score of   6, grade group is 1, number of cores involved is 2 of 2, total surface area involved is 10-15%, perineural   invasion is not identified, high-grade prostatic intraepithelial neoplasia is present      TRUS 3/2019  F: Prostate, left apex, needle core biopsy: Adenocarcinoma, [place] score   3 + 3 = 6 (of 10) (Grade Group 1),   involving 1 of 1 needle core(s) and 1 mm of 10 mm of biopsy tissue   (approximately 10% of biopsy tissue).   Perineural invasion not identified.     K: Prostate, right mid, needle core biopsy: Adenocarcinoma, Krista score   3 + 3 = 6 (of 10) (Grade Group 1),   involving 1 of 1 needle core(s) and 1 mm of 10 mm of biopsy tissue   (approximately 10% of biopsy tissue).   Perineural invasion not identified.      MRI Prostate 3/9/2021  IMPRESSION:  1. Based on the most suspicious abnormality, this exam is  characterized as PIRADS 2 - Clinically significant cancer is unlikely  to be present.    2. No suspicious adenopathy or evidence of pelvic metastases.  3. Bilateral hip  arthritis with effusion.    I have reviewed and updated the patient's Past Medical History, Social History, Family History and Medication List.    Review of Systems   Constitutional, HEENT, cardiovascular, pulmonary, gi and gu systems are negative, except as otherwise noted.    ALLERGIES  Penicillins and Tetracyclines & related    Vitals:  No vitals were obtained today due to virtual visit.    Physical Exam   healthy, alert and no distress  PSYCH: Alert and oriented times 3; coherent speech, normal   rate and volume, able to articulate logical thoughts, able   to abstract reason, no tangential thoughts, no hallucinations   or delusions  His affect is normal and pleasant  RESP: No cough, no audible wheezing, able to talk in full sentences  Remainder of exam unable to be completed due to telephone visits      Outside and Past Medical records:    Review of the result(s) of each unique test - PSA, MRI, pathology    ASSESSMENT and PLAN  68 year old male with Krista 3+3 disease diagnosed 2019. GPS score 5. On active surveillance. Recent PSA 16.00 and slightly elevated from previous. MRI demonstrates, again, that he has PIRADS 2 lesion on MRI 3/2021. Confirmatory re-biopsy with [place] 3+3=6 disease 6/2021. Planning to continue surveillance, but given continued rise in PSA, will re-assess with MRI.      Plan:  MRI prostate - will call with results    Phone call duration: 11 minutes    Visit converted to telephone encounter due to technical difficulties    15 total minutes spent on the date of the encounter including direct interaction with the patient, performing chart review, documentation and further activities as noted above.    Sukumar Oglesby MD  Urology  [facility]            Again, thank you for allowing me to participate in the care of your patient.      Sincerely,    Sukumar Oglesby MD

## 2023-05-09 NOTE — PROGRESS NOTES
Iraida Hernandez is a 68 year old male who is being evaluated via a billable telephone visit.      Patient has given verbal consent for Telephone visit?  Yes    What phone number would you like to be contacted at? [phone]    How would you like to obtain your AVS? [place]    Chief Complaint   It was my pleasure to speak with Iraida Hernandez who is a 68 year old male for follow-up of Prostate Cancer.      HPI  Iraida Hernandez is a very pleasant 68 year old male who presents with a history of Prostate Cancer. 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  He had clinical stage T1c disease at that time with a PSA of 9.7.  His Oncotype DX test suggested a very low probability of grade 4 disease or progression.      Oncotype GPS is 5 - very low risk.     Here for PSA recheck; remains rather high and up a bit to 16.0, but stable over past 2 years.    PSA  5/1/2023 - 16.00  8/31/2022 - 14.9  1/20/2022 - 13.7  2/2/2021 - 12.90  3/31/2020 - 10.70  2/5/2020 - 14.60  2/15/2019 - 9.79  12/2/2013 - 6.19     TRUS 6/25/2021  FINAL DIAGNOSIS:   A.  Prostate, left base x2, biopsy   - Focal high-grade prostatic intraepithelial neoplasia. Negative for   invasive malignancy.     B.  Prostate, left mid x2, biopsy   - Benign prostatic tissue. Negative for malignancy.     C.  Prostate, left [place] x2, biopsy   - Focal high-grade prostatic intraepithelial neoplasia. Negative for   invasive malignancy.     D.  Prostate, right base x2, biopsy   - Benign prostatic tissue. Negative for malignancy     E.  Prostate, right mid x2, biopsy   - Atypical glands suspicious for malignancy (Please see comment)     F.  Prostate, right [place] x2, biopsy   - Prostatic adenocarcinoma, acinar type, Reform's grade 3+ 3 = score of   6, grade group is 1, number of cores involved is 2 of 2, total surface area involved is 10-15%, perineural   invasion is not identified, high-grade prostatic intraepithelial neoplasia is  present      TRUS 3/2019  F: Prostate, left apex, needle core biopsy: Adenocarcinoma, Krista score   3 + 3 = 6 (of 10) (Grade Group 1),   involving 1 of 1 needle core(s) and 1 mm of 10 mm of biopsy tissue   (approximately 10% of biopsy tissue).   Perineural invasion not identified.     K: Prostate, right mid, needle core biopsy: Adenocarcinoma, [place] score   3 + 3 = 6 (of 10) (Grade Group 1),   involving 1 of 1 needle core(s) and 1 mm of 10 mm of biopsy tissue   (approximately 10% of biopsy tissue).   Perineural invasion not identified.      MRI Prostate 3/9/2021  IMPRESSION:  1. Based on the most suspicious abnormality, this exam is  characterized as PIRADS 2 - Clinically significant cancer is unlikely  to be present.    2. No suspicious adenopathy or evidence of pelvic metastases.  3. Bilateral hip arthritis with effusion.    I have reviewed and updated the patient's Past Medical History, Social History, Family History and Medication List.    Review of Systems   Constitutional, HEENT, cardiovascular, pulmonary, gi and gu systems are negative, except as otherwise noted.    ALLERGIES  Penicillins and Tetracyclines & related    Vitals:  No vitals were obtained today due to virtual visit.    Physical Exam   healthy, alert and no distress  PSYCH: Alert and oriented times 3; coherent speech, normal   rate and volume, able to articulate logical thoughts, able   to abstract reason, no tangential thoughts, no hallucinations   or delusions  His affect is normal and pleasant  RESP: No cough, no audible wheezing, able to talk in full sentences  Remainder of exam unable to be completed due to telephone visits      Outside and Past Medical records:    Review of the result(s) of each unique test - PSA, MRI, pathology    ASSESSMENT and PLAN  68 year old male with [place] 3+3 disease diagnosed 2019. GPS score 5. On active surveillance. Recent PSA 16.00 and slightly elevated from previous. MRI demonstrates, again, that he has  PIRADS 2 lesion on MRI 3/2021. Confirmatory re-biopsy with [place] 3+3=6 disease 6/2021. Planning to continue surveillance, but given continued rise in PSA, will re-assess with MRI.      Plan:  MRI prostate - will call with results    Phone call duration: 11 minutes    Visit converted to telephone encounter due to technical difficulties    15 total minutes spent on the date of the encounter including direct interaction with the patient, performing chart review, documentation and further activities as noted above.    Sukumar Oglesby MD  Urology  [facility]

## 2023-05-11 ENCOUNTER — ANCILLARY PROCEDURE (OUTPATIENT)
Dept: CARDIOLOGY | Facility: CLINIC | Age: 68
End: 2023-05-11
Attending: INTERNAL MEDICINE
Payer: MEDICARE

## 2023-05-11 DIAGNOSIS — I25.5 ISCHEMIC CARDIOMYOPATHY: ICD-10-CM

## 2023-05-11 DIAGNOSIS — Z95.810 ICD (IMPLANTABLE CARDIOVERTER-DEFIBRILLATOR) IN PLACE: ICD-10-CM

## 2023-05-11 PROCEDURE — 93295 DEV INTERROG REMOTE 1/2/MLT: CPT | Performed by: INTERNAL MEDICINE

## 2023-05-11 PROCEDURE — 93296 REM INTERROG EVL PM/IDS: CPT | Performed by: INTERNAL MEDICINE

## 2023-05-12 LAB
MDC_IDC_EPISODE_DTM: NORMAL
MDC_IDC_EPISODE_DURATION: 10 S
MDC_IDC_EPISODE_DURATION: 10 S
MDC_IDC_EPISODE_DURATION: 13 S
MDC_IDC_EPISODE_DURATION: 8 S
MDC_IDC_EPISODE_DURATION: 8 S
MDC_IDC_EPISODE_DURATION: 9 S
MDC_IDC_EPISODE_ID: NORMAL
MDC_IDC_EPISODE_TYPE: NORMAL
MDC_IDC_LEAD_IMPLANT_DT: NORMAL
MDC_IDC_LEAD_LOCATION: NORMAL
MDC_IDC_LEAD_LOCATION_DETAIL_1: NORMAL
MDC_IDC_LEAD_MFG: NORMAL
MDC_IDC_LEAD_MODEL: NORMAL
MDC_IDC_LEAD_POLARITY_TYPE: NORMAL
MDC_IDC_LEAD_SERIAL: NORMAL
MDC_IDC_MSMT_BATTERY_DTM: NORMAL
MDC_IDC_MSMT_BATTERY_REMAINING_LONGEVITY: 72 MO
MDC_IDC_MSMT_BATTERY_REMAINING_PERCENTAGE: 75 %
MDC_IDC_MSMT_BATTERY_STATUS: NORMAL
MDC_IDC_MSMT_CAP_CHARGE_DTM: NORMAL
MDC_IDC_MSMT_CAP_CHARGE_TIME: 10.4 S
MDC_IDC_MSMT_CAP_CHARGE_TYPE: NORMAL
MDC_IDC_MSMT_LEADCHNL_RV_IMPEDANCE_VALUE: 671 OHM
MDC_IDC_MSMT_LEADCHNL_RV_PACING_THRESHOLD_AMPLITUDE: 0.5 V
MDC_IDC_MSMT_LEADCHNL_RV_PACING_THRESHOLD_PULSEWIDTH: 0.5 MS
MDC_IDC_PG_IMPLANT_DTM: NORMAL
MDC_IDC_PG_MFG: NORMAL
MDC_IDC_PG_MODEL: NORMAL
MDC_IDC_PG_SERIAL: NORMAL
MDC_IDC_PG_TYPE: NORMAL
MDC_IDC_SESS_CLINIC_NAME: NORMAL
MDC_IDC_SESS_DTM: NORMAL
MDC_IDC_SESS_TYPE: NORMAL
MDC_IDC_SET_BRADY_LOWRATE: 40 {BEATS}/MIN
MDC_IDC_SET_BRADY_MODE: NORMAL
MDC_IDC_SET_LEADCHNL_RV_PACING_AMPLITUDE: 2 V
MDC_IDC_SET_LEADCHNL_RV_PACING_POLARITY: NORMAL
MDC_IDC_SET_LEADCHNL_RV_PACING_PULSEWIDTH: 0.5 MS
MDC_IDC_SET_LEADCHNL_RV_SENSING_ADAPTATION_MODE: NORMAL
MDC_IDC_SET_LEADCHNL_RV_SENSING_POLARITY: NORMAL
MDC_IDC_SET_LEADCHNL_RV_SENSING_SENSITIVITY: 0.6 MV
MDC_IDC_SET_ZONE_DETECTION_INTERVAL: 250 MS
MDC_IDC_SET_ZONE_DETECTION_INTERVAL: 300 MS
MDC_IDC_SET_ZONE_DETECTION_INTERVAL: 353 MS
MDC_IDC_SET_ZONE_TYPE: NORMAL
MDC_IDC_SET_ZONE_VENDOR_TYPE: NORMAL
MDC_IDC_STAT_BRADY_DTM_END: NORMAL
MDC_IDC_STAT_BRADY_DTM_START: NORMAL
MDC_IDC_STAT_BRADY_RV_PERCENT_PACED: 0 %
MDC_IDC_STAT_EPISODE_RECENT_COUNT: 0
MDC_IDC_STAT_EPISODE_RECENT_COUNT: 6
MDC_IDC_STAT_EPISODE_RECENT_COUNT_DTM_END: NORMAL
MDC_IDC_STAT_EPISODE_RECENT_COUNT_DTM_START: NORMAL
MDC_IDC_STAT_EPISODE_TYPE: NORMAL
MDC_IDC_STAT_EPISODE_VENDOR_TYPE: NORMAL
MDC_IDC_STAT_TACHYTHERAPY_ATP_DELIVERED_RECENT: 0
MDC_IDC_STAT_TACHYTHERAPY_ATP_DELIVERED_TOTAL: 0
MDC_IDC_STAT_TACHYTHERAPY_RECENT_DTM_END: NORMAL
MDC_IDC_STAT_TACHYTHERAPY_RECENT_DTM_START: NORMAL
MDC_IDC_STAT_TACHYTHERAPY_SHOCKS_ABORTED_RECENT: 0
MDC_IDC_STAT_TACHYTHERAPY_SHOCKS_ABORTED_TOTAL: 0
MDC_IDC_STAT_TACHYTHERAPY_SHOCKS_DELIVERED_RECENT: 0
MDC_IDC_STAT_TACHYTHERAPY_SHOCKS_DELIVERED_TOTAL: 0
MDC_IDC_STAT_TACHYTHERAPY_TOTAL_DTM_END: NORMAL
MDC_IDC_STAT_TACHYTHERAPY_TOTAL_DTM_START: NORMAL

## 2023-05-15 ENCOUNTER — CARE COORDINATION (OUTPATIENT)
Dept: CARDIOLOGY | Facility: CLINIC | Age: 68
End: 2023-05-15
Payer: MEDICARE

## 2023-05-15 NOTE — PROGRESS NOTES
Request received for patient to have a MRI. Patient has a MRI non-conditional [place] E140 ENERGEN VR ICD. MRACE Hicks CNP has re-approved the MRI utilizing the Magnetic Resonance Imagining nonconditional cardiac Implantable electronic device (CIED) Protocol. MRI to be scheduled by MRI department.

## 2023-05-17 ENCOUNTER — TELEPHONE (OUTPATIENT)
Dept: UROLOGY | Facility: CLINIC | Age: 68
End: 2023-05-17
Payer: MEDICARE

## 2023-06-01 ENCOUNTER — ANCILLARY PROCEDURE (OUTPATIENT)
Dept: CARDIOLOGY | Facility: CLINIC | Age: 68
End: 2023-06-01
Attending: INTERNAL MEDICINE
Payer: MEDICARE

## 2023-06-01 ENCOUNTER — HOSPITAL ENCOUNTER (OUTPATIENT)
Dept: MRI IMAGING | Facility: CLINIC | Age: 68
Discharge: HOME | End: 2023-06-01
Attending: UROLOGY
Payer: MEDICARE

## 2023-06-01 DIAGNOSIS — Z45.02 ENCOUNTER FOR ADJUSTMENT AND MANAGEMENT OF AUTOMATIC IMPLANTABLE CARDIAC DEFIBRILLATOR: ICD-10-CM

## 2023-06-01 DIAGNOSIS — Z95.810 ICD (IMPLANTABLE CARDIOVERTER-DEFIBRILLATOR) IN PLACE: Primary | ICD-10-CM

## 2023-06-01 DIAGNOSIS — C61 PROSTATE CANCER (H): ICD-10-CM

## 2023-06-01 DIAGNOSIS — Z95.810 ICD (IMPLANTABLE CARDIOVERTER-DEFIBRILLATOR) IN PLACE: ICD-10-CM

## 2023-06-01 PROCEDURE — G1010 CDSM STANSON: HCPCS | Performed by: RADIOLOGY

## 2023-06-01 PROCEDURE — A9585 GADOBUTROL INJECTION: HCPCS | Performed by: UROLOGY

## 2023-06-01 PROCEDURE — 93287 PERI-PX DEVICE EVAL & PRGR: CPT | Mod: 26 | Performed by: INTERNAL MEDICINE

## 2023-06-01 PROCEDURE — 93287 PERI-PX DEVICE EVAL & PRGR: CPT

## 2023-06-01 PROCEDURE — 72197 MRI PELVIS W/O & W/DYE: CPT | Mod: 26 | Performed by: RADIOLOGY

## 2023-06-01 PROCEDURE — G1010 CDSM STANSON: HCPCS

## 2023-06-01 PROCEDURE — 255N000002 HC RX 255 OP 636: Performed by: UROLOGY

## 2023-06-01 RX ORDER — GADOBUTROL 604.72 MG/ML
7.5 INJECTION INTRAVENOUS ONCE
Status: COMPLETED | OUTPATIENT
Start: 2023-06-01 | End: 2023-06-01

## 2023-06-01 RX ADMIN — GADOBUTROL 7.5 ML: 604.72 INJECTION INTRAVENOUS at 14:46

## 2023-06-04 LAB
MDC_IDC_LEAD_IMPLANT_DT: NORMAL
MDC_IDC_LEAD_IMPLANT_DT: NORMAL
MDC_IDC_LEAD_LOCATION: NORMAL
MDC_IDC_LEAD_LOCATION: NORMAL
MDC_IDC_LEAD_LOCATION_DETAIL_1: NORMAL
MDC_IDC_LEAD_LOCATION_DETAIL_1: NORMAL
MDC_IDC_LEAD_MFG: NORMAL
MDC_IDC_LEAD_MFG: NORMAL
MDC_IDC_LEAD_MODEL: NORMAL
MDC_IDC_LEAD_MODEL: NORMAL
MDC_IDC_LEAD_POLARITY_TYPE: NORMAL
MDC_IDC_LEAD_POLARITY_TYPE: NORMAL
MDC_IDC_LEAD_SERIAL: NORMAL
MDC_IDC_LEAD_SERIAL: NORMAL
MDC_IDC_MSMT_BATTERY_REMAINING_LONGEVITY: 72 MO
MDC_IDC_MSMT_BATTERY_REMAINING_LONGEVITY: 72 MO
MDC_IDC_MSMT_BATTERY_STATUS: NORMAL
MDC_IDC_MSMT_BATTERY_STATUS: NORMAL
MDC_IDC_MSMT_CAP_CHARGE_DTM: NORMAL
MDC_IDC_MSMT_CAP_CHARGE_DTM: NORMAL
MDC_IDC_MSMT_CAP_CHARGE_ENERGY: 0 J
MDC_IDC_MSMT_CAP_CHARGE_ENERGY: 0 J
MDC_IDC_MSMT_CAP_CHARGE_TIME: 0 S
MDC_IDC_MSMT_CAP_CHARGE_TIME: 0 S
MDC_IDC_MSMT_CAP_CHARGE_TIME: 10.45
MDC_IDC_MSMT_CAP_CHARGE_TIME: 10.45
MDC_IDC_MSMT_CAP_CHARGE_TYPE: NORMAL
MDC_IDC_MSMT_LEADCHNL_RV_IMPEDANCE_VALUE: 699 OHM
MDC_IDC_MSMT_LEADCHNL_RV_IMPEDANCE_VALUE: 814 OHM
MDC_IDC_MSMT_LEADCHNL_RV_PACING_THRESHOLD_AMPLITUDE: 0.7 V
MDC_IDC_MSMT_LEADCHNL_RV_PACING_THRESHOLD_AMPLITUDE: 0.7 V
MDC_IDC_MSMT_LEADCHNL_RV_PACING_THRESHOLD_PULSEWIDTH: 0.5 MS
MDC_IDC_MSMT_LEADCHNL_RV_PACING_THRESHOLD_PULSEWIDTH: 0.5 MS
MDC_IDC_MSMT_LEADCHNL_RV_SENSING_INTR_AMPL: 6 MV
MDC_IDC_MSMT_LEADCHNL_RV_SENSING_INTR_AMPL: 6 MV
MDC_IDC_PG_IMPLANT_DTM: NORMAL
MDC_IDC_PG_IMPLANT_DTM: NORMAL
MDC_IDC_PG_MFG: NORMAL
MDC_IDC_PG_MFG: NORMAL
MDC_IDC_PG_MODEL: NORMAL
MDC_IDC_PG_MODEL: NORMAL
MDC_IDC_PG_SERIAL: NORMAL
MDC_IDC_PG_SERIAL: NORMAL
MDC_IDC_PG_TYPE: NORMAL
MDC_IDC_PG_TYPE: NORMAL
MDC_IDC_SESS_CLINIC_NAME: NORMAL
MDC_IDC_SESS_CLINIC_NAME: NORMAL
MDC_IDC_SESS_DTM: NORMAL
MDC_IDC_SESS_DTM: NORMAL
MDC_IDC_SESS_TYPE: NORMAL
MDC_IDC_SESS_TYPE: NORMAL
MDC_IDC_SET_BRADY_HYSTRATE: NORMAL
MDC_IDC_SET_BRADY_LOWRATE: 40 {BEATS}/MIN
MDC_IDC_SET_BRADY_MODE: NORMAL
MDC_IDC_SET_BRADY_MODE: NORMAL
MDC_IDC_SET_LEADCHNL_RV_PACING_AMPLITUDE: 2 V
MDC_IDC_SET_LEADCHNL_RV_PACING_CAPTURE_MODE: NORMAL
MDC_IDC_SET_LEADCHNL_RV_PACING_POLARITY: NORMAL
MDC_IDC_SET_LEADCHNL_RV_PACING_POLARITY: NORMAL
MDC_IDC_SET_LEADCHNL_RV_PACING_PULSEWIDTH: 0.5 MS
MDC_IDC_SET_LEADCHNL_RV_SENSING_ADAPTATION_MODE: NORMAL
MDC_IDC_SET_LEADCHNL_RV_SENSING_POLARITY: NORMAL
MDC_IDC_SET_LEADCHNL_RV_SENSING_POLARITY: NORMAL
MDC_IDC_SET_LEADCHNL_RV_SENSING_SENSITIVITY: 0.6 MV
MDC_IDC_SET_ZONE_DETECTION_INTERVAL: 250 MS
MDC_IDC_SET_ZONE_DETECTION_INTERVAL: 250 MS
MDC_IDC_SET_ZONE_DETECTION_INTERVAL: 300 MS
MDC_IDC_SET_ZONE_DETECTION_INTERVAL: 300 MS
MDC_IDC_SET_ZONE_DETECTION_INTERVAL: 353 MS
MDC_IDC_SET_ZONE_DETECTION_INTERVAL: 353 MS
MDC_IDC_SET_ZONE_TYPE: NORMAL
MDC_IDC_SET_ZONE_VENDOR_TYPE: NORMAL
MDC_IDC_STAT_BRADY_RV_PERCENT_PACED: 1 %
MDC_IDC_STAT_BRADY_RV_PERCENT_PACED: 1 %
MDC_IDC_STAT_EPISODE_RECENT_COUNT: 0
MDC_IDC_STAT_EPISODE_RECENT_COUNT: 9
MDC_IDC_STAT_EPISODE_RECENT_COUNT: 9
MDC_IDC_STAT_EPISODE_RECENT_COUNT_DTM_END: NORMAL
MDC_IDC_STAT_EPISODE_RECENT_COUNT_DTM_START: NORMAL
MDC_IDC_STAT_EPISODE_TOTAL_COUNT: 0
MDC_IDC_STAT_EPISODE_TOTAL_COUNT: 125
MDC_IDC_STAT_EPISODE_TOTAL_COUNT: 125
MDC_IDC_STAT_EPISODE_TOTAL_COUNT_DTM_END: NORMAL
MDC_IDC_STAT_EPISODE_TYPE: NORMAL
MDC_IDC_STAT_EPISODE_VENDOR_TYPE: NORMAL
MDC_IDC_STAT_TACHYTHERAPY_ATP_DELIVERED_RECENT: 0
MDC_IDC_STAT_TACHYTHERAPY_ATP_DELIVERED_RECENT: 0
MDC_IDC_STAT_TACHYTHERAPY_ATP_DELIVERED_TOTAL: 0
MDC_IDC_STAT_TACHYTHERAPY_ATP_DELIVERED_TOTAL: 0
MDC_IDC_STAT_TACHYTHERAPY_RECENT_DTM_END: NORMAL
MDC_IDC_STAT_TACHYTHERAPY_RECENT_DTM_END: NORMAL
MDC_IDC_STAT_TACHYTHERAPY_RECENT_DTM_START: NORMAL
MDC_IDC_STAT_TACHYTHERAPY_RECENT_DTM_START: NORMAL
MDC_IDC_STAT_TACHYTHERAPY_SHOCKS_ABORTED_RECENT: 0
MDC_IDC_STAT_TACHYTHERAPY_SHOCKS_ABORTED_RECENT: 0
MDC_IDC_STAT_TACHYTHERAPY_SHOCKS_ABORTED_TOTAL: 0
MDC_IDC_STAT_TACHYTHERAPY_SHOCKS_ABORTED_TOTAL: 0
MDC_IDC_STAT_TACHYTHERAPY_SHOCKS_DELIVERED_RECENT: 0
MDC_IDC_STAT_TACHYTHERAPY_SHOCKS_DELIVERED_RECENT: 0
MDC_IDC_STAT_TACHYTHERAPY_SHOCKS_DELIVERED_TOTAL: 0
MDC_IDC_STAT_TACHYTHERAPY_SHOCKS_DELIVERED_TOTAL: 0
MDC_IDC_STAT_TACHYTHERAPY_TOTAL_DTM_END: NORMAL
MDC_IDC_STAT_TACHYTHERAPY_TOTAL_DTM_END: NORMAL

## 2023-06-09 ENCOUNTER — CARE COORDINATION (OUTPATIENT)
Dept: UROLOGY | Facility: CLINIC | Age: 68
End: 2023-06-09
Payer: MEDICARE

## 2023-06-09 ENCOUNTER — TELEPHONE (OUTPATIENT)
Dept: CARDIOLOGY | Facility: CLINIC | Age: 68
End: 2023-06-09
Payer: MEDICARE

## 2023-06-09 DIAGNOSIS — C61 PROSTATE CANCER (H): Primary | ICD-10-CM

## 2023-06-09 NOTE — PROGRESS NOTES
Left message for patient to call back to discuss MRI results per Dr. Oglesby.    Mariana Hathaway RN, BSN  [facility]            Reny, MD [facility], [facility]    Saravanan has a lesion on his MRI this time. I would like to have him come to the office for another PSA recheck in a few weeks and we will discuss his MRI and options going forward, including possible biopsy.     Can you call him and help schedule?     Thanks,   Tk

## 2023-06-09 NOTE — PROGRESS NOTES
Patient returning my call.  Went over MRI results.  Message routed to scheduling for PSA and follow up with Dr. Oglesby in 3 weeks.  PSA orders placed.  Patient states understanding.    Mariana Hathaway, RN, BSN  [facility]

## 2023-06-09 NOTE — TELEPHONE ENCOUNTER
Pt called to let us know that he is scheduled to see Dr. Hughes in August and already has his remote device check set up and wants to keep it that way. SH/RN

## 2023-06-12 ENCOUNTER — TELEPHONE (OUTPATIENT)
Dept: UROLOGY | Facility: CLINIC | Age: 68
End: 2023-06-12
Payer: MEDICARE

## 2023-06-12 NOTE — TELEPHONE ENCOUNTER
----- Message from Mariana Hathaway RN sent at 6/9/2023 12:28 PM CDT -----  Hello,    Discussed MRI results with patient.  Please schedule a PSA and follow up with Dr. Oglesby in 3 weeks.  PSA orders are in.    Thanks,  Mariana    ----- Message -----  From: Sukumar Oglesby MD  Sent: 6/9/2023  12:05 PM CDT  To: Mariana Hathaway RN      Saravanan has a lesion on his MRI this time. I would like to have him come to the office for another PSA recheck in a few weeks and we will discuss his MRI and options going forward, including possible biopsy.    Can you call him and help schedule?    Thanks,  Tk

## 2023-06-29 ENCOUNTER — OFFICE VISIT (OUTPATIENT)
Dept: UROLOGY | Facility: CLINIC | Age: 68
End: 2023-06-29
Payer: MEDICARE

## 2023-06-29 ENCOUNTER — TELEPHONE (OUTPATIENT)
Dept: UROLOGY | Facility: CLINIC | Age: 68
End: 2023-06-29

## 2023-06-29 VITALS
DIASTOLIC BLOOD PRESSURE: 58 MMHG | WEIGHT: 155 LBS | BODY MASS INDEX: 20.99 KG/M2 | SYSTOLIC BLOOD PRESSURE: 100 MMHG | HEIGHT: 72 IN | OXYGEN SATURATION: 98 %

## 2023-06-29 DIAGNOSIS — C61 PROSTATE CANCER (H): ICD-10-CM

## 2023-06-29 LAB — PSA SERPL-MCNC: 17.5 UG/L (ref 0–4)

## 2023-06-29 PROCEDURE — 99213 OFFICE O/P EST LOW 20 MIN: CPT | Performed by: UROLOGY

## 2023-06-29 PROCEDURE — 84153 ASSAY OF PSA TOTAL: CPT | Performed by: UROLOGY

## 2023-06-29 PROCEDURE — 36415 COLL VENOUS BLD VENIPUNCTURE: CPT | Performed by: UROLOGY

## 2023-06-29 ASSESSMENT — PAIN SCALES - GENERAL: PAINLEVEL: NO PAIN (0)

## 2023-06-29 NOTE — PROGRESS NOTES
CHIEF COMPLAINT   It was my pleasure to see Iraida Hernandez who is a 68 year old male for follow-up of Prostate Cancer.      HPI  Iraida Hernandez is a very pleasant 68 year old male who presents with a history of Prostate Cancer. 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  He had clinical stage T1c disease at that time with a PSA of 9.7.  His Oncotype DX test suggested a very low probability of grade 4 disease or progression.      Oncotype GPS is 5 - very low risk.     Here for PSA recheck; remains rather high and up a bit to 16.0. Now with MRI demonstrating PIRADS 4 lesion.     MRI Prostate 6/1/2023   Size: 5.6 x 4.6 x 3.5 cm, 47 grams  IMPRESSION:  Limited evaluation due to nondiagnostic diffusion/ADC images.     1. Based on the most suspicious abnormality, this exam is  characterized as PIRADS 4 - Clinically significant cancer is likely to  be present.  The most suspicious abnormality is located at the right  mid gland peripheral zone and there is short segment capsular abutment  with low likelihood of minimal extraprostatic extension.  2. No suspicious adenopathy or evidence of pelvic metastases.    PSA  6/29/2023 - 17.50  5/1/2023 - 16.00  8/31/2022 - 14.9  1/20/2022 - 13.7  2/2/2021 - 12.90  3/31/2020 - 10.70  2/5/2020 - 14.60  2/15/2019 - 9.79  12/2/2013 - 6.19     TRUS 6/25/2021  FINAL DIAGNOSIS:   A.  Prostate, left base x2, biopsy   - Focal high-grade prostatic intraepithelial neoplasia. Negative for   invasive malignancy.     B.  Prostate, left mid x2, biopsy   - Benign prostatic tissue. Negative for malignancy.     C.  Prostate, left [place] x2, biopsy   - Focal high-grade prostatic intraepithelial neoplasia. Negative for   invasive malignancy.     D.  Prostate, right base x2, biopsy   - Benign prostatic tissue. Negative for malignancy     E.  Prostate, right mid x2, biopsy   - Atypical glands suspicious for malignancy (Please see comment)     F.  Prostate,  right [place] x2, biopsy   - Prostatic adenocarcinoma, acinar type, Hooker's grade 3+ 3 = score of   6, grade group is 1, number of cores involved is 2 of 2, total surface area involved is 10-15%, perineural   invasion is not identified, high-grade prostatic intraepithelial neoplasia is present      TRUS 3/2019  F: Prostate, left apex, needle core biopsy: Adenocarcinoma, Krista score   3 + 3 = 6 (of 10) (Grade Group 1),   involving 1 of 1 needle core(s) and 1 mm of 10 mm of biopsy tissue   (approximately 10% of biopsy tissue).   Perineural invasion not identified.     K: Prostate, right mid, needle core biopsy: Adenocarcinoma, Krista score   3 + 3 = 6 (of 10) (Grade Group 1),   involving 1 of 1 needle core(s) and 1 mm of 10 mm of biopsy tissue   (approximately 10% of biopsy tissue).   Perineural invasion not identified.      MRI Prostate 3/9/2021  IMPRESSION:  1. Based on the most suspicious abnormality, this exam is  characterized as PIRADS 2 - Clinically significant cancer is unlikely  to be present.    2. No suspicious adenopathy or evidence of pelvic metastases.  3. Bilateral hip arthritis with effusion.    PHYSICAL EXAM  Patient is a 68 year old  male   Vitals: Blood pressure 90/62, height 1.829 m (6'), weight 70.3 kg (155 lb), SpO2 98 %.  General Appearance Adult: Body mass index is 21.02 kg/m .  Alert, no acute distress, oriented  Lungs: no respiratory distress, or pursed lip breathing  Abdomen: soft, nontender, no organomegaly or masses  Back: no CVAT  Neuro: Alert, oriented, speech and mentation normal  Psych: affect and mood normal    Outside and Past Medical records:  Review of the result(s) of each unique test - MRI, PSA    ASSESSMENT and PLAN  68 year old male with [place] 3+3 disease diagnosed 2019. GPS score 5. On active surveillance. Recent PSA 16.00 and slightly elevated from previous. MRI now with PIRADS 4 lesion. We discussed this in detail today. At this point, given rising PSA, and lesion on  MRI, fusion biopsy would be very reasonable and he elects to proceed. We spent time today also discussing possible definitive treatment options, should he be found to have higher grade disease. Risks and expectations for biopsy were reviewed.     Plan:  Schedule fusion biopsy    25 minutes spent on the date of the encounter doing chart review, history and exam, documentation and further activities as noted above.    Sukumar Oglesby MD  Urology  [facility]

## 2023-06-29 NOTE — TELEPHONE ENCOUNTER
[facility] Call Center    Phone Message    May a detailed message be left on voicemail: yes     Reason for Call: Other: .  Pt calling to schedule prostate biopsy with Reny. Please call pt     Action Taken: Message routed to:  Other: Uro    Travel Screening: Not Applicable

## 2023-06-29 NOTE — NURSING NOTE
Chief Complaint   Patient presents with     Follow Up     Mri done 06- and also psa draw today   talk about the psa done today  Everything is going well pr patient  Rose Calderon, clinic assistant

## 2023-06-29 NOTE — Clinical Note
6/29/2023       RE: Iraida Hernandez  [address] [facility] [id number]     Dear Colleague,    Thank you for referring your patient, Iraida Hernandez, to the [facility] at [facility]. Please see a copy of my visit note below.      CHIEF COMPLAINT   It was my pleasure to see Iraida Hernandez who is a 68 year old male for follow-up of Prostate Cancer.      HPI  Iraida Hernandez is a very pleasant 68 year old male who presents with a history of Prostate Cancer. 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  He had clinical stage T1c disease at that time with a PSA of 9.7.  His Oncotype DX test suggested a very low probability of grade 4 disease or progression.      Oncotype GPS is 5 - very low risk.     Here for PSA recheck; remains rather high and up a bit to 16.0, but stable over past 2 years.    MRI Prostate 6/1/2023   Size: 5.6 x 4.6 x 3.5 cm, 47 grams  IMPRESSION:  Limited evaluation due to nondiagnostic diffusion/ADC images.     1. Based on the most suspicious abnormality, this exam is  characterized as PIRADS 4 - Clinically significant cancer is likely to  be present.  The most suspicious abnormality is located at the right  mid gland peripheral zone and there is short segment capsular abutment  with low likelihood of minimal extraprostatic extension.  2. No suspicious adenopathy or evidence of pelvic metastases.    PSA  5/1/2023 - 16.00  8/31/2022 - 14.9  1/20/2022 - 13.7  2/2/2021 - 12.90  3/31/2020 - 10.70  2/5/2020 - 14.60  2/15/2019 - 9.79  12/2/2013 - 6.19     TRUS 6/25/2021  FINAL DIAGNOSIS:   A.  Prostate, left [place] x2, biopsy   - Focal high-grade prostatic intraepithelial neoplasia. Negative for   invasive malignancy.     B.  Prostate, left mid x2, biopsy   - Benign prostatic tissue. Negative for malignancy.     C.  Prostate, left [place] x2, biopsy   - Focal high-grade prostatic  intraepithelial neoplasia. Negative for   invasive malignancy.     D.  Prostate, right base x2, biopsy   - Benign prostatic tissue. Negative for malignancy     E.  Prostate, right mid x2, biopsy   - Atypical glands suspicious for malignancy (Please see comment)     F.  Prostate, right [place] x2, biopsy   - Prostatic adenocarcinoma, acinar type, Krista's grade 3+ 3 = score of   6, grade group is 1, number of cores involved is 2 of 2, total surface area involved is 10-15%, perineural   invasion is not identified, high-grade prostatic intraepithelial neoplasia is present      TRUS 3/2019  F: Prostate, left apex, needle core biopsy: Adenocarcinoma, [place] score   3 + 3 = 6 (of 10) (Grade Group 1),   involving 1 of 1 needle core(s) and 1 mm of 10 mm of biopsy tissue   (approximately 10% of biopsy tissue).   Perineural invasion not identified.     K: Prostate, right mid, needle core biopsy: Adenocarcinoma, [place] score   3 + 3 = 6 (of 10) (Grade Group 1),   involving 1 of 1 needle core(s) and 1 mm of 10 mm of biopsy tissue   (approximately 10% of biopsy tissue).   Perineural invasion not identified.      MRI Prostate 3/9/2021  IMPRESSION:  1. Based on the most suspicious abnormality, this exam is  characterized as PIRADS 2 - Clinically significant cancer is unlikely  to be present.    2. No suspicious adenopathy or evidence of pelvic metastases.  3. Bilateral hip arthritis with effusion.    PHYSICAL EXAM  Patient is a 68 year old  male   Vitals: Blood pressure 90/62, height 1.829 m (6'), weight 70.3 kg (155 lb), SpO2 98 %.  General Appearance Adult: Body mass index is 21.02 kg/m .  Alert, no acute distress, oriented  Lungs: no respiratory distress, or pursed lip breathing  Abdomen: soft, nontender, no organomegaly or masses; ***  Back: *** CVAT  Neuro: Alert, oriented, speech and mentation normal  Psych: affect and mood normal  : {UZAIR EXAM MALE GENITAL:675850}    Outside and Past Medical records:  Review of the  result(s) of each unique test - MRI, PSA    ASSESSMENT and PLAN  68 year old male with Krista 3+3 disease diagnosed 2019. GPS score 5. On active surveillance. Recent PSA 16.00 and slightly elevated from previous. MRI demonstrates, again, that he has PIRADS 2 lesion on MRI 3/2021. Confirmatory re-biopsy with Krista 3+3=6 disease 6/2021. Planning to continue surveillance, but given continued rise in PSA, will re-assess with MRI.      Plan:  MRI prostate - will call with results      *** minutes spent on the date of the encounter doing chart review, history and exam, documentation and further activities as noted above.    Sukumar Oglesby MD  Urology  [facility]        CHIEF COMPLAINT   It was my pleasure to see Iraida Hernandez who is a 68 year old male for follow-up of Prostate Cancer.      HPI  Iraida Hernandez is a very pleasant 68 year old male who presents with a history of Prostate Cancer. 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  He had clinical stage T1c disease at that time with a PSA of 9.7.  His Oncotype DX test suggested a very low probability of grade 4 disease or progression.      Oncotype GPS is 5 - very low risk.     Here for PSA recheck; remains rather high and up a bit to 16.0, but stable over past 2 years.    MRI Prostate 6/1/2023   Size: 5.6 x 4.6 x 3.5 cm, 47 grams  IMPRESSION:  Limited evaluation due to nondiagnostic diffusion/ADC images.     1. Based on the most suspicious abnormality, this exam is  characterized as PIRADS 4 - Clinically significant cancer is likely to  be present.  The most suspicious abnormality is located at the right  mid gland peripheral zone and there is short segment capsular abutment  with low likelihood of minimal extraprostatic extension.  2. No suspicious adenopathy or evidence of pelvic metastases.    PSA  6/29/2023 - 17.50  5/1/2023 - 16.00  8/31/2022 - 14.9  1/20/2022 - 13.7  2/2/2021 -  12.90  3/31/2020 - 10.70  2/5/2020 - 14.60  2/15/2019 - 9.79  12/2/2013 - 6.19     TRUS 6/25/2021  FINAL DIAGNOSIS:   A.  Prostate, left base x2, biopsy   - Focal high-grade prostatic intraepithelial neoplasia. Negative for   invasive malignancy.     B.  Prostate, left mid x2, biopsy   - Benign prostatic tissue. Negative for malignancy.     C.  Prostate, left [place] x2, biopsy   - Focal high-grade prostatic intraepithelial neoplasia. Negative for   invasive malignancy.     D.  Prostate, right base x2, biopsy   - Benign prostatic tissue. Negative for malignancy     E.  Prostate, right mid x2, biopsy   - Atypical glands suspicious for malignancy (Please see comment)     F.  Prostate, right [place] x2, biopsy   - Prostatic adenocarcinoma, acinar type, Krista's grade 3+ 3 = score of   6, grade group is 1, number of cores involved is 2 of 2, total surface area involved is 10-15%, perineural   invasion is not identified, high-grade prostatic intraepithelial neoplasia is present      TRUS 3/2019  F: Prostate, left apex, needle core biopsy: Adenocarcinoma, Krista score   3 + 3 = 6 (of 10) (Grade Group 1),   involving 1 of 1 needle core(s) and 1 mm of 10 mm of biopsy tissue   (approximately 10% of biopsy tissue).   Perineural invasion not identified.     K: Prostate, right mid, needle core biopsy: Adenocarcinoma, Krista score   3 + 3 = 6 (of 10) (Grade Group 1),   involving 1 of 1 needle core(s) and 1 mm of 10 mm of biopsy tissue   (approximately 10% of biopsy tissue).   Perineural invasion not identified.      MRI Prostate 3/9/2021  IMPRESSION:  1. Based on the most suspicious abnormality, this exam is  characterized as PIRADS 2 - Clinically significant cancer is unlikely  to be present.    2. No suspicious adenopathy or evidence of pelvic metastases.  3. Bilateral hip arthritis with effusion.    PHYSICAL EXAM  Patient is a 68 year old  male   Vitals: Blood pressure 90/62, height 1.829 m (6'), weight 70.3 kg (155 lb), SpO2 98  %.  General Appearance Adult: Body mass index is 21.02 kg/m .  Alert, no acute distress, oriented  Lungs: no respiratory distress, or pursed lip breathing  Abdomen: soft, nontender, no organomegaly or masses; ***  Back: *** CVAT  Neuro: Alert, oriented, speech and mentation normal  Psych: affect and mood normal  : {UZAIR EXAM MALE GENITAL:084385}    Outside and Past Medical records:  Review of the result(s) of each unique test - MRI, PSA    ASSESSMENT and PLAN  68 year old male with [place] 3+3 disease diagnosed 2019. GPS score 5. On active surveillance. Recent PSA 16.00 and slightly elevated from previous. MRI demonstrates, again, that he has PIRADS 2 lesion on MRI 3/2021. Confirmatory re-biopsy with Krista 3+3=6 disease 6/2021. Planning to continue surveillance, but given continued rise in PSA, will re-assess with MRI.      Plan:  MRI prostate - will call with results      *** minutes spent on the date of the encounter doing chart review, history and exam, documentation and further activities as noted above.    Sukumar Oglesby MD  Urology  [facility]        Again, thank you for allowing me to participate in the care of your patient.      Sincerely,    Sukumar Oglesby MD

## 2023-07-12 DIAGNOSIS — Z79.2 PROPHYLACTIC ANTIBIOTIC: ICD-10-CM

## 2023-07-12 DIAGNOSIS — C61 PROSTATE CANCER (H): Primary | ICD-10-CM

## 2023-07-12 RX ORDER — CIPROFLOXACIN 500 MG/1
TABLET, FILM COATED ORAL
Qty: 6 TABLET | Refills: 0 | Status: SHIPPED | OUTPATIENT
Start: 2023-07-12 | End: 2023-08-17

## 2023-07-14 DIAGNOSIS — I25.5 ISCHEMIC CARDIOMYOPATHY: ICD-10-CM

## 2023-07-14 DIAGNOSIS — I25.10 CORONARY ARTERY DISEASE INVOLVING NATIVE CORONARY ARTERY OF NATIVE HEART WITHOUT ANGINA PECTORIS: ICD-10-CM

## 2023-07-14 DIAGNOSIS — I21.9 ACUTE MYOCARDIAL INFARCTION (H): ICD-10-CM

## 2023-07-14 DIAGNOSIS — I42.9 CARDIOMYOPATHY, UNSPECIFIED TYPE (H): ICD-10-CM

## 2023-07-14 RX ORDER — SACUBITRIL AND VALSARTAN 49; 51 MG/1; MG/1
1 TABLET, FILM COATED ORAL 2 TIMES DAILY
Qty: 180 TABLET | Refills: 1 | Status: SHIPPED | OUTPATIENT
Start: 2023-07-14 | End: 2023-08-09

## 2023-08-07 ENCOUNTER — VIRTUAL VISIT (OUTPATIENT)
Dept: CARDIOLOGY | Facility: CLINIC | Age: 68
End: 2023-08-07
Payer: MEDICARE

## 2023-08-07 DIAGNOSIS — E78.5 HYPERLIPIDEMIA WITH TARGET LDL LESS THAN 70: ICD-10-CM

## 2023-08-07 DIAGNOSIS — I25.5 ISCHEMIC CARDIOMYOPATHY: Primary | ICD-10-CM

## 2023-08-07 DIAGNOSIS — I25.10 CORONARY ARTERY DISEASE INVOLVING NATIVE CORONARY ARTERY OF NATIVE HEART WITHOUT ANGINA PECTORIS: ICD-10-CM

## 2023-08-07 DIAGNOSIS — C61 PROSTATE CANCER (H): ICD-10-CM

## 2023-08-07 PROCEDURE — 99214 OFFICE O/P EST MOD 30 MIN: CPT | Mod: 95 | Performed by: NURSE PRACTITIONER

## 2023-08-07 RX ORDER — ASPIRIN 81 MG/1
81 TABLET ORAL DAILY
COMMUNITY

## 2023-08-07 NOTE — PROGRESS NOTES
Saravanan is a 68 year old who is being evaluated via a billable video visit.      How would you like to obtain your AVS? MyChart  If the video visit is dropped, the invitation should be resent by: Text to cell phone: [phone]  Will anyone else be joining your video visit? No        Video-Visit Details    Type of service:  Video Visit   Video Start Time: 11:09 AM  Video End Time:11:09 AM    Originating Location (pt. Location): Home    Distant Location (provider location):  On-site  Platform used for Video Visit: [email or web address]  Skin: negative  Eyes: negative  Ears/Nose/Throat: negative  Respiratory: No shortness of breath, dyspnea on exertion, cough, or hemoptysis  Cardiovascular: negative  Gastrointestinal: negative  Genitourinary: negative  Musculoskeletal: negative  Neurologic: negative  Psychiatric: negative  Hematologic/Lymphatic/Immunologic: negative  Endocrine: negative         CARDIOLOGY CLINIC VIDEO VISIT    Iraida Hernandez is a 68 year old male who is being evaluated via a billable video visit.      The patient has been notified of following:     This video visit will be conducted via a call between you and your physician/provider. We have found that certain health care needs can be provided without the need for an in-person physical exam.  This service lets us provide the care you need with a video conversation.  If a prescription is necessary we can send it directly to your pharmacy.  If lab work is needed we can place an order for that and you can then stop by our lab to have the test done at a later time.    Virtual visits are billed at different rates depending on your insurance coverage. During this emergency period, for some insurers they may be billed the same as an in-person visit.  Please reach out to your insurance provider with any questions.    If during the course of the call the physician/provider feels a video visit is not appropriate, you will not be charged for this  service.      Physician has received verbal consent for a Video Visit from the patient? Yes    Patient would like the video invitation sent by: Text to cell phone: [phone]      I have reviewed and updated the patient's Past Medical History, Social History, Family History and Medication List.    MEDICATIONS:  Current Outpatient Medications   Medication Sig Dispense Refill    aspirin 81 MG EC tablet Take 81 mg by mouth daily      atorvastatin (LIPITOR) 40 MG tablet Take 1 tablet (40 mg) by mouth daily (Patient taking differently: Take 40 mg by mouth At Bedtime) 90 tablet 3    carvedilol (COREG) 12.5 MG tablet Take 1 tablet (12.5 mg) by mouth 2 times daily (with meals) 180 tablet 3    ciprofloxacin (CIPRO) 500 MG tablet Take 1 tab in the AM and 1 tab in the PM the day before your procedure, day of and day after. 6 tablet 0    clopidogrel (PLAVIX) 75 MG tablet Take 1 tablet (75 mg) by mouth daily 90 tablet 3    nitroGLYcerin (NITROSTAT) 0.4 MG sublingual tablet Place 1 tablet (0.4 mg) under the tongue every 5 minutes as needed for chest pain 25 tablet 3    sacubitril-valsartan (ENTRESTO) 49-51 MG per tablet Take 1 tablet by mouth 2 times daily 180 tablet 1    spironolactone (ALDACTONE) 25 MG tablet Take 1 tablet (25 mg) by mouth daily 90 tablet 3    acetaminophen (TYLENOL) 325 MG tablet Take 2 tablets (650 mg) by mouth every 4 hours as needed for other (Patient not taking: Reported on 6/29/2023) 60 tablet 0       ALLERGIES  Penicillins and Tetracyclines & related    Review Of Systems:     Skin: negative  Eyes: negative  Ears/Nose/Throat: negative  Respiratory: No shortness of breath, dyspnea on exertion, cough, or hemoptysis  Cardiovascular: negative  Gastrointestinal: negative  Genitourinary: negative  Musculoskeletal: negative  Neurologic: negative  Psychiatric: negative  Hematologic/Lymphatic/Immunologic: negative  Endocrine: negative  (ROS TAKEN BY Sha Roca PRIOR TO VISIT)    Self reported  vitals:  Weight: 152lbs  /71  HR N/A    Brief physical exam:  General: In no acute distress, upright and calm.  Eyes: No apparent redness or discharge.   Chest: No labored breathing, no cough during exam or audible wheezing.   Neuro: No obvious focal defects or tremors.   Psych: Alert and oriented. Does not appear anxious.     The rest of a comprehensive physical examination is deferred due to public health emergency video visit restrictions.     Primary cardiologist: Dr. Stevens      HPI:  Iraida Hernandez is a pleasant 68 year old patient who carries a past medical history significant for ST elevation MI in 2014 status post emergent coronary angiogram, thrombectomy and stenting to the LAD and RCA, V. fib cardiac arrest status post ICD placement, ischemic cardiomyopathy, hyperlipidemia, prostate cancer, and bilateral hip replacement.     She was last seen on 2/2/2023 in follow-up with Dr. Stevens.  He had recently undergone bilateral hip replacement with the left in October and the right in January.  Surgery was successful and physical mobility significantly improved.  At that visit, he offered no cardiac complaints and no medication changes were made.  He was transitioned from lisinopril to Entresto in November 2022.  A follow-up echocardiogram showed an ejection fraction of 30 to 35%, apical and inferior septal akinesis, normal RV size and function, no significant valvular disease.  LVIDD did  decrease from 5.3 to 4.4 cm.    Today, he is participating in a 6-month virtual visit.  He offers no cardiac complaint, denies chest pain, exertional shortness of breath with peak exercise that quickly recovers with rest, no palpitations, PND, orthopnea, presyncope, syncope, or leg edema.  He is recovering well from his orthopedic surgery and is golfing regularly without pain which has him very excited.  His only complaint is mildly elevated PSA levels.  He is scheduled for a prostate biopsy this week.    Last nuclear  stress test in December 2022 showed a large area of severe degree of infarction in the anterior, apical, anterior septal and inferior septal segment of the LV (unchanged from 2016).     Device interrogation on 6/1/2023 showed less than 1% ventricular pacing, 9 episodes of nonsustained VT lasting between 3 to 5 seconds.    Blood pressure is well-controlled at 112/71, managed on carvedilol, spironolactone, and Entresto.  Last BMP was unremarkable  Lipid panel in December 2022 showed total cholesterol 141, HDL 45, LDL 75, and triglycerides 104    Compliant with all medications    Assessment/Plan:  Coronary artery disease -remote STEMI in 2014 status post thrombectomy and stenting to the LAD and RCA.  Nuclear stress test 12/2022 showed a large area of severe degree of infarction in the anterior, apical, anterior septal and inferior septal segment of the LV (unchanged from 2016).  Continue carvedilol, Plavix, aspirin and statin.    2.  Ischemic cardiomyopathy -last echocardiogram showed an ejection fraction of 30 to 35%, apical and inferior septal akinesis, normal RV size and function, no significant valvular disease.  LVIDD did  decrease from 5.3 to 4.4 cm.  Continue Entresto, spironolactone, and carvedilol.  Patient may need to stop Entresto if it becomes cost prohibitive. ($600/3 months).  He would like to continue until January when there is a reassessment of his insurance.  If Entresto is unaffordable we will transition to valsartan.  Continue with current medical therapy for now.  Limited echocardiogram in December.    3.  VF arrest/ICD - Device interrogation on 6/1/2023 showed less than 1% ventricular pacing, 9 episodes of nonsustained VT lasting between 3 to 5 seconds.    4.  Hyperlipidemia -on atorvastatin.   5.  Prostate cancer -scheduled for prostate biopsy this week.       Follow up plan: Follow-up in 6 months with [facility] with limited echocardiogram prior          Video-Visit Details    Type of service:  Video  Visit    Video Time:  30 min   Additional 20 minutes was spent reviewing EMR and documentation    Originating Location (pt. Location): Home    Distant Location (provider location):  [facility]    Platform used for Video Visit: CARA Garcia, CNP  Pager [phone]  8/7/2023

## 2023-08-07 NOTE — PATIENT INSTRUCTIONS
Thanks for participating in a virtual visit with the [facility] today.    Doing well on a cardiac standpoint  Reviewed results of last echocardiogram with moderate improvement in LV size.  Follow-up limited echocardiogram 4 months.  Blood pressure is well-controlled  Continue with current medical therapy.   I will reach out to my nurse team on status of patient assistance for Entresto.  If medication becomes cost prohibitive we will consider switching to valsartan.  Patient would like to continue with Entresto until January.      Follow up in January with [facility] or sooner if needed.    Please call my nurse at  [phone] with any questions or concerns.    Scheduling phone number: [phone]  Reminder: Please bring in all current medications, over the counter supplements and vitamin bottles to your next appointment.

## 2023-08-07 NOTE — LETTER
8/7/2023    Cesar Marie PA-C  [address]  [facility] [address]    RE: Iraida Hernandez       Dear Colleague,     I had the pleasure of seeing Iraida Hernandez in the [facility].  Saravanan is a 68 year old who is being evaluated via a billable video visit.      How would you like to obtain your AVS? MyChart  If the video visit is dropped, the invitation should be resent by: Text to cell phone: [phone]  Will anyone else be joining your video visit? No        Video-Visit Details    Type of service:  Video Visit   Video Start Time: 11:09 AM  Video End Time:11:09 AM    Originating Location (pt. Location): Home    Distant Location (provider location):  On-site  Platform used for Video Visit: AttorneyFee      Review Of Systems  Skin: negative  Eyes: negative  Ears/Nose/Throat: negative  Respiratory: No shortness of breath, dyspnea on exertion, cough, or hemoptysis  Cardiovascular: negative  Gastrointestinal: negative  Genitourinary: negative  Musculoskeletal: negative  Neurologic: negative  Psychiatric: negative  Hematologic/Lymphatic/Immunologic: negative  Endocrine: negative         CARDIOLOGY CLINIC VIDEO VISIT    Iraida Hernandez is a 68 year old male who is being evaluated via a billable video visit.      The patient has been notified of following:     This video visit will be conducted via a call between you and your physician/provider. We have found that certain health care needs can be provided without the need for an in-person physical exam.  This service lets us provide the care you need with a video conversation.  If a prescription is necessary we can send it directly to your pharmacy.  If lab work is needed we can place an order for that and you can then stop by our lab to have the test done at a later time.    Virtual visits are billed at different rates depending on your insurance coverage. During this emergency period, for some insurers they may be billed the same as an in-person  visit.  Please reach out to your insurance provider with any questions.    If during the course of the call the physician/provider feels a video visit is not appropriate, you will not be charged for this service.      Physician has received verbal consent for a Video Visit from the patient? Yes    Patient would like the video invitation sent by: Text to cell phone: [phone]      I have reviewed and updated the patient's Past Medical History, Social History, Family History and Medication List.    MEDICATIONS:  Current Outpatient Medications   Medication Sig Dispense Refill    aspirin 81 MG EC tablet Take 81 mg by mouth daily      atorvastatin (LIPITOR) 40 MG tablet Take 1 tablet (40 mg) by mouth daily (Patient taking differently: Take 40 mg by mouth At Bedtime) 90 tablet 3    carvedilol (COREG) 12.5 MG tablet Take 1 tablet (12.5 mg) by mouth 2 times daily (with meals) 180 tablet 3    ciprofloxacin (CIPRO) 500 MG tablet Take 1 tab in the AM and 1 tab in the PM the day before your procedure, day of and day after. 6 tablet 0    clopidogrel (PLAVIX) 75 MG tablet Take 1 tablet (75 mg) by mouth daily 90 tablet 3    nitroGLYcerin (NITROSTAT) 0.4 MG sublingual tablet Place 1 tablet (0.4 mg) under the tongue every 5 minutes as needed for chest pain 25 tablet 3    sacubitril-valsartan (ENTRESTO) 49-51 MG per tablet Take 1 tablet by mouth 2 times daily 180 tablet 1    spironolactone (ALDACTONE) 25 MG tablet Take 1 tablet (25 mg) by mouth daily 90 tablet 3    acetaminophen (TYLENOL) 325 MG tablet Take 2 tablets (650 mg) by mouth every 4 hours as needed for other (Patient not taking: Reported on 6/29/2023) 60 tablet 0       ALLERGIES  Penicillins and Tetracyclines & related    Review Of Systems:     Skin: negative  Eyes: negative  Ears/Nose/Throat: negative  Respiratory: No shortness of breath, dyspnea on exertion, cough, or hemoptysis  Cardiovascular: negative  Gastrointestinal: negative  Genitourinary:  negative  Musculoskeletal: negative  Neurologic: negative  Psychiatric: negative  Hematologic/Lymphatic/Immunologic: negative  Endocrine: negative  (ROS TAKEN BY Sha Roca PRIOR TO VISIT)    Self reported vitals:  Weight: 152lbs  /71  HR N/A    Brief physical exam:  General: In no acute distress, upright and calm.  Eyes: No apparent redness or discharge.   Chest: No labored breathing, no cough during exam or audible wheezing.   Neuro: No obvious focal defects or tremors.   Psych: Alert and oriented. Does not appear anxious.     The rest of a comprehensive physical examination is deferred due to public [facility] emergency video visit restrictions.     Primary cardiologist: Dr. Stevens      HPI:  Iraida Hernandez is a pleasant 68 year old patient who carries a past medical history significant for ST elevation MI in 2014 status post emergent coronary angiogram, thrombectomy and stenting to the LAD and RCA, V. fib cardiac arrest status post ICD placement, ischemic cardiomyopathy, hyperlipidemia, prostate cancer, and bilateral hip replacement.     She was last seen on 2/2/2023 in follow-up with Dr. Stevens.  He had recently undergone bilateral hip replacement with the left in October and the right in January.  Surgery was successful and physical mobility significantly improved.  At that visit, he offered no cardiac complaints and no medication changes were made.  He was transitioned from lisinopril to Entresto in November 2022.  A follow-up echocardiogram showed an ejection fraction of 30 to 35%, apical and inferior septal akinesis, normal RV size and function, no significant valvular disease.  LVIDD did  decrease from 5.3 to 4.4 cm.    Today, he is participating in a 6-month virtual visit.  He offers no cardiac complaint, denies chest pain, exertional shortness of breath with peak exercise that quickly recovers with rest, no palpitations, PND, orthopnea, presyncope, syncope, or leg edema.  He is recovering well from  his orthopedic surgery and is golfing regularly without pain which has him very excited.  His only complaint is mildly elevated PSA levels.  He is scheduled for a prostate biopsy this week.    Last nuclear stress test in December 2022 showed a large area of severe degree of infarction in the anterior, apical, anterior septal and inferior septal segment of the LV (unchanged from 2016).     Device interrogation on 6/1/2023 showed less than 1% ventricular pacing, 9 episodes of nonsustained VT lasting between 3 to 5 seconds.    Blood pressure is well-controlled at 112/71, managed on carvedilol, spironolactone, and Entresto.  Last BMP was unremarkable  Lipid panel in December 2022 showed total cholesterol 141, HDL 45, LDL 75, and triglycerides 104    Compliant with all medications    Assessment/Plan:  Coronary artery disease -remote STEMI in 2014 status post thrombectomy and stenting to the LAD and RCA.  Nuclear stress test 12/2022 showed a large area of severe degree of infarction in the anterior, apical, anterior septal and inferior septal segment of the LV (unchanged from 2016).  Continue carvedilol, Plavix, aspirin and statin.    2.  Ischemic cardiomyopathy -last echocardiogram showed an ejection fraction of 30 to 35%, apical and inferior septal akinesis, normal RV size and function, no significant valvular disease.  LVIDD did  decrease from 5.3 to 4.4 cm.  Continue Entresto, spironolactone, and carvedilol.  Patient may need to stop Entresto if it becomes cost prohibitive. ($600/3 months).  He would like to continue until January when there is a reassessment of his insurance.  If Entresto is unaffordable we will transition to valsartan.  Continue with current medical therapy for now.  Limited echocardiogram in December.    3.  VF arrest/ICD - Device interrogation on 6/1/2023 showed less than 1% ventricular pacing, 9 episodes of nonsustained VT lasting between 3 to 5 seconds.    4.  Hyperlipidemia -on atorvastatin.    5.  Prostate cancer -scheduled for prostate biopsy this week.       Follow up plan: Follow-up in 6 months with [facility] with limited echocardiogram prior          Video-Visit Details    Type of service:  Video Visit    Video Time:  30 min   Additional 20 minutes was spent reviewing EMR and documentation    Originating Location (pt. Location): Home    Distant Location (provider location):  [facility]    Platform used for Video Visit: CARA Garcia, PATRICK  Pager [phone]  8/7/2023        Thank you for allowing me to participate in the care of your patient.      Sincerely,     CARA Rodriguez CNP     [facility]  cc:   Juancarlos Stevens MD  [address]  [place] [id number]

## 2023-08-09 ENCOUNTER — TELEPHONE (OUTPATIENT)
Dept: CARDIOLOGY | Facility: CLINIC | Age: 68
End: 2023-08-09
Payer: MEDICARE

## 2023-08-09 ENCOUNTER — DOCUMENTATION ONLY (OUTPATIENT)
Dept: CARDIOLOGY | Facility: CLINIC | Age: 68
End: 2023-08-09
Payer: MEDICARE

## 2023-08-09 DIAGNOSIS — I25.5 ISCHEMIC CARDIOMYOPATHY: Primary | ICD-10-CM

## 2023-08-09 DIAGNOSIS — I42.9 CARDIOMYOPATHY, UNSPECIFIED TYPE (H): ICD-10-CM

## 2023-08-09 DIAGNOSIS — I21.9 ACUTE MYOCARDIAL INFARCTION (H): ICD-10-CM

## 2023-08-09 NOTE — TELEPHONE ENCOUNTER
----- Message from CARA Rodriguez CNP sent at 8/7/2023 12:37 PM CDT -----  Hi,     I just had a virtual visit with the above patient.  He has been on Entresto since November and actually showed improvement in his LV diameter.  He has been paying $40 every 3 months up until his last prescription that was 600.  It looks like there has been some communication for possible patient assistance but patient said there has been no update.  He is willing to stay on Entresto until January in the hopes his insurance will go back down to $40 every 3 months again.  Does he not qualify for patient assistance?     Ellen

## 2023-08-09 NOTE — PROGRESS NOTES
8-9-2023-I spoke w/ pt today, they make around 65,000 annual so will most likely qualify. I am sending him out the application today. I will need Ellen to sign the Entresto RX, and if you can fax it to me AT:[phone] hema Samuels fadumo mailed to pt  parthas lpn        8- I received signed provider RX from BV office today, I faxed the completed provider section of application [facility]  [facility]      8-  Pt called me today, he has not received the pkt I mailed out on 8-9-2023  I apologized for the slow mail, but will RE SEND the fadumo etc to pt again today  parthas lpn       9- I did receive APPROVAL letter effective 9-7-2023 thru 12-  Pt will need to call [phone] to get the delivery in motion  His ID# is [id number]  I called him with the above info, but  got VM, so left him the information.    [facility]

## 2023-08-10 ENCOUNTER — OFFICE VISIT (OUTPATIENT)
Dept: UROLOGY | Facility: CLINIC | Age: 68
End: 2023-08-10
Payer: MEDICARE

## 2023-08-10 VITALS
WEIGHT: 152 LBS | BODY MASS INDEX: 20.59 KG/M2 | SYSTOLIC BLOOD PRESSURE: 147 MMHG | HEART RATE: 60 BPM | HEIGHT: 72 IN | DIASTOLIC BLOOD PRESSURE: 76 MMHG | OXYGEN SATURATION: 99 %

## 2023-08-10 DIAGNOSIS — C61 PROSTATE CANCER (H): Primary | ICD-10-CM

## 2023-08-10 DIAGNOSIS — Z79.2 PROPHYLACTIC ANTIBIOTIC: ICD-10-CM

## 2023-08-10 PROCEDURE — 96372 THER/PROPH/DIAG INJ SC/IM: CPT | Mod: 59 | Performed by: UROLOGY

## 2023-08-10 PROCEDURE — G0416 PROSTATE BIOPSY, ANY MTHD: HCPCS | Performed by: PATHOLOGY

## 2023-08-10 PROCEDURE — 55700 PR BIOPSY OF PROSTATE,NEEDLE/PUNCH: CPT | Performed by: UROLOGY

## 2023-08-10 PROCEDURE — 76942 ECHO GUIDE FOR BIOPSY: CPT | Performed by: UROLOGY

## 2023-08-10 RX ORDER — GENTAMICIN 40 MG/ML
80 INJECTION, SOLUTION INTRAMUSCULAR; INTRAVENOUS ONCE
Status: DISCONTINUED | OUTPATIENT
Start: 2023-08-10 | End: 2023-08-10 | Stop reason: HOSPADM

## 2023-08-10 RX ADMIN — GENTAMICIN 80 MG: 40 INJECTION, SOLUTION INTRAMUSCULAR; INTRAVENOUS at 13:15

## 2023-08-10 NOTE — NURSING NOTE
The following medication was given:     MEDICATION: Gentamicin   ROUTE: IM  SITE: LUQ - Gluteus  DOSE:80mg/2ml  LOT #: [id number]  : Kilopass  EXPIRATION DATE: 07/24  NDC#:  42372-762-42  Was there drug waste? No  Multi-dose vial: Yes     Using a 1 1/2 inch 21 guage needle medication drawn up as ordered with sterile syringe. Using sterile technique, a new 1 1/2 needle 21 gauge placed on syringe and patient cleansed with alcohol pad. Site was mapped out using palm of hand on the greater trochanter and forefinger on iliac crest. Using V technique between middle finger and index finger. Skin was tracted and Injection was given using a 90 degree angle dart method and after aspiration of needle and no blood, medication was slowly given via IM injection.  Patient tolerated injection well, and bandage placed on site following insertion removal.      Nithya Lam LPN

## 2023-08-10 NOTE — PROGRESS NOTES
PREPROCEDURE DIAGNOSIS: Prostate Cancer  POSTPROCEDURE DIAGNOSIS: Prostate Cancer  PROCEDURE: Transrectal ultrasound sizing and transrectal ultrasound guided prostatic needle biopsy, including MRI fusion targeting  for Saravanan Hernandez who is a 68 year old male. PSA 17.50  SURGEON: Sukumar Oglesby  ANESTHESIA: 5 mL of 1% periprostatic block bilaterally   We previously obtained an MRI of the prostate and identified all PIRADS 3-5 lesions for targeting    Target Lesion #1 PIRADS 4 - right mid gland peripheral zone  DESCRIPTION OF PROCEDURE: The procedure, the outcome, the anesthesia, and the risks were discussed with the patient. Informed consent was obtained and signed and a timeout was completed prior to the procedure. Digital rectal examination was performed with the below findings noted. Anesthesia was administered as noted above and the transrectal ultrasound probe was inserted, sizing was performed, and the below findings were noted. Two core biopsies were taken from each of the MRI targets, and 12 core biopsies were taken as described below. The probe was then withdrawn. Patient tolerated the procedure well.   FINDINGS: Digital rectal exam reveals normal prostate. Total volume is 40 mL. Hypoechoic lesion bilaterally. US images were obtained and then fused with the MRI images.  The fused images were then used to guide the biopsy of the targeted lesions with 2 cores taken of each lesion.  We then performed random biopsies.  12 cores were taken with 6 on each side, base, mid and apex.  PLAN: Will follow-up next week to review results.  Sukumar Oglesby MD  Urology  [facility]

## 2023-08-10 NOTE — PATIENT INSTRUCTIONS
[facility]  Transrectal Ultrasound  Post Operative Information    The physician who performed your Transrectal Ultrasound is Dr. Oglesby (telephone number [phone] 8-4:30pm after hours please call [phone].  Please contact this doctor if you have any problems or questions.  If unable to reach your doctor, please return to the Emergency Department.    Take one antibiotic the evening of the procedure and then as directed on your prescription.  Drink at least 6-8 glasses of fluids for the first 48 hours.  Avoid heavy lifting and strenuous activity for 48 hours.  Avoid sexual intercourse for the first 24 hours.  No aspirin or ibuprofen products (Motrin, Advil, Nuprin, ect.) Monday You may take for Plavix Friday night.  You may take acetaminophen (Tylenol) for pain. You may take 2-500mg extra strength tylenol every 6 hours, not to exceed the daily recommendation of 4,000mg.   You may notice a small amount of blood on the tissue after a bowel movement.  You may pass blood with clots in your urine following the procedure.  The amount will decrease with time but may be visible for up to two weeks. If you have trouble urinating due to a possible blood clot, please call our office.  You make have blood in your semen for 4 weeks after the procedure.  You may experience mild perineal (groin area) discomfort after the procedure. If you were not prescribed a sedative, you may take a tub soak to alleviate groin discomfort.   Please call you doctor if you have any of the follow symptoms:  Fever over 101.1  Increase in the amount of blood passed, dark-red blood cherry color urine/Trouble Urinating  Severe discomfort or pain not well managed with methods above

## 2023-08-10 NOTE — NURSING NOTE
Chief Complaint   Patient presents with    Prostate Cancer     Patient is here for Transrectal Ultrasound/MRI Guided Prostate Biopsies            Procedure was explained to patient prior to performing said procedure. The patient signed the consent form and all questions were answered prior to the procedure. Any pre-procedural antibiotics were given according to the performing physicians recommendation. Pt's information was confirmed on samples and samples were sent for analysis. Paient reviewed information on labels sent with patient and confirmed the accuracy of all the labels.    Consent read and signed: Yes     Allergies   Allergen Reactions    Penicillins      Violent shaking of entire body. Reaction was 50 years ago     Tetracyclines & Related Hives     Performing Physician: Dr. Oglesby    12 samples were taken from the right and left, medial and  base, mid, and apex of the prostate respectively. Yes additional samples were taken. Vitals were repeated prior to patient leaving and instructions for post Transrectal Ultrasound/MRI Guided Prostate Biopsies care were explained to the patient         SHAY Vera

## 2023-08-10 NOTE — LETTER
8/10/2023       RE: Iraida Hernandez  [address] [facility] [facility] [address]     Dear Colleague,    Thank you for referring your patient, Iraida Hernandez, to the [facility] [place] [facility]. Please see a copy of my visit note below.    PREPROCEDURE DIAGNOSIS: Prostate Cancer  POSTPROCEDURE DIAGNOSIS: Prostate Cancer  PROCEDURE: Transrectal ultrasound sizing and transrectal ultrasound guided prostatic needle biopsy, including MRI fusion targeting  for Saravanan Hernandez who is a 68 year old male. PSA 17.50  SURGEON: Sukumar Oglesby  ANESTHESIA: 5 mL of 1% periprostatic block bilaterally   We previously obtained an MRI of the prostate and identified all PIRADS 3-5 lesions for targeting    Target Lesion #1 PIRADS 4 - right mid gland peripheral zone  DESCRIPTION OF PROCEDURE: The procedure, the outcome, the anesthesia, and the risks were discussed with the patient. Informed consent was obtained and signed and a timeout was completed prior to the procedure. Digital rectal examination was performed with the below findings noted. Anesthesia was administered as noted above and the transrectal ultrasound probe was inserted, sizing was performed, and the below findings were noted. Two core biopsies were taken from each of the MRI targets, and 12 core biopsies were taken as described below. The probe was then withdrawn. Patient tolerated the procedure well.   FINDINGS: Digital rectal exam reveals normal prostate. Total volume is 40 mL. Hypoechoic lesion bilaterally. US images were obtained and then fused with the MRI images.  The fused images were then used to guide the biopsy of the targeted lesions with 2 cores taken of each lesion.  We then performed random biopsies.  12 cores were taken with 6 on each side, base, mid and apex.  PLAN: Will follow-up next week to review results.  Sukumar Oglesby MD  Urology  [facility]

## 2023-08-14 LAB
PATH REPORT.COMMENTS IMP SPEC: ABNORMAL
PATH REPORT.COMMENTS IMP SPEC: ABNORMAL
PATH REPORT.COMMENTS IMP SPEC: YES
PATH REPORT.FINAL DX SPEC: ABNORMAL
PATH REPORT.GROSS SPEC: ABNORMAL
PATH REPORT.MICROSCOPIC SPEC OTHER STN: ABNORMAL
PATH REPORT.RELEVANT HX SPEC: ABNORMAL
PHOTO IMAGE: ABNORMAL

## 2023-08-15 ENCOUNTER — ANCILLARY PROCEDURE (OUTPATIENT)
Dept: CARDIOLOGY | Facility: CLINIC | Age: 68
End: 2023-08-15
Attending: INTERNAL MEDICINE
Payer: MEDICARE

## 2023-08-15 DIAGNOSIS — Z95.810 ICD (IMPLANTABLE CARDIOVERTER-DEFIBRILLATOR) IN PLACE: ICD-10-CM

## 2023-08-15 DIAGNOSIS — I25.5 ISCHEMIC CARDIOMYOPATHY: ICD-10-CM

## 2023-08-15 LAB
MDC_IDC_EPISODE_DTM: NORMAL
MDC_IDC_EPISODE_DURATION: 6 S
MDC_IDC_EPISODE_DURATION: 8 S
MDC_IDC_EPISODE_DURATION: 8 S
MDC_IDC_EPISODE_DURATION: 9 S
MDC_IDC_EPISODE_ID: NORMAL
MDC_IDC_EPISODE_TYPE: NORMAL
MDC_IDC_LEAD_IMPLANT_DT: NORMAL
MDC_IDC_LEAD_LOCATION: NORMAL
MDC_IDC_LEAD_LOCATION_DETAIL_1: NORMAL
MDC_IDC_LEAD_MFG: NORMAL
MDC_IDC_LEAD_MODEL: NORMAL
MDC_IDC_LEAD_POLARITY_TYPE: NORMAL
MDC_IDC_LEAD_SERIAL: NORMAL
MDC_IDC_MSMT_BATTERY_DTM: NORMAL
MDC_IDC_MSMT_BATTERY_REMAINING_LONGEVITY: 66 MO
MDC_IDC_MSMT_BATTERY_REMAINING_PERCENTAGE: 71 %
MDC_IDC_MSMT_BATTERY_STATUS: NORMAL
MDC_IDC_MSMT_CAP_CHARGE_DTM: NORMAL
MDC_IDC_MSMT_CAP_CHARGE_TIME: 10.4 S
MDC_IDC_MSMT_CAP_CHARGE_TYPE: NORMAL
MDC_IDC_MSMT_LEADCHNL_RV_IMPEDANCE_VALUE: 668 OHM
MDC_IDC_MSMT_LEADCHNL_RV_PACING_THRESHOLD_AMPLITUDE: 0.7 V
MDC_IDC_MSMT_LEADCHNL_RV_PACING_THRESHOLD_PULSEWIDTH: 0.5 MS
MDC_IDC_PG_IMPLANT_DTM: NORMAL
MDC_IDC_PG_MFG: NORMAL
MDC_IDC_PG_MODEL: NORMAL
MDC_IDC_PG_SERIAL: NORMAL
MDC_IDC_PG_TYPE: NORMAL
MDC_IDC_SESS_CLINIC_NAME: NORMAL
MDC_IDC_SESS_DTM: NORMAL
MDC_IDC_SESS_TYPE: NORMAL
MDC_IDC_SET_BRADY_LOWRATE: 40 {BEATS}/MIN
MDC_IDC_SET_BRADY_MODE: NORMAL
MDC_IDC_SET_LEADCHNL_RV_PACING_AMPLITUDE: 2 V
MDC_IDC_SET_LEADCHNL_RV_PACING_POLARITY: NORMAL
MDC_IDC_SET_LEADCHNL_RV_PACING_PULSEWIDTH: 0.5 MS
MDC_IDC_SET_LEADCHNL_RV_SENSING_ADAPTATION_MODE: NORMAL
MDC_IDC_SET_LEADCHNL_RV_SENSING_POLARITY: NORMAL
MDC_IDC_SET_LEADCHNL_RV_SENSING_SENSITIVITY: 0.6 MV
MDC_IDC_SET_ZONE_DETECTION_INTERVAL: 250 MS
MDC_IDC_SET_ZONE_DETECTION_INTERVAL: 300 MS
MDC_IDC_SET_ZONE_DETECTION_INTERVAL: 353 MS
MDC_IDC_SET_ZONE_TYPE: NORMAL
MDC_IDC_SET_ZONE_VENDOR_TYPE: NORMAL
MDC_IDC_STAT_BRADY_DTM_END: NORMAL
MDC_IDC_STAT_BRADY_DTM_START: NORMAL
MDC_IDC_STAT_BRADY_RV_PERCENT_PACED: 0 %
MDC_IDC_STAT_EPISODE_RECENT_COUNT: 0
MDC_IDC_STAT_EPISODE_RECENT_COUNT: 13
MDC_IDC_STAT_EPISODE_RECENT_COUNT_DTM_END: NORMAL
MDC_IDC_STAT_EPISODE_RECENT_COUNT_DTM_START: NORMAL
MDC_IDC_STAT_EPISODE_TYPE: NORMAL
MDC_IDC_STAT_EPISODE_VENDOR_TYPE: NORMAL
MDC_IDC_STAT_TACHYTHERAPY_ATP_DELIVERED_RECENT: 0
MDC_IDC_STAT_TACHYTHERAPY_ATP_DELIVERED_TOTAL: 0
MDC_IDC_STAT_TACHYTHERAPY_RECENT_DTM_END: NORMAL
MDC_IDC_STAT_TACHYTHERAPY_RECENT_DTM_START: NORMAL
MDC_IDC_STAT_TACHYTHERAPY_SHOCKS_ABORTED_RECENT: 0
MDC_IDC_STAT_TACHYTHERAPY_SHOCKS_ABORTED_TOTAL: 0
MDC_IDC_STAT_TACHYTHERAPY_SHOCKS_DELIVERED_RECENT: 0
MDC_IDC_STAT_TACHYTHERAPY_SHOCKS_DELIVERED_TOTAL: 0
MDC_IDC_STAT_TACHYTHERAPY_TOTAL_DTM_END: NORMAL
MDC_IDC_STAT_TACHYTHERAPY_TOTAL_DTM_START: NORMAL

## 2023-08-15 PROCEDURE — 93296 REM INTERROG EVL PM/IDS: CPT | Performed by: INTERNAL MEDICINE

## 2023-08-15 PROCEDURE — 93295 DEV INTERROG REMOTE 1/2/MLT: CPT | Performed by: INTERNAL MEDICINE

## 2023-08-17 ENCOUNTER — VIRTUAL VISIT (OUTPATIENT)
Dept: UROLOGY | Facility: CLINIC | Age: 68
End: 2023-08-17
Payer: MEDICARE

## 2023-08-17 VITALS — HEIGHT: 72 IN | WEIGHT: 152 LBS | BODY MASS INDEX: 20.59 KG/M2

## 2023-08-17 DIAGNOSIS — C61 PROSTATE CANCER (H): Primary | ICD-10-CM

## 2023-08-17 PROCEDURE — 99213 OFFICE O/P EST LOW 20 MIN: CPT | Mod: VID | Performed by: UROLOGY

## 2023-08-17 ASSESSMENT — PAIN SCALES - GENERAL: PAINLEVEL: NO PAIN (0)

## 2023-08-17 NOTE — PROGRESS NOTES
Iraida Hernandez is a 68 year old male who is being evaluated via a billable video visit.      How would you like to obtain your AVS? MyChart  If the video visit is dropped, the invitation should be resent by: Text to cell phone: [phone]  Will anyone else be joining your video visit? No    Video Start Time: 3:42 PM    CHIEF COMPLAINT   It was my pleasure to see Iraida Hernandez who is a 68 year old male for follow-up of Prostate Cancer.      HPI  Iraida Hernandez is a very pleasant 68 year old male who presents with a history of Prostate Cancer. 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  He had clinical stage T1c disease at that time with a PSA of 9.7.  His Oncotype DX test suggested a very low probability of grade 4 disease or progression.      Oncotype GPS is 5 - very low risk.     Surveillance biopsy of PIRADS 4 lesion done 8/10/2023 - demonstrates stable Krista 6 disease. No complications from biopsy.     Fusion Biopsy 8/10/2023  Final Diagnosis   A.  Prostate, left base, biopsy-  Benign prostate tissue     B.  Prostate, left mid, biopsy-  Adenocarcinoma, Krista's grade 3/3 in 1 of 2 needle core biopsy and less than 5% of submitted tissue     C.  Prostate, left apex, biopsy-  Adenocarcinoma, [place] grade 3/3 in 1 of 2 needle core biopsies and less than 5% of submitted tissue     D.  Prostate, right base, biopsy-  Benign prostate tissue     E.  Prostate, right mid, biopsy-  Adenocarcinoma, [place] grade 3/3 in 1 of 1 needle core biopsy and less than 5% of submitted tissue     F.  Prostate, right apex, biopsy-  Adenocarcinoma, Krista's grade 3/3 in 1 of 2 needle core biopsies and 10% of submitted tissue     G.  Prostate, MRI target, biopsy-  Adenocarcinoma, Krista grade 3/3 in 2 of 2 submitted needle core biopsies and 10% of submitted tissue      MRI Prostate 6/1/2023   Size: 5.6 x 4.6 x 3.5 cm, 47 grams  IMPRESSION:  Limited evaluation due to nondiagnostic  diffusion/ADC images.  1. Based on the most suspicious abnormality, this exam is  characterized as PIRADS 4 - Clinically significant cancer is likely to  be present.  The most suspicious abnormality is located at the right  mid gland peripheral zone and there is short segment capsular abutment  with low likelihood of minimal extraprostatic extension.  2. No suspicious adenopathy or evidence of pelvic metastases.     PSA  6/29/2023 - 17.50  5/1/2023 - 16.00  8/31/2022 - 14.9  1/20/2022 - 13.7  2/2/2021 - 12.90  3/31/2020 - 10.70  2/5/2020 - 14.60  2/15/2019 - 9.79  12/2/2013 - 6.19     TRUS 6/25/2021  FINAL DIAGNOSIS:   A.  Prostate, left base x2, biopsy   - Focal high-grade prostatic intraepithelial neoplasia. Negative for   invasive malignancy.     B.  Prostate, left mid x2, biopsy   - Benign prostatic tissue. Negative for malignancy.     C.  Prostate, left [place] x2, biopsy   - Focal high-grade prostatic intraepithelial neoplasia. Negative for   invasive malignancy.     D.  Prostate, right base x2, biopsy   - Benign prostatic tissue. Negative for malignancy     E.  Prostate, right mid x2, biopsy   - Atypical glands suspicious for malignancy (Please see comment)     F.  Prostate, right [place] x2, biopsy   - Prostatic adenocarcinoma, acinar type, [place]'s grade 3+ 3 = score of   6, grade group is 1, number of cores involved is 2 of 2, total surface area involved is 10-15%, perineural   invasion is not identified, high-grade prostatic intraepithelial neoplasia is present      TRUS 3/2019  F: Prostate, left apex, needle core biopsy: Adenocarcinoma, [place] score   3 + 3 = 6 (of 10) (Grade Group 1),   involving 1 of 1 needle core(s) and 1 mm of 10 mm of biopsy tissue   (approximately 10% of biopsy tissue).   Perineural invasion not identified.     K: Prostate, right mid, needle core biopsy: Adenocarcinoma, Krista score   3 + 3 = 6 (of 10) (Grade Group 1),   involving 1 of 1 needle core(s) and 1 mm of 10 mm of biopsy  tissue   (approximately 10% of biopsy tissue).   Perineural invasion not identified.           Allergies:   Penicillins and Tetracyclines & related         Review of Systems:  From intake questionnaire     Skin: negative  Eyes: negative  Ears/Nose/Throat: negative  Respiratory: No shortness of breath, dyspnea on exertion, cough, or hemoptysis  Cardiovascular: No chest pain or palpitations  Gastrointestinal: negative; no nausea/vomiting, constipation or diarrhea  Genitourinary: as per HPI  Musculoskeletal: negative  Neurologic: negative  Psychiatric: negative  Hematologic/Lymphatic/Immunologic: negative  Endocrine: negative         Physical Exam:     Vitals:  No vitals were obtained today due to virtual visit.    Physical Exam   GENERAL: Healthy, alert and no distress  EYES: Eyes grossly normal to inspection.  No discharge or erythema, or obvious scleral/conjunctival abnormalities.  RESP: No audible wheeze, cough, or visible cyanosis.  No visible retractions or increased work of breathing.    SKIN: Visible skin clear. No significant rash, abnormal pigmentation or lesions.  NEURO: Cranial nerves grossly intact.  Mentation and speech appropriate for age.  PSYCH: Mentation appears normal, affect normal/bright, judgement and insight intact, normal speech and appearance well-groomed.      Outside and Past Medical records:    Review of the result(s) of each unique test - PSA, pathology, MRI         Assessment and Plan:     68 year old male with Krista 3+3 disease diagnosed 2019. GPS score 5. On active surveillance. Recent PSA 16.00 and slightly elevated from previous. MRI now with PIRADS 4 lesion. Had surveillance biopsy 8/10/2023 demonstrating [place] 6 disease. We discussed this and role for ongoing surveillance. We discussed that there are multiple reasons why his PSA has remained rather high, but that his Krista score has been stable. He elects ongoing active surveillance.    - Follow-up 6 months with  PSA    Orders  Orders Placed This Encounter   Procedures    PSA tumor marker [[id number]]     Video-Visit Details    Type of service:  Video Visit    Video End Time:3:56 PM    Originating Location (pt. Location): Home    Distant Location (provider location):  On-site    Platform used for Video Visit: [employer]    16 minutes spent on the date of the encounter including direct interaction with the patient, performing chart review, history and exam, documentation and further activities as noted above.    Sukumar Oglesby MD  Urology  [facility]

## 2023-08-17 NOTE — Clinical Note
"8/17/2023       RE: Iraida Hernandez  [address] [facility] [address]     Dear Colleague,    Thank you for referring your patient, Iraida Hernandez, to the [facility] at [facility]. Please see a copy of my visit note below.    Meet me in my chart          Saravanan is a 68 year old who is being evaluated via a billable video visit.      How would you like to obtain your AVS? MyChart  If the video visit is dropped, the invitation should be resent by: Text to cell phone: [phone]  Will anyone else be joining your video visit? No  {If patient encounters technical issues they should call [phone] :[id number]}      Video-Visit Details    Type of service:  Video Visit   Video Start Time: {video visit start/end time for provider to select:021224}  Video End Time:{video visit start/end time for provider to select:544274}    Originating Location (pt. Location): {video visit patient location:452767::\"Home\"}  {PROVIDER LOCATION On-site should be selected for visits conducted from your clinic location or adjoining [facility] hospital, academic office, or other nearby [facility] building. Off-site should be selected for all other provider locations, including home:194179}  Distant Location (provider location):  {virtual location provider:137246}  Platform used for Video Visit: {Virtual Visit Platforms:043659::\"[employer]\"}     Iraida Hernandez is a 68 year old male who is being evaluated via a billable video visit.      How would you like to obtain your AVS? MyChart  If the video visit is dropped, the invitation should be resent by: Text to cell phone: [phone]  Will anyone else be joining your video visit? No    Video Start Time: 3:42 PM    CHIEF COMPLAINT   It was my pleasure to see Iraida Hernandez who is a 68 year old male for follow-up of Prostate Cancer.      HPI  Iraida Hernandez is a very pleasant 68 year old male who presents with a history of Prostate " Cancer. 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  He had clinical stage T1c disease at that time with a PSA of 9.7.  His Oncotype DX test suggested a very low probability of grade 4 disease or progression.      Oncotype GPS is 5 - very low risk.     Here for PSA recheck; remains rather high and up a bit to 16.0. Now with MRI demonstrating PIRADS 4 lesion.     Fusion Biopsy 8/10/2023  Final Diagnosis   A.  Prostate, left base, biopsy-  Benign prostate tissue     B.  Prostate, left mid, biopsy-  Adenocarcinoma, [place]'s grade 3/3 in 1 of 2 needle core biopsy and less than 5% of submitted tissue     C.  Prostate, left apex, biopsy-  Adenocarcinoma, Krista grade 3/3 in 1 of 2 needle core biopsies and less than 5% of submitted tissue     D.  Prostate, right base, biopsy-  Benign prostate tissue     E.  Prostate, right mid, biopsy-  Adenocarcinoma, [place] grade 3/3 in 1 of 1 needle core biopsy and less than 5% of submitted tissue     F.  Prostate, right apex, biopsy-  Adenocarcinoma, [place]'s grade 3/3 in 1 of 2 needle core biopsies and 10% of submitted tissue     G.  Prostate, MRI target, biopsy-  Adenocarcinoma, [place] grade 3/3 in 2 of 2 submitted needle core biopsies and 10% of submitted tissue      MRI Prostate 6/1/2023   Size: 5.6 x 4.6 x 3.5 cm, 47 grams  IMPRESSION:  Limited evaluation due to nondiagnostic diffusion/ADC images.  1. Based on the most suspicious abnormality, this exam is  characterized as PIRADS 4 - Clinically significant cancer is likely to  be present.  The most suspicious abnormality is located at the right  mid gland peripheral zone and there is short segment capsular abutment  with low likelihood of minimal extraprostatic extension.  2. No suspicious adenopathy or evidence of pelvic metastases.     PSA  6/29/2023 - 17.50  5/1/2023 - 16.00  8/31/2022 - 14.9  1/20/2022 - 13.7  2/2/2021 - 12.90  3/31/2020 - 10.70  2/5/2020 - 14.60  2/15/2019 -  9.79  12/2/2013 - 6.19     TRUS 6/25/2021  FINAL DIAGNOSIS:   A.  Prostate, left base x2, biopsy   - Focal high-grade prostatic intraepithelial neoplasia. Negative for   invasive malignancy.     B.  Prostate, left mid x2, biopsy   - Benign prostatic tissue. Negative for malignancy.     C.  Prostate, left [place] x2, biopsy   - Focal high-grade prostatic intraepithelial neoplasia. Negative for   invasive malignancy.     D.  Prostate, right base x2, biopsy   - Benign prostatic tissue. Negative for malignancy     E.  Prostate, right mid x2, biopsy   - Atypical glands suspicious for malignancy (Please see comment)     F.  Prostate, right [place] x2, biopsy   - Prostatic adenocarcinoma, acinar type, Krista's grade 3+ 3 = score of   6, grade group is 1, number of cores involved is 2 of 2, total surface area involved is 10-15%, perineural   invasion is not identified, high-grade prostatic intraepithelial neoplasia is present      TRUS 3/2019  F: Prostate, left apex, needle core biopsy: Adenocarcinoma, [place] score   3 + 3 = 6 (of 10) (Grade Group 1),   involving 1 of 1 needle core(s) and 1 mm of 10 mm of biopsy tissue   (approximately 10% of biopsy tissue).   Perineural invasion not identified.     K: Prostate, right mid, needle core biopsy: Adenocarcinoma, Krista score   3 + 3 = 6 (of 10) (Grade Group 1),   involving 1 of 1 needle core(s) and 1 mm of 10 mm of biopsy tissue   (approximately 10% of biopsy tissue).   Perineural invasion not identified.           Allergies:   Penicillins and Tetracyclines & related         Review of Systems:  From intake questionnaire     Skin: negative  Eyes: negative  Ears/Nose/Throat: negative  Respiratory: No shortness of breath, dyspnea on exertion, cough, or hemoptysis  Cardiovascular: No chest pain or palpitations  Gastrointestinal: negative; no nausea/vomiting, constipation or diarrhea  Genitourinary: as per HPI  Musculoskeletal: negative  Neurologic: negative  Psychiatric:  negative  Hematologic/Lymphatic/Immunologic: negative  Endocrine: negative         Physical Exam:     Vitals:  No vitals were obtained today due to virtual visit.    Physical Exam   GENERAL: Healthy, alert and no distress  EYES: Eyes grossly normal to inspection.  No discharge or erythema, or obvious scleral/conjunctival abnormalities.  RESP: No audible wheeze, cough, or visible cyanosis.  No visible retractions or increased work of breathing.    SKIN: Visible skin clear. No significant rash, abnormal pigmentation or lesions.  NEURO: Cranial nerves grossly intact.  Mentation and speech appropriate for age.  PSYCH: Mentation appears normal, affect normal/bright, judgement and insight intact, normal speech and appearance well-groomed.      Outside and Past Medical records:    Review of the result(s) of each unique test - PSA, pathology, MRI         Assessment and Plan:     68 year old male with Krista 3+3 disease diagnosed 2019. GPS score 5. On active surveillance. Recent PSA 16.00 and slightly elevated from previous. MRI now with PIRADS 4 lesion. Had surveillance biopsy 8/10/2023 demonstrating [place] 6 disease. We discussed this and role for ongoing surveillance. We discussed that there are multiple reasons why his PSA has remained rather high, but that his c    Orders  Orders Placed This Encounter   Procedures     PSA tumor marker [[id number]]     Video-Visit Details    Type of service:  Video Visit    Video End Time:3:56 PM    Originating Location (pt. Location): Home    Distant Location (provider location):  On-site    Platform used for Video Visit: [employer]Eye    *** minutes spent on the date of the encounter including direct interaction with the patient, performing chart review, history and exam, documentation and further activities as noted above.    Sukumar Oglesby MD  Urology  [facility]        Again, thank you for allowing me to participate in the care of your patient.       Sincerely,    Sukumar Oglesby MD

## 2023-08-19 ENCOUNTER — HEALTH MAINTENANCE LETTER (OUTPATIENT)
Age: 68
End: 2023-08-19

## 2023-09-12 ENCOUNTER — TELEPHONE (OUTPATIENT)
Dept: CARDIOLOGY | Facility: CLINIC | Age: 68
End: 2023-09-12
Payer: MEDICARE

## 2023-09-12 NOTE — TELEPHONE ENCOUNTER
"Received message from the Patient Assistance  below. Routing FYI to Ellen Toney CNP.     ----- Message from Naina Gomez LPN sent at 9/11/2023  4:56 PM CDT -----  \"Regarding: entresto approval  He got approved for entresto  Not sure if he is core-mmunns epifanion\"      Per Ellen Toney CNP note on 8/7/23,   \"Hi,      I just had a virtual visit with the above patient.  He has been on Entresto since November and actually showed improvement in his LV diameter.  He has been paying $40 every 3 months up until his last prescription that was 600.  It looks like there has been some communication for possible patient assistance but patient said there has been no update.  He is willing to stay on Entresto until January in the hopes his insurance will go back down to $40 every 3 months again.  Does he not qualify for patient assistance?      Ellen\".         SAGE Lemos    "

## 2023-10-09 RX ORDER — GENTAMICIN 40 MG/ML
80 INJECTION, SOLUTION INTRAMUSCULAR; INTRAVENOUS ONCE
Status: COMPLETED | OUTPATIENT
Start: 2023-08-10 | End: 2023-08-10

## 2023-10-10 ENCOUNTER — DOCUMENTATION ONLY (OUTPATIENT)
Dept: CARDIOLOGY | Facility: CLINIC | Age: 68
End: 2023-10-10
Payer: MEDICARE

## 2023-10-10 NOTE — PROGRESS NOTES
Working on application to send, [facility] after Nov 15th. Just need RX printed and signed  Mmunns lpn      11- I faxed completed provider portion of re enrollment application to [facility]. Naina Gomez Lpn      1-8-2024 received today APPROVAL for pts Entresto, effective 1-8-2024 thru 12-. He will need to call [facility] to make sure they have correct mailing address, Prior to medication being shipped out.  I left him that information and [facility] Phone number and My direct phone number  Naina Gomez lpn

## 2023-10-24 ENCOUNTER — TELEPHONE (OUTPATIENT)
Dept: FAMILY MEDICINE | Facility: CLINIC | Age: 68
End: 2023-10-24
Payer: MEDICARE

## 2023-10-24 NOTE — TELEPHONE ENCOUNTER
Unfortunately given radiation exposure to screening, he needs a visit with me to discuss this prior to me being able to order it. May be virtual though.     Also, was diagnosed by previous provider with diabetes. He has no diabetes history. All diabetes hm is not accurate.     -wendie ferrara, pac

## 2023-10-24 NOTE — TELEPHONE ENCOUNTER
Bharath Marie PAC   Would you like to place order for lung cancer screen?   Patient due for multiple care gap items, last visit F2F 12/19/2022    Thank you   Alexandra Liao, Registered Nurse  [facility]

## 2023-10-24 NOTE — TELEPHONE ENCOUNTER
Order/Referral Request    Who is requesting: patient    Orders being requested: lung cancer screening    Reason service is needed/diagnosis: patients  -routine     When are orders needed by: asap    Has this been discussed with Provider: Yes    Does patient have a preference on a Group/Provider/Facility? [facility]    Does patient have an appointment scheduled?: No    Where to send orders: Place orders within Epic    Could we send this information to you in Fileforce or would you prefer to receive a phone call?:   Patient would like to be contacted via Fileforce

## 2023-10-27 ENCOUNTER — VIRTUAL VISIT (OUTPATIENT)
Dept: FAMILY MEDICINE | Facility: CLINIC | Age: 68
End: 2023-10-27
Payer: MEDICARE

## 2023-10-27 DIAGNOSIS — Z87.891 PERSONAL HISTORY OF TOBACCO USE: Primary | ICD-10-CM

## 2023-10-27 DIAGNOSIS — R91.8 PULMONARY NODULES: ICD-10-CM

## 2023-10-27 DIAGNOSIS — Z23 INFLUENZA VACCINE ADMINISTERED: ICD-10-CM

## 2023-10-27 PROCEDURE — G0296 VISIT TO DETERM LDCT ELIG: HCPCS | Mod: VID | Performed by: PHYSICIAN ASSISTANT

## 2023-10-27 PROCEDURE — 99213 OFFICE O/P EST LOW 20 MIN: CPT | Mod: VID | Performed by: PHYSICIAN ASSISTANT

## 2023-10-27 NOTE — PATIENT INSTRUCTIONS
Lung Cancer Screening   Frequently Asked Questions  If you are at high-risk for lung cancer, getting screened with low-dose computed tomography (LDCT) every year can help save your life. This handout offers answers to some of the most common questions about lung cancer screening. If you have other questions, please call [phone] ([phone]).     What is it?  Lung cancer screening uses special X-ray technology to create an image of your lung tissue. The exam is quick and easy and takes less than 10 seconds. We don t give you any medicine or use any needles. You can eat before and after the exam. You don t need to change your clothes as long as the clothing on your chest doesn t contain metal. But, you do need to be able to hold your breath for at least 6 seconds during the exam.    What is the goal of lung cancer screening?  The goal of lung cancer screening is to save lives. Many times, lung cancer is not found until a person starts having physical symptoms. Lung cancer screening can help detect lung cancer in the earliest stages when it may be easier to treat.    Who should be screened for lung cancer?  We suggest lung cancer screening for anyone who is at high-risk for lung cancer. You are in the high-risk group if you:      are between the ages of 55 and 79, and    have smoked at least 1 pack of cigarettes a day for 20 or more years, and    still smoke or have quit within the past 15 years.    However, if you have a new cough or shortness of breath, you should talk to your doctor before being screened.    Why does it matter if I have symptoms?  Certain symptoms can be a sign that you have a condition in your lungs that should be checked and treated by your doctor. These symptoms include fever, chest pain, a new or changing cough, shortness of breath that you have never felt before, coughing up blood or unexplained weight loss. Having any of these symptoms can greatly affect the results of lung  cancer screening.       Should all smokers get an LDCT lung cancer screening exam?  It depends. Lung cancer screening is for a very specific group of men and women who have a history of heavy smoking over a long period of time (see  Who should be screened for lung cancer  above).  I am in the high-risk group, but have been diagnosed with cancer in the past. Is LDCT lung cancer screening right for me?  In some cases, you should not have LDCT lung screening, such as when your doctor is already following your cancer with CT scan studies. Your doctor will help you decide if LDCT lung screening is right for you.  Do I need to have a screening exam every year?  Yes. If you are in the high-risk group described earlier, you should get an LDCT lung cancer screening exam every year until you are 79, or are no longer willing or able to undergo screening and possible procedures to diagnose and treat lung cancer.  How effective is LDCT at preventing death from lung cancer?  Studies have shown that LDCT lung cancer screening can lower the risk of death from lung cancer by 20 percent in people who are at high-risk.  What are the risks?  There are some risks and limitations of LDCT lung cancer screening. We want to make sure you understand the risks and benefits, so please let us know if you have any questions. Your doctor may want to talk with you more about these risks.    Radiation exposure: As with any exam that uses radiation, there is a very small increased risk of cancer. The amount of radiation in LDCT is small--about the same amount a person would get from a mammogram. Your doctor orders the exam when he or she feels the potential benefits outweigh the risks.    False negatives: No test is perfect, including LDCT. It is possible that you may have a medical condition, including lung cancer, that is not found during your exam. This is called a false negative result.    False positives and more testing: LDCT very often finds  something in the lung that could be cancer, but in fact is not. This is called a false positive result. False positive tests often cause anxiety. To make sure these findings are not cancer, you may need to have more tests. These tests will be done only if you give us permission. Sometimes patients need a treatment that can have side effects, such as a biopsy. For more information on false positives, see  What can I expect from the results?     Findings not related to lung cancer: Your LDCT exam also takes pictures of areas of your body next to your lungs. In a very small number of cases, the CT scan will show an abnormal finding in one of these areas, such as your kidneys, adrenal glands, liver or thyroid. This finding may not be serious, but you may need more tests. Your doctor can help you decide what other tests you may need, if any.  What can I expect from the results?  About 1 out of 4 LDCT exams will find something that may need more tests. Most of the time, these findings are lung nodules. Lung nodules are very small collections of tissue in the lung. These nodules are very common, and the vast majority--more than 97 percent--are not cancer (benign). Most are normal lymph nodes or small areas of scarring from past infections.  But, if a small lung nodule is found to be cancer, the cancer can be cured more than 90 percent of the time. To know if the nodule is cancer, we may need to get more images before your next yearly screening exam. If the nodule has suspicious features (for example, it is large, has an odd shape or grows over time), we will refer you to a specialist for further testing.  Will my doctor also get the results?  Yes. Your doctor will get a copy of your results.  Is it okay to keep smoking now that there s a cancer screening exam?  No. Tobacco is one of the strongest cancer-causing agents. It causes not only lung cancer, but other cancers and cardiovascular (heart) diseases as well. The damage  caused by smoking builds over time. This means that the longer you smoke, the higher your risk of disease. While it is never too late to quit, the sooner you quit, the better.  Where can I find help to quit smoking?  The best way to prevent lung cancer is to stop smoking. If you have already quit smoking, congratulations and keep it up! For help on quitting smoking, please call [employer] at 5-174-QUITNOW (1-475.866.3410) or the American Cancer Society at 1-309.243.2509 to find local resources near you.  One-on-one health coaching:  If you d prefer to work individually with a health care provider on tobacco cessation, we offer:      Medication Therapy Management:  Our specially trained pharmacists work closely with you and your doctor to help you quit smoking.  Call [phone] or [phone] (toll free).

## 2023-10-27 NOTE — PROGRESS NOTES
Lung Cancer Screening Shared Decision Making Visit     Iraida Hernandez, a 68 year old male, is eligible for lung cancer screening    History   Smoking Status     Light Smoker     Packs/day: 0.70     Years: 48.00     Types: Cigarettes     Start date: 1/1/1975   Smokeless Tobacco     Never       I have discussed with patient the risks and benefits of screening for lung cancer with low-dose CT.     The risks include:    radiation exposure: one low dose chest CT has as much ionizing radiation as about 15 chest x-rays, or 6 months of background radiation living in [place]      false positives: most findings/nodules are NOT cancer, but some might still require additional diagnostic evaluation, including biopsy    over-diagnosis: some slow growing cancers that might never have been clinically significant will be detected and treated unnecessarily     The benefit of early detection of lung cancer is contingent upon adherence to annual screening or more frequent follow up if indicated.     Furthermore, to benefit from screening, Iraida must be willing and able to undergo diagnostic procedures, if indicated. Although no specific guide is available for determining severity of comorbidities, it is reasonable to withhold screening in patients who have greater mortality risk from other diseases.     We did discuss that the best way to prevent lung cancer is to not smoke.    Some patients may value a numeric estimation of lung cancer risk when evaluating if lung cancer screening is right for them, here is one calculator:    ShouldIScreen

## 2023-11-08 ENCOUNTER — ANCILLARY PROCEDURE (OUTPATIENT)
Dept: GENERAL RADIOLOGY | Facility: CLINIC | Age: 68
End: 2023-11-08
Attending: FAMILY MEDICINE
Payer: MEDICARE

## 2023-11-08 ENCOUNTER — OFFICE VISIT (OUTPATIENT)
Dept: ORTHOPEDICS | Facility: CLINIC | Age: 68
End: 2023-11-08
Payer: MEDICARE

## 2023-11-08 VITALS
HEIGHT: 72 IN | WEIGHT: 152 LBS | HEART RATE: 86 BPM | BODY MASS INDEX: 20.59 KG/M2 | SYSTOLIC BLOOD PRESSURE: 119 MMHG | DIASTOLIC BLOOD PRESSURE: 79 MMHG

## 2023-11-08 DIAGNOSIS — M67.911 TENDINOPATHY OF RIGHT ROTATOR CUFF: ICD-10-CM

## 2023-11-08 DIAGNOSIS — M25.511 ACUTE PAIN OF RIGHT SHOULDER: ICD-10-CM

## 2023-11-08 DIAGNOSIS — M19.011 PRIMARY OSTEOARTHRITIS OF RIGHT SHOULDER: ICD-10-CM

## 2023-11-08 DIAGNOSIS — M25.511 ACUTE PAIN OF RIGHT SHOULDER: Primary | ICD-10-CM

## 2023-11-08 PROCEDURE — 73030 X-RAY EXAM OF SHOULDER: CPT | Mod: TC | Performed by: RADIOLOGY

## 2023-11-08 PROCEDURE — 99214 OFFICE O/P EST MOD 30 MIN: CPT | Performed by: FAMILY MEDICINE

## 2023-11-08 RX ORDER — CYCLOBENZAPRINE HCL 10 MG
10 TABLET ORAL
Qty: 30 TABLET | Refills: 0 | Status: SHIPPED | OUTPATIENT
Start: 2023-11-08 | End: 2024-02-22

## 2023-11-08 RX ORDER — TRAMADOL HYDROCHLORIDE 50 MG/1
50 TABLET ORAL EVERY 6 HOURS PRN
Qty: 12 TABLET | Refills: 0 | Status: SHIPPED | OUTPATIENT
Start: 2023-11-08 | End: 2024-02-22

## 2023-11-08 NOTE — LETTER
11/8/2023         RE: Iraida Hernandez  [address] [address]        Dear Colleague,    Thank you for referring your patient, Iraida Hernandez, to the [facility] [place]. Please see a copy of my visit note below.    ASSESSMENT & PLAN  Patient Instructions     1. Acute pain of right shoulder    2. Tendinopathy of right rotator cuff    3. Primary osteoarthritis of right shoulder      -Patient has acute right shoulder pain due to supraspinatus tendinopathy versus degenerative full-thickness tear  -Patient has severe shoulder pain and an inability to abduct the shoulder for the past week  -Patient has arthritis seen on x-ray.  -Patient will get an MRI of the right shoulder for further evaluation of rotator cuff muscles and tendons.  - Your MRI has been ordered. You may call [phone] to schedule over the phone. Once you get notified on [employer] of your results, send me a [employer] message to discuss results and next of treatment options.  To consider a glenohumeral/subacromial cortisone injection and therapy versus surgical consult depending on the results.  -Call direct clinic number [[phone]] at any time with questions or concerns.    Albert Yeo MD [facility]  [facility]        -----    SUBJECTIVE  Iraida Hernandez is a/an 68 year old Right handed male who is seen as a self referral for evaluation of right shoulder pain. The patient is seen with their wife.    Onset: 11/02/2023. Reports insidious onset without acute precipitating event.  Location of Pain: right anterior shoulder  Rating of Pain at worst: 9/10  Rating of Pain Currently: 2/10 w/o movement, 5/10 w/ abduction  Worsened by: golf swing, shoulder abduction  Better with: tylenol, rest  Treatments tried: ice and heat, tylenol x3, rest/activity avoidance  Associated symptoms: no distal numbness or tingling; denies swelling or warmth, rapid loss of  range of motion,   Orthopedic history: NO  Relevant surgical history: NO  Social history: social history: retired, golf frequently    Past Medical History:   Diagnosis Date     Antiplatelet or antithrombotic long-term use      Arthritis Age related     Calculus of kidney      Cancer (H)     basel cell on nose     Cardiac arrest (H)     V-fib, 2/16/2014     Cardiomyopathy (H)      Coronary artery disease      Hyperlipidaemia      Hyperlipidemia with target LDL less than 70 10/31/2010     Diagnosis updated by automated process. Provider to review and confirm.     ICD (implantable cardiac defibrillator) in place     12-1-2014 EF 29%     Myocardial infarction (H) 02/2014     Anterior infarct     Pacemaker      Prostate cancer (H)      Stented coronary artery      Tobacco dependence      Social History     Socioeconomic History     Marital status:      Spouse name: Jessica     Number of children: 2   Occupational History     Occupation:      Employer: shelter supply     Comment: [employer]   Tobacco Use     Smoking status: Light Smoker     Packs/day: 0.70     Years: 48.00     Additional pack years: 0.00     Total pack years: 33.60     Types: Cigarettes     Start date: 1/1/1975     Smokeless tobacco: Never     Tobacco comments:     3-5 cigs per day 5/9/23   Vaping Use     Vaping Use: Never used   Substance and Sexual Activity     Alcohol use: Yes     Comment: 1-3 drinks per week     Drug use: No     Sexual activity: Yes     Partners: Female   Other Topics Concern      Service No     Blood Transfusions No     Caffeine Concern No     Comment: soda occasionally      Occupational Exposure No     Hobby Hazards No     Sleep Concern No     Stress Concern No     Weight Concern No     Special Diet Yes     Comment: eats healthy      Back Care No     Exercise Yes     Comment: golf 2x/week, tredmill daily     Seat Belt Yes     Self-Exams Yes     Parent/sibling w/ CABG, MI or angioplasty before 65F  55M? No     Social Determinants of Health     Financial Resource Strain: Low Risk  (10/25/2023)    Financial Resource Strain      Within the past 12 months, have you or your family members you live with been unable to get utilities (heat, electricity) when it was really needed?: No   Food Insecurity: Low Risk  (10/25/2023)    Food Insecurity      Within the past 12 months, did you worry that your food would run out before you got money to buy more?: No      Within the past 12 months, did the food you bought just not last and you didn t have money to get more?: No   Transportation Needs: Low Risk  (10/25/2023)    Transportation Needs      Within the past 12 months, has lack of transportation kept you from medical appointments, getting your medicines, non-medical meetings or appointments, work, or from getting things that you need?: No   Physical Activity: Insufficiently Active (8/24/2022)    Exercise Vital Sign      Days of Exercise per Week: 2 days      Minutes of Exercise per Session: 60 min   Stress: No Stress Concern Present (8/24/2022)    [place] [place] [employer] - Occupational Stress Questionnaire      Feeling of Stress : Not at all   Social Connections: Moderately Integrated (8/24/2022)    Social Connection and Isolation Panel [NHANES]      Frequency of Communication with Friends and Family: More than three times a week      Frequency of Social Gatherings with Friends and Family: Twice a week      Attends Pent[employer]al Services: 1 to 4 times per year      Active Member of Clubs or Organizations: No      Marital Status:    Housing Stability: Low Risk  (10/25/2023)    Housing Stability      Do you have housing? : Yes      Are you worried about losing your housing?: No         Patient's past medical, surgical, social, and family histories were reviewed today and no changes are noted.    REVIEW OF SYSTEMS:  10 point ROS is negative other than symptoms noted above in HPI, Past Medical History or  as stated below  Constitutional: NEGATIVE for fever, chills, change in weight  Skin: NEGATIVE for worrisome rashes, moles or lesions  GI/: NEGATIVE for bowel or bladder changes  Neuro: NEGATIVE for weakness, dizziness or paresthesias    OBJECTIVE:  /79   Pulse 86   Ht 1.829 m (6')   Wt 68.9 kg (152 lb)   BMI 20.61 kg/m     General: healthy, alert and in no distress  HEENT: no scleral icterus or conjunctival erythema  Skin: no suspicious lesions or rash. No jaundice.  CV: regular rhythm by palpation  Resp: normal respiratory effort without conversational dyspnea   Psych: normal mood and affect  Gait: normal steady gait with appropriate coordination and balance  Neuro: normal light touch sensory exam of the bilateral upper extremities.    MSK:  RIGHT SHOULDER  Inspection:    no swelling, bruising, discoloration, or obvious deformity or asymmetry  Palpation:    Tender about the anterior capsule and supraspinatus insertion. Remainder of bony and tendinous landmarks are nontender.  Active Range of Motion:     Abduction 300, , , IR hip pocket.      Scapular dyskinesis absent  Strength:    Scapular plane abduction weakness, limited by pain,  ER weakness, limited by pain, IR grossly intact, biceps not tested, triceps not tested  Special Tests:    Positive: Neer's, Amos', supraspinatus (empty can), drop arm/painful arc, and crossed arm adduction      Independent visualization of the below image:  No results found for this or any previous visit (from the past 24 hour(s)).    Personal review of right shoulder x-rays taken office today show glenohumeral joint space narrowing and osteophytes of the humeral head.  No acute fracture or dislocation.        Albert Yeo MD [facility]      Again, thank you for allowing me to participate in the care of your patient.        Sincerely,        Albert Yeo, MD

## 2023-11-08 NOTE — PATIENT INSTRUCTIONS
1. Acute pain of right shoulder    2. Tendinopathy of right rotator cuff    3. Primary osteoarthritis of right shoulder      -Patient has acute right shoulder pain due to supraspinatus tendinopathy versus degenerative full-thickness tear  -Patient has severe shoulder pain and an inability to abduct the shoulder for the past week  -Patient has arthritis seen on x-ray.  -Patient will get an MRI of the right shoulder for further evaluation of rotator cuff muscles and tendons.  - Your MRI has been ordered. You may call [phone] to schedule over the phone. Once you get notified on [employer] of your results, send me a [employer] message to discuss results and next of treatment options.  To consider a glenohumeral/subacromial cortisone injection and therapy versus surgical consult depending on the results.  -Call direct clinic number [[phone]] at any time with questions or concerns.    Albert Yeo MD [facility]  [facility]

## 2023-11-08 NOTE — PROGRESS NOTES
ASSESSMENT & PLAN  Patient Instructions     1. Acute pain of right shoulder    2. Tendinopathy of right rotator cuff    3. Primary osteoarthritis of right shoulder      -Patient has acute right shoulder pain due to supraspinatus tendinopathy versus degenerative full-thickness tear  -Patient has severe shoulder pain and an inability to abduct the shoulder for the past week  -Patient has arthritis seen on x-ray.  -Patient will get an MRI of the right shoulder for further evaluation of rotator cuff muscles and tendons.  - Your MRI has been ordered. You may call [phone] to schedule over the phone. Once you get notified on [employer] of your results, send me a [employer] message to discuss results and next of treatment options.  To consider a glenohumeral/subacromial cortisone injection and therapy versus surgical consult depending on the results.  -Call direct clinic number [[phone]] at any time with questions or concerns.    Albert Yeo MD [facility] Medicine  [facility]        -----    SUBJECTIVE  Iraida Hernandez is a/an 68 year old Right handed male who is seen as a self referral for evaluation of right shoulder pain. The patient is seen with their wife.    Onset: 11/02/2023. Reports insidious onset without acute precipitating event.  Location of Pain: right anterior shoulder  Rating of Pain at worst: 9/10  Rating of Pain Currently: 2/10 w/o movement, 5/10 w/ abduction  Worsened by: golf swing, shoulder abduction  Better with: tylenol, rest  Treatments tried: ice and heat, tylenol x3, rest/activity avoidance  Associated symptoms: no distal numbness or tingling; denies swelling or warmth, rapid loss of range of motion,   Orthopedic history: NO  Relevant surgical history: NO  Social history: social history: retired, golf frequently    Past Medical History:   Diagnosis Date    Antiplatelet or antithrombotic long-term use     Arthritis Age related    Calculus of kidney      Cancer (H)     basel cell on nose    Cardiac arrest (H)     V-fib, 2/16/2014    Cardiomyopathy (H)     Coronary artery disease     Hyperlipidaemia     Hyperlipidemia with target LDL less than 70 10/31/2010     Diagnosis updated by automated process. Provider to review and confirm.    ICD (implantable cardiac defibrillator) in place     12-1-2014 EF 29%    Myocardial infarction (H) 02/2014     Anterior infarct    Pacemaker     Prostate cancer (H)     Stented coronary artery     Tobacco dependence      Social History     Socioeconomic History    Marital status:      Spouse name: Jessica    Number of children: 2   Occupational History    Occupation:      Employer: shelter supply     Comment: [employer]   Tobacco Use    Smoking status: Light Smoker     Packs/day: 0.70     Years: 48.00     Additional pack years: 0.00     Total pack years: 33.60     Types: Cigarettes     Start date: 1/1/1975    Smokeless tobacco: Never    Tobacco comments:     3-5 cigs per day 5/9/23   Vaping Use    Vaping Use: Never used   Substance and Sexual Activity    Alcohol use: Yes     Comment: 1-3 drinks per week    Drug use: No    Sexual activity: Yes     Partners: Female   Other Topics Concern     Service No    Blood Transfusions No    Caffeine Concern No     Comment: soda occasionally     Occupational Exposure No    Hobby Hazards No    Sleep Concern No    Stress Concern No    Weight Concern No    Special Diet Yes     Comment: eats healthy     Back Care No    Exercise Yes     Comment: golf 2x/week, tredmill daily    Seat Belt Yes    Self-Exams Yes    Parent/sibling w/ CABG, MI or angioplasty before 65F 55M? No     Social Determinants of Health     Financial Resource Strain: Low Risk  (10/25/2023)    Financial Resource Strain     Within the past 12 months, have you or your family members you live with been unable to get utilities (heat, electricity) when it was really needed?: No   Food Insecurity: Low Risk   (10/25/2023)    Food Insecurity     Within the past 12 months, did you worry that your food would run out before you got money to buy more?: No     Within the past 12 months, did the food you bought just not last and you didn t have money to get more?: No   Transportation Needs: Low Risk  (10/25/2023)    Transportation Needs     Within the past 12 months, has lack of transportation kept you from medical appointments, getting your medicines, non-medical meetings or appointments, work, or from getting things that you need?: No   Physical Activity: Insufficiently Active (8/24/2022)    Exercise Vital Sign     Days of Exercise per Week: 2 days     Minutes of Exercise per Session: 60 min   Stress: No Stress Concern Present (8/24/2022)    [employer] [place] [employer]ational [employer] - Occupational Stress Questionnaire     Feeling of Stress : Not at all   Social Connections: Moderately Integrated (8/24/2022)    Social Connection and Isolation Panel [NHANES]     Frequency of Communication with Friends and Family: More than three times a week     Frequency of Social Gatherings with Friends and Family: Twice a week     Attends Religion Services: 1 to 4 times per year     Active Member of Clubs or Organizations: No     Marital Status:    Housing Stability: Low Risk  (10/25/2023)    Housing Stability     Do you have housing? : Yes     Are you worried about losing your housing?: No         Patient's past medical, surgical, social, and family histories were reviewed today and no changes are noted.    REVIEW OF SYSTEMS:  10 point ROS is negative other than symptoms noted above in HPI, Past Medical History or as stated below  Constitutional: NEGATIVE for fever, chills, change in weight  Skin: NEGATIVE for worrisome rashes, moles or lesions  GI/: NEGATIVE for bowel or bladder changes  Neuro: NEGATIVE for weakness, dizziness or paresthesias    OBJECTIVE:  /79   Pulse 86   Ht 1.829 m (6')   Wt 68.9 kg (152 lb)   BMI  20.61 kg/m     General: healthy, alert and in no distress  HEENT: no scleral icterus or conjunctival erythema  Skin: no suspicious lesions or rash. No jaundice.  CV: regular rhythm by palpation  Resp: normal respiratory effort without conversational dyspnea   Psych: normal mood and affect  Gait: normal steady gait with appropriate coordination and balance  Neuro: normal light touch sensory exam of the bilateral upper extremities.    MSK:  RIGHT SHOULDER  Inspection:    no swelling, bruising, discoloration, or obvious deformity or asymmetry  Palpation:    Tender about the anterior capsule and supraspinatus insertion. Remainder of bony and tendinous landmarks are nontender.  Active Range of Motion:     Abduction 300, , , IR hip pocket.      Scapular dyskinesis absent  Strength:    Scapular plane abduction weakness, limited by pain,  ER weakness, limited by pain, IR grossly intact, biceps not tested, triceps not tested  Special Tests:    Positive: Neer's, Amos', supraspinatus (empty can), drop arm/painful arc, and crossed arm adduction      Independent visualization of the below image:  No results found for this or any previous visit (from the past 24 hour(s)).    Personal review of right shoulder x-rays taken office today show glenohumeral joint space narrowing and osteophytes of the humeral head.  No acute fracture or dislocation.        Albert Yeo MD [facility]

## 2023-11-13 ENCOUNTER — TELEPHONE (OUTPATIENT)
Dept: ORTHOPEDICS | Facility: CLINIC | Age: 68
End: 2023-11-13
Payer: MEDICARE

## 2023-11-13 NOTE — TELEPHONE ENCOUNTER
[facility] Call Center    Phone Message    May a detailed message be left on voicemail: yes     Reason for Call: Other: patient NEEDs someone to respond to his [employer] message today.      Action Taken: Other: TE    Travel Screening: Not Applicable

## 2023-11-13 NOTE — TELEPHONE ENCOUNTER
Duplicate. Please see MyChart encounter dated today (11/13/23).     Kaylin Lyon MSA, ATC  Certified Athletic Trainer

## 2023-11-16 ENCOUNTER — HOSPITAL ENCOUNTER (OUTPATIENT)
Dept: CT IMAGING | Facility: CLINIC | Age: 68
Discharge: HOME | End: 2023-11-16
Attending: PHYSICIAN ASSISTANT | Admitting: PHYSICIAN ASSISTANT
Payer: MEDICARE

## 2023-11-16 ENCOUNTER — ANCILLARY PROCEDURE (OUTPATIENT)
Dept: CARDIOLOGY | Facility: CLINIC | Age: 68
End: 2023-11-16
Attending: INTERNAL MEDICINE
Payer: MEDICARE

## 2023-11-16 DIAGNOSIS — Z95.810 ICD (IMPLANTABLE CARDIOVERTER-DEFIBRILLATOR) IN PLACE: ICD-10-CM

## 2023-11-16 DIAGNOSIS — I25.5 ISCHEMIC CARDIOMYOPATHY: ICD-10-CM

## 2023-11-16 DIAGNOSIS — Z87.891 PERSONAL HISTORY OF TOBACCO USE: ICD-10-CM

## 2023-11-16 PROBLEM — R91.8 PULMONARY NODULES: Status: ACTIVE | Noted: 2023-11-16

## 2023-11-16 LAB
MDC_IDC_EPISODE_DTM: NORMAL
MDC_IDC_EPISODE_DURATION: 7 S
MDC_IDC_EPISODE_DURATION: 8 S
MDC_IDC_EPISODE_ID: NORMAL
MDC_IDC_EPISODE_TYPE: NORMAL
MDC_IDC_EPISODE_TYPE_INDUCED: NO
MDC_IDC_LEAD_CONNECTION_STATUS: NORMAL
MDC_IDC_LEAD_IMPLANT_DT: NORMAL
MDC_IDC_LEAD_LOCATION: NORMAL
MDC_IDC_LEAD_LOCATION_DETAIL_1: NORMAL
MDC_IDC_LEAD_MFG: NORMAL
MDC_IDC_LEAD_MODEL: NORMAL
MDC_IDC_LEAD_POLARITY_TYPE: NORMAL
MDC_IDC_LEAD_SERIAL: NORMAL
MDC_IDC_MSMT_BATTERY_DTM: NORMAL
MDC_IDC_MSMT_BATTERY_REMAINING_LONGEVITY: 66 MO
MDC_IDC_MSMT_BATTERY_REMAINING_PERCENTAGE: 68 %
MDC_IDC_MSMT_BATTERY_STATUS: NORMAL
MDC_IDC_MSMT_CAP_CHARGE_DTM: NORMAL
MDC_IDC_MSMT_CAP_CHARGE_TIME: 10.6 S
MDC_IDC_MSMT_CAP_CHARGE_TYPE: NORMAL
MDC_IDC_MSMT_LEADCHNL_RV_IMPEDANCE_VALUE: 637 OHM
MDC_IDC_MSMT_LEADCHNL_RV_PACING_THRESHOLD_AMPLITUDE: 0.7 V
MDC_IDC_MSMT_LEADCHNL_RV_PACING_THRESHOLD_PULSEWIDTH: 0.5 MS
MDC_IDC_PG_IMPLANT_DTM: NORMAL
MDC_IDC_PG_MFG: NORMAL
MDC_IDC_PG_MODEL: NORMAL
MDC_IDC_PG_SERIAL: NORMAL
MDC_IDC_PG_TYPE: NORMAL
MDC_IDC_SESS_CLINIC_NAME: NORMAL
MDC_IDC_SESS_DTM: NORMAL
MDC_IDC_SESS_TYPE: NORMAL
MDC_IDC_SET_BRADY_LOWRATE: 40 {BEATS}/MIN
MDC_IDC_SET_BRADY_MODE: NORMAL
MDC_IDC_SET_LEADCHNL_RV_PACING_AMPLITUDE: 2 V
MDC_IDC_SET_LEADCHNL_RV_PACING_POLARITY: NORMAL
MDC_IDC_SET_LEADCHNL_RV_PACING_PULSEWIDTH: 0.5 MS
MDC_IDC_SET_LEADCHNL_RV_SENSING_ADAPTATION_MODE: NORMAL
MDC_IDC_SET_LEADCHNL_RV_SENSING_POLARITY: NORMAL
MDC_IDC_SET_LEADCHNL_RV_SENSING_SENSITIVITY: 0.6 MV
MDC_IDC_SET_ZONE_DETECTION_INTERVAL: 250 MS
MDC_IDC_SET_ZONE_DETECTION_INTERVAL: 300 MS
MDC_IDC_SET_ZONE_DETECTION_INTERVAL: 353 MS
MDC_IDC_SET_ZONE_STATUS: NORMAL
MDC_IDC_SET_ZONE_TYPE: NORMAL
MDC_IDC_SET_ZONE_VENDOR_TYPE: NORMAL
MDC_IDC_STAT_BRADY_DTM_END: NORMAL
MDC_IDC_STAT_BRADY_DTM_START: NORMAL
MDC_IDC_STAT_BRADY_RV_PERCENT_PACED: 0 %
MDC_IDC_STAT_EPISODE_RECENT_COUNT: 0
MDC_IDC_STAT_EPISODE_RECENT_COUNT: 17
MDC_IDC_STAT_EPISODE_RECENT_COUNT_DTM_END: NORMAL
MDC_IDC_STAT_EPISODE_RECENT_COUNT_DTM_START: NORMAL
MDC_IDC_STAT_EPISODE_TYPE: NORMAL
MDC_IDC_STAT_EPISODE_VENDOR_TYPE: NORMAL
MDC_IDC_STAT_TACHYTHERAPY_ATP_DELIVERED_RECENT: 0
MDC_IDC_STAT_TACHYTHERAPY_ATP_DELIVERED_TOTAL: 0
MDC_IDC_STAT_TACHYTHERAPY_RECENT_DTM_END: NORMAL
MDC_IDC_STAT_TACHYTHERAPY_RECENT_DTM_START: NORMAL
MDC_IDC_STAT_TACHYTHERAPY_SHOCKS_ABORTED_RECENT: 0
MDC_IDC_STAT_TACHYTHERAPY_SHOCKS_ABORTED_TOTAL: 0
MDC_IDC_STAT_TACHYTHERAPY_SHOCKS_DELIVERED_RECENT: 0
MDC_IDC_STAT_TACHYTHERAPY_SHOCKS_DELIVERED_TOTAL: 0
MDC_IDC_STAT_TACHYTHERAPY_TOTAL_DTM_END: NORMAL
MDC_IDC_STAT_TACHYTHERAPY_TOTAL_DTM_START: NORMAL

## 2023-11-16 PROCEDURE — 93295 DEV INTERROG REMOTE 1/2/MLT: CPT | Performed by: INTERNAL MEDICINE

## 2023-11-16 PROCEDURE — 93296 REM INTERROG EVL PM/IDS: CPT | Performed by: INTERNAL MEDICINE

## 2023-11-16 PROCEDURE — 71271 CT THORAX LUNG CANCER SCR C-: CPT

## 2023-12-02 ENCOUNTER — MYC MEDICAL ADVICE (OUTPATIENT)
Dept: FAMILY MEDICINE | Facility: CLINIC | Age: 68
End: 2023-12-02
Payer: MEDICARE

## 2023-12-04 ENCOUNTER — MYC MEDICAL ADVICE (OUTPATIENT)
Dept: ORTHOPEDICS | Facility: CLINIC | Age: 68
End: 2023-12-04
Payer: MEDICARE

## 2023-12-04 NOTE — TELEPHONE ENCOUNTER
Patient MyC regarding chest xrays ordered 11/28/23 after 11/8/23 visit.  I could not locate a note or message about why the chest xray was ordered, and that was his question.   I can reply to patient or call once I know what I can tell them.  Please note the indication of the chest xray for patient understanding.    Bharath Dee, ATC

## 2023-12-04 NOTE — TELEPHONE ENCOUNTER
Pt calls,     ~informed Dr Yeo ordered CHEST XRAY views  ~pt wonders why  ~pt will contact orthopedic to inquire  Gabbie Roberson RN, BSN  [facility]

## 2023-12-13 ENCOUNTER — ANCILLARY PROCEDURE (OUTPATIENT)
Dept: CARDIOLOGY | Facility: CLINIC | Age: 68
End: 2023-12-13
Attending: INTERNAL MEDICINE
Payer: MEDICARE

## 2023-12-13 ENCOUNTER — HOSPITAL ENCOUNTER (OUTPATIENT)
Dept: GENERAL RADIOLOGY | Facility: CLINIC | Age: 68
Discharge: HOME | End: 2023-12-13
Attending: FAMILY MEDICINE
Payer: MEDICARE

## 2023-12-13 ENCOUNTER — HOSPITAL ENCOUNTER (OUTPATIENT)
Dept: MRI IMAGING | Facility: CLINIC | Age: 68
Discharge: HOME | End: 2023-12-13
Attending: FAMILY MEDICINE
Payer: MEDICARE

## 2023-12-13 VITALS — HEART RATE: 66 BPM | OXYGEN SATURATION: 95 %

## 2023-12-13 DIAGNOSIS — M67.911 TENDINOPATHY OF RIGHT ROTATOR CUFF: ICD-10-CM

## 2023-12-13 DIAGNOSIS — M25.511 ACUTE PAIN OF RIGHT SHOULDER: ICD-10-CM

## 2023-12-13 DIAGNOSIS — Z45.02 FITTING AND ADJUSTMENT OF AUTOMATIC IMPLANTABLE CARDIOVERTER-DEFIBRILLATOR: ICD-10-CM

## 2023-12-13 DIAGNOSIS — Z45.02 FITTING AND ADJUSTMENT OF AUTOMATIC IMPLANTABLE CARDIOVERTER-DEFIBRILLATOR: Primary | ICD-10-CM

## 2023-12-13 PROCEDURE — G1010 CDSM STANSON: HCPCS | Performed by: RADIOLOGY

## 2023-12-13 PROCEDURE — 73221 MRI JOINT UPR EXTREM W/O DYE: CPT | Mod: 26 | Performed by: RADIOLOGY

## 2023-12-13 PROCEDURE — 93287 PERI-PX DEVICE EVAL & PRGR: CPT

## 2023-12-13 PROCEDURE — G1010 CDSM STANSON: HCPCS

## 2023-12-13 PROCEDURE — 71046 X-RAY EXAM CHEST 2 VIEWS: CPT | Mod: 26 | Performed by: RADIOLOGY

## 2023-12-13 PROCEDURE — 71046 X-RAY EXAM CHEST 2 VIEWS: CPT

## 2023-12-13 PROCEDURE — 93287 PERI-PX DEVICE EVAL & PRGR: CPT | Mod: 26 | Performed by: INTERNAL MEDICINE

## 2023-12-13 NOTE — PROGRESS NOTES
Patient monitored while in MRI. No complications observed or reported throughout the scan.    Lawson Saeed RN

## 2023-12-19 DIAGNOSIS — I21.9 ACUTE MYOCARDIAL INFARCTION (H): ICD-10-CM

## 2023-12-19 DIAGNOSIS — I21.02 ACUTE ST ELEVATION MYOCARDIAL INFARCTION (STEMI) INVOLVING LEFT ANTERIOR DESCENDING (LAD) CORONARY ARTERY (H): ICD-10-CM

## 2023-12-19 RX ORDER — SPIRONOLACTONE 25 MG/1
25 TABLET ORAL DAILY
Qty: 90 TABLET | Refills: 0 | Status: SHIPPED | OUTPATIENT
Start: 2023-12-19 | End: 2024-03-14

## 2023-12-19 RX ORDER — ATORVASTATIN CALCIUM 40 MG/1
40 TABLET, FILM COATED ORAL DAILY
Qty: 90 TABLET | Refills: 0 | Status: SHIPPED | OUTPATIENT
Start: 2023-12-19 | End: 2024-03-14

## 2024-01-06 ENCOUNTER — HEALTH MAINTENANCE LETTER (OUTPATIENT)
Age: 69
End: 2024-01-06

## 2024-01-10 DIAGNOSIS — I21.9 ACUTE MYOCARDIAL INFARCTION (H): ICD-10-CM

## 2024-01-10 RX ORDER — CARVEDILOL 12.5 MG/1
12.5 TABLET ORAL 2 TIMES DAILY WITH MEALS
Qty: 180 TABLET | Refills: 0 | Status: SHIPPED | OUTPATIENT
Start: 2024-01-10 | End: 2024-03-21

## 2024-01-25 ENCOUNTER — PATIENT OUTREACH (OUTPATIENT)
Dept: CARE COORDINATION | Facility: CLINIC | Age: 69
End: 2024-01-25
Payer: MEDICARE

## 2024-02-06 LAB
MDC_IDC_EPISODE_DTM: NORMAL
MDC_IDC_EPISODE_ID: NORMAL
MDC_IDC_EPISODE_TYPE: NORMAL
MDC_IDC_LEAD_CONNECTION_STATUS: NORMAL
MDC_IDC_LEAD_CONNECTION_STATUS: NORMAL
MDC_IDC_LEAD_IMPLANT_DT: NORMAL
MDC_IDC_LEAD_IMPLANT_DT: NORMAL
MDC_IDC_LEAD_LOCATION: NORMAL
MDC_IDC_LEAD_LOCATION: NORMAL
MDC_IDC_LEAD_LOCATION_DETAIL_1: NORMAL
MDC_IDC_LEAD_LOCATION_DETAIL_1: NORMAL
MDC_IDC_LEAD_MFG: NORMAL
MDC_IDC_LEAD_MFG: NORMAL
MDC_IDC_LEAD_MODEL: NORMAL
MDC_IDC_LEAD_MODEL: NORMAL
MDC_IDC_LEAD_POLARITY_TYPE: NORMAL
MDC_IDC_LEAD_POLARITY_TYPE: NORMAL
MDC_IDC_LEAD_SERIAL: NORMAL
MDC_IDC_LEAD_SERIAL: NORMAL
MDC_IDC_MSMT_BATTERY_DTM: NORMAL
MDC_IDC_MSMT_BATTERY_DTM: NORMAL
MDC_IDC_MSMT_BATTERY_REMAINING_LONGEVITY: 60 MO
MDC_IDC_MSMT_BATTERY_REMAINING_LONGEVITY: 60 MO
MDC_IDC_MSMT_BATTERY_REMAINING_PERCENTAGE: 67 %
MDC_IDC_MSMT_BATTERY_REMAINING_PERCENTAGE: 67 %
MDC_IDC_MSMT_BATTERY_STATUS: NORMAL
MDC_IDC_MSMT_BATTERY_STATUS: NORMAL
MDC_IDC_MSMT_CAP_CHARGE_DTM: NORMAL
MDC_IDC_MSMT_CAP_CHARGE_DTM: NORMAL
MDC_IDC_MSMT_CAP_CHARGE_TIME: 10.6 S
MDC_IDC_MSMT_CAP_CHARGE_TIME: 10.6 S
MDC_IDC_MSMT_CAP_CHARGE_TYPE: NORMAL
MDC_IDC_MSMT_CAP_CHARGE_TYPE: NORMAL
MDC_IDC_MSMT_LEADCHNL_RV_IMPEDANCE_VALUE: 692 OHM
MDC_IDC_MSMT_LEADCHNL_RV_IMPEDANCE_VALUE: 750 OHM
MDC_IDC_MSMT_LEADCHNL_RV_PACING_THRESHOLD_AMPLITUDE: 0.7 V
MDC_IDC_MSMT_LEADCHNL_RV_PACING_THRESHOLD_AMPLITUDE: 0.7 V
MDC_IDC_MSMT_LEADCHNL_RV_PACING_THRESHOLD_PULSEWIDTH: 0.5 MS
MDC_IDC_MSMT_LEADCHNL_RV_PACING_THRESHOLD_PULSEWIDTH: 0.5 MS
MDC_IDC_PG_IMPLANT_DTM: NORMAL
MDC_IDC_PG_IMPLANT_DTM: NORMAL
MDC_IDC_PG_MFG: NORMAL
MDC_IDC_PG_MFG: NORMAL
MDC_IDC_PG_MODEL: NORMAL
MDC_IDC_PG_MODEL: NORMAL
MDC_IDC_PG_SERIAL: NORMAL
MDC_IDC_PG_SERIAL: NORMAL
MDC_IDC_PG_TYPE: NORMAL
MDC_IDC_PG_TYPE: NORMAL
MDC_IDC_SESS_CLINIC_NAME: NORMAL
MDC_IDC_SESS_CLINIC_NAME: NORMAL
MDC_IDC_SESS_DTM: NORMAL
MDC_IDC_SESS_DTM: NORMAL
MDC_IDC_SESS_TYPE: NORMAL
MDC_IDC_SESS_TYPE: NORMAL
MDC_IDC_SET_BRADY_LOWRATE: 40 {BEATS}/MIN
MDC_IDC_SET_BRADY_LOWRATE: 40 {BEATS}/MIN
MDC_IDC_SET_BRADY_MODE: NORMAL
MDC_IDC_SET_BRADY_MODE: NORMAL
MDC_IDC_SET_LEADCHNL_RV_PACING_AMPLITUDE: 2 V
MDC_IDC_SET_LEADCHNL_RV_PACING_AMPLITUDE: 2 V
MDC_IDC_SET_LEADCHNL_RV_PACING_POLARITY: NORMAL
MDC_IDC_SET_LEADCHNL_RV_PACING_POLARITY: NORMAL
MDC_IDC_SET_LEADCHNL_RV_PACING_PULSEWIDTH: 0.5 MS
MDC_IDC_SET_LEADCHNL_RV_PACING_PULSEWIDTH: 0.5 MS
MDC_IDC_SET_LEADCHNL_RV_SENSING_ADAPTATION_MODE: NORMAL
MDC_IDC_SET_LEADCHNL_RV_SENSING_ADAPTATION_MODE: NORMAL
MDC_IDC_SET_LEADCHNL_RV_SENSING_POLARITY: NORMAL
MDC_IDC_SET_LEADCHNL_RV_SENSING_POLARITY: NORMAL
MDC_IDC_SET_LEADCHNL_RV_SENSING_SENSITIVITY: 0.6 MV
MDC_IDC_SET_LEADCHNL_RV_SENSING_SENSITIVITY: 0.6 MV
MDC_IDC_SET_ZONE_DETECTION_INTERVAL: 250 MS
MDC_IDC_SET_ZONE_DETECTION_INTERVAL: 250 MS
MDC_IDC_SET_ZONE_DETECTION_INTERVAL: 300 MS
MDC_IDC_SET_ZONE_DETECTION_INTERVAL: 300 MS
MDC_IDC_SET_ZONE_DETECTION_INTERVAL: 353 MS
MDC_IDC_SET_ZONE_DETECTION_INTERVAL: 353 MS
MDC_IDC_SET_ZONE_STATUS: NORMAL
MDC_IDC_SET_ZONE_TYPE: NORMAL
MDC_IDC_SET_ZONE_VENDOR_TYPE: NORMAL
MDC_IDC_STAT_BRADY_DTM_END: NORMAL
MDC_IDC_STAT_BRADY_DTM_END: NORMAL
MDC_IDC_STAT_BRADY_DTM_START: NORMAL
MDC_IDC_STAT_BRADY_DTM_START: NORMAL
MDC_IDC_STAT_BRADY_RV_PERCENT_PACED: 0 %
MDC_IDC_STAT_BRADY_RV_PERCENT_PACED: 0 %
MDC_IDC_STAT_EPISODE_RECENT_COUNT: 0
MDC_IDC_STAT_EPISODE_RECENT_COUNT: 19
MDC_IDC_STAT_EPISODE_RECENT_COUNT: 19
MDC_IDC_STAT_EPISODE_RECENT_COUNT_DTM_END: NORMAL
MDC_IDC_STAT_EPISODE_RECENT_COUNT_DTM_START: NORMAL
MDC_IDC_STAT_EPISODE_TYPE: NORMAL
MDC_IDC_STAT_EPISODE_VENDOR_TYPE: NORMAL
MDC_IDC_STAT_TACHYTHERAPY_ATP_DELIVERED_RECENT: 0
MDC_IDC_STAT_TACHYTHERAPY_ATP_DELIVERED_RECENT: 0
MDC_IDC_STAT_TACHYTHERAPY_ATP_DELIVERED_TOTAL: 0
MDC_IDC_STAT_TACHYTHERAPY_ATP_DELIVERED_TOTAL: 0
MDC_IDC_STAT_TACHYTHERAPY_RECENT_DTM_END: NORMAL
MDC_IDC_STAT_TACHYTHERAPY_RECENT_DTM_END: NORMAL
MDC_IDC_STAT_TACHYTHERAPY_RECENT_DTM_START: NORMAL
MDC_IDC_STAT_TACHYTHERAPY_RECENT_DTM_START: NORMAL
MDC_IDC_STAT_TACHYTHERAPY_SHOCKS_ABORTED_RECENT: 0
MDC_IDC_STAT_TACHYTHERAPY_SHOCKS_ABORTED_RECENT: 0
MDC_IDC_STAT_TACHYTHERAPY_SHOCKS_ABORTED_TOTAL: 0
MDC_IDC_STAT_TACHYTHERAPY_SHOCKS_ABORTED_TOTAL: 0
MDC_IDC_STAT_TACHYTHERAPY_SHOCKS_DELIVERED_RECENT: 0
MDC_IDC_STAT_TACHYTHERAPY_SHOCKS_DELIVERED_RECENT: 0
MDC_IDC_STAT_TACHYTHERAPY_SHOCKS_DELIVERED_TOTAL: 0
MDC_IDC_STAT_TACHYTHERAPY_SHOCKS_DELIVERED_TOTAL: 0
MDC_IDC_STAT_TACHYTHERAPY_TOTAL_DTM_END: NORMAL
MDC_IDC_STAT_TACHYTHERAPY_TOTAL_DTM_END: NORMAL
MDC_IDC_STAT_TACHYTHERAPY_TOTAL_DTM_START: NORMAL
MDC_IDC_STAT_TACHYTHERAPY_TOTAL_DTM_START: NORMAL

## 2024-02-14 DIAGNOSIS — I21.9 ACUTE MYOCARDIAL INFARCTION (H): ICD-10-CM

## 2024-02-14 DIAGNOSIS — I42.9 CARDIOMYOPATHY, UNSPECIFIED TYPE (H): ICD-10-CM

## 2024-02-14 RX ORDER — CLOPIDOGREL BISULFATE 75 MG/1
75 TABLET ORAL DAILY
Qty: 90 TABLET | Refills: 0 | Status: SHIPPED | OUTPATIENT
Start: 2024-02-14 | End: 2024-05-14

## 2024-02-16 ENCOUNTER — HOSPITAL ENCOUNTER (OUTPATIENT)
Dept: CT IMAGING | Facility: CLINIC | Age: 69
Discharge: HOME | End: 2024-02-16
Attending: PHYSICIAN ASSISTANT | Admitting: PHYSICIAN ASSISTANT
Payer: MEDICARE

## 2024-02-16 DIAGNOSIS — R91.8 PULMONARY NODULES: ICD-10-CM

## 2024-02-16 PROCEDURE — 71250 CT THORAX DX C-: CPT | Mod: MG

## 2024-02-22 ENCOUNTER — OFFICE VISIT (OUTPATIENT)
Dept: UROLOGY | Facility: CLINIC | Age: 69
End: 2024-02-22
Payer: MEDICARE

## 2024-02-22 VITALS
SYSTOLIC BLOOD PRESSURE: 104 MMHG | HEART RATE: 71 BPM | BODY MASS INDEX: 20.59 KG/M2 | OXYGEN SATURATION: 96 % | DIASTOLIC BLOOD PRESSURE: 74 MMHG | WEIGHT: 152 LBS | HEIGHT: 72 IN

## 2024-02-22 DIAGNOSIS — C61 PROSTATE CANCER (H): Primary | ICD-10-CM

## 2024-02-22 LAB — PSA SERPL-MCNC: 21.3 UG/L (ref 0–4)

## 2024-02-22 PROCEDURE — 36415 COLL VENOUS BLD VENIPUNCTURE: CPT | Performed by: UROLOGY

## 2024-02-22 PROCEDURE — 84153 ASSAY OF PSA TOTAL: CPT | Performed by: UROLOGY

## 2024-02-22 PROCEDURE — 99213 OFFICE O/P EST LOW 20 MIN: CPT | Performed by: UROLOGY

## 2024-02-22 ASSESSMENT — PAIN SCALES - GENERAL: PAINLEVEL: NO PAIN (0)

## 2024-02-22 NOTE — Clinical Note
2/22/2024       RE: Iraida Hernandez  [address] [facility] [address]     Dear Colleague,    Thank you for referring your patient, Iraida Hernandez, to the [facility] at [facility]. Please see a copy of my visit note below.      CHIEF COMPLAINT   It was my pleasure to see Iraida Hernandez who is a 68 year old male for follow-up of Prostate Cancer.      HPI  Iraida Hernandez is a very pleasant 68 year old male who presents with a history of Prostate Cancer. 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  He had clinical stage T1c disease at that time with a PSA of 9.7.  His Oncotype DX test suggested a very low probability of grade 4 disease or progression.      Oncotype GPS is 5 - very low risk.     Surveillance biopsy of PIRADS 4 lesion done 8/10/2023 - demonstrates stable Chandler 6 disease. No complications from biopsy.     PSA  2/22/2024 - 21.30  6/29/2023 - 17.50  5/1/2023 - 16.00  8/31/2022 - 14.9  1/20/2022 - 13.7  2/2/2021 - 12.90  3/31/2020 - 10.70  2/5/2020 - 14.60  2/15/2019 - 9.79  12/2/2013 - 6.19     Fusion Biopsy 8/10/2023  Final Diagnosis   A.  Prostate, left base, biopsy-  Benign prostate tissue     B.  Prostate, left mid, biopsy-  Adenocarcinoma, Chandler's grade 3/3 in 1 of 2 needle core biopsy and less than 5% of submitted tissue     C.  Prostate, left apex, biopsy-  Adenocarcinoma, [place] grade 3/3 in 1 of 2 needle core biopsies and less than 5% of submitted tissue     D.  Prostate, right base, biopsy-  Benign prostate tissue     E.  Prostate, right mid, biopsy-  Adenocarcinoma, Krista grade 3/3 in 1 of 1 needle core biopsy and less than 5% of submitted tissue     F.  Prostate, right apex, biopsy-  Adenocarcinoma, Krista's grade 3/3 in 1 of 2 needle core biopsies and 10% of submitted tissue     G.  Prostate, MRI target, biopsy-  Adenocarcinoma, Krista grade 3/3 in 2 of 2  submitted needle core biopsies and 10% of submitted tissue      MRI Prostate 6/1/2023   Size: 5.6 x 4.6 x 3.5 cm, 47 grams  IMPRESSION:  Limited evaluation due to nondiagnostic diffusion/ADC images.  1. Based on the most suspicious abnormality, this exam is  characterized as PIRADS 4 - Clinically significant cancer is likely to  be present.  The most suspicious abnormality is located at the right  mid gland peripheral zone and there is short segment capsular abutment  with low likelihood of minimal extraprostatic extension.  2. No suspicious adenopathy or evidence of pelvic metastases.      TRUS 6/25/2021  FINAL DIAGNOSIS:   A.  Prostate, left base x2, biopsy   - Focal high-grade prostatic intraepithelial neoplasia. Negative for   invasive malignancy.     B.  Prostate, left mid x2, biopsy   - Benign prostatic tissue. Negative for malignancy.     C.  Prostate, left [place] x2, biopsy   - Focal high-grade prostatic intraepithelial neoplasia. Negative for   invasive malignancy.     D.  Prostate, right base x2, biopsy   - Benign prostatic tissue. Negative for malignancy     E.  Prostate, right mid x2, biopsy   - Atypical glands suspicious for malignancy (Please see comment)     F.  Prostate, right [place] x2, biopsy   - Prostatic adenocarcinoma, acinar type, Krista's grade 3+ 3 = score of   6, grade group is 1, number of cores involved is 2 of 2, total surface area involved is 10-15%, perineural   invasion is not identified, high-grade prostatic intraepithelial neoplasia is present      TRUS 3/2019  F: Prostate, left apex, needle core biopsy: Adenocarcinoma, Krista score   3 + 3 = 6 (of 10) (Grade Group 1),   involving 1 of 1 needle core(s) and 1 mm of 10 mm of biopsy tissue   (approximately 10% of biopsy tissue).   Perineural invasion not identified.     K: Prostate, right mid, needle core biopsy: Adenocarcinoma, Krista score   3 + 3 = 6 (of 10) (Grade Group 1),   involving 1 of 1 needle core(s) and 1 mm of 10 mm of  biopsy tissue   (approximately 10% of biopsy tissue).   Perineural invasion not identified.     PHYSICAL EXAM  Patient is a 68 year old  male   Vitals: Blood pressure 104/74, pulse 71, height 1.829 m (6'), weight 68.9 kg (152 lb), SpO2 96%.  General Appearance Adult: Body mass index is 20.61 kg/m .  Alert, no acute distress, oriented  Lungs: no respiratory distress, or pursed lip breathing  Abdomen: soft, nontender, no organomegaly or masses; ***  Back: *** CVAT  Neuro: Alert, oriented, speech and mentation normal  Psych: affect and mood normal  : {UZAIR EXAM MALE GENITAL:339092}    Outside and Past Medical records:  Assessment requiring an independent historian(s) - {:[id number]}  Review of prior external note(s) from - {note reviewed source:775170}  Review of the result(s) of each unique test - ***    ASSESSMENT and PLAN  68 year old male with [place] 3+3 disease diagnosed 2019. GPS score 5. On active surveillance. Recent PSA 16.00 and slightly elevated from previous. MRI now with PIRADS 4 lesion. Had surveillance biopsy 8/10/2023 demonstrating [place] 6 disease. We discussed this and role for ongoing surveillance. We discussed that there are multiple reasons why his PSA has remained rather high, but that his Krista score has been stable. He elects ongoing active surveillance.     - Follow-up 6 months with PSA      *** minutes spent on the date of the encounter doing chart review, history and exam, documentation and further activities as noted above.    Sukumar Oglesby MD  Urology  [facility]        CHIEF COMPLAINT   It was my pleasure to see Iraida Hernandez who is a 68 year old male for follow-up of Prostate Cancer.      HPI  Iraida Hernandez is a very pleasant 68 year old male who presents with a history of Prostate Cancer. 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  He had clinical stage T1c disease at that time with a PSA of 9.7.  His Oncotype DX  test suggested a very low probability of grade 4 disease or progression.      Oncotype GPS is 5 - very low risk.     Surveillance biopsy of PIRADS 4 lesion done 8/10/2023 - demonstrates stable Krista 6 disease.     PSA now up to 21.30. No new symptoms.    PSA  2/22/2024 - 21.30  6/29/2023 - 17.50  5/1/2023 - 16.00  8/31/2022 - 14.9  1/20/2022 - 13.7  2/2/2021 - 12.90  3/31/2020 - 10.70  2/5/2020 - 14.60  2/15/2019 - 9.79  12/2/2013 - 6.19     Fusion Biopsy 8/10/2023  Final Diagnosis   A.  Prostate, left base, biopsy-  Benign prostate tissue     B.  Prostate, left mid, biopsy-  Adenocarcinoma, Krista's grade 3/3 in 1 of 2 needle core biopsy and less than 5% of submitted tissue     C.  Prostate, left apex, biopsy-  Adenocarcinoma, Krista grade 3/3 in 1 of 2 needle core biopsies and less than 5% of submitted tissue     D.  Prostate, right base, biopsy-  Benign prostate tissue     E.  Prostate, right mid, biopsy-  Adenocarcinoma, [place] grade 3/3 in 1 of 1 needle core biopsy and less than 5% of submitted tissue     F.  Prostate, right apex, biopsy-  Adenocarcinoma, [place]'s grade 3/3 in 1 of 2 needle core biopsies and 10% of submitted tissue     G.  Prostate, MRI target, biopsy-  Adenocarcinoma, [place] grade 3/3 in 2 of 2 submitted needle core biopsies and 10% of submitted tissue      MRI Prostate 6/1/2023   Size: 5.6 x 4.6 x 3.5 cm, 47 grams  IMPRESSION:  Limited evaluation due to nondiagnostic diffusion/ADC images.  1. Based on the most suspicious abnormality, this exam is  characterized as PIRADS 4 - Clinically significant cancer is likely to  be present.  The most suspicious abnormality is located at the right  mid gland peripheral zone and there is short segment capsular abutment  with low likelihood of minimal extraprostatic extension.  2. No suspicious adenopathy or evidence of pelvic metastases.      TRUS 6/25/2021  FINAL DIAGNOSIS:   A.  Prostate, left base x2, biopsy   - Focal high-grade prostatic  intraepithelial neoplasia. Negative for   invasive malignancy.     B.  Prostate, left mid x2, biopsy   - Benign prostatic tissue. Negative for malignancy.     C.  Prostate, left [place] x2, biopsy   - Focal high-grade prostatic intraepithelial neoplasia. Negative for   invasive malignancy.     D.  Prostate, right base x2, biopsy   - Benign prostatic tissue. Negative for malignancy     E.  Prostate, right mid x2, biopsy   - Atypical glands suspicious for malignancy (Please see comment)     F.  Prostate, right [place] x2, biopsy   - Prostatic adenocarcinoma, acinar type, [place]'s grade 3+ 3 = score of   6, grade group is 1, number of cores involved is 2 of 2, total surface area involved is 10-15%, perineural   invasion is not identified, high-grade prostatic intraepithelial neoplasia is present      TRUS 3/2019  F: Prostate, left apex, needle core biopsy: Adenocarcinoma, [place] score   3 + 3 = 6 (of 10) (Grade Group 1),   involving 1 of 1 needle core(s) and 1 mm of 10 mm of biopsy tissue   (approximately 10% of biopsy tissue).   Perineural invasion not identified.     K: Prostate, right mid, needle core biopsy: Adenocarcinoma, [place] score   3 + 3 = 6 (of 10) (Grade Group 1),   involving 1 of 1 needle core(s) and 1 mm of 10 mm of biopsy tissue   (approximately 10% of biopsy tissue).   Perineural invasion not identified.     PHYSICAL EXAM  Patient is a 68 year old  male   Vitals: Blood pressure 104/74, pulse 71, height 1.829 m (6'), weight 68.9 kg (152 lb), SpO2 96%.  General Appearance Adult: Body mass index is 20.61 kg/m .  Alert, no acute distress, oriented  Lungs: no respiratory distress, or pursed lip breathing  Abdomen: soft, nontender, no organomegaly or masses  Back: no CVAT  Neuro: Alert, oriented, speech and mentation normal  Psych: affect and mood normal    Outside and Past Medical records:  Review of the result(s) of each unique test - PSA    ASSESSMENT and PLAN  68 year old male with Krista 3+3 disease  diagnosed 2019. GPS score 5. On active surveillance. Recent PSA 21.30 and elevated from previous. MRI now with PIRADS 4 lesion. Had surveillance biopsy 8/10/2023 demonstrating Krista 6 disease. We had a long discussion today about the significance of his rising PSA. While this has again risen a bit, we reviewed that his stable MRI findings and low-grade pathology on biopsy are re-assuring. We again reviewed the options for treatment including surgery and radiation. At this point, it would be reasonable to plan PSA and MRI in 6 months. If PSA rises further, would likely plan another biopsy or consider definitive treatment.      - Follow-up 6 months with PSA and MRI prostate    20 minutes spent on the date of the encounter doing chart review, history and exam, documentation and further activities as noted above.    Sukumar Oglesby MD  Urology  [facility]        Again, thank you for allowing me to participate in the care of your patient.      Sincerely,    Sukumar Oglesby MD

## 2024-02-22 NOTE — NURSING NOTE
Chief Complaint   Patient presents with    Prostate Cancer     Here in clinic for a psa draw     Talk about the psa today   Everything  going well   Rose Calderon, CMA

## 2024-02-22 NOTE — PROGRESS NOTES
CHIEF COMPLAINT   It was my pleasure to see Iraida Hernandez who is a 68 year old male for follow-up of Prostate Cancer.      HPI  Iraida Hernandez is a very pleasant 68 year old male who presents with a history of Prostate Cancer. 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  He had clinical stage T1c disease at that time with a PSA of 9.7.  His Oncotype DX test suggested a very low probability of grade 4 disease or progression.      Oncotype GPS is 5 - very low risk.     Surveillance biopsy of PIRADS 4 lesion done 8/10/2023 - demonstrates stable [place] 6 disease.     PSA now up to 21.30. No new symptoms.    PSA  2/22/2024 - 21.30  6/29/2023 - 17.50  5/1/2023 - 16.00  8/31/2022 - 14.9  1/20/2022 - 13.7  2/2/2021 - 12.90  3/31/2020 - 10.70  2/5/2020 - 14.60  2/15/2019 - 9.79  12/2/2013 - 6.19     Fusion Biopsy 8/10/2023  Final Diagnosis   A.  Prostate, left base, biopsy-  Benign prostate tissue     B.  Prostate, left mid, biopsy-  Adenocarcinoma, Krista's grade 3/3 in 1 of 2 needle core biopsy and less than 5% of submitted tissue     C.  Prostate, left apex, biopsy-  Adenocarcinoma, [place] grade 3/3 in 1 of 2 needle core biopsies and less than 5% of submitted tissue     D.  Prostate, right base, biopsy-  Benign prostate tissue     E.  Prostate, right mid, biopsy-  Adenocarcinoma, [place] grade 3/3 in 1 of 1 needle core biopsy and less than 5% of submitted tissue     F.  Prostate, right apex, biopsy-  Adenocarcinoma, [place]'s grade 3/3 in 1 of 2 needle core biopsies and 10% of submitted tissue     G.  Prostate, MRI target, biopsy-  Adenocarcinoma, Krista grade 3/3 in 2 of 2 submitted needle core biopsies and 10% of submitted tissue      MRI Prostate 6/1/2023   Size: 5.6 x 4.6 x 3.5 cm, 47 grams  IMPRESSION:  Limited evaluation due to nondiagnostic diffusion/ADC images.  1. Based on the most suspicious abnormality, this exam is  characterized as PIRADS 4 - Clinically  significant cancer is likely to  be present.  The most suspicious abnormality is located at the right  mid gland peripheral zone and there is short segment capsular abutment  with low likelihood of minimal extraprostatic extension.  2. No suspicious adenopathy or evidence of pelvic metastases.      TRUS 6/25/2021  FINAL DIAGNOSIS:   A.  Prostate, left base x2, biopsy   - Focal high-grade prostatic intraepithelial neoplasia. Negative for   invasive malignancy.     B.  Prostate, left mid x2, biopsy   - Benign prostatic tissue. Negative for malignancy.     C.  Prostate, left [place] x2, biopsy   - Focal high-grade prostatic intraepithelial neoplasia. Negative for   invasive malignancy.     D.  Prostate, right base x2, biopsy   - Benign prostatic tissue. Negative for malignancy     E.  Prostate, right mid x2, biopsy   - Atypical glands suspicious for malignancy (Please see comment)     F.  Prostate, right [place] x2, biopsy   - Prostatic adenocarcinoma, acinar type, Krista's grade 3+ 3 = score of   6, grade group is 1, number of cores involved is 2 of 2, total surface area involved is 10-15%, perineural   invasion is not identified, high-grade prostatic intraepithelial neoplasia is present      TRUS 3/2019  F: Prostate, left apex, needle core biopsy: Adenocarcinoma, Krista score   3 + 3 = 6 (of 10) (Grade Group 1),   involving 1 of 1 needle core(s) and 1 mm of 10 mm of biopsy tissue   (approximately 10% of biopsy tissue).   Perineural invasion not identified.     K: Prostate, right mid, needle core biopsy: Adenocarcinoma, Krista score   3 + 3 = 6 (of 10) (Grade Group 1),   involving 1 of 1 needle core(s) and 1 mm of 10 mm of biopsy tissue   (approximately 10% of biopsy tissue).   Perineural invasion not identified.     PHYSICAL EXAM  Patient is a 68 year old  male   Vitals: Blood pressure 104/74, pulse 71, height 1.829 m (6'), weight 68.9 kg (152 lb), SpO2 96%.  General Appearance Adult: Body mass index is 20.61  kg/m .  Alert, no acute distress, oriented  Lungs: no respiratory distress, or pursed lip breathing  Abdomen: soft, nontender, no organomegaly or masses  Back: no CVAT  Neuro: Alert, oriented, speech and mentation normal  Psych: affect and mood normal    Outside and Past Medical records:  Review of the result(s) of each unique test - PSA    ASSESSMENT and PLAN  68 year old male with [place] 3+3 disease diagnosed 2019. GPS score 5. On active surveillance. Recent PSA 21.30 and elevated from previous. MRI now with PIRADS 4 lesion. Had surveillance biopsy 8/10/2023 demonstrating Krista 6 disease. We had a long discussion today about the significance of his rising PSA. While this has again risen a bit, we reviewed that his stable MRI findings and low-grade pathology on biopsy are re-assuring. We again reviewed the options for treatment including surgery and radiation. At this point, it would be reasonable to plan PSA and MRI in 6 months. If PSA rises further, would likely plan another biopsy or consider definitive treatment.      - Follow-up 6 months with PSA and MRI prostate    20 minutes spent on the date of the encounter doing chart review, history and exam, documentation and further activities as noted above.    Sukumar Oglesby MD  Urology  [facility]

## 2024-03-04 ENCOUNTER — ANCILLARY PROCEDURE (OUTPATIENT)
Dept: CARDIOLOGY | Facility: CLINIC | Age: 69
End: 2024-03-04
Attending: INTERNAL MEDICINE
Payer: MEDICARE

## 2024-03-04 DIAGNOSIS — I25.5 ISCHEMIC CARDIOMYOPATHY: ICD-10-CM

## 2024-03-04 DIAGNOSIS — Z95.810 ICD (IMPLANTABLE CARDIOVERTER-DEFIBRILLATOR) IN PLACE: ICD-10-CM

## 2024-03-04 DIAGNOSIS — I25.5 ISCHEMIC CARDIOMYOPATHY: Primary | ICD-10-CM

## 2024-03-04 LAB
MDC_IDC_EPISODE_DTM: NORMAL
MDC_IDC_EPISODE_DTM: NORMAL
MDC_IDC_EPISODE_DURATION: 7 S
MDC_IDC_EPISODE_ID: NORMAL
MDC_IDC_EPISODE_ID: NORMAL
MDC_IDC_EPISODE_TYPE: NORMAL
MDC_IDC_EPISODE_TYPE: NORMAL
MDC_IDC_EPISODE_TYPE_INDUCED: NO
MDC_IDC_LEAD_CONNECTION_STATUS: NORMAL
MDC_IDC_LEAD_IMPLANT_DT: NORMAL
MDC_IDC_LEAD_LOCATION: NORMAL
MDC_IDC_LEAD_LOCATION_DETAIL_1: NORMAL
MDC_IDC_LEAD_MFG: NORMAL
MDC_IDC_LEAD_MODEL: NORMAL
MDC_IDC_LEAD_POLARITY_TYPE: NORMAL
MDC_IDC_LEAD_SERIAL: NORMAL
MDC_IDC_MSMT_BATTERY_DTM: NORMAL
MDC_IDC_MSMT_BATTERY_REMAINING_LONGEVITY: 60 MO
MDC_IDC_MSMT_BATTERY_REMAINING_PERCENTAGE: 63 %
MDC_IDC_MSMT_BATTERY_STATUS: NORMAL
MDC_IDC_MSMT_CAP_CHARGE_DTM: NORMAL
MDC_IDC_MSMT_CAP_CHARGE_TIME: 10.7 S
MDC_IDC_MSMT_CAP_CHARGE_TYPE: NORMAL
MDC_IDC_MSMT_LEADCHNL_RV_IMPEDANCE_VALUE: 660 OHM
MDC_IDC_MSMT_LEADCHNL_RV_PACING_THRESHOLD_AMPLITUDE: 0.7 V
MDC_IDC_MSMT_LEADCHNL_RV_PACING_THRESHOLD_PULSEWIDTH: 0.5 MS
MDC_IDC_PG_IMPLANT_DTM: NORMAL
MDC_IDC_PG_MFG: NORMAL
MDC_IDC_PG_MODEL: NORMAL
MDC_IDC_PG_SERIAL: NORMAL
MDC_IDC_PG_TYPE: NORMAL
MDC_IDC_SESS_CLINIC_NAME: NORMAL
MDC_IDC_SESS_DTM: NORMAL
MDC_IDC_SESS_TYPE: NORMAL
MDC_IDC_SET_BRADY_LOWRATE: 40 {BEATS}/MIN
MDC_IDC_SET_BRADY_MODE: NORMAL
MDC_IDC_SET_LEADCHNL_RV_PACING_AMPLITUDE: 2 V
MDC_IDC_SET_LEADCHNL_RV_PACING_POLARITY: NORMAL
MDC_IDC_SET_LEADCHNL_RV_PACING_PULSEWIDTH: 0.5 MS
MDC_IDC_SET_LEADCHNL_RV_SENSING_ADAPTATION_MODE: NORMAL
MDC_IDC_SET_LEADCHNL_RV_SENSING_POLARITY: NORMAL
MDC_IDC_SET_LEADCHNL_RV_SENSING_SENSITIVITY: 0.6 MV
MDC_IDC_SET_ZONE_DETECTION_INTERVAL: 250 MS
MDC_IDC_SET_ZONE_DETECTION_INTERVAL: 300 MS
MDC_IDC_SET_ZONE_DETECTION_INTERVAL: 353 MS
MDC_IDC_SET_ZONE_STATUS: NORMAL
MDC_IDC_SET_ZONE_TYPE: NORMAL
MDC_IDC_SET_ZONE_VENDOR_TYPE: NORMAL
MDC_IDC_STAT_BRADY_DTM_END: NORMAL
MDC_IDC_STAT_BRADY_DTM_START: NORMAL
MDC_IDC_STAT_BRADY_RV_PERCENT_PACED: 0 %
MDC_IDC_STAT_EPISODE_RECENT_COUNT: 0
MDC_IDC_STAT_EPISODE_RECENT_COUNT: 20
MDC_IDC_STAT_EPISODE_RECENT_COUNT_DTM_END: NORMAL
MDC_IDC_STAT_EPISODE_RECENT_COUNT_DTM_START: NORMAL
MDC_IDC_STAT_EPISODE_TYPE: NORMAL
MDC_IDC_STAT_EPISODE_VENDOR_TYPE: NORMAL
MDC_IDC_STAT_TACHYTHERAPY_ATP_DELIVERED_RECENT: 0
MDC_IDC_STAT_TACHYTHERAPY_ATP_DELIVERED_TOTAL: 0
MDC_IDC_STAT_TACHYTHERAPY_RECENT_DTM_END: NORMAL
MDC_IDC_STAT_TACHYTHERAPY_RECENT_DTM_START: NORMAL
MDC_IDC_STAT_TACHYTHERAPY_SHOCKS_ABORTED_RECENT: 0
MDC_IDC_STAT_TACHYTHERAPY_SHOCKS_ABORTED_TOTAL: 0
MDC_IDC_STAT_TACHYTHERAPY_SHOCKS_DELIVERED_RECENT: 0
MDC_IDC_STAT_TACHYTHERAPY_SHOCKS_DELIVERED_TOTAL: 0
MDC_IDC_STAT_TACHYTHERAPY_TOTAL_DTM_END: NORMAL
MDC_IDC_STAT_TACHYTHERAPY_TOTAL_DTM_START: NORMAL

## 2024-03-04 PROCEDURE — 93296 REM INTERROG EVL PM/IDS: CPT | Performed by: INTERNAL MEDICINE

## 2024-03-04 PROCEDURE — 93295 DEV INTERROG REMOTE 1/2/MLT: CPT | Performed by: INTERNAL MEDICINE

## 2024-03-14 DIAGNOSIS — I21.9 ACUTE MYOCARDIAL INFARCTION (H): ICD-10-CM

## 2024-03-14 DIAGNOSIS — I21.02 ACUTE ST ELEVATION MYOCARDIAL INFARCTION (STEMI) INVOLVING LEFT ANTERIOR DESCENDING (LAD) CORONARY ARTERY (H): ICD-10-CM

## 2024-03-14 RX ORDER — SPIRONOLACTONE 25 MG/1
25 TABLET ORAL DAILY
Qty: 90 TABLET | Refills: 0 | Status: SHIPPED | OUTPATIENT
Start: 2024-03-14 | End: 2024-05-22

## 2024-03-14 RX ORDER — ATORVASTATIN CALCIUM 40 MG/1
40 TABLET, FILM COATED ORAL DAILY
Qty: 90 TABLET | Refills: 0 | Status: SHIPPED | OUTPATIENT
Start: 2024-03-14 | End: 2024-05-22

## 2024-03-21 DIAGNOSIS — I21.9 ACUTE MYOCARDIAL INFARCTION (H): ICD-10-CM

## 2024-03-21 RX ORDER — CARVEDILOL 12.5 MG/1
12.5 TABLET ORAL 2 TIMES DAILY WITH MEALS
Qty: 180 TABLET | Refills: 0 | Status: SHIPPED | OUTPATIENT
Start: 2024-03-21 | End: 2024-05-22

## 2024-04-23 DIAGNOSIS — I25.5 ISCHEMIC CARDIOMYOPATHY: ICD-10-CM

## 2024-04-23 DIAGNOSIS — I42.9 CARDIOMYOPATHY, UNSPECIFIED TYPE (H): ICD-10-CM

## 2024-04-23 DIAGNOSIS — I21.9 ACUTE MYOCARDIAL INFARCTION (H): ICD-10-CM

## 2024-04-25 DIAGNOSIS — I25.10 CORONARY ARTERY DISEASE INVOLVING NATIVE CORONARY ARTERY OF NATIVE HEART WITHOUT ANGINA PECTORIS: ICD-10-CM

## 2024-04-25 RX ORDER — NITROGLYCERIN 0.4 MG/1
0.4 TABLET SUBLINGUAL EVERY 5 MIN PRN
Qty: 25 TABLET | Refills: 1 | Status: SHIPPED | OUTPATIENT
Start: 2024-04-25

## 2024-05-08 ENCOUNTER — TRANSFERRED RECORDS (OUTPATIENT)
Dept: HEALTH INFORMATION MANAGEMENT | Facility: CLINIC | Age: 69
End: 2024-05-08
Payer: MEDICARE

## 2024-05-10 ENCOUNTER — HOSPITAL ENCOUNTER (OUTPATIENT)
Dept: CARDIOLOGY | Facility: CLINIC | Age: 69
Discharge: HOME | End: 2024-05-10
Attending: NURSE PRACTITIONER | Admitting: NURSE PRACTITIONER
Payer: MEDICARE

## 2024-05-10 DIAGNOSIS — I25.5 ISCHEMIC CARDIOMYOPATHY: ICD-10-CM

## 2024-05-10 LAB
BI-PLANE LVEF ECHO: NORMAL
LVEF ECHO: NORMAL

## 2024-05-10 PROCEDURE — 93321 DOPPLER ECHO F-UP/LMTD STD: CPT | Mod: 26 | Performed by: INTERNAL MEDICINE

## 2024-05-10 PROCEDURE — 255N000002 HC RX 255 OP 636: Performed by: NURSE PRACTITIONER

## 2024-05-10 PROCEDURE — 93308 TTE F-UP OR LMTD: CPT | Mod: 26 | Performed by: INTERNAL MEDICINE

## 2024-05-10 PROCEDURE — 93325 DOPPLER ECHO COLOR FLOW MAPG: CPT | Mod: 26 | Performed by: INTERNAL MEDICINE

## 2024-05-10 PROCEDURE — 93325 DOPPLER ECHO COLOR FLOW MAPG: CPT

## 2024-05-10 RX ADMIN — HUMAN ALBUMIN MICROSPHERES AND PERFLUTREN 2 ML: 10; .22 INJECTION, SOLUTION INTRAVENOUS at 13:23

## 2024-05-14 DIAGNOSIS — I21.9 ACUTE MYOCARDIAL INFARCTION (H): ICD-10-CM

## 2024-05-14 DIAGNOSIS — I42.9 CARDIOMYOPATHY, UNSPECIFIED TYPE (H): ICD-10-CM

## 2024-05-14 RX ORDER — CLOPIDOGREL BISULFATE 75 MG/1
75 TABLET ORAL DAILY
Qty: 90 TABLET | Refills: 1 | Status: SHIPPED | OUTPATIENT
Start: 2024-05-14 | End: 2024-05-22

## 2024-05-20 NOTE — PROGRESS NOTES
HISTORY OF PRESENT ILLNESS:    This is a 69 year old male who follows with Dr Stevens at [facility]  His past medical history includes:  Coronary artery disease, ischemic cardiomyopathy s/p ICD, hyperlipidemia, and prostate cancer.    Mr Hernandez suffered an anterior STEMI (2014) and underwent urgent thrombectomy and stenting to LAD and RCA  This was complicated by V-fib arrest   ECHO showed LVEF 30-35% and he eventually received an ICD     Since his MI, he has had ongoing diminished LVEF with apical, inferoseptal akinesis      Cinthya NUC (2022) showed large anterior, apical anteroseptal, inferoseptal scar  No ischemia      ECHO (2023) showed LVEF 30-35% with apical, inferoseptal akinesis, normal RV function, no significant valvular pathology  (no change)    Device interrogation today showed <1% V-paced  3 brief nonsustained VT,  (not new) no shocks  4.5 yr of battery life    Our visit today is for annual review and recent surveillance ECHO    ECHO (5/10/24) showed LVEF 36% with global hypokinesis, worse in apical and septal walls, normal RV function, 1+ TR  (no change)      Mr Hernandez is more active this spring after having both his hips replaced over the winter  His energy is good  He denies any chest pain, shortness of breath, palpitations, orthopnea, or peripheral edema        VITAL SIGNS:  BP:  102/60  Pulse:  71  Weight: 158 lbs  (BMI: 21)      IMPRESSION AND PLAN:    Coronary Artery Disease:  -s/p Anterior STEMI and stenting to LAD and RCA (2014)  -known remaining large anterior scar  -denies angina  -remains on both Plavix + ASA 81 mg    Ischemic Cardiomyopathy  S/p ICD  -LVEF 30-35% with apical, inferoseptal akinesis since 2014  -no signs or symptoms of heart failure  -weight stable  -no shocks from device  -continue device cares  -GMT:  Coreg, Entresto, spironolactone    Hyperlipidemia:  -on Atorvastatin 40 mg  -will arrange fasting lipids in near future and review results over MyChart    The total  time for the visit today was 30 minutes which includes patient visit, reviewing of records, discussion, and placing of orders of the outpatient coordination of cardiovascular care as described.  The level of medical decision making during this visit was of moderate complexity.  Thank you for allowing me to participate in their care.    The longitudinal plan of care for the diagnosis(es)/condition(s) as documented were addressed during this visit. Due to the added complexity in care, I will continue to support Saravanan in the subsequent management and with ongoing continuity of care.      Orders Placed This Encounter   Procedures    Lipid Profile    ALT    Hemoglobin    Basic metabolic panel    TSH with free T4 reflex    Follow-Up with Cardiology       Orders Placed This Encounter   Medications    atorvastatin (LIPITOR) 40 MG tablet     Sig: Take 1 tablet (40 mg) by mouth daily     Dispense:  90 tablet     Refill:  3    carvedilol (COREG) 12.5 MG tablet     Sig: Take 1 tablet (12.5 mg) by mouth 2 times daily (with meals)     Dispense:  180 tablet     Refill:  3    clopidogrel (PLAVIX) 75 MG tablet     Sig: Take 1 tablet (75 mg) by mouth daily     Dispense:  90 tablet     Refill:  3    sacubitril-valsartan (ENTRESTO) 49-51 MG per tablet     Sig: Take 1 tablet by mouth 2 times daily     Dispense:  180 tablet     Refill:  3    spironolactone (ALDACTONE) 25 MG tablet     Sig: Take 1 tablet (25 mg) by mouth daily     Dispense:  90 tablet     Refill:  3       Medications Discontinued During This Encounter   Medication Reason    atorvastatin (LIPITOR) 40 MG tablet Reorder (No AVS)    spironolactone (ALDACTONE) 25 MG tablet Reorder (No AVS)    carvedilol (COREG) 12.5 MG tablet Reorder (No AVS)    sacubitril-valsartan (ENTRESTO) 49-51 MG per tablet Reorder (No AVS)    clopidogrel (PLAVIX) 75 MG tablet Reorder (No AVS)         Encounter Diagnoses   Name Primary?    Ischemic cardiomyopathy     ICD (implantable  cardioverter-defibrillator) in place     Coronary artery disease involving native coronary artery of native heart without angina pectoris Yes    Mixed hyperlipidemia     Acute ST elevation myocardial infarction (STEMI) involving left anterior descending (LAD) coronary artery (H)     Other type of acute myocardial infarction (H)     Cardiomyopathy, unspecified type (H)        CURRENT MEDICATIONS:  Current Outpatient Medications   Medication Sig Dispense Refill    aspirin 81 MG EC tablet Take 81 mg by mouth daily      atorvastatin (LIPITOR) 40 MG tablet Take 1 tablet (40 mg) by mouth daily 90 tablet 3    carvedilol (COREG) 12.5 MG tablet Take 1 tablet (12.5 mg) by mouth 2 times daily (with meals) 180 tablet 3    clopidogrel (PLAVIX) 75 MG tablet Take 1 tablet (75 mg) by mouth daily 90 tablet 3    nitroGLYcerin (NITROSTAT) 0.4 MG sublingual tablet Place 1 tablet (0.4 mg) under the tongue every 5 minutes as needed for chest pain 25 tablet 1    sacubitril-valsartan (ENTRESTO) 49-51 MG per tablet Take 1 tablet by mouth 2 times daily 180 tablet 3    spironolactone (ALDACTONE) 25 MG tablet Take 1 tablet (25 mg) by mouth daily 90 tablet 3       ALLERGIES     Allergies   Allergen Reactions    Penicillins      Violent shaking of entire body. Reaction was 50 years ago     Tetracyclines & Related Hives       PAST MEDICAL HISTORY:  Past Medical History:   Diagnosis Date    Antiplatelet or antithrombotic long-term use     Arthritis Age related    Calculus of kidney     Cancer (H)     basel cell on nose    Cardiac arrest (H)     V-fib, 2/16/2014    Cardiomyopathy (H)     Coronary artery disease     Hyperlipidaemia     Hyperlipidemia with target LDL less than 70 10/31/2010     Diagnosis updated by automated process. Provider to review and confirm.    ICD (implantable cardiac defibrillator) in place     12-1-2014 EF 29%    Myocardial infarction (H) 02/2014     Anterior infarct    Pacemaker     Prostate cancer (H)     Stented coronary  artery     Tobacco dependence        PAST SURGICAL HISTORY:  Past Surgical History:   Procedure Laterality Date    ABDOMEN SURGERY  15 years ago    Hearnia    ARTHROPLASTY HIP Left 09/21/2022    Procedure: Left total hip arthroplasty;  Surgeon: Ok Jolly MD;  [facility]    ARTHROPLASTY HIP Right 01/04/2023    Procedure: Right total hip arthroplasty;  Surgeon: Ok Jolly MD;  [facility]    BIOPSY  February 2019    Prostate    BIOPSY, PROSTATE, TRANSRECTAL, WITH ULTRASOUND GUIDANCE Bilateral     CARDIAC SURGERY      COLONOSCOPY  2005    HEART CATH, ANGIOPLASTY  02/2014    Emergent cath,thrombectomy-pt had cardiac arrest-severe 3 vessel CAD-MACY proximal LAD, LAD diagonal kissing balloon, and stent to proximal RCA    HERNIA REPAIR      NO HISTORY OF SURGERY      PROSTATE SURGERY  03/29/2019       FAMILY HISTORY:  Family History   Problem Relation Age of Onset    Cerebrovascular Disease Father     Heart Disease Father         MI    Unknown/Adopted Father     Cancer Mother     Other Cancer Mother     Unknown/Adopted Mother         Laine David    Unknown/Adopted Maternal Grandmother     Unknown/Adopted Maternal Grandfather     Unknown/Adopted Paternal Grandmother     Unknown/Adopted Paternal Grandfather     Diabetes Son     Family History Negative Son     Cancer Maternal Uncle        SOCIAL HISTORY:  Social History     Socioeconomic History    Marital status:      Spouse name: Jessica    Number of children: 2    Years of education: None    Highest education level: None   Occupational History    Occupation:      Employer: shelter supply     Comment: [employer]   Tobacco Use    Smoking status: Light Smoker     Current packs/day: 0.70     Average packs/day: 0.7 packs/day for 49.4 years (34.6 ttl pk-yrs)     Types: Cigarettes     Start date: 1/1/1975    Smokeless tobacco: Never    Tobacco comments:     3-5 cigs per day 5/9/23   Vaping Use    Vaping status: Never Used    Substance and Sexual Activity    Alcohol use: Yes     Comment: 1-3 drinks per week    Drug use: No    Sexual activity: Yes     Partners: Female   Other Topics Concern     Service No    Blood Transfusions No    Caffeine Concern No     Comment: soda occasionally     Occupational Exposure No    Hobby Hazards No    Sleep Concern No    Stress Concern No    Weight Concern No    Special Diet Yes     Comment: eats healthy     Back Care No    Exercise Yes     Comment: golf 2x/week, tredmill daily    Seat Belt Yes    Self-Exams Yes    Parent/sibling w/ CABG, MI or angioplasty before 65F 55M? No     Social Determinants of Health     Financial Resource Strain: Low Risk  (10/25/2023)    Financial Resource Strain     Within the past 12 months, have you or your family members you live with been unable to get utilities (heat, electricity) when it was really needed?: No   Food Insecurity: Low Risk  (10/25/2023)    Food Insecurity     Within the past 12 months, did you worry that your food would run out before you got money to buy more?: No     Within the past 12 months, did the food you bought just not last and you didn t have money to get more?: No   Transportation Needs: Low Risk  (10/25/2023)    Transportation Needs     Within the past 12 months, has lack of transportation kept you from medical appointments, getting your medicines, non-medical meetings or appointments, work, or from getting things that you need?: No   Physical Activity: Insufficiently Active (8/24/2022)    Exercise Vital Sign     Days of Exercise per Week: 2 days     Minutes of Exercise per Session: 60 min   Stress: No Stress Concern Present (8/24/2022)    [place] [employer] - Occupational Stress Questionnaire     Feeling of Stress : Not at all   Social Connections: Moderately Integrated (8/24/2022)    Social Connection and Isolation Panel [NHANES]     Frequency of Communication with Friends and Family: More than three times a week      Frequency of Social Gatherings with Friends and Family: Twice a week     Attends Faith Services: 1 to 4 times per year     Active Member of Clubs or Organizations: No     Marital Status:    Housing Stability: Low Risk  (10/25/2023)    Housing Stability     Do you have housing? : Yes     Are you worried about losing your housing?: No       Review of Systems:  Skin:  not assessed       Eyes:  not assessed      ENT:  not assessed      Respiratory:  Negative       Cardiovascular:  Negative      Gastroenterology: not assessed      Genitourinary:  not assessed      Musculoskeletal:  not assessed      Neurologic:  not assessed      Psychiatric:  not assessed      Heme/Lymph/Imm:  not assessed      Endocrine:  not assessed        Physical Exam:  Vitals: /60 (BP Location: Right arm, Patient Position: Sitting, Cuff Size: Adult Large)   Pulse 78   Ht 1.829 m (6')   Wt 71.7 kg (158 lb)   SpO2 97%   BMI 21.43 kg/m      Constitutional:  cooperative        Skin:  warm and dry to the touch   ICD incision in the left infraclavicular area was well-healed      Head:  normocephalic        Eyes:  pupils equal and round        Lymph:      ENT:  no pallor or cyanosis        Neck:  JVP normal;no carotid bruit        Respiratory:  clear to auscultation;normal respiratory excursion         Cardiac: regular rhythm;no S3 or S4     no presence of murmur early systolic murmur        pulses full and equal                                        GI:  abdomen soft        Extremities and Muscular Skeletal:  no edema              Neurological:  affect appropriate        Psych:  Alert and Oriented x 3          CC  Alfa Ojeda MD  [address]  [place] [id number]

## 2024-05-22 ENCOUNTER — OFFICE VISIT (OUTPATIENT)
Dept: CARDIOLOGY | Facility: CLINIC | Age: 69
End: 2024-05-22
Attending: INTERNAL MEDICINE
Payer: MEDICARE

## 2024-05-22 VITALS
WEIGHT: 158 LBS | SYSTOLIC BLOOD PRESSURE: 102 MMHG | OXYGEN SATURATION: 97 % | BODY MASS INDEX: 21.4 KG/M2 | DIASTOLIC BLOOD PRESSURE: 60 MMHG | HEIGHT: 72 IN | HEART RATE: 78 BPM

## 2024-05-22 DIAGNOSIS — I25.5 ISCHEMIC CARDIOMYOPATHY: ICD-10-CM

## 2024-05-22 DIAGNOSIS — Z95.810 ICD (IMPLANTABLE CARDIOVERTER-DEFIBRILLATOR) IN PLACE: ICD-10-CM

## 2024-05-22 DIAGNOSIS — I42.9 CARDIOMYOPATHY, UNSPECIFIED TYPE (H): ICD-10-CM

## 2024-05-22 DIAGNOSIS — I25.10 CORONARY ARTERY DISEASE INVOLVING NATIVE CORONARY ARTERY OF NATIVE HEART WITHOUT ANGINA PECTORIS: Primary | ICD-10-CM

## 2024-05-22 DIAGNOSIS — I21.A9 OTHER TYPE OF ACUTE MYOCARDIAL INFARCTION (H): ICD-10-CM

## 2024-05-22 DIAGNOSIS — E78.2 MIXED HYPERLIPIDEMIA: ICD-10-CM

## 2024-05-22 DIAGNOSIS — I21.02 ACUTE ST ELEVATION MYOCARDIAL INFARCTION (STEMI) INVOLVING LEFT ANTERIOR DESCENDING (LAD) CORONARY ARTERY (H): ICD-10-CM

## 2024-05-22 PROCEDURE — 99214 OFFICE O/P EST MOD 30 MIN: CPT | Mod: 25 | Performed by: NURSE PRACTITIONER

## 2024-05-22 PROCEDURE — 93282 PRGRMG EVAL IMPLANTABLE DFB: CPT | Performed by: INTERNAL MEDICINE

## 2024-05-22 RX ORDER — SPIRONOLACTONE 25 MG/1
25 TABLET ORAL DAILY
Qty: 90 TABLET | Refills: 3 | Status: SHIPPED | OUTPATIENT
Start: 2024-05-22

## 2024-05-22 RX ORDER — CARVEDILOL 12.5 MG/1
12.5 TABLET ORAL 2 TIMES DAILY WITH MEALS
Qty: 180 TABLET | Refills: 3 | Status: SHIPPED | OUTPATIENT
Start: 2024-05-22

## 2024-05-22 RX ORDER — CLOPIDOGREL BISULFATE 75 MG/1
75 TABLET ORAL DAILY
Qty: 90 TABLET | Refills: 3 | Status: SHIPPED | OUTPATIENT
Start: 2024-05-22

## 2024-05-22 RX ORDER — ATORVASTATIN CALCIUM 40 MG/1
40 TABLET, FILM COATED ORAL DAILY
Qty: 90 TABLET | Refills: 3 | Status: SHIPPED | OUTPATIENT
Start: 2024-05-22

## 2024-05-22 NOTE — PATIENT INSTRUCTIONS
Get your labs drawn at [facility] and we will review them through MyChart  Return next year with Dr Stevens    It was a pleasure seeing you today     Please do not hesitate to call my nurse team with any questions or concerns:  [phone]    Scheduling number:  [phone]    CARA Sheppard, CNP

## 2024-05-22 NOTE — LETTER
5/22/2024    Cesar Marie PA-C  [id number] [facility] [id number]    RE: Iraida David       Dear Colleague,     I had the pleasure of seeing Iraida Hernandez in the [facility].  HISTORY OF PRESENT ILLNESS:    This is a 69 year old male who follows with Dr Stevens at [facility]  His past medical history includes:  Coronary artery disease, ischemic cardiomyopathy s/p ICD, hyperlipidemia, and prostate cancer.    Mr Hernandez suffered an anterior STEMI (2014) and underwent urgent thrombectomy and stenting to LAD and RCA  This was complicated by V-fib arrest   ECHO showed LVEF 30-35% and he eventually received an ICD     Since his MI, he has had ongoing diminished LVEF with apical, inferoseptal akinesis      Cinthya NUC (2022) showed large anterior, apical anteroseptal, inferoseptal scar  No ischemia      ECHO (2023) showed LVEF 30-35% with apical, inferoseptal akinesis, normal RV function, no significant valvular pathology  (no change)    Device interrogation today showed <1% V-paced  3 brief nonsustained VT,  (not new) no shocks  4.5 yr of battery life    Our visit today is for annual review and recent surveillance ECHO    ECHO (5/10/24) showed LVEF 36% with global hypokinesis, worse in apical and septal walls, normal RV function, 1+ TR  (no change)      Mr Hernandez is more active this spring after having both his hips replaced over the winter  His energy is good  He denies any chest pain, shortness of breath, palpitations, orthopnea, or peripheral edema        VITAL SIGNS:  BP:  102/60  Pulse:  71  Weight: 158 lbs  (BMI: 21)      IMPRESSION AND PLAN:    Coronary Artery Disease:  -s/p Anterior STEMI and stenting to LAD and RCA (2014)  -known remaining large anterior scar  -denies angina  -remains on both Plavix + ASA 81 mg    Ischemic Cardiomyopathy  S/p ICD  -LVEF 30-35% with apical, inferoseptal akinesis since 2014  -no signs or symptoms of heart failure  -weight stable  -no  shocks from device  -continue device cares  -GMT:  Coreg, Entresto, spironolactone    Hyperlipidemia:  -on Atorvastatin 40 mg  -will arrange fasting lipids in near future and review results over MyChart    The total time for the visit today was 30 minutes which includes patient visit, reviewing of records, discussion, and placing of orders of the outpatient coordination of cardiovascular care as described.  The level of medical decision making during this visit was of moderate complexity.  Thank you for allowing me to participate in their care.    The longitudinal plan of care for the diagnosis(es)/condition(s) as documented were addressed during this visit. Due to the added complexity in care, I will continue to support Saravanan in the subsequent management and with ongoing continuity of care.      Orders Placed This Encounter   Procedures    Lipid Profile    ALT    Hemoglobin    Basic metabolic panel    TSH with free T4 reflex    Follow-Up with Cardiology       Orders Placed This Encounter   Medications    atorvastatin (LIPITOR) 40 MG tablet     Sig: Take 1 tablet (40 mg) by mouth daily     Dispense:  90 tablet     Refill:  3    carvedilol (COREG) 12.5 MG tablet     Sig: Take 1 tablet (12.5 mg) by mouth 2 times daily (with meals)     Dispense:  180 tablet     Refill:  3    clopidogrel (PLAVIX) 75 MG tablet     Sig: Take 1 tablet (75 mg) by mouth daily     Dispense:  90 tablet     Refill:  3    sacubitril-valsartan (ENTRESTO) 49-51 MG per tablet     Sig: Take 1 tablet by mouth 2 times daily     Dispense:  180 tablet     Refill:  3    spironolactone (ALDACTONE) 25 MG tablet     Sig: Take 1 tablet (25 mg) by mouth daily     Dispense:  90 tablet     Refill:  3       Medications Discontinued During This Encounter   Medication Reason    atorvastatin (LIPITOR) 40 MG tablet Reorder (No AVS)    spironolactone (ALDACTONE) 25 MG tablet Reorder (No AVS)    carvedilol (COREG) 12.5 MG tablet Reorder (No AVS)     sacubitril-valsartan (ENTRESTO) 49-51 MG per tablet Reorder (No AVS)    clopidogrel (PLAVIX) 75 MG tablet Reorder (No AVS)         Encounter Diagnoses   Name Primary?    Ischemic cardiomyopathy     ICD (implantable cardioverter-defibrillator) in place     Coronary artery disease involving native coronary artery of native heart without angina pectoris Yes    Mixed hyperlipidemia     Acute ST elevation myocardial infarction (STEMI) involving left anterior descending (LAD) coronary artery (H)     Other type of acute myocardial infarction (H)     Cardiomyopathy, unspecified type (H)        CURRENT MEDICATIONS:  Current Outpatient Medications   Medication Sig Dispense Refill    aspirin 81 MG EC tablet Take 81 mg by mouth daily      atorvastatin (LIPITOR) 40 MG tablet Take 1 tablet (40 mg) by mouth daily 90 tablet 3    carvedilol (COREG) 12.5 MG tablet Take 1 tablet (12.5 mg) by mouth 2 times daily (with meals) 180 tablet 3    clopidogrel (PLAVIX) 75 MG tablet Take 1 tablet (75 mg) by mouth daily 90 tablet 3    nitroGLYcerin (NITROSTAT) 0.4 MG sublingual tablet Place 1 tablet (0.4 mg) under the tongue every 5 minutes as needed for chest pain 25 tablet 1    sacubitril-valsartan (ENTRESTO) 49-51 MG per tablet Take 1 tablet by mouth 2 times daily 180 tablet 3    spironolactone (ALDACTONE) 25 MG tablet Take 1 tablet (25 mg) by mouth daily 90 tablet 3       ALLERGIES     Allergies   Allergen Reactions    Penicillins      Violent shaking of entire body. Reaction was 50 years ago     Tetracyclines & Related Hives       PAST MEDICAL HISTORY:  Past Medical History:   Diagnosis Date    Antiplatelet or antithrombotic long-term use     Arthritis Age related    Calculus of kidney     Cancer (H)     basel cell on nose    Cardiac arrest (H)     V-fib, 2/16/2014    Cardiomyopathy (H)     Coronary artery disease     Hyperlipidaemia     Hyperlipidemia with target LDL less than 70 10/31/2010     Diagnosis updated by automated process.  Provider to review and confirm.    ICD (implantable cardiac defibrillator) in place     12-1-2014 EF 29%    Myocardial infarction (H) 02/2014     Anterior infarct    Pacemaker     Prostate cancer (H)     Stented coronary artery     Tobacco dependence        PAST SURGICAL HISTORY:  Past Surgical History:   Procedure Laterality Date    ABDOMEN SURGERY  15 years ago    Hearnia    ARTHROPLASTY HIP Left 09/21/2022    Procedure: Left total hip arthroplasty;  Surgeon: Ok Jolly MD;  [facility]    ARTHROPLASTY HIP Right 01/04/2023    Procedure: Right total hip arthroplasty;  Surgeon: Ok Jolly MD;  [facility]    BIOPSY  February 2019    Prostate    BIOPSY, PROSTATE, TRANSRECTAL, WITH ULTRASOUND GUIDANCE Bilateral     CARDIAC SURGERY      COLONOSCOPY  2005    HEART CATH, ANGIOPLASTY  02/2014    Emergent cath,thrombectomy-pt had cardiac arrest-severe 3 vessel CAD-MACY proximal LAD, LAD diagonal kissing balloon, and stent to proximal RCA    HERNIA REPAIR      NO HISTORY OF SURGERY      PROSTATE SURGERY  03/29/2019       FAMILY HISTORY:  Family History   Problem Relation Age of Onset    Cerebrovascular Disease Father     Heart Disease Father         MI    Unknown/Adopted Father     Cancer Mother     Other Cancer Mother     Unknown/Adopted Mother         Laine David    Unknown/Adopted Maternal Grandmother     Unknown/Adopted Maternal Grandfather     Unknown/Adopted Paternal Grandmother     Unknown/Adopted Paternal Grandfather     Diabetes Son     Family History Negative Son     Cancer Maternal Uncle        SOCIAL HISTORY:  Social History     Socioeconomic History    Marital status:      Spouse name: Jessica    Number of children: 2    Years of education: None    Highest education level: None   Occupational History    Occupation:      Employer: shelter supply     Comment: [employer]   Tobacco Use    Smoking status: Light Smoker     Current packs/day: 0.70     Average  packs/day: 0.7 packs/day for 49.4 years (34.6 ttl pk-yrs)     Types: Cigarettes     Start date: 1/1/1975    Smokeless tobacco: Never    Tobacco comments:     3-5 cigs per day 5/9/23   Vaping Use    Vaping status: Never Used   Substance and Sexual Activity    Alcohol use: Yes     Comment: 1-3 drinks per week    Drug use: No    Sexual activity: Yes     Partners: Female   Other Topics Concern     Service No    Blood Transfusions No    Caffeine Concern No     Comment: soda occasionally     Occupational Exposure No    Hobby Hazards No    Sleep Concern No    Stress Concern No    Weight Concern No    Special Diet Yes     Comment: eats healthy     Back Care No    Exercise Yes     Comment: golf 2x/week, tredmill daily    Seat Belt Yes    Self-Exams Yes    Parent/sibling w/ CABG, MI or angioplasty before 65F 55M? No     Social Determinants of Health     Financial Resource Strain: Low Risk  (10/25/2023)    Financial Resource Strain     Within the past 12 months, have you or your family members you live with been unable to get utilities (heat, electricity) when it was really needed?: No   Food Insecurity: Low Risk  (10/25/2023)    Food Insecurity     Within the past 12 months, did you worry that your food would run out before you got money to buy more?: No     Within the past 12 months, did the food you bought just not last and you didn t have money to get more?: No   Transportation Needs: Low Risk  (10/25/2023)    Transportation Needs     Within the past 12 months, has lack of transportation kept you from medical appointments, getting your medicines, non-medical meetings or appointments, work, or from getting things that you need?: No   Physical Activity: Insufficiently Active (8/24/2022)    Exercise Vital Sign     Days of Exercise per Week: 2 days     Minutes of Exercise per Session: 60 min   Stress: No Stress Concern Present (8/24/2022)    [place] [employer] - Occupational Stress Questionnaire      Feeling of Stress : Not at all   Social Connections: Moderately Integrated (8/24/2022)    Social Connection and Isolation Panel [NHANES]     Frequency of Communication with Friends and Family: More than three times a week     Frequency of Social Gatherings with Friends and Family: Twice a week     Attends Protestant Services: 1 to 4 times per year     Active Member of Clubs or Organizations: No     Marital Status:    Housing Stability: Low Risk  (10/25/2023)    Housing Stability     Do you have housing? : Yes     Are you worried about losing your housing?: No       Review of Systems:  Skin:  not assessed       Eyes:  not assessed      ENT:  not assessed      Respiratory:  Negative       Cardiovascular:  Negative      Gastroenterology: not assessed      Genitourinary:  not assessed      Musculoskeletal:  not assessed      Neurologic:  not assessed      Psychiatric:  not assessed      Heme/Lymph/Imm:  not assessed      Endocrine:  not assessed        Physical Exam:  Vitals: /60 (BP Location: Right arm, Patient Position: Sitting, Cuff Size: Adult Large)   Pulse 78   Ht 1.829 m (6')   Wt 71.7 kg (158 lb)   SpO2 97%   BMI 21.43 kg/m      Constitutional:  cooperative        Skin:  warm and dry to the touch   ICD incision in the left infraclavicular area was well-healed      Head:  normocephalic        Eyes:  pupils equal and round        Lymph:      ENT:  no pallor or cyanosis        Neck:  JVP normal;no carotid bruit        Respiratory:  clear to auscultation;normal respiratory excursion         Cardiac: regular rhythm;no S3 or S4     no presence of murmur early systolic murmur        pulses full and equal                                        GI:  abdomen soft        Extremities and Muscular Skeletal:  no edema              Neurological:  affect appropriate        Psych:  Alert and Oriented x 3          CC  Alfa Ojeda MD  [address]  [place] [id number]    Thank you for  allowing me to participate in the care of your patient.      Sincerely,     CARA Alvarez CNP     [facility]

## 2024-05-23 LAB
MDC_IDC_LEAD_CONNECTION_STATUS: NORMAL
MDC_IDC_LEAD_IMPLANT_DT: NORMAL
MDC_IDC_LEAD_LOCATION: NORMAL
MDC_IDC_LEAD_LOCATION_DETAIL_1: NORMAL
MDC_IDC_LEAD_MFG: NORMAL
MDC_IDC_LEAD_MODEL: NORMAL
MDC_IDC_LEAD_POLARITY_TYPE: NORMAL
MDC_IDC_LEAD_SERIAL: NORMAL
MDC_IDC_MSMT_BATTERY_DTM: NORMAL
MDC_IDC_MSMT_BATTERY_REMAINING_LONGEVITY: 54 MO
MDC_IDC_MSMT_BATTERY_REMAINING_PERCENTAGE: 61 %
MDC_IDC_MSMT_BATTERY_STATUS: NORMAL
MDC_IDC_MSMT_CAP_CHARGE_DTM: NORMAL
MDC_IDC_MSMT_CAP_CHARGE_TIME: 10.8 S
MDC_IDC_MSMT_CAP_CHARGE_TYPE: NORMAL
MDC_IDC_MSMT_LEADCHNL_RV_IMPEDANCE_VALUE: 674 OHM
MDC_IDC_MSMT_LEADCHNL_RV_PACING_THRESHOLD_AMPLITUDE: 0.8 V
MDC_IDC_MSMT_LEADCHNL_RV_PACING_THRESHOLD_PULSEWIDTH: 0.5 MS
MDC_IDC_PG_IMPLANT_DTM: NORMAL
MDC_IDC_PG_MFG: NORMAL
MDC_IDC_PG_MODEL: NORMAL
MDC_IDC_PG_SERIAL: NORMAL
MDC_IDC_PG_TYPE: NORMAL
MDC_IDC_SESS_CLINIC_NAME: NORMAL
MDC_IDC_SESS_DTM: NORMAL
MDC_IDC_SESS_TYPE: NORMAL
MDC_IDC_SET_BRADY_LOWRATE: 40 {BEATS}/MIN
MDC_IDC_SET_BRADY_MODE: NORMAL
MDC_IDC_SET_LEADCHNL_RV_PACING_AMPLITUDE: 2 V
MDC_IDC_SET_LEADCHNL_RV_PACING_POLARITY: NORMAL
MDC_IDC_SET_LEADCHNL_RV_PACING_PULSEWIDTH: 0.5 MS
MDC_IDC_SET_LEADCHNL_RV_SENSING_ADAPTATION_MODE: NORMAL
MDC_IDC_SET_LEADCHNL_RV_SENSING_POLARITY: NORMAL
MDC_IDC_SET_LEADCHNL_RV_SENSING_SENSITIVITY: 0.6 MV
MDC_IDC_SET_ZONE_DETECTION_INTERVAL: 250 MS
MDC_IDC_SET_ZONE_DETECTION_INTERVAL: 300 MS
MDC_IDC_SET_ZONE_DETECTION_INTERVAL: 353 MS
MDC_IDC_SET_ZONE_STATUS: NORMAL
MDC_IDC_SET_ZONE_TYPE: NORMAL
MDC_IDC_SET_ZONE_VENDOR_TYPE: NORMAL
MDC_IDC_STAT_BRADY_DTM_END: NORMAL
MDC_IDC_STAT_BRADY_DTM_START: NORMAL
MDC_IDC_STAT_BRADY_RV_PERCENT_PACED: 0 %
MDC_IDC_STAT_EPISODE_RECENT_COUNT: 0
MDC_IDC_STAT_EPISODE_RECENT_COUNT: 23
MDC_IDC_STAT_EPISODE_RECENT_COUNT_DTM_END: NORMAL
MDC_IDC_STAT_EPISODE_RECENT_COUNT_DTM_START: NORMAL
MDC_IDC_STAT_EPISODE_TYPE: NORMAL
MDC_IDC_STAT_EPISODE_VENDOR_TYPE: NORMAL
MDC_IDC_STAT_TACHYTHERAPY_ATP_DELIVERED_RECENT: 0
MDC_IDC_STAT_TACHYTHERAPY_ATP_DELIVERED_TOTAL: 0
MDC_IDC_STAT_TACHYTHERAPY_RECENT_DTM_END: NORMAL
MDC_IDC_STAT_TACHYTHERAPY_RECENT_DTM_START: NORMAL
MDC_IDC_STAT_TACHYTHERAPY_SHOCKS_ABORTED_RECENT: 0
MDC_IDC_STAT_TACHYTHERAPY_SHOCKS_ABORTED_TOTAL: 0
MDC_IDC_STAT_TACHYTHERAPY_SHOCKS_DELIVERED_RECENT: 0
MDC_IDC_STAT_TACHYTHERAPY_SHOCKS_DELIVERED_TOTAL: 0
MDC_IDC_STAT_TACHYTHERAPY_TOTAL_DTM_END: NORMAL
MDC_IDC_STAT_TACHYTHERAPY_TOTAL_DTM_START: NORMAL

## 2024-05-25 ENCOUNTER — HEALTH MAINTENANCE LETTER (OUTPATIENT)
Age: 69
End: 2024-05-25

## 2024-06-03 ENCOUNTER — LAB (OUTPATIENT)
Dept: LAB | Facility: CLINIC | Age: 69
End: 2024-06-03
Payer: MEDICARE

## 2024-06-03 DIAGNOSIS — E78.2 MIXED HYPERLIPIDEMIA: ICD-10-CM

## 2024-06-03 DIAGNOSIS — I25.5 ISCHEMIC CARDIOMYOPATHY: ICD-10-CM

## 2024-06-03 DIAGNOSIS — I25.10 CORONARY ARTERY DISEASE INVOLVING NATIVE CORONARY ARTERY OF NATIVE HEART WITHOUT ANGINA PECTORIS: ICD-10-CM

## 2024-06-03 LAB — HGB BLD-MCNC: 15.4 G/DL (ref 13.3–17.7)

## 2024-06-03 PROCEDURE — 80048 BASIC METABOLIC PNL TOTAL CA: CPT

## 2024-06-03 PROCEDURE — 36415 COLL VENOUS BLD VENIPUNCTURE: CPT

## 2024-06-03 PROCEDURE — 80061 LIPID PANEL: CPT

## 2024-06-03 PROCEDURE — 84460 ALANINE AMINO (ALT) (SGPT): CPT

## 2024-06-03 PROCEDURE — 84443 ASSAY THYROID STIM HORMONE: CPT

## 2024-06-03 PROCEDURE — 85018 HEMOGLOBIN: CPT

## 2024-06-04 LAB
ALT SERPL W P-5'-P-CCNC: 26 U/L (ref 0–70)
ANION GAP SERPL CALCULATED.3IONS-SCNC: 9 MMOL/L (ref 7–15)
BUN SERPL-MCNC: 19.1 MG/DL (ref 8–23)
CALCIUM SERPL-MCNC: 8.8 MG/DL (ref 8.8–10.2)
CHLORIDE SERPL-SCNC: 103 MMOL/L (ref 98–107)
CHOLEST SERPL-MCNC: 136 MG/DL
CREAT SERPL-MCNC: 1.06 MG/DL (ref 0.67–1.17)
DEPRECATED HCO3 PLAS-SCNC: 24 MMOL/L (ref 22–29)
EGFRCR SERPLBLD CKD-EPI 2021: 75 ML/MIN/1.73M2
FASTING STATUS PATIENT QL REPORTED: YES
FASTING STATUS PATIENT QL REPORTED: YES
GLUCOSE SERPL-MCNC: 101 MG/DL (ref 70–99)
HDLC SERPL-MCNC: 45 MG/DL
LDLC SERPL CALC-MCNC: 74 MG/DL
NONHDLC SERPL-MCNC: 91 MG/DL
POTASSIUM SERPL-SCNC: 4.6 MMOL/L (ref 3.4–5.3)
SODIUM SERPL-SCNC: 136 MMOL/L (ref 135–145)
TRIGL SERPL-MCNC: 87 MG/DL
TSH SERPL DL<=0.005 MIU/L-ACNC: 1.85 UIU/ML (ref 0.3–4.2)

## 2024-06-04 NOTE — RESULT ENCOUNTER NOTE
"Per June, \"BMP, TSH, Hemoglobin and lipids reviewed  and fairly normal  LDL 74  Continue current medical regimen.\"    Updated patient with results and comments via MyChart. "

## 2024-06-26 ENCOUNTER — TELEPHONE (OUTPATIENT)
Dept: CARDIOLOGY | Facility: CLINIC | Age: 69
End: 2024-06-26
Payer: MEDICARE

## 2024-06-26 NOTE — TELEPHONE ENCOUNTER
"Spoke with patient on the phone regarding a form that he needs filled out. Patient states that he was at the [facility] recently filling out a form to renew his drivers license. The [facility] employee was helping the patient fill out the paperwork, and the box next to the phrase \"takes medication for seizures or loss of consciousness due to cardiac conditions\" was checked by accident. Patient called V to report that it was a mistake, but a form still needs to be filled out by cardiology indicating that he doesn't lose consciousness due to cardiac history or take any medications for loss of consciousness. Asked patient to drop off forms at the [facility] to nurses to fill out and get signed by June. Will route to nursing team inbox and June as FYI.   "

## 2024-07-01 NOTE — TELEPHONE ENCOUNTER
"7-1-2024 pt has prev been approved for Entresto assistance-Approval letter has him approved thru 12--for some reason his bottle says\"no refills\", I asked him to call [employer] directly-he agrees to call and will also let me know if he has any issue with [employer]-Naina Goemz lpn    7-2-2024 pt called today-he called [employer], rep said we needed to call in new RX??. I called Cover my meds, spoke w/ a Pharmacist who said they have another RX on file, and will send meds out , she will also call pt w/ the Update  Naina Gomez lpn  "

## 2024-08-05 ENCOUNTER — HOSPITAL ENCOUNTER (OUTPATIENT)
Dept: CT IMAGING | Facility: CLINIC | Age: 69
Discharge: HOME | End: 2024-08-05
Attending: PHYSICIAN ASSISTANT | Admitting: PHYSICIAN ASSISTANT
Payer: MEDICARE

## 2024-08-05 DIAGNOSIS — R91.8 PULMONARY NODULES: ICD-10-CM

## 2024-08-05 PROCEDURE — 71250 CT THORAX DX C-: CPT | Mod: MG

## 2024-08-12 ENCOUNTER — ANCILLARY PROCEDURE (OUTPATIENT)
Dept: CARDIOLOGY | Facility: CLINIC | Age: 69
End: 2024-08-12
Attending: INTERNAL MEDICINE
Payer: MEDICARE

## 2024-08-12 ENCOUNTER — HOSPITAL ENCOUNTER (OUTPATIENT)
Dept: MRI IMAGING | Facility: CLINIC | Age: 69
Discharge: HOME | End: 2024-08-12
Attending: UROLOGY
Payer: MEDICARE

## 2024-08-12 VITALS — OXYGEN SATURATION: 92 % | HEART RATE: 67 BPM

## 2024-08-12 DIAGNOSIS — C61 PROSTATE CANCER (H): ICD-10-CM

## 2024-08-12 DIAGNOSIS — Z45.02 FITTING AND ADJUSTMENT OF AUTOMATIC IMPLANTABLE CARDIOVERTER-DEFIBRILLATOR: Primary | ICD-10-CM

## 2024-08-12 DIAGNOSIS — Z45.02 FITTING AND ADJUSTMENT OF AUTOMATIC IMPLANTABLE CARDIOVERTER-DEFIBRILLATOR: ICD-10-CM

## 2024-08-12 LAB
MDC_IDC_LEAD_CONNECTION_STATUS: NORMAL
MDC_IDC_LEAD_CONNECTION_STATUS: NORMAL
MDC_IDC_LEAD_IMPLANT_DT: NORMAL
MDC_IDC_LEAD_IMPLANT_DT: NORMAL
MDC_IDC_LEAD_LOCATION: NORMAL
MDC_IDC_LEAD_LOCATION: NORMAL
MDC_IDC_LEAD_LOCATION_DETAIL_1: NORMAL
MDC_IDC_LEAD_LOCATION_DETAIL_1: NORMAL
MDC_IDC_LEAD_MFG: NORMAL
MDC_IDC_LEAD_MFG: NORMAL
MDC_IDC_LEAD_MODEL: NORMAL
MDC_IDC_LEAD_MODEL: NORMAL
MDC_IDC_LEAD_POLARITY_TYPE: NORMAL
MDC_IDC_LEAD_POLARITY_TYPE: NORMAL
MDC_IDC_LEAD_SERIAL: NORMAL
MDC_IDC_LEAD_SERIAL: NORMAL
MDC_IDC_MSMT_BATTERY_DTM: NORMAL
MDC_IDC_MSMT_BATTERY_REMAINING_LONGEVITY: 54 MO
MDC_IDC_MSMT_BATTERY_REMAINING_LONGEVITY: 60 MO
MDC_IDC_MSMT_BATTERY_REMAINING_PERCENTAGE: 63 %
MDC_IDC_MSMT_BATTERY_STATUS: NORMAL
MDC_IDC_MSMT_BATTERY_STATUS: NORMAL
MDC_IDC_MSMT_CAP_CHARGE_DTM: NORMAL
MDC_IDC_MSMT_CAP_CHARGE_ENERGY: 0 J
MDC_IDC_MSMT_CAP_CHARGE_TIME: 0 S
MDC_IDC_MSMT_CAP_CHARGE_TIME: 10.8 S
MDC_IDC_MSMT_CAP_CHARGE_TYPE: NORMAL
MDC_IDC_MSMT_CAP_CHARGE_TYPE: NORMAL
MDC_IDC_MSMT_LEADCHNL_RV_IMPEDANCE_VALUE: 710 OHM
MDC_IDC_MSMT_LEADCHNL_RV_IMPEDANCE_VALUE: 730 OHM
MDC_IDC_MSMT_LEADCHNL_RV_PACING_THRESHOLD_AMPLITUDE: 0.7 V
MDC_IDC_MSMT_LEADCHNL_RV_PACING_THRESHOLD_AMPLITUDE: 0.7 V
MDC_IDC_MSMT_LEADCHNL_RV_PACING_THRESHOLD_PULSEWIDTH: 0.5 MS
MDC_IDC_MSMT_LEADCHNL_RV_PACING_THRESHOLD_PULSEWIDTH: 0.5 MS
MDC_IDC_MSMT_LEADCHNL_RV_SENSING_INTR_AMPL: 5.8 MV
MDC_IDC_PG_IMPLANT_DTM: NORMAL
MDC_IDC_PG_IMPLANT_DTM: NORMAL
MDC_IDC_PG_MFG: NORMAL
MDC_IDC_PG_MFG: NORMAL
MDC_IDC_PG_MODEL: NORMAL
MDC_IDC_PG_MODEL: NORMAL
MDC_IDC_PG_SERIAL: NORMAL
MDC_IDC_PG_SERIAL: NORMAL
MDC_IDC_PG_TYPE: NORMAL
MDC_IDC_PG_TYPE: NORMAL
MDC_IDC_SESS_CLINIC_NAME: NORMAL
MDC_IDC_SESS_CLINIC_NAME: NORMAL
MDC_IDC_SESS_DTM: NORMAL
MDC_IDC_SESS_DTM: NORMAL
MDC_IDC_SESS_TYPE: NORMAL
MDC_IDC_SESS_TYPE: NORMAL
MDC_IDC_SET_BRADY_HYSTRATE: NORMAL
MDC_IDC_SET_BRADY_LOWRATE: 40 {BEATS}/MIN
MDC_IDC_SET_BRADY_MODE: NORMAL
MDC_IDC_SET_BRADY_MODE: NORMAL
MDC_IDC_SET_LEADCHNL_RV_PACING_AMPLITUDE: 2 V
MDC_IDC_SET_LEADCHNL_RV_PACING_CAPTURE_MODE: NORMAL
MDC_IDC_SET_LEADCHNL_RV_PACING_POLARITY: NORMAL
MDC_IDC_SET_LEADCHNL_RV_PACING_POLARITY: NORMAL
MDC_IDC_SET_LEADCHNL_RV_PACING_PULSEWIDTH: 0.5 MS
MDC_IDC_SET_LEADCHNL_RV_SENSING_ADAPTATION_MODE: NORMAL
MDC_IDC_SET_LEADCHNL_RV_SENSING_POLARITY: NORMAL
MDC_IDC_SET_LEADCHNL_RV_SENSING_POLARITY: NORMAL
MDC_IDC_SET_LEADCHNL_RV_SENSING_SENSITIVITY: 0.6 MV
MDC_IDC_SET_ZONE_DETECTION_INTERVAL: 250 MS
MDC_IDC_SET_ZONE_DETECTION_INTERVAL: 300 MS
MDC_IDC_SET_ZONE_DETECTION_INTERVAL: 353 MS
MDC_IDC_SET_ZONE_STATUS: NORMAL
MDC_IDC_SET_ZONE_TYPE: NORMAL
MDC_IDC_SET_ZONE_VENDOR_TYPE: NORMAL
MDC_IDC_STAT_BRADY_DTM_END: NORMAL
MDC_IDC_STAT_BRADY_DTM_START: NORMAL
MDC_IDC_STAT_BRADY_RV_PERCENT_PACED: 1 %
MDC_IDC_STAT_BRADY_RV_PERCENT_PACED: 1 %
MDC_IDC_STAT_EPISODE_RECENT_COUNT: 0
MDC_IDC_STAT_EPISODE_RECENT_COUNT: 2
MDC_IDC_STAT_EPISODE_RECENT_COUNT: 2
MDC_IDC_STAT_EPISODE_RECENT_COUNT_DTM_END: NORMAL
MDC_IDC_STAT_EPISODE_RECENT_COUNT_DTM_START: NORMAL
MDC_IDC_STAT_EPISODE_TOTAL_COUNT: 0
MDC_IDC_STAT_EPISODE_TOTAL_COUNT: 0
MDC_IDC_STAT_EPISODE_TOTAL_COUNT: 141
MDC_IDC_STAT_EPISODE_TOTAL_COUNT_DTM_END: NORMAL
MDC_IDC_STAT_EPISODE_TYPE: NORMAL
MDC_IDC_STAT_EPISODE_VENDOR_TYPE: NORMAL
MDC_IDC_STAT_TACHYTHERAPY_ATP_DELIVERED_RECENT: 0
MDC_IDC_STAT_TACHYTHERAPY_ATP_DELIVERED_RECENT: 0
MDC_IDC_STAT_TACHYTHERAPY_ATP_DELIVERED_TOTAL: 0
MDC_IDC_STAT_TACHYTHERAPY_ATP_DELIVERED_TOTAL: 0
MDC_IDC_STAT_TACHYTHERAPY_RECENT_DTM_END: NORMAL
MDC_IDC_STAT_TACHYTHERAPY_RECENT_DTM_END: NORMAL
MDC_IDC_STAT_TACHYTHERAPY_RECENT_DTM_START: NORMAL
MDC_IDC_STAT_TACHYTHERAPY_RECENT_DTM_START: NORMAL
MDC_IDC_STAT_TACHYTHERAPY_SHOCKS_ABORTED_RECENT: 0
MDC_IDC_STAT_TACHYTHERAPY_SHOCKS_ABORTED_RECENT: 0
MDC_IDC_STAT_TACHYTHERAPY_SHOCKS_ABORTED_TOTAL: 0
MDC_IDC_STAT_TACHYTHERAPY_SHOCKS_ABORTED_TOTAL: 0
MDC_IDC_STAT_TACHYTHERAPY_SHOCKS_DELIVERED_RECENT: 0
MDC_IDC_STAT_TACHYTHERAPY_SHOCKS_DELIVERED_RECENT: 0
MDC_IDC_STAT_TACHYTHERAPY_SHOCKS_DELIVERED_TOTAL: 0
MDC_IDC_STAT_TACHYTHERAPY_SHOCKS_DELIVERED_TOTAL: 0
MDC_IDC_STAT_TACHYTHERAPY_TOTAL_DTM_END: NORMAL
MDC_IDC_STAT_TACHYTHERAPY_TOTAL_DTM_END: NORMAL
MDC_IDC_STAT_TACHYTHERAPY_TOTAL_DTM_START: NORMAL

## 2024-08-12 PROCEDURE — 255N000002 HC RX 255 OP 636: Performed by: UROLOGY

## 2024-08-12 PROCEDURE — 72197 MRI PELVIS W/O & W/DYE: CPT | Mod: 26 | Performed by: RADIOLOGY

## 2024-08-12 PROCEDURE — 93287 PERI-PX DEVICE EVAL & PRGR: CPT

## 2024-08-12 PROCEDURE — 93287 PERI-PX DEVICE EVAL & PRGR: CPT | Mod: 26 | Performed by: INTERNAL MEDICINE

## 2024-08-12 PROCEDURE — A9585 GADOBUTROL INJECTION: HCPCS | Performed by: UROLOGY

## 2024-08-12 PROCEDURE — 72197 MRI PELVIS W/O & W/DYE: CPT | Mod: MG

## 2024-08-12 PROCEDURE — G1010 CDSM STANSON: HCPCS | Performed by: RADIOLOGY

## 2024-08-12 RX ORDER — GADOBUTROL 604.72 MG/ML
7.5 INJECTION INTRAVENOUS ONCE
Status: COMPLETED | OUTPATIENT
Start: 2024-08-12 | End: 2024-08-12

## 2024-08-12 RX ADMIN — GADOBUTROL 7 ML: 604.72 INJECTION INTRAVENOUS at 11:10

## 2024-08-22 ENCOUNTER — PATIENT OUTREACH (OUTPATIENT)
Dept: CARE COORDINATION | Facility: CLINIC | Age: 69
End: 2024-08-22

## 2024-08-22 ENCOUNTER — OFFICE VISIT (OUTPATIENT)
Dept: UROLOGY | Facility: CLINIC | Age: 69
End: 2024-08-22
Payer: MEDICARE

## 2024-08-22 ENCOUNTER — ANCILLARY PROCEDURE (OUTPATIENT)
Dept: CARDIOLOGY | Facility: CLINIC | Age: 69
End: 2024-08-22
Attending: INTERNAL MEDICINE
Payer: MEDICARE

## 2024-08-22 VITALS
BODY MASS INDEX: 20.72 KG/M2 | DIASTOLIC BLOOD PRESSURE: 61 MMHG | OXYGEN SATURATION: 99 % | WEIGHT: 153 LBS | HEART RATE: 62 BPM | SYSTOLIC BLOOD PRESSURE: 100 MMHG | HEIGHT: 72 IN

## 2024-08-22 DIAGNOSIS — I25.5 ISCHEMIC CARDIOMYOPATHY: ICD-10-CM

## 2024-08-22 DIAGNOSIS — Z95.810 ICD (IMPLANTABLE CARDIOVERTER-DEFIBRILLATOR) IN PLACE: ICD-10-CM

## 2024-08-22 DIAGNOSIS — C61 PROSTATE CANCER (H): Primary | ICD-10-CM

## 2024-08-22 LAB — PSA SERPL-MCNC: 26 UG/L (ref 0–4)

## 2024-08-22 PROCEDURE — 99214 OFFICE O/P EST MOD 30 MIN: CPT | Performed by: UROLOGY

## 2024-08-22 PROCEDURE — 36415 COLL VENOUS BLD VENIPUNCTURE: CPT | Performed by: UROLOGY

## 2024-08-22 PROCEDURE — 84153 ASSAY OF PSA TOTAL: CPT | Performed by: UROLOGY

## 2024-08-22 PROCEDURE — 93295 DEV INTERROG REMOTE 1/2/MLT: CPT | Performed by: INTERNAL MEDICINE

## 2024-08-22 PROCEDURE — 93296 REM INTERROG EVL PM/IDS: CPT | Performed by: INTERNAL MEDICINE

## 2024-08-22 NOTE — Clinical Note
8/22/2024       RE: Iraida Hernandez  [id number] [facility] [id number]     Dear Colleague,    Thank you for referring your patient, Iraida Hernandez, to the [facility] at [facility]. Please see a copy of my visit note below.      CHIEF COMPLAINT   It was my pleasure to see Iraida Hernandez who is a 69 year old male for follow-up of Prostate Cancer.      HPI  Iraida Hernandez is a very pleasant 69 year old male who presents with a history of Prostate Cancer. 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  He had clinical stage T1c disease at that time with a PSA of 9.7.  His Oncotype DX test suggested a very low probability of grade 4 disease or progression.      Oncotype GPS is 5 - very low risk.     Surveillance biopsy of PIRADS 4 lesion done 8/10/2023 - demonstrates stable [place] 6 disease.      PSA now up to 26.0. No new symptoms.     MRI Prostate 8/12/2024  IMPRESSION:  Limited evaluation due to nondiagnostic diffusion/ADC as well as  dynamic contrast-enhanced sequences secondary to metallic artifact.     1. No significant change in T2 appearance or size of previously  described PI-RADS 4 lesion located in the right mid gland peripheral  zone. A PIRADS classification cannot be assigned secondary to lack of  diagnostic diffusion/ADC as well as dynamic contrast-enhanced  sequences.  2. No suspicious adenopathy or evidence of pelvic metastases.    PSA  8/22/2024 - 26.0  2/22/2024 - 21.30  6/29/2023 - 17.50  5/1/2023 - 16.00  8/31/2022 - 14.9  1/20/2022 - 13.7  2/2/2021 - 12.90  3/31/2020 - 10.70  2/5/2020 - 14.60  2/15/2019 - 9.79  12/2/2013 - 6.19     Fusion Biopsy 8/10/2023  Final Diagnosis   A.  Prostate, left base, biopsy-  Benign prostate tissue     B.  Prostate, left mid, biopsy-  Adenocarcinoma, Krista's grade 3/3 in 1 of 2 needle core biopsy and less than 5% of submitted tissue     C.   Prostate, left apex, biopsy-  Adenocarcinoma, Krista grade 3/3 in 1 of 2 needle core biopsies and less than 5% of submitted tissue     D.  Prostate, right base, biopsy-  Benign prostate tissue     E.  Prostate, right mid, biopsy-  Adenocarcinoma, Krista grade 3/3 in 1 of 1 needle core biopsy and less than 5% of submitted tissue     F.  Prostate, right apex, biopsy-  Adenocarcinoma, Byron's grade 3/3 in 1 of 2 needle core biopsies and 10% of submitted tissue     G.  Prostate, MRI target, biopsy-  Adenocarcinoma, Byron grade 3/3 in 2 of 2 submitted needle core biopsies and 10% of submitted tissue        TRUS 6/25/2021  FINAL DIAGNOSIS:   A.  Prostate, left base x2, biopsy   - Focal high-grade prostatic intraepithelial neoplasia. Negative for   invasive malignancy.     B.  Prostate, left mid x2, biopsy   - Benign prostatic tissue. Negative for malignancy.     C.  Prostate, left [place] x2, biopsy   - Focal high-grade prostatic intraepithelial neoplasia. Negative for   invasive malignancy.     D.  Prostate, right base x2, biopsy   - Benign prostatic tissue. Negative for malignancy     E.  Prostate, right mid x2, biopsy   - Atypical glands suspicious for malignancy (Please see comment)     F.  Prostate, right [place] x2, biopsy   - Prostatic adenocarcinoma, acinar type, Krista's grade 3+ 3 = score of   6, grade group is 1, number of cores involved is 2 of 2, total surface area involved is 10-15%, perineural   invasion is not identified, high-grade prostatic intraepithelial neoplasia is present      TRUS 3/2019  F: Prostate, left apex, needle core biopsy: Adenocarcinoma, Byron score   3 + 3 = 6 (of 10) (Grade Group 1),   involving 1 of 1 needle core(s) and 1 mm of 10 mm of biopsy tissue   (approximately 10% of biopsy tissue).   Perineural invasion not identified.     K: Prostate, right mid, needle core biopsy: Adenocarcinoma, Krista score   3 + 3 = 6 (of 10) (Grade Group 1),   involving 1 of 1 needle core(s) and 1 mm  of 10 mm of biopsy tissue   (approximately 10% of biopsy tissue).   Perineural invasion not identified.     PHYSICAL EXAM  Patient is a 69 year old  male   Vitals: Blood pressure 100/61, pulse 62, height 1.829 m (6'), weight 69.4 kg (153 lb), SpO2 99%.  General Appearance Adult: Body mass index is 20.75 kg/m .  Alert, no acute distress, oriented  Lungs: no respiratory distress, or pursed lip breathing  Abdomen: soft, nontender, no organomegaly or masses; ***  Back: *** CVAT  Neuro: Alert, oriented, speech and mentation normal  Psych: affect and mood normal  : {UZAIR EXAM MALE GENITAL:536170}    Outside and Past Medical records:  Review of the result(s) of each unique test - PSA    ASSESSMENT and PLAN  69 year old male with Krista 3+3 disease diagnosed 2019. GPS score 5. On active surveillance. Recent PSA 21.30  and MRI with PIRADS 4 lesion. Had surveillance biopsy 8/10/2023 demonstrating Krista 6 disease. We had a long discussion today about the significance of his rising PSA.      - Follow-up 6 months with PSA      *** minutes spent on the date of the encounter doing chart review, history and exam, documentation and further activities as noted above.    Sukumar Oglesby MD  Urology  [facility]        Again, thank you for allowing me to participate in the care of your patient.      Sincerely,    Sukumar Oglesby MD

## 2024-08-22 NOTE — PROGRESS NOTES
CHIEF COMPLAINT   It was my pleasure to see Iraida Hernandez who is a 69 year old male for follow-up of Prostate Cancer.      HPI  Iraida Hernandez is a very pleasant 69 year old male who presents with a history of Prostate Cancer. 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  He had clinical stage T1c disease at that time with a PSA of 9.7.  His Oncotype DX test suggested a very low probability of grade 4 disease or progression.      Oncotype GPS is 5 - very low risk.     Surveillance biopsy of PIRADS 4 lesion done 8/10/2023 - demonstrates stable [place] 6 disease.      PSA now up to 26.0. No new symptoms.     MRI Prostate 8/12/2024  IMPRESSION:  Limited evaluation due to nondiagnostic diffusion/ADC as well as  dynamic contrast-enhanced sequences secondary to metallic artifact.     1. No significant change in T2 appearance or size of previously  described PI-RADS 4 lesion located in the right mid gland peripheral  zone. A PIRADS classification cannot be assigned secondary to lack of  diagnostic diffusion/ADC as well as dynamic contrast-enhanced  sequences.  2. No suspicious adenopathy or evidence of pelvic metastases.    PSA  8/22/2024 - 26.0  2/22/2024 - 21.30  6/29/2023 - 17.50  5/1/2023 - 16.00  8/31/2022 - 14.9  1/20/2022 - 13.7  2/2/2021 - 12.90  3/31/2020 - 10.70  2/5/2020 - 14.60  2/15/2019 - 9.79  12/2/2013 - 6.19     Fusion Biopsy 8/10/2023  Final Diagnosis   A.  Prostate, left base, biopsy-  Benign prostate tissue     B.  Prostate, left mid, biopsy-  Adenocarcinoma, Krista's grade 3/3 in 1 of 2 needle core biopsy and less than 5% of submitted tissue     C.  Prostate, left apex, biopsy-  Adenocarcinoma, Dozier grade 3/3 in 1 of 2 needle core biopsies and less than 5% of submitted tissue     D.  Prostate, right base, biopsy-  Benign prostate tissue     E.  Prostate, right mid, biopsy-  Adenocarcinoma, Krista grade 3/3 in 1 of 1 needle core biopsy and less than  5% of submitted tissue     F.  Prostate, right apex, biopsy-  Adenocarcinoma, Krista's grade 3/3 in 1 of 2 needle core biopsies and 10% of submitted tissue     G.  Prostate, MRI target, biopsy-  Adenocarcinoma, Krista grade 3/3 in 2 of 2 submitted needle core biopsies and 10% of submitted tissue        TRUS 6/25/2021  FINAL DIAGNOSIS:   A.  Prostate, left base x2, biopsy   - Focal high-grade prostatic intraepithelial neoplasia. Negative for   invasive malignancy.     B.  Prostate, left mid x2, biopsy   - Benign prostatic tissue. Negative for malignancy.     C.  Prostate, left [place] x2, biopsy   - Focal high-grade prostatic intraepithelial neoplasia. Negative for   invasive malignancy.     D.  Prostate, right base x2, biopsy   - Benign prostatic tissue. Negative for malignancy     E.  Prostate, right mid x2, biopsy   - Atypical glands suspicious for malignancy (Please see comment)     F.  Prostate, right [place] x2, biopsy   - Prostatic adenocarcinoma, acinar type, [place]'s grade 3+ 3 = score of   6, grade group is 1, number of cores involved is 2 of 2, total surface area involved is 10-15%, perineural   invasion is not identified, high-grade prostatic intraepithelial neoplasia is present      TRUS 3/2019  F: Prostate, left apex, needle core biopsy: Adenocarcinoma, Krista score   3 + 3 = 6 (of 10) (Grade Group 1),   involving 1 of 1 needle core(s) and 1 mm of 10 mm of biopsy tissue   (approximately 10% of biopsy tissue).   Perineural invasion not identified.     K: Prostate, right mid, needle core biopsy: Adenocarcinoma, Krista score   3 + 3 = 6 (of 10) (Grade Group 1),   involving 1 of 1 needle core(s) and 1 mm of 10 mm of biopsy tissue   (approximately 10% of biopsy tissue).   Perineural invasion not identified.     PHYSICAL EXAM  Patient is a 69 year old  male   Vitals: Blood pressure 100/61, pulse 62, height 1.829 m (6'), weight 69.4 kg (153 lb), SpO2 99%.  General Appearance Adult: Body mass index is 20.75  kg/m .  Alert, no acute distress, oriented  Lungs: no respiratory distress, or pursed lip breathing  Abdomen: soft, nontender, no organomegaly or masses  Back: no CVAT  Neuro: Alert, oriented, speech and mentation normal  Psych: affect and mood normal    Outside and Past Medical records:  Review of the result(s) of each unique test - PSA    ASSESSMENT and PLAN  69 year old male with [place] 3+3 disease diagnosed 2019. GPS score 5. On active surveillance. Recent now up to 26. Updated MRI with no new lesions, but visibility limited by hip prosthesis. Had surveillance biopsy 8/10/2023 demonstrating Krista 6 disease. We had a long discussion today about the significance of his rising PSA. He remains concerned about the ongoing upward trend. We discussed this in great detail today, and that while his pathology has been reassuring, the ongoing upward trend in PSA is notable. For now, will plan another 3 month period of observation, but he is considering definitive treatment this winter vs another biopsy if PSA continues to rise.      - Follow-up 3 months with PSA    30 minutes spent on the date of the encounter doing chart review, history and exam, documentation and further activities as noted above.    Sukumar Oglesby MD  Urology  [facility]

## 2024-08-22 NOTE — NURSING NOTE
Chief Complaint   Patient presents with    Prostate Cancer     Patient here today for MRI result and SD PSA       Patient here today to discuss his MRI of the prostate  Patient had a SD PSA and doing well          SHAY Vera

## 2024-08-23 LAB
MDC_IDC_EPISODE_DTM: NORMAL
MDC_IDC_EPISODE_DURATION: 7 S
MDC_IDC_EPISODE_ID: NORMAL
MDC_IDC_EPISODE_TYPE: NORMAL
MDC_IDC_EPISODE_TYPE_INDUCED: NO
MDC_IDC_LEAD_CONNECTION_STATUS: NORMAL
MDC_IDC_LEAD_IMPLANT_DT: NORMAL
MDC_IDC_LEAD_LOCATION: NORMAL
MDC_IDC_LEAD_LOCATION_DETAIL_1: NORMAL
MDC_IDC_LEAD_MFG: NORMAL
MDC_IDC_LEAD_MODEL: NORMAL
MDC_IDC_LEAD_POLARITY_TYPE: NORMAL
MDC_IDC_LEAD_SERIAL: NORMAL
MDC_IDC_MSMT_BATTERY_DTM: NORMAL
MDC_IDC_MSMT_BATTERY_REMAINING_LONGEVITY: 54 MO
MDC_IDC_MSMT_BATTERY_REMAINING_PERCENTAGE: 55 %
MDC_IDC_MSMT_BATTERY_STATUS: NORMAL
MDC_IDC_MSMT_CAP_CHARGE_DTM: NORMAL
MDC_IDC_MSMT_CAP_CHARGE_TIME: 11 S
MDC_IDC_MSMT_CAP_CHARGE_TYPE: NORMAL
MDC_IDC_MSMT_LEADCHNL_RV_IMPEDANCE_VALUE: 668 OHM
MDC_IDC_MSMT_LEADCHNL_RV_PACING_THRESHOLD_AMPLITUDE: 0.7 V
MDC_IDC_MSMT_LEADCHNL_RV_PACING_THRESHOLD_PULSEWIDTH: 0.5 MS
MDC_IDC_PG_IMPLANT_DTM: NORMAL
MDC_IDC_PG_MFG: NORMAL
MDC_IDC_PG_MODEL: NORMAL
MDC_IDC_PG_SERIAL: NORMAL
MDC_IDC_PG_TYPE: NORMAL
MDC_IDC_SESS_CLINIC_NAME: NORMAL
MDC_IDC_SESS_DTM: NORMAL
MDC_IDC_SESS_TYPE: NORMAL
MDC_IDC_SET_BRADY_LOWRATE: 40 {BEATS}/MIN
MDC_IDC_SET_BRADY_MODE: NORMAL
MDC_IDC_SET_LEADCHNL_RV_PACING_AMPLITUDE: 2 V
MDC_IDC_SET_LEADCHNL_RV_PACING_POLARITY: NORMAL
MDC_IDC_SET_LEADCHNL_RV_PACING_PULSEWIDTH: 0.5 MS
MDC_IDC_SET_LEADCHNL_RV_SENSING_ADAPTATION_MODE: NORMAL
MDC_IDC_SET_LEADCHNL_RV_SENSING_POLARITY: NORMAL
MDC_IDC_SET_LEADCHNL_RV_SENSING_SENSITIVITY: 0.6 MV
MDC_IDC_SET_ZONE_DETECTION_INTERVAL: 250 MS
MDC_IDC_SET_ZONE_DETECTION_INTERVAL: 300 MS
MDC_IDC_SET_ZONE_DETECTION_INTERVAL: 353 MS
MDC_IDC_SET_ZONE_STATUS: NORMAL
MDC_IDC_SET_ZONE_TYPE: NORMAL
MDC_IDC_SET_ZONE_VENDOR_TYPE: NORMAL
MDC_IDC_STAT_BRADY_DTM_END: NORMAL
MDC_IDC_STAT_BRADY_DTM_START: NORMAL
MDC_IDC_STAT_BRADY_RV_PERCENT_PACED: 0 %
MDC_IDC_STAT_EPISODE_RECENT_COUNT: 0
MDC_IDC_STAT_EPISODE_RECENT_COUNT: 3
MDC_IDC_STAT_EPISODE_RECENT_COUNT_DTM_END: NORMAL
MDC_IDC_STAT_EPISODE_RECENT_COUNT_DTM_START: NORMAL
MDC_IDC_STAT_EPISODE_TYPE: NORMAL
MDC_IDC_STAT_EPISODE_VENDOR_TYPE: NORMAL
MDC_IDC_STAT_TACHYTHERAPY_ATP_DELIVERED_RECENT: 0
MDC_IDC_STAT_TACHYTHERAPY_ATP_DELIVERED_TOTAL: 0
MDC_IDC_STAT_TACHYTHERAPY_RECENT_DTM_END: NORMAL
MDC_IDC_STAT_TACHYTHERAPY_RECENT_DTM_START: NORMAL
MDC_IDC_STAT_TACHYTHERAPY_SHOCKS_ABORTED_RECENT: 0
MDC_IDC_STAT_TACHYTHERAPY_SHOCKS_ABORTED_TOTAL: 0
MDC_IDC_STAT_TACHYTHERAPY_SHOCKS_DELIVERED_RECENT: 0
MDC_IDC_STAT_TACHYTHERAPY_SHOCKS_DELIVERED_TOTAL: 0
MDC_IDC_STAT_TACHYTHERAPY_TOTAL_DTM_END: NORMAL
MDC_IDC_STAT_TACHYTHERAPY_TOTAL_DTM_START: NORMAL

## 2024-09-16 ENCOUNTER — TELEPHONE (OUTPATIENT)
Dept: FAMILY MEDICINE | Facility: CLINIC | Age: 69
End: 2024-09-16
Payer: MEDICARE

## 2024-09-16 NOTE — TELEPHONE ENCOUNTER
Patient Quality Outreach    Patient is due for the following:   Diabetes -  A1C, Eye Exam, Microalbumin, Diabetic Follow-Up Visit, and Foot Exam  Physical Annual Wellness Visit      Topic Date Due    Diptheria Tetanus Pertussis (DTAP/TDAP/TD) Vaccine (1 - Tdap) 04/07/2005    Pneumococcal Vaccine (2 of 2 - PCV) 02/17/2015    Flu Vaccine (1) 09/01/2024    COVID-19 Vaccine (7 - 2024-25 season) 09/01/2024       Next Steps:   Patient was scheduled for AWV    Type of outreach:    Chart review performed, no outreach needed.      Questions for provider review:    None           Aura Maldonado CMA

## 2024-10-02 SDOH — HEALTH STABILITY: PHYSICAL HEALTH: ON AVERAGE, HOW MANY DAYS PER WEEK DO YOU ENGAGE IN MODERATE TO STRENUOUS EXERCISE (LIKE A BRISK WALK)?: 2 DAYS

## 2024-10-02 SDOH — HEALTH STABILITY: PHYSICAL HEALTH: ON AVERAGE, HOW MANY MINUTES DO YOU ENGAGE IN EXERCISE AT THIS LEVEL?: 60 MIN

## 2024-10-02 ASSESSMENT — SOCIAL DETERMINANTS OF HEALTH (SDOH): HOW OFTEN DO YOU GET TOGETHER WITH FRIENDS OR RELATIVES?: TWICE A WEEK

## 2024-10-07 ENCOUNTER — OFFICE VISIT (OUTPATIENT)
Dept: FAMILY MEDICINE | Facility: CLINIC | Age: 69
End: 2024-10-07
Payer: MEDICARE

## 2024-10-07 VITALS
OXYGEN SATURATION: 100 % | SYSTOLIC BLOOD PRESSURE: 99 MMHG | TEMPERATURE: 98.1 F | BODY MASS INDEX: 21.6 KG/M2 | HEIGHT: 71 IN | WEIGHT: 154.25 LBS | RESPIRATION RATE: 16 BRPM | HEART RATE: 68 BPM | DIASTOLIC BLOOD PRESSURE: 62 MMHG

## 2024-10-07 DIAGNOSIS — I25.119 CORONARY ARTERY DISEASE WITH ANGINA PECTORIS, UNSPECIFIED VESSEL OR LESION TYPE, UNSPECIFIED WHETHER NATIVE OR TRANSPLANTED HEART (H): ICD-10-CM

## 2024-10-07 DIAGNOSIS — I47.20 VENTRICULAR TACHYCARDIA (H): ICD-10-CM

## 2024-10-07 DIAGNOSIS — C61 PROSTATE CANCER (H): ICD-10-CM

## 2024-10-07 DIAGNOSIS — R80.9 MICROALBUMINURIA: ICD-10-CM

## 2024-10-07 DIAGNOSIS — Z23 NEED FOR PNEUMOCOCCAL 20-VALENT CONJUGATE VACCINATION: ICD-10-CM

## 2024-10-07 DIAGNOSIS — Z00.00 ENCOUNTER FOR MEDICARE ANNUAL WELLNESS EXAM: Primary | ICD-10-CM

## 2024-10-07 PROCEDURE — G0439 PPPS, SUBSEQ VISIT: HCPCS | Performed by: PHYSICIAN ASSISTANT

## 2024-10-07 PROCEDURE — 99213 OFFICE O/P EST LOW 20 MIN: CPT | Mod: 25 | Performed by: PHYSICIAN ASSISTANT

## 2024-10-07 PROCEDURE — 82043 UR ALBUMIN QUANTITATIVE: CPT | Performed by: PHYSICIAN ASSISTANT

## 2024-10-07 PROCEDURE — 82570 ASSAY OF URINE CREATININE: CPT | Performed by: PHYSICIAN ASSISTANT

## 2024-10-07 PROCEDURE — G0009 ADMIN PNEUMOCOCCAL VACCINE: HCPCS | Performed by: PHYSICIAN ASSISTANT

## 2024-10-07 PROCEDURE — 90677 PCV20 VACCINE IM: CPT | Performed by: PHYSICIAN ASSISTANT

## 2024-10-07 NOTE — PROGRESS NOTES
Preventive Care Visit  [facility]  Cesar Marie PA-C, Family Medicine  Oct 7, 2024      Assessment & Plan     Encounter for Medicare annual wellness exam  Stable wellness.  - Pneumococcal 20 Valent Conjugate (PCV20)    Ventricular tachycardia (H)  Stable also followed by cardiology    Microalbuminuria  Past history will continue to monitor already on Entresto.  With reduced EF heart failure history if this is persistent or worsening consider possible SGLT2 inhibitor in the future.  - Albumin Random Urine Quantitative with Creat Ratio; Future  - Albumin Random Urine Quantitative with Creat Ratio    Need for pneumococcal 20-valent conjugate vaccination    - Pneumococcal 20 Valent Conjugate (PCV20)    Prostate cancer (H)  Followed by urology.  Plan in place.    Coronary artery disease with angina pectoris, unspecified vessel or lesion type, unspecified whether native or transplanted heart (H)  Following cardiology          Nicotine/Tobacco Cessation  He reports that he has been smoking cigarettes. He started smoking about 49 years ago. He has a 34.8 pack-year smoking history. He has never used smokeless tobacco.  Nicotine/Tobacco Cessation Plan  Self help information given to patient      Counseling  Appropriate preventive services were addressed with this patient via screening, questionnaire, or discussion as appropriate for fall prevention, nutrition, physical activity, Tobacco-use cessation, social engagement, weight loss and cognition.  Checklist reviewing preventive services available has been given to the patient.  Reviewed patient's diet, addressing concerns and/or questions.   He is at risk for lack of exercise and has been provided with information to increase physical activity for the benefit of his well-being.   Patient reported safety concerns were addressed today.      Manjinder Coe is a 69 year old, presenting for the following:  Wellness Visit        10/7/2024     12:43 PM   Additional Questions   Roomed by Aura RUCKER CMA   Accompanied by Self         Health Care Directive  Patient has a Health Care Directive on file  Advance care planning document is on file and is current.    TABBY Naik is a 69-year-old male with a past history of coronary artery disease with systolic dysfunction followed by cardiology, prostate cancer being monitored closely by urology.  He presents today for his annual wellness visit.  All labs are up-to-date with the exception of microalbumin.  This has been assessed in the past for a air and his coding by his previous primary of having type 2 diabetes.  However, his microalbumin was slightly elevated at last check.  He is on Entresto.            10/2/2024   General Health   How would you rate your overall physical health? Good   Feel stress (tense, anxious, or unable to sleep) Not at all            10/2/2024   Nutrition   Diet: Regular (no restrictions)            10/2/2024   Exercise   Days per week of moderate/strenous exercise 2 days   Average minutes spent exercising at this level 60 min      (!) EXERCISE CONCERN      10/2/2024   Social Factors   Frequency of gathering with friends or relatives Twice a week   Worry food won't last until get money to buy more No   Food not last or not have enough money for food? No   Do you have housing? (Housing is defined as stable permanent housing and does not include staying ouside in a car, in a tent, in an abandoned building, in an overnight shelter, or couch-surfing.) Yes   Are you worried about losing your housing? No   Lack of transportation? No   Unable to get utilities (heat,electricity)? No            10/2/2024   Fall Risk   Fallen 2 or more times in the past year? No    No   Trouble with walking or balance? No    No       Multiple values from one day are sorted in reverse-chronological order          10/2/2024   Activities of Daily Living- Home Safety   Needs help with the following daily activites  None of the above   Safety concerns in the home No grab bars in the bathroom            10/2/2024   Dental   Dentist two times every year? Yes            10/2/2024   Hearing Screening   Hearing concerns? None of the above            10/2/2024   Driving Risk Screening   Patient/family members have concerns about driving No            10/2/2024   General Alertness/Fatigue Screening   Have you been more tired than usual lately? No            10/2/2024   Urinary Incontinence Screening   Bothered by leaking urine in past 6 months No            10/2/2024   TB Screening   Were you born outside of the [place]? Yes            Today's PHQ-2 Score:       10/6/2024     7:15 AM   PHQ-2 ( 1999 [employer])   Q1: Little interest or pleasure in doing things 0   Q2: Feeling down, depressed or hopeless 0   PHQ-2 Score 0   Q1: Little interest or pleasure in doing things Not at all   Q2: Feeling down, depressed or hopeless Not at all   PHQ-2 Score 0           10/2/2024   Substance Use   If I could quit smoking, I would Somewhat disagree   I want to quit somking, worry about health affects Somewhat agree   Willing to make a plan to quit smoking Neutral   Willing to cut down before quitting Neutral   Alcohol more than 3/day or more than 7/wk No   Do you have a current opioid prescription? No   How severe/bad is pain from 1 to 10? 0/10 (No Pain)   Do you use any other substances recreationally? No        Social History     Tobacco Use    Smoking status: Light Smoker     Current packs/day: 0.70     Average packs/day: 0.7 packs/day for 49.8 years (34.8 ttl pk-yrs)     Types: Cigarettes     Start date: 1/1/1975    Smokeless tobacco: Never    Tobacco comments:     3-5 cigs per day 5/9/23   Vaping Use    Vaping status: Never Used   Substance Use Topics    Alcohol use: Yes     Comment: 1-3 drinks per week    Drug use: No       ASCVD Risk   The ASCVD Risk score (Gurdeep LUTZ, et al., 2019) failed to calculate for the following reasons:    The  patient has a prior MI or stroke diagnosis            Reviewed and updated as needed this visit by Provider                    Current providers sharing in care for this patient include:  Patient Care Team:  Cesar Marie PA-C as PCP - General (Family Medicine)  Juancarlos Stevens MD as MD (Cardiology)  Jose Roberto Echevarria MD as MD (Urology)  Sukumar Oglesby MD as Assigned Cancer Care Provider  Charline Neely APRN CNP as Nurse Practitioner (Cardiovascular Disease)  Charline Neely APRN CNP as Assigned Heart and Vascular Provider  Cesar Marie PA-C as Assigned PCP  Yeo, Albert, MD as Assigned Musculoskeletal Provider    The following health maintenance items are reviewed in Epic and correct as of today:  Health Maintenance   Topic Date Due    DIABETIC FOOT EXAM  Never done    DTAP/TDAP/TD IMMUNIZATION (1 - Tdap) 04/07/2005    RSV VACCINE (1 - Risk 60-74 years 1-dose series) Never done    Pneumococcal Vaccine: 65+ Years (2 of 2 - PCV) 02/17/2015    NICOTINE/TOBACCO CESSATION COUNSELING Q 1 YR  10/18/2022    A1C  08/01/2023    ANNUAL REVIEW OF HM ORDERS  12/19/2023    MEDICARE ANNUAL WELLNESS VISIT  02/24/2024    MICROALBUMIN  05/01/2024    INFLUENZA VACCINE (1) 09/01/2024    COVID-19 Vaccine (7 - 2024-25 season) 09/01/2024    BMP  12/03/2024    EYE EXAM  05/08/2025    LIPID  06/03/2025    LUNG CANCER SCREENING  08/05/2025    FALL RISK ASSESSMENT  10/07/2025    COLORECTAL CANCER SCREENING  02/27/2026    ADVANCE CARE PLANNING  10/28/2026    HEPATITIS C SCREENING  Completed    PHQ-2 (once per calendar year)  Completed    ZOSTER IMMUNIZATION  Completed    AORTIC ANEURYSM SCREENING (SYSTEM ASSIGNED)  Completed    HPV IMMUNIZATION  Aged Out    MENINGITIS IMMUNIZATION  Aged Out    RSV MONOCLONAL ANTIBODY  Aged Out          Objective    Exam  BP 99/62 (BP Location: Left arm, Patient Position: Sitting, Cuff Size: Adult Regular)   Pulse 68   Temp 98.1  F (36.7  C)   Resp 16   Ht 1.81  "m (5' 11.25\")   Wt 70 kg (154 lb 4 oz)   SpO2 100%   BMI 21.36 kg/m     Estimated body mass index is 21.36 kg/m  as calculated from the following:    Height as of this encounter: 1.81 m (5' 11.25\").    Weight as of this encounter: 70 kg (154 lb 4 oz).    Physical Exam  GENERAL: alert and no distress  RESP: lungs clear to auscultation - no rales, rhonchi or wheezes  CV: regular rates and rhythm  PSYCH: mentation appears normal, affect normal/bright        10/7/2024   Mini Cog   Clock Draw Score 2 Normal   3 Item Recall 3 objects recalled   Mini Cog Total Score 5                 Signed Electronically by: Cesar Marie PA-C    "

## 2024-10-07 NOTE — PATIENT INSTRUCTIONS
We may consider jardiance if your urine albumin ever is worsening.       Patient Education   Preventive Care Advice   This is general advice given by our system to help you stay healthy. However, your care team may have specific advice just for you. Please talk to your care team about your preventive care needs.  Nutrition  Eat 5 or more servings of fruits and vegetables each day.  Try wheat bread, brown rice and whole grain pasta (instead of white bread, rice, and pasta).  Get enough calcium and vitamin D. Check the label on foods and aim for 100% of the RDA (recommended daily allowance).  Lifestyle  Exercise at least 150 minutes each week  (30 minutes a day, 5 days a week).  Do muscle strengthening activities 2 days a week. These help control your weight and prevent disease.  No smoking.  Wear sunscreen to prevent skin cancer.  Have a dental exam and cleaning every 6 months.  Yearly exams  See your health care team every year to talk about:  Any changes in your health.  Any medicines your care team has prescribed.  Preventive care, family planning, and ways to prevent chronic diseases.  Shots (vaccines)   HPV shots (up to age 26), if you've never had them before.  Hepatitis B shots (up to age 59), if you've never had them before.  COVID-19 shot: Get this shot when it's due.  Flu shot: Get a flu shot every year.  Tetanus shot: Get a tetanus shot every 10 years.  Pneumococcal, hepatitis A, and RSV shots: Ask your care team if you need these based on your risk.  Shingles shot (for age 50 and up)  General health tests  Diabetes screening:  Starting at age 35, Get screened for diabetes at least every 3 years.  If you are younger than age 35, ask your care team if you should be screened for diabetes.  Cholesterol test: At age 39, start having a cholesterol test every 5 years, or more often if advised.  Bone density scan (DEXA): At age 50, ask your care team if you should have this scan for osteoporosis (brittle  bones).  Hepatitis C: Get tested at least once in your life.  STIs (sexually transmitted infections)  Before age 24: Ask your care team if you should be screened for STIs.  After age 24: Get screened for STIs if you're at risk. You are at risk for STIs (including HIV) if:  You are sexually active with more than one person.  You don't use condoms every time.  You or a partner was diagnosed with a sexually transmitted infection.  If you are at risk for HIV, ask about PrEP medicine to prevent HIV.  Get tested for HIV at least once in your life, whether you are at risk for HIV or not.  Cancer screening tests  Cervical cancer screening: If you have a cervix, begin getting regular cervical cancer screening tests starting at age 21.  Breast cancer scan (mammogram): If you've ever had breasts, begin having regular mammograms starting at age 40. This is a scan to check for breast cancer.  Colon cancer screening: It is important to start screening for colon cancer at age 45.  Have a colonoscopy test every 10 years (or more often if you're at risk) Or, ask your provider about stool tests like a FIT test every year or Cologuard test every 3 years.  To learn more about your testing options, visit:   .  For help making a decision, visit:   https://bit.ly/th40047.  Prostate cancer screening test: If you have a prostate, ask your care team if a prostate cancer screening test (PSA) at age 55 is right for you.  Lung cancer screening: If you are a current or former smoker ages 50 to 80, ask your care team if ongoing lung cancer screenings are right for you.  For informational purposes only. Not to replace the advice of your health care provider. Copyright   2023 [facility]. All rights reserved. Clinically reviewed by the [facility]. IMScouting [id number] - REV 01/24.  Learning About Activities of Daily Living  What are activities of daily living?     Activities of daily living (ADLs) are the basic  self-care tasks you do every day. These include eating, bathing, dressing, and moving around.  As you age, and if you have health problems, you may find that it's harder to do some of these tasks. If so, your doctor can suggest ideas that may help.  To measure what kind of help you may need, your doctor will ask how well you are able to do ADLs. Let your doctor know if there are any tasks that you are having trouble doing. This is an important first step to getting help. And when you have the help you need, you can stay as independent as possible.  How will a doctor assess your ADLs?  Asking about ADLs is part of a routine health checkup your doctor will likely do as you age. Your health check might be done in a doctor's office, in your home, or at a hospital. The goal is to find out if you are having any problems that could make it hard to care for yourself or that make it unsafe for you to be on your own.  To measure your ADLs, your doctor will ask how hard it is for you to do routine tasks. Your doctor may also want to know if you have changed the way you do a task because of a health problem. Your doctor may watch how you:  Walk back and forth.  Keep your balance while you stand or walk.  Move from sitting to standing or from a bed to a chair.  Button or unbutton a shirt or sweater.  Remove and put on your shoes.  It's common to feel a little worried or anxious if you find you can't do all the things you used to be able to do. Talking with your doctor about ADLs is a way to make sure you're as safe as possible and able to care for yourself as well as you can. You may want to bring a caregiver, friend, or family member to your checkup. They can help you talk to your doctor.  Follow-up care is a key part of your treatment and safety. Be sure to make and go to all appointments, and call your doctor if you are having problems. It's also a good idea to know your test results and keep a list of the medicines you  take.  Current as of: October 24, 2023  Content Version: 14.2 2024 [facility]   Care instructions adapted under license by your healthcare professional. If you have questions about a medical condition or this instruction, always ask your healthcare professional. [employer] disclaims any warranty or liability for your use of this information.

## 2024-10-08 LAB
CREAT UR-MCNC: 137 MG/DL
MICROALBUMIN UR-MCNC: <12 MG/L
MICROALBUMIN/CREAT UR: NORMAL MG/G{CREAT}

## 2024-10-21 ENCOUNTER — DOCUMENTATION ONLY (OUTPATIENT)
Dept: CARDIOLOGY | Facility: CLINIC | Age: 69
End: 2024-10-21
Payer: MEDICARE

## 2024-10-21 DIAGNOSIS — I42.9 CARDIOMYOPATHY, UNSPECIFIED TYPE (H): ICD-10-CM

## 2024-10-21 DIAGNOSIS — I21.A9 OTHER TYPE OF ACUTE MYOCARDIAL INFARCTION (H): ICD-10-CM

## 2024-10-21 DIAGNOSIS — I25.5 ISCHEMIC CARDIOMYOPATHY: ICD-10-CM

## 2024-10-21 NOTE — PROGRESS NOTES
"10- pt called today, received the [employer] letter re new eligibility requirement.he is running \"low\" on his entresto-I called [facility], they will expedite the Entresto.I will be sending in a NEW RX and 2025 provider part of the re enrollment application. Will message Ellen Sheffield team re needing a signed RX  Naina Gomez lpn  ID#[id number]  "

## 2024-11-21 ENCOUNTER — OFFICE VISIT (OUTPATIENT)
Dept: UROLOGY | Facility: CLINIC | Age: 69
End: 2024-11-21
Payer: MEDICARE

## 2024-11-21 VITALS — OXYGEN SATURATION: 99 % | SYSTOLIC BLOOD PRESSURE: 116 MMHG | HEART RATE: 65 BPM | DIASTOLIC BLOOD PRESSURE: 74 MMHG

## 2024-11-21 DIAGNOSIS — C61 PROSTATE CANCER (H): Primary | ICD-10-CM

## 2024-11-21 LAB — PSA SERPL-MCNC: 18.3 UG/L (ref 0–4)

## 2024-11-21 NOTE — PROGRESS NOTES
CHIEF COMPLAINT   It was my pleasure to see Iraida Hernandez who is a 69 year old male for follow-up of Prostate Cancer.      HPI  Iraida Hernandez is a very pleasant 69 year old male who presents with a history of Prostate Cancer. 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  He had clinical stage T1c disease at that time with a PSA of 9.7.  His Oncotype DX test suggested a very low probability of grade 4 disease or progression.      Oncotype GPS is 5 - very low risk.     Surveillance biopsy of PIRADS 4 lesion done 8/10/2023 - demonstrates stable [place] 6 disease.      PSA has been up to 26.0, which previously had him considering therapy.    Returns today with PSA back down to 18.30. Most recent MRI is stable, but there is metallic artifact from his hips.     MRI Prostate 8/12/2024  IMPRESSION:  Limited evaluation due to nondiagnostic diffusion/ADC as well as  dynamic contrast-enhanced sequences secondary to metallic artifact.     1. No significant change in T2 appearance or size of previously  described PI-RADS 4 lesion located in the right mid gland peripheral  zone. A PIRADS classification cannot be assigned secondary to lack of  diagnostic diffusion/ADC as well as dynamic contrast-enhanced  sequences.  2. No suspicious adenopathy or evidence of pelvic metastases.     PSA  11/21/2024 - 18.30  8/22/2024 - 26.0  2/22/2024 - 21.30  6/29/2023 - 17.50  5/1/2023 - 16.00  8/31/2022 - 14.9  1/20/2022 - 13.7  2/2/2021 - 12.90  3/31/2020 - 10.70  2/5/2020 - 14.60  2/15/2019 - 9.79  12/2/2013 - 6.19     Fusion Biopsy 8/10/2023  Final Diagnosis   A.  Prostate, left base, biopsy-  Benign prostate tissue     B.  Prostate, left mid, biopsy-  Adenocarcinoma, [place]'s grade 3/3 in 1 of 2 needle core biopsy and less than 5% of submitted tissue     C.  Prostate, left apex, biopsy-  Adenocarcinoma, [place] grade 3/3 in 1 of 2 needle core biopsies and less than 5% of submitted tissue      D.  Prostate, right base, biopsy-  Benign prostate tissue     E.  Prostate, right mid, biopsy-  Adenocarcinoma, [place] grade 3/3 in 1 of 1 needle core biopsy and less than 5% of submitted tissue     F.  Prostate, right apex, biopsy-  Adenocarcinoma, [place]'s grade 3/3 in 1 of 2 needle core biopsies and 10% of submitted tissue     G.  Prostate, MRI target, biopsy-  Adenocarcinoma, [place] grade 3/3 in 2 of 2 submitted needle core biopsies and 10% of submitted tissue        TRUS 6/25/2021  FINAL DIAGNOSIS:   A.  Prostate, left base x2, biopsy   - Focal high-grade prostatic intraepithelial neoplasia. Negative for   invasive malignancy.     B.  Prostate, left mid x2, biopsy   - Benign prostatic tissue. Negative for malignancy.     C.  Prostate, left [place] x2, biopsy   - Focal high-grade prostatic intraepithelial neoplasia. Negative for   invasive malignancy.     D.  Prostate, right base x2, biopsy   - Benign prostatic tissue. Negative for malignancy     E.  Prostate, right mid x2, biopsy   - Atypical glands suspicious for malignancy (Please see comment)     F.  Prostate, right [place] x2, biopsy   - Prostatic adenocarcinoma, acinar type, [place]'s grade 3+ 3 = score of   6, grade group is 1, number of cores involved is 2 of 2, total surface area involved is 10-15%, perineural   invasion is not identified, high-grade prostatic intraepithelial neoplasia is present      TRUS 3/2019  F: Prostate, left apex, needle core biopsy: Adenocarcinoma, Krista score   3 + 3 = 6 (of 10) (Grade Group 1),   involving 1 of 1 needle core(s) and 1 mm of 10 mm of biopsy tissue   (approximately 10% of biopsy tissue).   Perineural invasion not identified.     K: Prostate, right mid, needle core biopsy: Adenocarcinoma, Krista score   3 + 3 = 6 (of 10) (Grade Group 1),   involving 1 of 1 needle core(s) and 1 mm of 10 mm of biopsy tissue   (approximately 10% of biopsy tissue).   Perineural invasion not identified.     PHYSICAL EXAM  Patient is  a 69 year old  male   Vitals: Blood pressure 116/74, pulse 65, SpO2 99%.  General Appearance Adult: There is no height or weight on file to calculate BMI.  Alert, no acute distress, oriented  Lungs: no respiratory distress, or pursed lip breathing  Abdomen: soft, nontender, no organomegaly or masses  Back: no CVAT  Neuro: Alert, oriented, speech and mentation normal  Psych: affect and mood normal    Outside and Past Medical records:  Review of the result(s) of each unique test - PSA    ASSESSMENT and PLAN  69 year old male with Krista 3+3 disease diagnosed 2019. GPS score 5. On active surveillance. Recent PSA as high as 26, but down to 18.30 today. Updated MRI with no new lesions, but visibility limited by hip prosthesis. Had surveillance biopsy 8/10/2023 demonstrating Krista 6 disease. We had a long discussion today about the significance of his PSA changes. He remains concerned about the high value We discussed this in great detail today, and that while his pathology has been reassuring, the ongoing PSA elevation is notable, but reassuring that it has declined.. For now, will plan another 3 month period of observation, with consideration of biopsy if PSA rises again at next check.      - Follow-up 3 months with PSA    20 minutes spent on the date of the encounter doing chart review, history and exam, documentation and further activities as noted above.    Sukumar Oglesby MD  Urology  [facility]

## 2024-11-21 NOTE — Clinical Note
11/21/2024       RE: Iraida Hernandez  [id number] [address] [id number]     Dear Colleague,    Thank you for referring your patient, Iraida Hernandez, to the [facility] at [facility]. Please see a copy of my visit note below.      CHIEF COMPLAINT   It was my pleasure to see Iraida Hernandez who is a 69 year old male for follow-up of Prostate Cancer.      HPI  Iraida Hernandez is a very pleasant 69 year old male who presents with a history of Prostate Cancer. 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  He had clinical stage T1c disease at that time with a PSA of 9.7.  His Oncotype DX test suggested a very low probability of grade 4 disease or progression.      Oncotype GPS is 5 - very low risk.     Surveillance biopsy of PIRADS 4 lesion done 8/10/2023 - demonstrates stable Krista 6 disease.      PSA now up to 26.0. No new symptoms.     MRI Prostate 8/12/2024  IMPRESSION:  Limited evaluation due to nondiagnostic diffusion/ADC as well as  dynamic contrast-enhanced sequences secondary to metallic artifact.     1. No significant change in T2 appearance or size of previously  described PI-RADS 4 lesion located in the right mid gland peripheral  zone. A PIRADS classification cannot be assigned secondary to lack of  diagnostic diffusion/ADC as well as dynamic contrast-enhanced  sequences.  2. No suspicious adenopathy or evidence of pelvic metastases.     PSA  11/21/2024 - 18.30  8/22/2024 - 26.0  2/22/2024 - 21.30  6/29/2023 - 17.50  5/1/2023 - 16.00  8/31/2022 - 14.9  1/20/2022 - 13.7  2/2/2021 - 12.90  3/31/2020 - 10.70  2/5/2020 - 14.60  2/15/2019 - 9.79  12/2/2013 - 6.19     Fusion Biopsy 8/10/2023  Final Diagnosis   A.  Prostate, left base, biopsy-  Benign prostate tissue     B.  Prostate, left mid, biopsy-  Adenocarcinoma, Krista's grade 3/3 in 1 of 2 needle core biopsy and less than 5% of  submitted tissue     C.  Prostate, left apex, biopsy-  Adenocarcinoma, [place] grade 3/3 in 1 of 2 needle core biopsies and less than 5% of submitted tissue     D.  Prostate, right base, biopsy-  Benign prostate tissue     E.  Prostate, right mid, biopsy-  Adenocarcinoma, [place] grade 3/3 in 1 of 1 needle core biopsy and less than 5% of submitted tissue     F.  Prostate, right apex, biopsy-  Adenocarcinoma, [place]'s grade 3/3 in 1 of 2 needle core biopsies and 10% of submitted tissue     G.  Prostate, MRI target, biopsy-  Adenocarcinoma, Krista grade 3/3 in 2 of 2 submitted needle core biopsies and 10% of submitted tissue        TRUS 6/25/2021  FINAL DIAGNOSIS:   A.  Prostate, left base x2, biopsy   - Focal high-grade prostatic intraepithelial neoplasia. Negative for   invasive malignancy.     B.  Prostate, left mid x2, biopsy   - Benign prostatic tissue. Negative for malignancy.     C.  Prostate, left [place] x2, biopsy   - Focal high-grade prostatic intraepithelial neoplasia. Negative for   invasive malignancy.     D.  Prostate, right base x2, biopsy   - Benign prostatic tissue. Negative for malignancy     E.  Prostate, right mid x2, biopsy   - Atypical glands suspicious for malignancy (Please see comment)     F.  Prostate, right [place] x2, biopsy   - Prostatic adenocarcinoma, acinar type, [place]'s grade 3+ 3 = score of   6, grade group is 1, number of cores involved is 2 of 2, total surface area involved is 10-15%, perineural   invasion is not identified, high-grade prostatic intraepithelial neoplasia is present      TRUS 3/2019  F: Prostate, left apex, needle core biopsy: Adenocarcinoma, Krista score   3 + 3 = 6 (of 10) (Grade Group 1),   involving 1 of 1 needle core(s) and 1 mm of 10 mm of biopsy tissue   (approximately 10% of biopsy tissue).   Perineural invasion not identified.     K: Prostate, right mid, needle core biopsy: Adenocarcinoma, Krista score   3 + 3 = 6 (of 10) (Grade Group 1),   involving 1 of 1  needle core(s) and 1 mm of 10 mm of biopsy tissue   (approximately 10% of biopsy tissue).   Perineural invasion not identified.     PHYSICAL EXAM  Patient is a 69 year old  male   Vitals: Blood pressure 116/74, pulse 65, SpO2 99%.  General Appearance Adult: There is no height or weight on file to calculate BMI.  Alert, no acute distress, oriented  Lungs: no respiratory distress, or pursed lip breathing  Abdomen: soft, nontender, no organomegaly or masses; ***  Back: *** CVAT  Neuro: Alert, oriented, speech and mentation normal  Psych: affect and mood normal  : {UZAIR EXAM MALE GENITAL:424698}    Outside and Past Medical records:  Assessment requiring an independent historian(s) - {:[id number]}  Review of prior external note(s) from - {note reviewed source:723255}  Review of the result(s) of each unique test - ***    ASSESSMENT and PLAN  69 year old male with Krista 3+3 disease diagnosed 2019. GPS score 5. On active surveillance. Recent now up to 26. Updated MRI with no new lesions, but visibility limited by hip prosthesis. Had surveillance biopsy 8/10/2023 demonstrating [place] 6 disease. We had a long discussion today about the significance of his rising PSA. He remains concerned about the ongoing upward trend. We discussed this in great detail today, and that while his pathology has been reassuring, the ongoing upward trend in PSA is notable. For now, will plan another 3 month period of observation, but he is considering definitive treatment this winter vs another biopsy if PSA continues to rise.       - Follow-up 3 months with PSA      *** minutes spent on the date of the encounter doing chart review, history and exam, documentation and further activities as noted above.    Sukumar Oglesby MD  Urology  [facility]        CHIEF COMPLAINT   It was my pleasure to see Iraida Hernandez who is a 69 year old male for follow-up of Prostate Cancer.      HPI  Iraida Hernandez is a very pleasant 69 year old  male who presents with a history of Prostate Cancer. 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  He had clinical stage T1c disease at that time with a PSA of 9.7.  His Oncotype DX test suggested a very low probability of grade 4 disease or progression.      Oncotype GPS is 5 - very low risk.     Surveillance biopsy of PIRADS 4 lesion done 8/10/2023 - demonstrates stable Krista 6 disease.      PSA has been up to 26.0, which previously had him considering therapy.    Returns today with PSA back down to 18.30. Most recent MRI is stable, but there is metallic artifact from his hips.     MRI Prostate 8/12/2024  IMPRESSION:  Limited evaluation due to nondiagnostic diffusion/ADC as well as  dynamic contrast-enhanced sequences secondary to metallic artifact.     1. No significant change in T2 appearance or size of previously  described PI-RADS 4 lesion located in the right mid gland peripheral  zone. A PIRADS classification cannot be assigned secondary to lack of  diagnostic diffusion/ADC as well as dynamic contrast-enhanced  sequences.  2. No suspicious adenopathy or evidence of pelvic metastases.     PSA  11/21/2024 - 18.30  8/22/2024 - 26.0  2/22/2024 - 21.30  6/29/2023 - 17.50  5/1/2023 - 16.00  8/31/2022 - 14.9  1/20/2022 - 13.7  2/2/2021 - 12.90  3/31/2020 - 10.70  2/5/2020 - 14.60  2/15/2019 - 9.79  12/2/2013 - 6.19     Fusion Biopsy 8/10/2023  Final Diagnosis   A.  Prostate, left base, biopsy-  Benign prostate tissue     B.  Prostate, left mid, biopsy-  Adenocarcinoma, [place]'s grade 3/3 in 1 of 2 needle core biopsy and less than 5% of submitted tissue     C.  Prostate, left apex, biopsy-  Adenocarcinoma, [place] grade 3/3 in 1 of 2 needle core biopsies and less than 5% of submitted tissue     D.  Prostate, right base, biopsy-  Benign prostate tissue     E.  Prostate, right mid, biopsy-  Adenocarcinoma, Krista grade 3/3 in 1 of 1 needle core biopsy and less than 5%  of submitted tissue     F.  Prostate, right apex, biopsy-  Adenocarcinoma, [place]'s grade 3/3 in 1 of 2 needle core biopsies and 10% of submitted tissue     G.  Prostate, MRI target, biopsy-  Adenocarcinoma, Krista grade 3/3 in 2 of 2 submitted needle core biopsies and 10% of submitted tissue        TRUS 6/25/2021  FINAL DIAGNOSIS:   A.  Prostate, left base x2, biopsy   - Focal high-grade prostatic intraepithelial neoplasia. Negative for   invasive malignancy.     B.  Prostate, left mid x2, biopsy   - Benign prostatic tissue. Negative for malignancy.     C.  Prostate, left [place] x2, biopsy   - Focal high-grade prostatic intraepithelial neoplasia. Negative for   invasive malignancy.     D.  Prostate, right base x2, biopsy   - Benign prostatic tissue. Negative for malignancy     E.  Prostate, right mid x2, biopsy   - Atypical glands suspicious for malignancy (Please see comment)     F.  Prostate, right [place] x2, biopsy   - Prostatic adenocarcinoma, acinar type, Krista's grade 3+ 3 = score of   6, grade group is 1, number of cores involved is 2 of 2, total surface area involved is 10-15%, perineural   invasion is not identified, high-grade prostatic intraepithelial neoplasia is present      TRUS 3/2019  F: Prostate, left apex, needle core biopsy: Adenocarcinoma, [place] score   3 + 3 = 6 (of 10) (Grade Group 1),   involving 1 of 1 needle core(s) and 1 mm of 10 mm of biopsy tissue   (approximately 10% of biopsy tissue).   Perineural invasion not identified.     K: Prostate, right mid, needle core biopsy: Adenocarcinoma, [place] score   3 + 3 = 6 (of 10) (Grade Group 1),   involving 1 of 1 needle core(s) and 1 mm of 10 mm of biopsy tissue   (approximately 10% of biopsy tissue).   Perineural invasion not identified.     PHYSICAL EXAM  Patient is a 69 year old  male   Vitals: Blood pressure 116/74, pulse 65, SpO2 99%.  General Appearance Adult: There is no height or weight on file to calculate BMI.  Alert, no acute  distress, oriented  Lungs: no respiratory distress, or pursed lip breathing  Abdomen: soft, nontender, no organomegaly or masses  Back: no CVAT  Neuro: Alert, oriented, speech and mentation normal  Psych: affect and mood normal    Outside and Past Medical records:  Review of the result(s) of each unique test - PSA    ASSESSMENT and PLAN  69 year old male with [place] 3+3 disease diagnosed 2019. GPS score 5. On active surveillance. Recent PSA as high as 26, but down to 18.30 today. Updated MRI with no new lesions, but visibility limited by hip prosthesis. Had surveillance biopsy 8/10/2023 demonstrating [place] 6 disease. We had a long discussion today about the significance of his PSA changes. He remains concerned about the high value We discussed this in great detail today, and that while his pathology has been reassuring, the ongoing PSA elevation is notable, but reassuring that it has declined.. For now, will plan another 3 month period of observation, with consideration of biopsy if PSA rises again at next check.      - Follow-up 3 months with PSA    20 minutes spent on the date of the encounter doing chart review, history and exam, documentation and further activities as noted above.    Sukumar Oglesby MD  Urology  [facility]        Again, thank you for allowing me to participate in the care of your patient.      Sincerely,    Sukumar Oglesby MD

## 2025-02-27 ENCOUNTER — OFFICE VISIT (OUTPATIENT)
Dept: UROLOGY | Facility: CLINIC | Age: 70
End: 2025-02-27
Payer: MEDICARE

## 2025-02-27 VITALS
HEIGHT: 72 IN | DIASTOLIC BLOOD PRESSURE: 75 MMHG | BODY MASS INDEX: 20.99 KG/M2 | SYSTOLIC BLOOD PRESSURE: 110 MMHG | OXYGEN SATURATION: 93 % | WEIGHT: 155 LBS | HEART RATE: 84 BPM

## 2025-02-27 DIAGNOSIS — C61 PROSTATE CANCER (H): ICD-10-CM

## 2025-02-27 DIAGNOSIS — Z79.2 PROPHYLACTIC ANTIBIOTIC: Primary | ICD-10-CM

## 2025-02-27 LAB — PSA SERPL-MCNC: 18.8 UG/L (ref 0–4)

## 2025-02-27 PROCEDURE — 3078F DIAST BP <80 MM HG: CPT | Performed by: UROLOGY

## 2025-02-27 PROCEDURE — 3074F SYST BP LT 130 MM HG: CPT | Performed by: UROLOGY

## 2025-02-27 PROCEDURE — 84153 ASSAY OF PSA TOTAL: CPT | Performed by: UROLOGY

## 2025-02-27 PROCEDURE — 1126F AMNT PAIN NOTED NONE PRSNT: CPT | Performed by: UROLOGY

## 2025-02-27 PROCEDURE — 99213 OFFICE O/P EST LOW 20 MIN: CPT | Performed by: UROLOGY

## 2025-02-27 PROCEDURE — 36415 COLL VENOUS BLD VENIPUNCTURE: CPT | Performed by: UROLOGY

## 2025-02-27 RX ORDER — CIPROFLOXACIN 500 MG/1
TABLET, FILM COATED ORAL
Qty: 6 TABLET | Refills: 0 | Status: SHIPPED | OUTPATIENT
Start: 2025-02-27

## 2025-02-27 ASSESSMENT — PAIN SCALES - GENERAL: PAINLEVEL_OUTOF10: NO PAIN (0)

## 2025-02-27 NOTE — PATIENT INSTRUCTIONS
Ultrasound Guided Prostate Biopsy    What is a prostate biopsy? A prostate biopsy is a relatively painless procedure performed in the physician's office, outpatient facility or hospital.  A long, thin needle is inserted into the prostate to collect a sample of tissue from the prostate.  These samples are then examined by a pathologist for abnormal cells.    Why do I need a prostate biopsy? During a man's lifetime the prostate gradually increases in size.  The patient may or may not experience symptoms.  Symptoms of an enlarged prostate include:    Increased frequency of urination    Decreased force of urinary stream    Trouble with urination    Awakening at night to urinate    When the prostate is examined, the physician may feel a nodule (hard or firm growth) which would require a biopsy.    Another reason for having a prostate biopsy is an increase in the prostate specific androgen (PSA) in your blood.  A prostate biopsy may be necessary to determine the cause of the increased PSA.    Your physician will also perform an ultrasound (an image created by sound waves).  The ultrasound is performed by placing a small probe in the rectum to image the prostate gland.    Preparation for the Prostate Biopsy    Blood thinning medications must be stopped prior to your biopsy.  Please stop the following medication the appropriate number of days before:  10 days-acetylsalicylic acid, vitamin E, fish oil, aspirin, baby aspirin, tumeric  7 days- Plavix, Brilinta, ibuprofen (Advil, Motrin, Aleve), Celebrex  5 days-Pradaxa  4 days-Coumadin/warfarin  3 days-Eliquis, Xarelto    2.  If you have any bleeding problems (thin blood), please tell your doctor.    3.  If you have been told by another doctor to take antibiotics before dental work or surgery, please tell the doctor.  This is common for patients that have had a joint replacement.    YOU HAVE BEEN PRESCRIBED AN ANTIBIOTIC.  Please take your antibiotic at 8am and 8pm the day  before, day of and day after your biopsy.    The day of the biopsy you will be asked to use an enema 1 1/2-2 hours before you leave for your appointment.    PLEASE EAT YOUR REGULAR MEALS ON THE DAY OF YOUR BIOPSY.    Unless you have been prescribed a sedative (Valium or a similar drug) you will be able to drive to and from your appointment.  If you have taken a sedative you must have a ride.    AFTER THE BIOPSY IT IS NORMAL TO EXPERIENCE:       Small amount of blood in the urine for 10-14 days   Small amount of blood in the stool for up to 2 weeks   Small amount of blood in the ejaculate for up to 4 weeks    DANGER SIGNS:     Excessive blood in the urine, stool or ejaculate  Fever over 100 or chills  Frequent urination or burning when you urinate             CALL THE DOCTOR'S OFFICE -[phone] DURING OFFICE HOURS IF YOU HAVE ANY OF THESE SYMPTOMS.  IF YOU CANNOT CONTACT THE OFFICE, GO TO THE EMERGENCY ROOM.          Your biopsy is scheduled on Thursday, 5/8/25, at our [place] office located at [address] [place] [address], [address].  Please be at the office at 2:30 PM.    A follow up visit has been scheduled for you on Thursday, 5/15/25, at 12:00 PM.  This is a virtual visit.  Your doctor will discuss your results with you at this visit.

## 2025-02-27 NOTE — PROGRESS NOTES
CHIEF COMPLAINT   It was my pleasure to see Iraida Hernandez who is a 69 year old male for follow-up of Prostate Cancer    HPI  Iraida Hernandez is a very pleasant 69 year old male who presents with a history of Prostate Cancer. 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  He had clinical stage T1c disease at that time with a PSA of 9.7.  His Oncotype DX test suggested a very low probability of grade 4 disease or progression.      Oncotype GPS is 5 - very low risk.     Surveillance biopsy of PIRADS 4 lesion done 8/10/2023 - demonstrates stable Mel[place] 6 disease.      PSA has been up to 26.0, which previously had him considering therapy.      PSA back down to 18.30. Most recent MRI is stable, but there is metallic artifact from his hips. PSA remains stable at 18.8 today.     MRI Prostate 8/12/2024  IMPRESSION:  Limited evaluation due to nondiagnostic diffusion/ADC as well as  dynamic contrast-enhanced sequences secondary to metallic artifact.     1. No significant change in T2 appearance or size of previously  described PI-RADS 4 lesion located in the right mid gland peripheral  zone. A PIRADS classification cannot be assigned secondary to lack of  diagnostic diffusion/ADC as well as dynamic contrast-enhanced  sequences.  2. No suspicious adenopathy or evidence of pelvic metastases.     PSA  2/27/2025 - 18.80  11/21/2024 - 18.30  8/22/2024 - 26.0  2/22/2024 - 21.30  6/29/2023 - 17.50  5/1/2023 - 16.00  8/31/2022 - 14.9  1/20/2022 - 13.7  2/2/2021 - 12.90  3/31/2020 - 10.70  2/5/2020 - 14.60  2/15/2019 - 9.79  12/2/2013 - 6.19     Fusion Biopsy 8/10/2023  Final Diagnosis   A.  Prostate, left base, biopsy-  Benign prostate tissue     B.  Prostate, left mid, biopsy-  Adenocarcinoma, Melrose's grade 3/3 in 1 of 2 needle core biopsy and less than 5% of submitted tissue     C.  Prostate, left apex, biopsy-  Adenocarcinoma, Mel[place] grade 3/3 in 1 of 2 needle core biopsies and less  than 5% of submitted tissue     D.  Prostate, right base, biopsy-  Benign prostate tissue     E.  Prostate, right mid, biopsy-  Adenocarcinoma, [place] grade 3/3 in 1 of 1 needle core biopsy and less than 5% of submitted tissue     F.  Prostate, right apex, biopsy-  Adenocarcinoma, Krista's grade 3/3 in 1 of 2 needle core biopsies and 10% of submitted tissue     G.  Prostate, MRI target, biopsy-  Adenocarcinoma, Krista grade 3/3 in 2 of 2 submitted needle core biopsies and 10% of submitted tissue        TRUS 6/25/2021  FINAL DIAGNOSIS:   A.  Prostate, left base x2, biopsy   - Focal high-grade prostatic intraepithelial neoplasia. Negative for   invasive malignancy.     B.  Prostate, left mid x2, biopsy   - Benign prostatic tissue. Negative for malignancy.     C.  Prostate, left Frost x2, biopsy   - Focal high-grade prostatic intraepithelial neoplasia. Negative for   invasive malignancy.     D.  Prostate, right base x2, biopsy   - Benign prostatic tissue. Negative for malignancy     E.  Prostate, right mid x2, biopsy   - Atypical glands suspicious for malignancy (Please see comment)     F.  Prostate, right Frost x2, biopsy   - Prostatic adenocarcinoma, acinar type, [place]'s grade 3+ 3 = score of   6, grade group is 1, number of cores involved is 2 of 2, total surface area involved is 10-15%, perineural   invasion is not identified, high-grade prostatic intraepithelial neoplasia is present      TRUS 3/2019  F: Prostate, left apex, needle core biopsy: Adenocarcinoma, [place] score   3 + 3 = 6 (of 10) (Grade Group 1),   involving 1 of 1 needle core(s) and 1 mm of 10 mm of biopsy tissue   (approximately 10% of biopsy tissue).   Perineural invasion not identified.     K: Prostate, right mid, needle core biopsy: Adenocarcinoma, Krista score   3 + 3 = 6 (of 10) (Grade Group 1),   involving 1 of 1 needle core(s) and 1 mm of 10 mm of biopsy tissue   (approximately 10% of biopsy tissue).   Perineural invasion not identified.      PHYSICAL EXAM  Patient is a 69 year old  male   Vitals: Blood pressure 110/75, pulse 84, height 1.829 m (6'), weight 70.3 kg (155 lb), SpO2 93%.  General Appearance Adult: Body mass index is 21.02 kg/m .  Alert, no acute distress, oriented  Lungs: no respiratory distress, or pursed lip breathing  Abdomen: soft, nontender, no organomegaly or masses  Back: no CVAT  Neuro: Alert, oriented, speech and mentation normal  Psych: affect and mood normal    Outside and Past Medical records:  Review of the result(s) of each unique test - PSA, MRI    ASSESSMENT and PLAN  69 year old male with Krista 3+3 disease diagnosed 2019. GPS score 5. On active surveillance. Recent PSA as high as 26, but down to 18.30 today. Updated MRI with no new lesions, but visibility limited by hip prosthesis. Had surveillance biopsy 8/10/2023 demonstrating [place] 6 disease. PSA remains stable, but elevated. We again had a long discussion today about the significance of his PSA changes. He remains concerned about the high value. At this point, given his PSA remains quite high and it has been close to 2 years since his last biopsy, we discussed option of another surveillance TRUS biopsy. We reviewed risk of infection and side effects again. At this point, he expresses anxiety about his high PSA, but appropriate reticence to proceed with treatment. As such, after thoughtful discussion, he elects to proceed with prostate biopsy.     - Schedule TRUS biopsy    20 minutes spent on the date of the encounter doing chart review, history and exam, documentation and further activities as noted above.    Sukumar Oglesby MD  Urology  [facility]

## 2025-02-27 NOTE — Clinical Note
2/27/2025       RE: Iraida Hernandez  [address] [facility] [id number]     Dear Colleague,    Thank you for referring your patient, Iraida Hernandez, to the [facility] at [facility]. Please see a copy of my visit note below.      CHIEF COMPLAINT   It was my pleasure to see Iraida Hernandez who is a 69 year old male for follow-up of ***.      HPI  Iraida Hernandez is a very pleasant 69 year old male who presents with a history of Prostate Cancer. 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  He had clinical stage T1c disease at that time with a PSA of 9.7.  His Oncotype DX test suggested a very low probability of grade 4 disease or progression.      Oncotype GPS is 5 - very low risk.     Surveillance biopsy of PIRADS 4 lesion done 8/10/2023 - demonstrates stable Krista 6 disease.      PSA has been up to 26.0, which previously had him considering therapy.     Returns today with PSA back down to 18.30. Most recent MRI is stable, but there is metallic artifact from his hips.     MRI Prostate 8/12/2024  IMPRESSION:  Limited evaluation due to nondiagnostic diffusion/ADC as well as  dynamic contrast-enhanced sequences secondary to metallic artifact.     1. No significant change in T2 appearance or size of previously  described PI-RADS 4 lesion located in the right mid gland peripheral  zone. A PIRADS classification cannot be assigned secondary to lack of  diagnostic diffusion/ADC as well as dynamic contrast-enhanced  sequences.  2. No suspicious adenopathy or evidence of pelvic metastases.     PSA  2/27/2025 - 18.80  11/21/2024 - 18.30  8/22/2024 - 26.0  2/22/2024 - 21.30  6/29/2023 - 17.50  5/1/2023 - 16.00  8/31/2022 - 14.9  1/20/2022 - 13.7  2/2/2021 - 12.90  3/31/2020 - 10.70  2/5/2020 - 14.60  2/15/2019 - 9.79  12/2/2013 - 6.19     Fusion Biopsy 8/10/2023  Final Diagnosis   A.  Prostate, left  base, biopsy-  Benign prostate tissue     B.  Prostate, left mid, biopsy-  Adenocarcinoma, Krista's grade 3/3 in 1 of 2 needle core biopsy and less than 5% of submitted tissue     C.  Prostate, left apex, biopsy-  Adenocarcinoma, Krista grade 3/3 in 1 of 2 needle core biopsies and less than 5% of submitted tissue     D.  Prostate, right base, biopsy-  Benign prostate tissue     E.  Prostate, right mid, biopsy-  Adenocarcinoma, Krista grade 3/3 in 1 of 1 needle core biopsy and less than 5% of submitted tissue     F.  Prostate, right apex, biopsy-  Adenocarcinoma, Skillman's grade 3/3 in 1 of 2 needle core biopsies and 10% of submitted tissue     G.  Prostate, MRI target, biopsy-  Adenocarcinoma, [place]illman grade 3/3 in 2 of 2 submitted needle core biopsies and 10% of submitted tissue        TRUS 6/25/2021  FINAL DIAGNOSIS:   A.  Prostate, left base x2, biopsy   - Focal high-grade prostatic intraepithelial neoplasia. Negative for   invasive malignancy.     B.  Prostate, left mid x2, biopsy   - Benign prostatic tissue. Negative for malignancy.     C.  Prostate, left [place] x2, biopsy   - Focal high-grade prostatic intraepithelial neoplasia. Negative for   invasive malignancy.     D.  Prostate, right base x2, biopsy   - Benign prostatic tissue. Negative for malignancy     E.  Prostate, right mid x2, biopsy   - Atypical glands suspicious for malignancy (Please see comment)     F.  Prostate, right [place] x2, biopsy   - Prostatic adenocarcinoma, acinar type, Krista's grade 3+ 3 = score of   6, grade group is 1, number of cores involved is 2 of 2, total surface area involved is 10-15%, perineural   invasion is not identified, high-grade prostatic intraepithelial neoplasia is present      TRUS 3/2019  F: Prostate, left apex, needle core biopsy: Adenocarcinoma, Skillman score   3 + 3 = 6 (of 10) (Grade Group 1),   involving 1 of 1 needle core(s) and 1 mm of 10 mm of biopsy tissue   (approximately 10% of biopsy tissue).    Perineural invasion not identified.     K: Prostate, right mid, needle core biopsy: Adenocarcinoma, Krista score   3 + 3 = 6 (of 10) (Grade Group 1),   involving 1 of 1 needle core(s) and 1 mm of 10 mm of biopsy tissue   (approximately 10% of biopsy tissue).   Perineural invasion not identified.     PHYSICAL EXAM  Patient is a 69 year old  male   Vitals: Blood pressure 110/75, pulse 84, height 1.829 m (6'), weight 70.3 kg (155 lb), SpO2 93%.  General Appearance Adult: Body mass index is 21.02 kg/m .  Alert, no acute distress, oriented  Lungs: no respiratory distress, or pursed lip breathing  Abdomen: soft, nontender, no organomegaly or masses; ***  Back: *** CVAT  Neuro: Alert, oriented, speech and mentation normal  Psych: affect and mood normal  : {UZAIR EXAM MALE GENITAL:767814}    Outside and Past Medical records:  Assessment requiring an independent historian(s) - {:[id number]}  Review of prior external note(s) from - {note reviewed source:695756}  Review of the result(s) of each unique test - ***    ASSESSMENT and PLAN  69 year old male with Krista 3+3 disease diagnosed 2019. GPS score 5. On active surveillance. Recent PSA as high as 26, but down to 18.30 today. Updated MRI with no new lesions, but visibility limited by hip prosthesis. Had surveillance biopsy 8/10/2023 demonstrating [place] 6 disease. We had a long discussion today about the significance of his PSA changes. He remains concerned about the high value. We discussed this in great detail today, and that while his pathology has been reassuring, the ongoing PSA elevation is notable, but reassuring that it has declined.. For now, will plan another 3 month period of observation, with consideration of biopsy if PSA rises again at next check.      - Follow-up 3 months with PSA      *** minutes spent on the date of the encounter doing chart review, history and exam, documentation and further activities as noted above.    Sukumar Oglesby,  MD  Urology  [facility]        Again, thank you for allowing me to participate in the care of your patient.      Sincerely,    Sukumar Oglesby MD

## 2025-02-27 NOTE — NURSING NOTE
Chief Complaint   Patient presents with    Prostate cancer (H)     PSA check    Jany Santiago LPN

## 2025-03-06 ENCOUNTER — ANCILLARY PROCEDURE (OUTPATIENT)
Dept: CARDIOLOGY | Facility: CLINIC | Age: 70
End: 2025-03-06
Attending: INTERNAL MEDICINE
Payer: MEDICARE

## 2025-03-06 DIAGNOSIS — I25.5 ISCHEMIC CARDIOMYOPATHY: ICD-10-CM

## 2025-03-06 DIAGNOSIS — Z95.810 ICD (IMPLANTABLE CARDIOVERTER-DEFIBRILLATOR) IN PLACE: ICD-10-CM

## 2025-03-06 PROCEDURE — 93295 DEV INTERROG REMOTE 1/2/MLT: CPT | Performed by: INTERNAL MEDICINE

## 2025-03-06 PROCEDURE — 93296 REM INTERROG EVL PM/IDS: CPT | Performed by: INTERNAL MEDICINE

## 2025-03-12 LAB
MDC_IDC_EPISODE_DTM: NORMAL
MDC_IDC_EPISODE_DURATION: 10 S
MDC_IDC_EPISODE_DURATION: 6 S
MDC_IDC_EPISODE_DURATION: 7 S
MDC_IDC_EPISODE_ID: NORMAL
MDC_IDC_EPISODE_TYPE: NORMAL
MDC_IDC_EPISODE_TYPE_INDUCED: NO
MDC_IDC_LEAD_CONNECTION_STATUS: NORMAL
MDC_IDC_LEAD_IMPLANT_DT: NORMAL
MDC_IDC_LEAD_LOCATION: NORMAL
MDC_IDC_LEAD_LOCATION_DETAIL_1: NORMAL
MDC_IDC_LEAD_MFG: NORMAL
MDC_IDC_LEAD_MODEL: NORMAL
MDC_IDC_LEAD_POLARITY_TYPE: NORMAL
MDC_IDC_LEAD_SERIAL: NORMAL
MDC_IDC_MSMT_BATTERY_DTM: NORMAL
MDC_IDC_MSMT_BATTERY_REMAINING_LONGEVITY: 48 MO
MDC_IDC_MSMT_BATTERY_REMAINING_PERCENTAGE: 51 %
MDC_IDC_MSMT_BATTERY_STATUS: NORMAL
MDC_IDC_MSMT_CAP_CHARGE_DTM: NORMAL
MDC_IDC_MSMT_CAP_CHARGE_TIME: 11.2 S
MDC_IDC_MSMT_CAP_CHARGE_TYPE: NORMAL
MDC_IDC_MSMT_LEADCHNL_RV_IMPEDANCE_VALUE: 654 OHM
MDC_IDC_MSMT_LEADCHNL_RV_PACING_THRESHOLD_AMPLITUDE: 0.7 V
MDC_IDC_MSMT_LEADCHNL_RV_PACING_THRESHOLD_PULSEWIDTH: 0.5 MS
MDC_IDC_PG_IMPLANT_DTM: NORMAL
MDC_IDC_PG_MFG: NORMAL
MDC_IDC_PG_MODEL: NORMAL
MDC_IDC_PG_SERIAL: NORMAL
MDC_IDC_PG_TYPE: NORMAL
MDC_IDC_SESS_CLINIC_NAME: NORMAL
MDC_IDC_SESS_DTM: NORMAL
MDC_IDC_SESS_TYPE: NORMAL
MDC_IDC_SET_BRADY_LOWRATE: 40 {BEATS}/MIN
MDC_IDC_SET_BRADY_MODE: NORMAL
MDC_IDC_SET_LEADCHNL_RV_PACING_AMPLITUDE: 2 V
MDC_IDC_SET_LEADCHNL_RV_PACING_POLARITY: NORMAL
MDC_IDC_SET_LEADCHNL_RV_PACING_PULSEWIDTH: 0.5 MS
MDC_IDC_SET_LEADCHNL_RV_SENSING_ADAPTATION_MODE: NORMAL
MDC_IDC_SET_LEADCHNL_RV_SENSING_POLARITY: NORMAL
MDC_IDC_SET_LEADCHNL_RV_SENSING_SENSITIVITY: 0.6 MV
MDC_IDC_SET_ZONE_DETECTION_INTERVAL: 250 MS
MDC_IDC_SET_ZONE_DETECTION_INTERVAL: 300 MS
MDC_IDC_SET_ZONE_DETECTION_INTERVAL: 353 MS
MDC_IDC_SET_ZONE_STATUS: NORMAL
MDC_IDC_SET_ZONE_TYPE: NORMAL
MDC_IDC_SET_ZONE_VENDOR_TYPE: NORMAL
MDC_IDC_STAT_BRADY_DTM_END: NORMAL
MDC_IDC_STAT_BRADY_DTM_START: NORMAL
MDC_IDC_STAT_BRADY_RV_PERCENT_PACED: 0 %
MDC_IDC_STAT_EPISODE_RECENT_COUNT: 0
MDC_IDC_STAT_EPISODE_RECENT_COUNT: 10
MDC_IDC_STAT_EPISODE_RECENT_COUNT_DTM_END: NORMAL
MDC_IDC_STAT_EPISODE_RECENT_COUNT_DTM_START: NORMAL
MDC_IDC_STAT_EPISODE_TYPE: NORMAL
MDC_IDC_STAT_EPISODE_VENDOR_TYPE: NORMAL
MDC_IDC_STAT_TACHYTHERAPY_ATP_DELIVERED_RECENT: 0
MDC_IDC_STAT_TACHYTHERAPY_ATP_DELIVERED_TOTAL: 0
MDC_IDC_STAT_TACHYTHERAPY_RECENT_DTM_END: NORMAL
MDC_IDC_STAT_TACHYTHERAPY_RECENT_DTM_START: NORMAL
MDC_IDC_STAT_TACHYTHERAPY_SHOCKS_ABORTED_RECENT: 0
MDC_IDC_STAT_TACHYTHERAPY_SHOCKS_ABORTED_TOTAL: 0
MDC_IDC_STAT_TACHYTHERAPY_SHOCKS_DELIVERED_RECENT: 0
MDC_IDC_STAT_TACHYTHERAPY_SHOCKS_DELIVERED_TOTAL: 0
MDC_IDC_STAT_TACHYTHERAPY_TOTAL_DTM_END: NORMAL
MDC_IDC_STAT_TACHYTHERAPY_TOTAL_DTM_START: NORMAL

## 2025-05-08 ENCOUNTER — OFFICE VISIT (OUTPATIENT)
Dept: UROLOGY | Facility: CLINIC | Age: 70
End: 2025-05-08
Payer: MEDICARE

## 2025-05-08 VITALS — SYSTOLIC BLOOD PRESSURE: 106 MMHG | OXYGEN SATURATION: 97 % | HEART RATE: 66 BPM | DIASTOLIC BLOOD PRESSURE: 71 MMHG

## 2025-05-08 DIAGNOSIS — R97.20 ELEVATED PROSTATE SPECIFIC ANTIGEN (PSA): ICD-10-CM

## 2025-05-08 DIAGNOSIS — C61 PROSTATE CANCER (H): Primary | ICD-10-CM

## 2025-05-08 RX ORDER — GENTAMICIN 40 MG/ML
80 INJECTION, SOLUTION INTRAMUSCULAR; INTRAVENOUS ONCE
Status: COMPLETED | OUTPATIENT
Start: 2025-05-08 | End: 2025-05-08

## 2025-05-08 RX ORDER — LIDOCAINE HYDROCHLORIDE 10 MG/ML
10 INJECTION, SOLUTION INFILTRATION; PERINEURAL ONCE
Status: COMPLETED | OUTPATIENT
Start: 2025-05-08 | End: 2025-05-08

## 2025-05-08 RX ADMIN — GENTAMICIN 80 MG: 40 INJECTION, SOLUTION INTRAMUSCULAR; INTRAVENOUS at 15:10

## 2025-05-08 RX ADMIN — LIDOCAINE HYDROCHLORIDE 10 ML: 10 INJECTION, SOLUTION INFILTRATION; PERINEURAL at 15:15

## 2025-05-08 ASSESSMENT — PAIN SCALES - GENERAL: PAINLEVEL_OUTOF10: NO PAIN (0)

## 2025-05-08 NOTE — NURSING NOTE
The following medication was given by MD:     MEDICATION:  Lidocaine 1% Soln  ROUTE: Local Infiltration   SITE: Prostate  DOSE: 100mg/10ml  LOT #: [id number]  : [employer]  EXPIRATION DATE: 04/30/2026  NDC#: 8762-2129-83  Was there drug waste? Yes  Amount of drug waste (mL): 10.  Reason for waste:  As per MD  Multi-dose vial: Yes    Clinic Administered Medication Documentation    Patient was given 1% lidocaine. Prior to medication administration, verified patient's identity using patient s name and date of birth. Please see MAR and medication order for additional information. Patient instructed to remain in clinic for 15 minutes and report any adverse reaction to staff immediately.    Vial/Syringe: Single dose vial. Was entire vial of medication used? No, The remainder 10MG of 10ML was discarded as unavoidable waste.    The following medication was given:     MEDICATION: Gentamicin   ROUTE: IM  SITE: LUQ - Gluteus  DOSE:80mg/2ml  LOT #: [id number]  : [employer]  EXPIRATION DATE: 01/2026  NDC#:20400-138-70    Was there drug waste? No  Multi-dose vial: Yes    Clinic Administered Medication Documentation    Patient was given 80mg/2ml Gentamicin. Prior to medication administration, verified patient's identity using patient s name and date of birth. Please see MAR and medication order for additional information. Patient instructed to remain in clinic for 15 minutes and report any adverse reaction to staff immediately.    Vial/Syringe: Single dose vial. Was entire vial of medication used? Yes     Patient tolerated injection well. Stayed in clinic for up to 15 minutes, with no adverse reaction.     Nithya Lam LPN

## 2025-05-08 NOTE — NURSING NOTE
Chief Complaint   Patient presents with    Prostate Cancer     Patient is here for Transrectal Ultrasound Guided Prostate Biopsies      Procedure was explained to patient prior to performing said procedure.  The patient signed the [facility] consent form and all questions were answered prior to the procedure.  Any pre-procedural antibiotics were given according to the performing physician's recommendation.  Patient reviewed information on the labels attached to samples and confirmed the accuracy of all of the labels.     Performing Physician:  Dr. Oglesby  Antibiotic taken?  yes  Aspirin or other blood thinning medications discontinued 7-10 days:  yes  Time of enema:1:00pm    Patient given Gentamicin intramuscular injection 30 minutes prior to procedure  Yes    Twelve samples were taken from the right and left, medial and lateral base, mid and apex of the prostate respectively.  No additional samples were taken from Prostate.  Vitals were repeated prior to patient leaving and instructions for post TRUS care were explained to the patient.      Nithya Lam LPN

## 2025-05-08 NOTE — LETTER
5/8/2025       RE: Iraida Hernandez  [address] [address] [facility] [address]     Dear Colleague,    Thank you for referring your patient, Iraida Hernandez, to the [facility] UROLOGY [facility] at [facility]. Please see a copy of my visit note below.    PREPROCEDURE DIAGNOSIS: Prostate Cancer    POSTPROCEDURE DIAGNOSIS: Prostate Cancer    PROCEDURE: Transrectal ultrasound sizing and transrectal ultrasound guided prostatic needle biopsy for Saravanan Hernandez who is a 70 year old male. Has been on active surveillance for several years. PSA 18.8.    SURGEON: Sukumar Oglesby    ANESTHESIA: 5 mL of 1% periprostatic block bilaterally     DESCRIPTION OF PROCEDURE: The procedure, the outcome, the anesthesia, and the risks were discussed with the patient. Informed consent was obtained and signed and a timeout was completed prior to the procedure. Digital rectal examination was performed with the below findings noted. Anesthesia was administered as noted above and the transrectal ultrasound probe was inserted, sizing was performed, and the below findings were noted. 12 core biopsies were taken as described below. The probe was then withdrawn. Patient tolerated the procedure well.     FINDINGS: Digital rectal exam reveals normal prostate. Total volume is 36 mL. Hypoechoic lesions noted bilaterally. US images were obtained.  We then performed site-directed sextant biopsies.  12 cores were taken with 6 on each side, base, mid and apex.    PLAN: Will follow-up next week to review results.    Sukumar Oglesby MD  Urology  [facility]      Again, thank you for allowing me to participate in the care of your patient.      Sincerely,    Sukumar Oglesby MD

## 2025-05-08 NOTE — PROGRESS NOTES
PREPROCEDURE DIAGNOSIS: Prostate Cancer    POSTPROCEDURE DIAGNOSIS: Prostate Cancer    PROCEDURE: Transrectal ultrasound sizing and transrectal ultrasound guided prostatic needle biopsy for Saravanan Hernandez who is a 70 year old male. Has been on active surveillance for several years. PSA 18.8.    SURGEON: Sukumar Oglesby    ANESTHESIA: 5 mL of 1% periprostatic block bilaterally     DESCRIPTION OF PROCEDURE: The procedure, the outcome, the anesthesia, and the risks were discussed with the patient. Informed consent was obtained and signed and a timeout was completed prior to the procedure. Digital rectal examination was performed with the below findings noted. Anesthesia was administered as noted above and the transrectal ultrasound probe was inserted, sizing was performed, and the below findings were noted. 12 core biopsies were taken as described below. The probe was then withdrawn. Patient tolerated the procedure well.     FINDINGS: Digital rectal exam reveals normal prostate. Total volume is 36 mL. Hypoechoic lesions noted bilaterally. US images were obtained.  We then performed site-directed sextant biopsies.  12 cores were taken with 6 on each side, base, mid and apex.    PLAN: Will follow-up next week to review results.    Sukumar Oglesby MD  Urology  [facility]

## 2025-05-08 NOTE — PATIENT INSTRUCTIONS
[facility]  Transrectal Ultrasound  Post Operative Information    The physician who performed your Transrectal Ultrasound is Dr. Oglesby (telephone number [phone] 8-4:30pm after hours please call [phone].  Please contact this doctor if you have any problems or questions.  If unable to reach your doctor, please return to the Emergency Department.    Take one antibiotic the evening of the procedure and then as directed on your prescription.  Drink at least 6-8 glasses of fluids for the first 48 hours.  Avoid heavy lifting and strenuous activity for 48 hours.  Avoid sexual intercourse for the first 24 hours.  No aspirin or ibuprofen products (Motrin, Advil, Nuprin, ect.) for one week.  You may take acetaminophen (Tylenol) for pain. You may take 2-500mg extra strength tylenol every 6 hours, not to exceed the daily recommendation of 4,000mg.   You may notice a small amount of blood on the tissue after a bowel movement.  You may pass blood with clots in your urine following the procedure.  The amount will decrease with time but may be visible for up to two weeks. If you have trouble urinating due to a possible blood clot, please call our office.  You make have blood in your semen for 4 weeks after the procedure.  You may experience mild perineal (groin area) discomfort after the procedure. If you were not prescribed a sedative, you may take a tub soak to alleviate groin discomfort.   Please call you doctor if you have any of the follow symptoms:  Fever over 101.1  Increase in the amount of blood passed, dark-red blood cherry color urine/Trouble Urinating  Severe discomfort or pain not well managed with methods above

## 2025-05-12 ENCOUNTER — TRANSFERRED RECORDS (OUTPATIENT)
Dept: HEALTH INFORMATION MANAGEMENT | Facility: CLINIC | Age: 70
End: 2025-05-12
Payer: MEDICARE

## 2025-05-13 LAB
PATH REPORT.COMMENTS IMP SPEC: ABNORMAL
PATH REPORT.COMMENTS IMP SPEC: ABNORMAL
PATH REPORT.COMMENTS IMP SPEC: YES
PATH REPORT.FINAL DX SPEC: ABNORMAL
PATH REPORT.GROSS SPEC: ABNORMAL
PATH REPORT.MICROSCOPIC SPEC OTHER STN: ABNORMAL
PATH REPORT.MICROSCOPIC SPEC OTHER STN: ABNORMAL
PATH REPORT.RELEVANT HX SPEC: ABNORMAL
PHOTO IMAGE: ABNORMAL

## 2025-05-15 ENCOUNTER — VIRTUAL VISIT (OUTPATIENT)
Dept: UROLOGY | Facility: CLINIC | Age: 70
End: 2025-05-15
Payer: MEDICARE

## 2025-05-15 DIAGNOSIS — C61 PROSTATE CANCER (H): Primary | ICD-10-CM

## 2025-05-15 RX ORDER — HEPARIN SODIUM 10000 [USP'U]/ML
5000 INJECTION, SOLUTION INTRAVENOUS; SUBCUTANEOUS
OUTPATIENT
Start: 2025-05-15

## 2025-05-15 RX ORDER — ACETAMINOPHEN 325 MG/1
975 TABLET ORAL ONCE
OUTPATIENT
Start: 2025-05-15 | End: 2025-05-15

## 2025-05-15 ASSESSMENT — PAIN SCALES - GENERAL: PAINLEVEL_OUTOF10: NO PAIN (0)

## 2025-05-15 NOTE — LETTER
5/15/2025       RE: Iraida Hernandez  [address] [facility] [id number]     Dear Colleague,    Thank you for referring your patient, Iraida Hernandez, to the [facility] at [facility]. Please see a copy of my visit note below.    Iraida Hernandez is a 70 year old male who is being evaluated via a billable video visit.      How would you like to obtain your AVS? MyChart  If the video visit is dropped, the invitation should be resent by: Text to cell phone: [phone]  Will anyone else be joining your video visit? No    Video Start Time: 12:23 PM    CHIEF COMPLAINT   It was my pleasure to see Iraida Hernandez who is a 70 year old male for follow-up of Prostate Cancer.      HPI  Iraida Hernandez is a very pleasant 70 year old male who presents with a history of Prostate Cancer. 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  He had clinical stage T1c disease at that time with a PSA of 9.7.      Surveillance biopsy of PIRADS 4 lesion done 8/10/2023 - demonstrates stable [place] 6 disease.      PSA has been up to 26.0, which previously had him considering therapy.      PSA back down to 18.30. Most recent MRI is stable, but there is metallic artifact from his hips. PSA remains at 18.8 today.    Now with another biopsy 5/2025 demonstrating [place] 3+4=7. No complications from biopsy. Here to discuss options.    Good erections at baseline.    Uronav Biopsy 5/8/2025  Final Diagnosis   A.  Prostate, left base, biopsy:   -Benign prostatic parenchyma.     B.  Prostate, left mid, biopsy:  - Benign prostatic parenchyma.     C.  Prostate, left apex, biopsy:  -Grade Group and [place] score: Grade group 1 (3+3=6)   -Percentage of pattern 4: Not applicable  -Tumor involves 7% of overall specimen (1 of 2 cores)  -Most affected core is involved by tumor over 10% of its length  -Perineural invasion: Not identified     D.   Prostate, right base, biopsy:  - Benign prostatic parenchyma.     E.  Prostate, right mid, biopsy:  -Prostatic adenocarcinoma Type: Acinar  -Grade Group and Krista score: Grade group 2 (3+4=7)   -Percentage of pattern 4: 40 %   -Tumor involves 10 % of overall specimen (2 of 2 cores)  -Most affected core is involved by tumor over 10 % of its length  -Perineural invasion: Not identified     F.  Prostate, right apex, biopsy:  -Grade Group and [place] score: Grade group 1 (3+3=6)   -Percentage of pattern 4: Not applicable  -Tumor involves 5 % of overall specimen (1 of 2 cores)  -Most affected core is involved by tumor over 10 % of its length  -Perineural invasion: Not identified      MRI Prostate 8/12/2024  IMPRESSION:  Limited evaluation due to nondiagnostic diffusion/ADC as well as  dynamic contrast-enhanced sequences secondary to metallic artifact.     1. No significant change in T2 appearance or size of previously  described PI-RADS 4 lesion located in the right mid gland peripheral  zone. A PIRADS classification cannot be assigned secondary to lack of  diagnostic diffusion/ADC as well as dynamic contrast-enhanced  sequences.  2. No suspicious adenopathy or evidence of pelvic metastases.     PSA  2/27/2025 - 18.80  11/21/2024 - 18.30  8/22/2024 - 26.0  2/22/2024 - 21.30  6/29/2023 - 17.50  5/1/2023 - 16.00  8/31/2022 - 14.9  1/20/2022 - 13.7  2/2/2021 - 12.90  3/31/2020 - 10.70  2/5/2020 - 14.60  2/15/2019 - 9.79  12/2/2013 - 6.19     Fusion Biopsy 8/10/2023  Final Diagnosis   A.  Prostate, left base, biopsy-  Benign prostate tissue     B.  Prostate, left mid, biopsy-  Adenocarcinoma, Krista's grade 3/3 in 1 of 2 needle core biopsy and less than 5% of submitted tissue     C.  Prostate, left apex, biopsy-  Adenocarcinoma, [place] grade 3/3 in 1 of 2 needle core biopsies and less than 5% of submitted tissue     D.  Prostate, right base, biopsy-  Benign prostate tissue     E.  Prostate, right mid,  biopsy-  Adenocarcinoma, Krista grade 3/3 in 1 of 1 needle core biopsy and less than 5% of submitted tissue     F.  Prostate, right apex, biopsy-  Adenocarcinoma, Gates's grade 3/3 in 1 of 2 needle core biopsies and 10% of submitted tissue     G.  Prostate, MRI target, biopsy-  Adenocarcinoma, Krista grade 3/3 in 2 of 2 submitted needle core biopsies and 10% of submitted tissue        TRUS 6/25/2021  FINAL DIAGNOSIS:   A.  Prostate, left base x2, biopsy   - Focal high-grade prostatic intraepithelial neoplasia. Negative for   invasive malignancy.     B.  Prostate, left mid x2, biopsy   - Benign prostatic tissue. Negative for malignancy.     C.  Prostate, left [place] x2, biopsy   - Focal high-grade prostatic intraepithelial neoplasia. Negative for   invasive malignancy.     D.  Prostate, right base x2, biopsy   - Benign prostatic tissue. Negative for malignancy     E.  Prostate, right mid x2, biopsy   - Atypical glands suspicious for malignancy (Please see comment)     F.  Prostate, right [place] x2, biopsy   - Prostatic adenocarcinoma, acinar type, Krista's grade 3+ 3 = score of   6, grade group is 1, number of cores involved is 2 of 2, total surface area involved is 10-15%, perineural   invasion is not identified, high-grade prostatic intraepithelial neoplasia is present      TRUS 3/2019  F: Prostate, left apex, needle core biopsy: Adenocarcinoma, Gates score   3 + 3 = 6 (of 10) (Grade Group 1),   involving 1 of 1 needle core(s) and 1 mm of 10 mm of biopsy tissue   (approximately 10% of biopsy tissue).   Perineural invasion not identified.     K: Prostate, right mid, needle core biopsy: Adenocarcinoma, Krista score   3 + 3 = 6 (of 10) (Grade Group 1),   involving 1 of 1 needle core(s) and 1 mm of 10 mm of biopsy tissue   (approximately 10% of biopsy tissue).   Perineural invasion not identified.            Allergies:   Penicillins and Tetracyclines & related         Review of Systems:  From intake questionnaire      Skin: negative  Eyes: negative  Ears/Nose/Throat: negative  Respiratory: No shortness of breath, dyspnea on exertion, cough, or hemoptysis  Cardiovascular: No chest pain or palpitations  Gastrointestinal: negative; no nausea/vomiting, constipation or diarrhea  Genitourinary: as per HPI  Musculoskeletal: negative  Neurologic: negative  Psychiatric: negative  Hematologic/Lymphatic/Immunologic: negative  Endocrine: negative         Physical Exam:     Vitals:  No vitals were obtained today due to virtual visit.    Physical Exam   EYES: Eyes grossly normal to inspection.  No discharge or erythema, or obvious scleral/conjunctival abnormalities.  SKIN: Visible skin clear. No significant rash, abnormal pigmentation or lesions.  NEURO: Cranial nerves grossly intact.  Mentation and speech appropriate for age.  GENERAL: Healthy, alert and no distress  RESP: No audible wheeze, cough, or visible cyanosis.  No visible retractions or increased work of breathing.    PSYCH: Mentation appears normal, affect normal/bright, judgement and insight intact, normal speech and appearance well-groomed.      Outside and Past Medical records:    Review of the result(s) of each unique test - MRI, PSA, pathology         Assessment and Plan:     70 year old male with Krista 3+4=7 prostate cancer, PSA 18. Has previously been on active surveillance, but now with evidence of grade progression. We had an extensive discussion about the significance of localized, prostate cancer. We discussed the options for treatment of a localized prostate cancer including active surveillance, brachytherapy, external beam radiation therapy, and surgical removal. We discussed that based on his [place] score he is no longer an ideal candidate for active surveillance.       We discussed the relative merits of robotic assisted radical prostatectomy. We also discussed the advantages and disadvantages and roles of open surgery vs. laparoscopic (and Da Tej assisted)  surgery. The anticipated post-operative course was explained, including an anticipated 1-2 day hospital stay. Catheter will remain in place 7-10 days.      We discussed that there was no clear evidence of advantage between surgery and radiation with regard to risk of recurrence. Regarding radiation, we discussed risks including but not limited to cancer recurrence, exacerbation of voiding symptoms or hematuria, urinary retention, incontinence, stricture of the urinary tract, induction of second malignancy, and risks of rectal symptoms or bleeding.  We discussed fact that rectal symptoms may be more common with radiation than with other treatment modalities. With radiation therapy, we also discussed the difficulties in diagnosing recurrent disease at an early stage due to variability in PSA levels and the fact that most patients are not candidates for local salvage therapy when biochemical recurrence is declared.  We also discussed the significant morbidity of post-radiation local salvage therapy in terms of perioperative complications, erectile dysfunction and urinary incontinence. With surgery, we also discussed the potential advantage over radiation therapy in that biochemical recurrence can be detected at a relatively earlier stage and that salvage radiotherapy is successful in controlling recurrent disease in a substantial proportion of patients.  We also discussed that salvage radiotherapy was associated with a considerably more favorable morbidity profile compared to local salvage therapies for recurrent disease post-radiation.     We discussed option for further discussion with radiation oncology, but patient is interested in surgery and would prefer to defer at this time.      The risks, benefits, alternatives, and personnel involved with robotic prostatectomy were discussed in detail. Specifically, we discussed that risks include but are not limited to anesthetic complications including stroke, MI, DVT/PE,  as well as risk of bleeding requiring transfusion, bowel injury, infection, lymphocele, ureteral injury, nerve injury,urine leak and other potentially unforseen complications. Also discussed the risk of bladder neck contracture and other urinary symptoms after procedure. In addition, we discussed risk long-term of urinary incontinence and erectile dysfunction following the procedure. Finally, we discussed risk for adverse pathologic features, including positive surgical margins and potential for post-surgical radiation, hormone ablation and long-term need for PSA monitoring.  All questions were answered in detail.  A written informed consent will be finalized on the morning of the procedure.    Plan:  Schedule robotic-assisted laparoscopic radical prostatectomy with pelvic lymphadenectomy    Orders  Orders Placed This Encounter   Procedures     Case Request: ROBOTIC-ASSISTED LAPAROSCOPIC RADICAL PROSTATECTOMY WITH PELVIC LYMPHADENECTOMY     Video-Visit Details    Type of service:  Video Visit    Video End Time:12:50 PM    Originating Location (pt. Location): Home    Distant Location (provider location):  On-site    Platform used for Video Visit: GameOn    31 minutes spent on the date of the encounter including direct interaction with the patient, performing chart review, history and exam, documentation and further activities as noted above.    Sukumar Oglesby MD  Urology  [facility]      Again, thank you for allowing me to participate in the care of your patient.      Sincerely,    Sukumar Oglesby MD

## 2025-05-15 NOTE — NURSING NOTE
Current patient location: [address] [place] [id number]    Is the patient currently in the state of [place]? YES    Visit mode: VIDEO    If the visit is dropped, the patient can be reconnected by:VIDEO VISIT: Send to e-mail at: [email or web address]    Will anyone else be joining the visit? NO  (If patient encounters technical issues they should call [phone])    Are changes needed to the allergy or medication list? No    Are refills needed on medications prescribed by this physician? NO    Rooming Documentation:  Questionnaire(s) completed    Reason for visit: DORIAN PARADA

## 2025-05-15 NOTE — PROGRESS NOTES
Iraida Hernandez is a 70 year old male who is being evaluated via a billable video visit.      How would you like to obtain your AVS? MyChart  If the video visit is dropped, the invitation should be resent by: Text to cell phone: [phone]  Will anyone else be joining your video visit? No    Video Start Time: 12:23 PM    CHIEF COMPLAINT   It was my pleasure to see Iraida Hernandez who is a 70 year old male for follow-up of Prostate Cancer.      HPI  Iraida Hernandez is a very pleasant 70 year old male who presents with a history of Prostate Cancer. 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  He had clinical stage T1c disease at that time with a PSA of 9.7.      Surveillance biopsy of PIRADS 4 lesion done 8/10/2023 - demonstrates stable Krista 6 disease.      PSA has been up to 26.0, which previously had him considering therapy.      PSA back down to 18.30. Most recent MRI is stable, but there is metallic artifact from his hips. PSA remains at 18.8 today.    Now with another biopsy 5/2025 demonstrating Krista 3+4=7. No complications from biopsy. Here to discuss options.    Good erections at baseline.    Uronav Biopsy 5/8/2025  Final Diagnosis   A.  Prostate, left base, biopsy:   -Benign prostatic parenchyma.     B.  Prostate, left mid, biopsy:  - Benign prostatic parenchyma.     C.  Prostate, left apex, biopsy:  -Grade Group and Krista score: Grade group 1 (3+3=6)   -Percentage of pattern 4: Not applicable  -Tumor involves 7% of overall specimen (1 of 2 cores)  -Most affected core is involved by tumor over 10% of its length  -Perineural invasion: Not identified     D.  Prostate, right base, biopsy:  - Benign prostatic parenchyma.     E.  Prostate, right mid, biopsy:  -Prostatic adenocarcinoma Type: Acinar  -Grade Group and [place] score: Grade group 2 (3+4=7)   -Percentage of pattern 4: 40 %   -Tumor involves 10 % of overall specimen (2 of 2 cores)  -Most affected core  is involved by tumor over 10 % of its length  -Perineural invasion: Not identified     F.  Prostate, right apex, biopsy:  -Grade Group and [place] score: Grade group 1 (3+3=6)   -Percentage of pattern 4: Not applicable  -Tumor involves 5 % of overall specimen (1 of 2 cores)  -Most affected core is involved by tumor over 10 % of its length  -Perineural invasion: Not identified      MRI Prostate 8/12/2024  IMPRESSION:  Limited evaluation due to nondiagnostic diffusion/ADC as well as  dynamic contrast-enhanced sequences secondary to metallic artifact.     1. No significant change in T2 appearance or size of previously  described PI-RADS 4 lesion located in the right mid gland peripheral  zone. A PIRADS classification cannot be assigned secondary to lack of  diagnostic diffusion/ADC as well as dynamic contrast-enhanced  sequences.  2. No suspicious adenopathy or evidence of pelvic metastases.     PSA  2/27/2025 - 18.80  11/21/2024 - 18.30  8/22/2024 - 26.0  2/22/2024 - 21.30  6/29/2023 - 17.50  5/1/2023 - 16.00  8/31/2022 - 14.9  1/20/2022 - 13.7  2/2/2021 - 12.90  3/31/2020 - 10.70  2/5/2020 - 14.60  2/15/2019 - 9.79  12/2/2013 - 6.19     Fusion Biopsy 8/10/2023  Final Diagnosis   A.  Prostate, left base, biopsy-  Benign prostate tissue     B.  Prostate, left mid, biopsy-  Adenocarcinoma, [place]'s grade 3/3 in 1 of 2 needle core biopsy and less than 5% of submitted tissue     C.  Prostate, left apex, biopsy-  Adenocarcinoma, Krista grade 3/3 in 1 of 2 needle core biopsies and less than 5% of submitted tissue     D.  Prostate, right base, biopsy-  Benign prostate tissue     E.  Prostate, right mid, biopsy-  Adenocarcinoma, Krista grade 3/3 in 1 of 1 needle core biopsy and less than 5% of submitted tissue     F.  Prostate, right apex, biopsy-  Adenocarcinoma, [place]'s grade 3/3 in 1 of 2 needle core biopsies and 10% of submitted tissue     G.  Prostate, MRI target, biopsy-  Adenocarcinoma, [place] grade 3/3 in 2 of 2  submitted needle core biopsies and 10% of submitted tissue        TRUS 6/25/2021  FINAL DIAGNOSIS:   A.  Prostate, left base x2, biopsy   - Focal high-grade prostatic intraepithelial neoplasia. Negative for   invasive malignancy.     B.  Prostate, left mid x2, biopsy   - Benign prostatic tissue. Negative for malignancy.     C.  Prostate, left [place] x2, biopsy   - Focal high-grade prostatic intraepithelial neoplasia. Negative for   invasive malignancy.     D.  Prostate, right base x2, biopsy   - Benign prostatic tissue. Negative for malignancy     E.  Prostate, right mid x2, biopsy   - Atypical glands suspicious for malignancy (Please see comment)     F.  Prostate, right [place] x2, biopsy   - Prostatic adenocarcinoma, acinar type, [place]'s grade 3+ 3 = score of   6, grade group is 1, number of cores involved is 2 of 2, total surface area involved is 10-15%, perineural   invasion is not identified, high-grade prostatic intraepithelial neoplasia is present      TRUS 3/2019  F: Prostate, left apex, needle core biopsy: Adenocarcinoma, Krista score   3 + 3 = 6 (of 10) (Grade Group 1),   involving 1 of 1 needle core(s) and 1 mm of 10 mm of biopsy tissue   (approximately 10% of biopsy tissue).   Perineural invasion not identified.     K: Prostate, right mid, needle core biopsy: Adenocarcinoma, Krista score   3 + 3 = 6 (of 10) (Grade Group 1),   involving 1 of 1 needle core(s) and 1 mm of 10 mm of biopsy tissue   (approximately 10% of biopsy tissue).   Perineural invasion not identified.            Allergies:   Penicillins and Tetracyclines & related         Review of Systems:  From intake questionnaire     Skin: negative  Eyes: negative  Ears/Nose/Throat: negative  Respiratory: No shortness of breath, dyspnea on exertion, cough, or hemoptysis  Cardiovascular: No chest pain or palpitations  Gastrointestinal: negative; no nausea/vomiting, constipation or diarrhea  Genitourinary: as per HPI  Musculoskeletal:  negative  Neurologic: negative  Psychiatric: negative  Hematologic/Lymphatic/Immunologic: negative  Endocrine: negative         Physical Exam:     Vitals:  No vitals were obtained today due to virtual visit.    Physical Exam   EYES: Eyes grossly normal to inspection.  No discharge or erythema, or obvious scleral/conjunctival abnormalities.  SKIN: Visible skin clear. No significant rash, abnormal pigmentation or lesions.  NEURO: Cranial nerves grossly intact.  Mentation and speech appropriate for age.  GENERAL: Healthy, alert and no distress  RESP: No audible wheeze, cough, or visible cyanosis.  No visible retractions or increased work of breathing.    PSYCH: Mentation appears normal, affect normal/bright, judgement and insight intact, normal speech and appearance well-groomed.      Outside and Past Medical records:    Review of the result(s) of each unique test - MRI, PSA, pathology         Assessment and Plan:     70 year old male with Krista 3+4=7 prostate cancer, PSA 18. Has previously been on active surveillance, but now with evidence of grade progression. We had an extensive discussion about the significance of localized, prostate cancer. We discussed the options for treatment of a localized prostate cancer including active surveillance, brachytherapy, external beam radiation therapy, and surgical removal. We discussed that based on his [place] score he is no longer an ideal candidate for active surveillance.       We discussed the relative merits of robotic assisted radical prostatectomy. We also discussed the advantages and disadvantages and roles of open surgery vs. laparoscopic (and Da Tej assisted) surgery. The anticipated post-operative course was explained, including an anticipated 1-2 day hospital stay. Catheter will remain in place 7-10 days.      We discussed that there was no clear evidence of advantage between surgery and radiation with regard to risk of recurrence. Regarding radiation, we  discussed risks including but not limited to cancer recurrence, exacerbation of voiding symptoms or hematuria, urinary retention, incontinence, stricture of the urinary tract, induction of second malignancy, and risks of rectal symptoms or bleeding.  We discussed fact that rectal symptoms may be more common with radiation than with other treatment modalities. With radiation therapy, we also discussed the difficulties in diagnosing recurrent disease at an early stage due to variability in PSA levels and the fact that most patients are not candidates for local salvage therapy when biochemical recurrence is declared.  We also discussed the significant morbidity of post-radiation local salvage therapy in terms of perioperative complications, erectile dysfunction and urinary incontinence. With surgery, we also discussed the potential advantage over radiation therapy in that biochemical recurrence can be detected at a relatively earlier stage and that salvage radiotherapy is successful in controlling recurrent disease in a substantial proportion of patients.  We also discussed that salvage radiotherapy was associated with a considerably more favorable morbidity profile compared to local salvage therapies for recurrent disease post-radiation.     We discussed option for further discussion with radiation oncology, but patient is interested in surgery and would prefer to defer at this time.      The risks, benefits, alternatives, and personnel involved with robotic prostatectomy were discussed in detail. Specifically, we discussed that risks include but are not limited to anesthetic complications including stroke, MI, DVT/PE, as well as risk of bleeding requiring transfusion, bowel injury, infection, lymphocele, ureteral injury, nerve injury,urine leak and other potentially unforseen complications. Also discussed the risk of bladder neck contracture and other urinary symptoms after procedure. In addition, we discussed risk  long-term of urinary incontinence and erectile dysfunction following the procedure. Finally, we discussed risk for adverse pathologic features, including positive surgical margins and potential for post-surgical radiation, hormone ablation and long-term need for PSA monitoring.  All questions were answered in detail.  A written informed consent will be finalized on the morning of the procedure.    Plan:  Schedule robotic-assisted laparoscopic radical prostatectomy with pelvic lymphadenectomy    Orders  Orders Placed This Encounter   Procedures    Case Request: ROBOTIC-ASSISTED LAPAROSCOPIC RADICAL PROSTATECTOMY WITH PELVIC LYMPHADENECTOMY     Video-Visit Details    Type of service:  Video Visit    Video End Time:12:50 PM    Originating Location (pt. Location): Home    Distant Location (provider location):  On-site    Platform used for Video Visit: SFOX    31 minutes spent on the date of the encounter including direct interaction with the patient, performing chart review, history and exam, documentation and further activities as noted above.    Sukumar Oglesby MD  Urology  [facility]

## 2025-06-08 DIAGNOSIS — I42.9 CARDIOMYOPATHY, UNSPECIFIED TYPE (H): ICD-10-CM

## 2025-06-08 DIAGNOSIS — I21.A9 OTHER TYPE OF ACUTE MYOCARDIAL INFARCTION (H): ICD-10-CM

## 2025-06-09 DIAGNOSIS — I21.02 ACUTE ST ELEVATION MYOCARDIAL INFARCTION (STEMI) INVOLVING LEFT ANTERIOR DESCENDING (LAD) CORONARY ARTERY (H): ICD-10-CM

## 2025-06-09 RX ORDER — ATORVASTATIN CALCIUM 40 MG/1
40 TABLET, FILM COATED ORAL DAILY
Qty: 90 TABLET | Refills: 0 | Status: SHIPPED | OUTPATIENT
Start: 2025-06-09

## 2025-06-09 RX ORDER — CLOPIDOGREL BISULFATE 75 MG/1
75 TABLET ORAL DAILY
Qty: 90 TABLET | Refills: 0 | Status: SHIPPED | OUTPATIENT
Start: 2025-06-09

## 2025-06-15 DIAGNOSIS — I21.A9 OTHER TYPE OF ACUTE MYOCARDIAL INFARCTION (H): ICD-10-CM

## 2025-06-16 DIAGNOSIS — I21.A9 OTHER TYPE OF ACUTE MYOCARDIAL INFARCTION (H): ICD-10-CM

## 2025-06-16 RX ORDER — SPIRONOLACTONE 25 MG/1
25 TABLET ORAL DAILY
Qty: 90 TABLET | Refills: 3 | OUTPATIENT
Start: 2025-06-16

## 2025-06-16 RX ORDER — SPIRONOLACTONE 25 MG/1
25 TABLET ORAL DAILY
Qty: 90 TABLET | Refills: 1 | Status: SHIPPED | OUTPATIENT
Start: 2025-06-16

## 2025-06-20 ENCOUNTER — TRANSFERRED RECORDS (OUTPATIENT)
Dept: HEALTH INFORMATION MANAGEMENT | Facility: CLINIC | Age: 70
End: 2025-06-20
Payer: MEDICARE

## 2025-07-10 DIAGNOSIS — I21.A9 OTHER TYPE OF ACUTE MYOCARDIAL INFARCTION (H): ICD-10-CM

## 2025-07-10 RX ORDER — CARVEDILOL 12.5 MG/1
12.5 TABLET ORAL 2 TIMES DAILY WITH MEALS
Qty: 180 TABLET | Refills: 1 | Status: SHIPPED | OUTPATIENT
Start: 2025-07-10

## 2025-07-16 ENCOUNTER — TRANSFERRED RECORDS (OUTPATIENT)
Dept: HEALTH INFORMATION MANAGEMENT | Facility: CLINIC | Age: 70
End: 2025-07-16
Payer: MEDICARE

## 2025-09-01 DIAGNOSIS — I21.02 ACUTE ST ELEVATION MYOCARDIAL INFARCTION (STEMI) INVOLVING LEFT ANTERIOR DESCENDING (LAD) CORONARY ARTERY (H): ICD-10-CM

## 2025-09-02 RX ORDER — ATORVASTATIN CALCIUM 40 MG/1
40 TABLET, FILM COATED ORAL DAILY
Qty: 90 TABLET | Refills: 0 | Status: SHIPPED | OUTPATIENT
Start: 2025-09-02

## (undated) DEVICE — BLADE SAW RECIP STRK 70X6X0.025MM 0277-096-250S5

## (undated) DEVICE — DRSG TEGADERM 4X10" 1627

## (undated) DEVICE — BONE CLEANING TIP INTERPULSE  0210-010-000

## (undated) DEVICE — SUCTION IRR SYSTEM W/O TIP INTERPULSE HANDPIECE 0210-100-000

## (undated) DEVICE — STRAP STIRRUP W/SLIP 30187-030

## (undated) DEVICE — DRAPE IOBAN INCISE 36X23" 6651EZ

## (undated) DEVICE — DRSG STERI STRIP 1/2X4" R1547

## (undated) DEVICE — DECANTER VIAL 2006S

## (undated) DEVICE — LINEN ORTHO PACK 5446

## (undated) DEVICE — SOL NACL 0.9% IRRIG 1000ML BOTTLE 2F7124

## (undated) DEVICE — SUCTION MANIFOLD NEPTUNE 2 SYS 4 PORT 0702-020-000

## (undated) DEVICE — DRILL BIT FLEXIBLE REFLECTION 35MM 71362935

## (undated) DEVICE — PREP CHLORAPREP 26ML TINTED HI-LITE ORANGE 930815

## (undated) DEVICE — DRSG KERLIX 4 1/2"X4YDS ROLL 6730

## (undated) DEVICE — SU MONOCRYL 3-0 PS-1 27" Y936H

## (undated) DEVICE — SOL WATER IRRIG 1000ML BOTTLE 2F7114

## (undated) DEVICE — GOWN IMPERVIOUS SPECIALTY XLG/XLONG 32474

## (undated) DEVICE — GLOVE PROTEXIS POWDER FREE 7.5 ORTHOPEDIC 2D73ET75

## (undated) DEVICE — GLOVE PROTEXIS BLUE W/NEU-THERA 6.5  2D73EB65

## (undated) DEVICE — SPONGE LAP 18X18" X8435

## (undated) DEVICE — GLOVE PROTEXIS W/NEU-THERA 8.0  2D73TE80

## (undated) DEVICE — BLADE KNIFE SURG 20 371120

## (undated) DEVICE — PACK TOTAL HIP W/POUCH RIVERSIDE LATEX FREE

## (undated) DEVICE — ESU PENCIL W/SMOKE EVAC NEPTUNE STRYKER 0703-046-000

## (undated) DEVICE — SU VICRYL 2-0 CT-1 27" UND J259H

## (undated) DEVICE — LINEN TOWEL PACK X5 5464

## (undated) DEVICE — POSITIONER ABDUCTION PILLOW FOAM MED FP-ABDUCTM

## (undated) DEVICE — SOL NACL 0.9% INJ 1000ML BAG 2B1324X

## (undated) DEVICE — SU VICRYL 0 CT 36" J358H

## (undated) DEVICE — STRAP KNEE/BODY 31143004

## (undated) DEVICE — HOOD FLYTE W/PEELAWAY 408-800-100

## (undated) DEVICE — ADH SKIN CLOSURE PREMIERPRO EXOFIN 1.0ML 3470

## (undated) DEVICE — LINEN MAYO STAND COVER OVERSIZE PACK 5458

## (undated) DEVICE — DRAPE STERI TOWEL LG 1010

## (undated) DEVICE — DRSG TEGADERM ALGINATE AG 4X5" 90303

## (undated) DEVICE — GOWN XLG DISP 9545

## (undated) DEVICE — SU ETHIBOND 5 V-37 4X30" MB66G

## (undated) DEVICE — DEVICE RETRIEVER HEWSON 71111579

## (undated) DEVICE — ESU GROUND PAD ADULT W/CORD E7507

## (undated) DEVICE — GLOVE BIOGEL PI SZ 7.5 40875

## (undated) DEVICE — GLOVE PROTEXIS W/NEU-THERA 6.0  2D73TE60

## (undated) DEVICE — GLOVE PROTEXIS BLUE W/NEU-THERA 8.0  2D73EB80

## (undated) DEVICE — GLOVE BIOGEL PI MICRO INDICATOR UNDERGLOVE SZ 8.0 48980

## (undated) RX ORDER — ACETAMINOPHEN 325 MG/1
TABLET ORAL
Status: DISPENSED
Start: 2022-09-21

## (undated) RX ORDER — TRANEXAMIC ACID 650 MG/1
TABLET ORAL
Status: DISPENSED
Start: 2023-01-04

## (undated) RX ORDER — FENTANYL CITRATE 50 UG/ML
INJECTION, SOLUTION INTRAMUSCULAR; INTRAVENOUS
Status: DISPENSED
Start: 2022-09-21

## (undated) RX ORDER — CEFAZOLIN SODIUM/WATER 2 G/20 ML
SYRINGE (ML) INTRAVENOUS
Status: DISPENSED
Start: 2023-01-04

## (undated) RX ORDER — FENTANYL CITRATE 50 UG/ML
INJECTION, SOLUTION INTRAMUSCULAR; INTRAVENOUS
Status: DISPENSED
Start: 2023-01-04

## (undated) RX ORDER — TRANEXAMIC ACID 650 MG/1
TABLET ORAL
Status: DISPENSED
Start: 2022-09-21

## (undated) RX ORDER — HYDROMORPHONE HYDROCHLORIDE 1 MG/ML
INJECTION, SOLUTION INTRAMUSCULAR; INTRAVENOUS; SUBCUTANEOUS
Status: DISPENSED
Start: 2023-01-04

## (undated) RX ORDER — LIDOCAINE HYDROCHLORIDE 20 MG/ML
INJECTION, SOLUTION EPIDURAL; INFILTRATION; INTRACAUDAL; PERINEURAL
Status: DISPENSED
Start: 2022-09-21

## (undated) RX ORDER — OXYCODONE HYDROCHLORIDE 5 MG/1
TABLET ORAL
Status: DISPENSED
Start: 2022-09-21

## (undated) RX ORDER — HYDROMORPHONE HYDROCHLORIDE 1 MG/ML
INJECTION, SOLUTION INTRAMUSCULAR; INTRAVENOUS; SUBCUTANEOUS
Status: DISPENSED
Start: 2022-09-21

## (undated) RX ORDER — SODIUM CHLORIDE, SODIUM LACTATE, POTASSIUM CHLORIDE, CALCIUM CHLORIDE 600; 310; 30; 20 MG/100ML; MG/100ML; MG/100ML; MG/100ML
INJECTION, SOLUTION INTRAVENOUS
Status: DISPENSED
Start: 2022-09-21

## (undated) RX ORDER — CEFAZOLIN SODIUM/WATER 2 G/20 ML
SYRINGE (ML) INTRAVENOUS
Status: DISPENSED
Start: 2022-09-21

## (undated) RX ORDER — DEXAMETHASONE SODIUM PHOSPHATE 4 MG/ML
INJECTION, SOLUTION INTRA-ARTICULAR; INTRALESIONAL; INTRAMUSCULAR; INTRAVENOUS; SOFT TISSUE
Status: DISPENSED
Start: 2022-09-21

## (undated) RX ORDER — PROPOFOL 10 MG/ML
INJECTION, EMULSION INTRAVENOUS
Status: DISPENSED
Start: 2022-09-21

## (undated) RX ORDER — ACETAMINOPHEN 325 MG/1
TABLET ORAL
Status: DISPENSED
Start: 2023-01-04

## (undated) RX ORDER — ONDANSETRON 2 MG/ML
INJECTION INTRAMUSCULAR; INTRAVENOUS
Status: DISPENSED
Start: 2022-09-21